# Patient Record
Sex: MALE | Race: WHITE | NOT HISPANIC OR LATINO | Employment: OTHER | URBAN - METROPOLITAN AREA
[De-identification: names, ages, dates, MRNs, and addresses within clinical notes are randomized per-mention and may not be internally consistent; named-entity substitution may affect disease eponyms.]

---

## 2017-01-13 ENCOUNTER — TRANSCRIBE ORDERS (OUTPATIENT)
Dept: LAB | Facility: OTHER | Age: 72
End: 2017-01-13

## 2017-01-13 ENCOUNTER — APPOINTMENT (OUTPATIENT)
Dept: LAB | Facility: OTHER | Age: 72
End: 2017-01-13
Payer: MEDICARE

## 2017-01-13 DIAGNOSIS — E78.00 PURE HYPERCHOLESTEROLEMIA: ICD-10-CM

## 2017-01-13 DIAGNOSIS — E11.29 TYPE 2 DIABETES MELLITUS WITH OTHER DIABETIC KIDNEY COMPLICATION (HCC): ICD-10-CM

## 2017-01-13 DIAGNOSIS — N18.30 CHRONIC KIDNEY DISEASE, STAGE III (MODERATE) (HCC): ICD-10-CM

## 2017-01-13 LAB
ALBUMIN SERPL BCP-MCNC: 4.3 G/DL (ref 3.5–5)
ALP SERPL-CCNC: 71 U/L (ref 46–116)
ALT SERPL W P-5'-P-CCNC: 26 U/L (ref 12–78)
ANION GAP SERPL CALCULATED.3IONS-SCNC: 7 MMOL/L (ref 4–13)
AST SERPL W P-5'-P-CCNC: 11 U/L (ref 5–45)
BILIRUB SERPL-MCNC: 0.56 MG/DL (ref 0.2–1)
BUN SERPL-MCNC: 33 MG/DL (ref 5–25)
CALCIUM SERPL-MCNC: 9.3 MG/DL (ref 8.3–10.1)
CHLORIDE SERPL-SCNC: 101 MMOL/L (ref 100–108)
CHOLEST SERPL-MCNC: 170 MG/DL (ref 50–200)
CO2 SERPL-SCNC: 28 MMOL/L (ref 21–32)
CREAT SERPL-MCNC: 1.83 MG/DL (ref 0.6–1.3)
EST. AVERAGE GLUCOSE BLD GHB EST-MCNC: 157 MG/DL
GFR SERPL CREATININE-BSD FRML MDRD: 36.6 ML/MIN/1.73SQ M
GLUCOSE SERPL-MCNC: 82 MG/DL (ref 65–140)
HBA1C MFR BLD: 7.1 % (ref 4.2–6.3)
HDLC SERPL-MCNC: 31 MG/DL (ref 40–60)
LDLC SERPL CALC-MCNC: 113 MG/DL (ref 0–100)
POTASSIUM SERPL-SCNC: 4.4 MMOL/L (ref 3.5–5.3)
PROT SERPL-MCNC: 7.8 G/DL (ref 6.4–8.2)
SODIUM SERPL-SCNC: 136 MMOL/L (ref 136–145)
TRIGL SERPL-MCNC: 132 MG/DL

## 2017-01-13 PROCEDURE — 83036 HEMOGLOBIN GLYCOSYLATED A1C: CPT

## 2017-01-13 PROCEDURE — 80053 COMPREHEN METABOLIC PANEL: CPT

## 2017-01-13 PROCEDURE — 80061 LIPID PANEL: CPT

## 2017-01-13 PROCEDURE — 36415 COLL VENOUS BLD VENIPUNCTURE: CPT

## 2017-01-15 ENCOUNTER — GENERIC CONVERSION - ENCOUNTER (OUTPATIENT)
Dept: OTHER | Facility: OTHER | Age: 72
End: 2017-01-15

## 2017-01-19 ENCOUNTER — ALLSCRIPTS OFFICE VISIT (OUTPATIENT)
Dept: OTHER | Facility: OTHER | Age: 72
End: 2017-01-19

## 2017-06-30 ENCOUNTER — TRANSCRIBE ORDERS (OUTPATIENT)
Dept: LAB | Facility: OTHER | Age: 72
End: 2017-06-30

## 2017-06-30 ENCOUNTER — APPOINTMENT (OUTPATIENT)
Dept: LAB | Facility: OTHER | Age: 72
End: 2017-06-30
Payer: MEDICARE

## 2017-06-30 DIAGNOSIS — N18.30 CHRONIC KIDNEY DISEASE, STAGE III (MODERATE) (HCC): ICD-10-CM

## 2017-06-30 LAB
ANION GAP SERPL CALCULATED.3IONS-SCNC: 10 MMOL/L (ref 4–13)
BILIRUB UR QL STRIP: NEGATIVE
BUN SERPL-MCNC: 30 MG/DL (ref 5–25)
CALCIUM SERPL-MCNC: 9 MG/DL (ref 8.3–10.1)
CHLORIDE SERPL-SCNC: 107 MMOL/L (ref 100–108)
CLARITY UR: CLEAR
CO2 SERPL-SCNC: 22 MMOL/L (ref 21–32)
COLOR UR: YELLOW
CREAT SERPL-MCNC: 1.83 MG/DL (ref 0.6–1.3)
CREAT UR-MCNC: 79.3 MG/DL
ERYTHROCYTE [DISTWIDTH] IN BLOOD BY AUTOMATED COUNT: 13.2 % (ref 11.6–15.1)
GFR SERPL CREATININE-BSD FRML MDRD: 36.6 ML/MIN/1.73SQ M
GLUCOSE P FAST SERPL-MCNC: 99 MG/DL (ref 65–99)
GLUCOSE UR STRIP-MCNC: NEGATIVE MG/DL
HCT VFR BLD AUTO: 41.5 % (ref 36.5–49.3)
HGB BLD-MCNC: 14.3 G/DL (ref 12–17)
HGB UR QL STRIP.AUTO: NEGATIVE
KETONES UR STRIP-MCNC: NEGATIVE MG/DL
LEUKOCYTE ESTERASE UR QL STRIP: NEGATIVE
MCH RBC QN AUTO: 30.9 PG (ref 26.8–34.3)
MCHC RBC AUTO-ENTMCNC: 34.5 G/DL (ref 31.4–37.4)
MCV RBC AUTO: 90 FL (ref 82–98)
NITRITE UR QL STRIP: NEGATIVE
PH UR STRIP.AUTO: 6 [PH] (ref 4.5–8)
PHOSPHATE SERPL-MCNC: 2.8 MG/DL (ref 2.3–4.1)
PLATELET # BLD AUTO: 264 THOUSANDS/UL (ref 149–390)
PMV BLD AUTO: 11.5 FL (ref 8.9–12.7)
POTASSIUM SERPL-SCNC: 4.3 MMOL/L (ref 3.5–5.3)
PROT UR STRIP-MCNC: NEGATIVE MG/DL
PROT UR-MCNC: 7 MG/DL
PROT/CREAT UR: 0.09 MG/G{CREAT} (ref 0–0.1)
PTH-INTACT SERPL-MCNC: 86.9 PG/ML (ref 14–72)
RBC # BLD AUTO: 4.63 MILLION/UL (ref 3.88–5.62)
SODIUM SERPL-SCNC: 139 MMOL/L (ref 136–145)
SP GR UR STRIP.AUTO: 1.01 (ref 1–1.03)
UROBILINOGEN UR QL STRIP.AUTO: 0.2 E.U./DL
WBC # BLD AUTO: 7 THOUSAND/UL (ref 4.31–10.16)

## 2017-06-30 PROCEDURE — 81003 URINALYSIS AUTO W/O SCOPE: CPT

## 2017-06-30 PROCEDURE — 82570 ASSAY OF URINE CREATININE: CPT

## 2017-06-30 PROCEDURE — 80048 BASIC METABOLIC PNL TOTAL CA: CPT

## 2017-06-30 PROCEDURE — 36415 COLL VENOUS BLD VENIPUNCTURE: CPT

## 2017-06-30 PROCEDURE — 84156 ASSAY OF PROTEIN URINE: CPT

## 2017-06-30 PROCEDURE — 84100 ASSAY OF PHOSPHORUS: CPT

## 2017-06-30 PROCEDURE — 83970 ASSAY OF PARATHORMONE: CPT

## 2017-06-30 PROCEDURE — 85027 COMPLETE CBC AUTOMATED: CPT

## 2017-07-01 DIAGNOSIS — E11.9 TYPE 2 DIABETES MELLITUS WITHOUT COMPLICATIONS (HCC): ICD-10-CM

## 2017-07-01 DIAGNOSIS — Z12.5 ENCOUNTER FOR SCREENING FOR MALIGNANT NEOPLASM OF PROSTATE: ICD-10-CM

## 2017-07-01 DIAGNOSIS — E78.00 PURE HYPERCHOLESTEROLEMIA: ICD-10-CM

## 2017-07-01 DIAGNOSIS — N18.30 CHRONIC KIDNEY DISEASE, STAGE III (MODERATE) (HCC): ICD-10-CM

## 2017-07-01 DIAGNOSIS — E11.29 TYPE 2 DIABETES MELLITUS WITH OTHER DIABETIC KIDNEY COMPLICATION (HCC): ICD-10-CM

## 2017-07-03 ENCOUNTER — ALLSCRIPTS OFFICE VISIT (OUTPATIENT)
Dept: OTHER | Facility: OTHER | Age: 72
End: 2017-07-03

## 2017-07-15 ENCOUNTER — APPOINTMENT (OUTPATIENT)
Dept: LAB | Facility: OTHER | Age: 72
End: 2017-07-15
Payer: MEDICARE

## 2017-07-15 DIAGNOSIS — Z12.5 ENCOUNTER FOR SCREENING FOR MALIGNANT NEOPLASM OF PROSTATE: ICD-10-CM

## 2017-07-15 DIAGNOSIS — N18.30 CHRONIC KIDNEY DISEASE, STAGE III (MODERATE) (HCC): ICD-10-CM

## 2017-07-15 DIAGNOSIS — E11.9 TYPE 2 DIABETES MELLITUS WITHOUT COMPLICATIONS (HCC): ICD-10-CM

## 2017-07-15 DIAGNOSIS — E78.00 PURE HYPERCHOLESTEROLEMIA: ICD-10-CM

## 2017-07-15 LAB
ALBUMIN SERPL BCP-MCNC: 4.2 G/DL (ref 3.5–5)
ALP SERPL-CCNC: 65 U/L (ref 46–116)
ALT SERPL W P-5'-P-CCNC: 28 U/L (ref 12–78)
ANION GAP SERPL CALCULATED.3IONS-SCNC: 6 MMOL/L (ref 4–13)
AST SERPL W P-5'-P-CCNC: 16 U/L (ref 5–45)
BILIRUB SERPL-MCNC: 0.69 MG/DL (ref 0.2–1)
BUN SERPL-MCNC: 29 MG/DL (ref 5–25)
CALCIUM SERPL-MCNC: 8.6 MG/DL (ref 8.3–10.1)
CHLORIDE SERPL-SCNC: 103 MMOL/L (ref 100–108)
CHOLEST SERPL-MCNC: 170 MG/DL (ref 50–200)
CO2 SERPL-SCNC: 26 MMOL/L (ref 21–32)
CREAT SERPL-MCNC: 1.72 MG/DL (ref 0.6–1.3)
EST. AVERAGE GLUCOSE BLD GHB EST-MCNC: 169 MG/DL
GFR SERPL CREATININE-BSD FRML MDRD: 39.3 ML/MIN/1.73SQ M
GLUCOSE P FAST SERPL-MCNC: 161 MG/DL (ref 65–99)
HBA1C MFR BLD: 7.5 % (ref 4.2–6.3)
HDLC SERPL-MCNC: 35 MG/DL (ref 40–60)
LDLC SERPL CALC-MCNC: 113 MG/DL (ref 0–100)
POTASSIUM SERPL-SCNC: 4.7 MMOL/L (ref 3.5–5.3)
PROT SERPL-MCNC: 7.3 G/DL (ref 6.4–8.2)
PSA SERPL-MCNC: 1.5 NG/ML (ref 0–4)
SODIUM SERPL-SCNC: 135 MMOL/L (ref 136–145)
TRIGL SERPL-MCNC: 110 MG/DL

## 2017-07-15 PROCEDURE — 83036 HEMOGLOBIN GLYCOSYLATED A1C: CPT

## 2017-07-15 PROCEDURE — 80053 COMPREHEN METABOLIC PANEL: CPT

## 2017-07-15 PROCEDURE — 36415 COLL VENOUS BLD VENIPUNCTURE: CPT

## 2017-07-15 PROCEDURE — G0103 PSA SCREENING: HCPCS

## 2017-07-15 PROCEDURE — 80061 LIPID PANEL: CPT

## 2017-07-16 ENCOUNTER — GENERIC CONVERSION - ENCOUNTER (OUTPATIENT)
Dept: OTHER | Facility: OTHER | Age: 72
End: 2017-07-16

## 2017-07-19 ENCOUNTER — ALLSCRIPTS OFFICE VISIT (OUTPATIENT)
Dept: OTHER | Facility: OTHER | Age: 72
End: 2017-07-19

## 2017-07-21 ENCOUNTER — GENERIC CONVERSION - ENCOUNTER (OUTPATIENT)
Dept: OTHER | Facility: OTHER | Age: 72
End: 2017-07-21

## 2017-08-10 ENCOUNTER — GENERIC CONVERSION - ENCOUNTER (OUTPATIENT)
Dept: OTHER | Facility: OTHER | Age: 72
End: 2017-08-10

## 2018-01-09 ENCOUNTER — TRANSCRIBE ORDERS (OUTPATIENT)
Dept: LAB | Facility: OTHER | Age: 73
End: 2018-01-09

## 2018-01-09 ENCOUNTER — LAB (OUTPATIENT)
Dept: LAB | Facility: OTHER | Age: 73
End: 2018-01-09
Payer: MEDICARE

## 2018-01-09 DIAGNOSIS — E11.29 TYPE 2 DIABETES MELLITUS WITH OTHER DIABETIC KIDNEY COMPLICATION (HCC): ICD-10-CM

## 2018-01-09 DIAGNOSIS — E78.00 PURE HYPERCHOLESTEROLEMIA: ICD-10-CM

## 2018-01-09 DIAGNOSIS — N18.30 CHRONIC KIDNEY DISEASE, STAGE III (MODERATE) (HCC): ICD-10-CM

## 2018-01-09 LAB
ALBUMIN SERPL BCP-MCNC: 4.4 G/DL (ref 3.5–5)
ALP SERPL-CCNC: 63 U/L (ref 46–116)
ALT SERPL W P-5'-P-CCNC: 26 U/L (ref 12–78)
ANION GAP SERPL CALCULATED.3IONS-SCNC: 9 MMOL/L (ref 4–13)
AST SERPL W P-5'-P-CCNC: 16 U/L (ref 5–45)
BILIRUB SERPL-MCNC: 0.75 MG/DL (ref 0.2–1)
BUN SERPL-MCNC: 30 MG/DL (ref 5–25)
CALCIUM SERPL-MCNC: 9.1 MG/DL (ref 8.3–10.1)
CHLORIDE SERPL-SCNC: 104 MMOL/L (ref 100–108)
CHOLEST SERPL-MCNC: 192 MG/DL (ref 50–200)
CO2 SERPL-SCNC: 24 MMOL/L (ref 21–32)
CREAT SERPL-MCNC: 1.85 MG/DL (ref 0.6–1.3)
CREAT UR-MCNC: 212 MG/DL
EST. AVERAGE GLUCOSE BLD GHB EST-MCNC: 166 MG/DL
GFR SERPL CREATININE-BSD FRML MDRD: 35 ML/MIN/1.73SQ M
GLUCOSE P FAST SERPL-MCNC: 155 MG/DL (ref 65–99)
HBA1C MFR BLD: 7.4 % (ref 4.2–6.3)
HDLC SERPL-MCNC: 40 MG/DL (ref 40–60)
LDLC SERPL CALC-MCNC: 126 MG/DL (ref 0–100)
MICROALBUMIN UR-MCNC: 114 MG/L (ref 0–20)
MICROALBUMIN/CREAT 24H UR: 54 MG/G CREATININE (ref 0–30)
POTASSIUM SERPL-SCNC: 5.2 MMOL/L (ref 3.5–5.3)
PROT SERPL-MCNC: 8.1 G/DL (ref 6.4–8.2)
SODIUM SERPL-SCNC: 137 MMOL/L (ref 136–145)
TRIGL SERPL-MCNC: 131 MG/DL

## 2018-01-09 PROCEDURE — 82570 ASSAY OF URINE CREATININE: CPT

## 2018-01-09 PROCEDURE — 80061 LIPID PANEL: CPT

## 2018-01-09 PROCEDURE — 36415 COLL VENOUS BLD VENIPUNCTURE: CPT

## 2018-01-09 PROCEDURE — 80053 COMPREHEN METABOLIC PANEL: CPT

## 2018-01-09 PROCEDURE — 82043 UR ALBUMIN QUANTITATIVE: CPT

## 2018-01-09 PROCEDURE — 83036 HEMOGLOBIN GLYCOSYLATED A1C: CPT

## 2018-01-09 NOTE — RESULT NOTES
Message   A1c went up from 6 9% to 7 4%  needs to really watch diet, exercise and will recheck this again in 3 months  If it conitnues to go up, will need to adjust med     Verified Results  (1) HEMOGLOBIN A1C [id number] 10:11AM Bentley Amador   5 7-6 4% impaired fasting glucose  >=6 5% diagnosis of diabetes    Falsely low levels are seen in conditions linked to short RBC life span-  hemolytic anemia, and splenomegaly  Falsely elevated levels are seen in situations where there is an increased production of RBC- receipt of erythropoietin or blood transfusions  Adopted from ADA-Clinical Practice Recommendations     Test Name Result Flag Reference   HEMOGLOBIN A1C 7 4 % H 4 0-5 6   EST  AVG   GLUCOSE 166 mg/dl

## 2018-01-10 ENCOUNTER — GENERIC CONVERSION - ENCOUNTER (OUTPATIENT)
Dept: OTHER | Facility: OTHER | Age: 73
End: 2018-01-10

## 2018-01-10 NOTE — RESULT NOTES
Verified Results  (1) COMPREHENSIVE METABOLIC PANEL [id number] 23:56UU [employer] Order Number: [id number]     Test Name Result Flag Reference   SODIUM 135 mmol/L L 136-145   POTASSIUM 4 7 mmol/L  3 5-5 3   CHLORIDE 103 mmol/L  100-108   CARBON DIOXIDE 26 mmol/L  21-32   ANION GAP (CALC) 6 mmol/L  4-13   BLOOD UREA NITROGEN 29 mg/dL H 5-25   CREATININE 1 72 mg/dL H 0 60-1 30   Standardized to IDMS reference method   CALCIUM 8 6 mg/dL  8 3-10 1   BILI, TOTAL 0 69 mg/dL  0 20-1 00   ALK PHOSPHATAS 65 U/L     ALT (SGPT) 28 U/L  12-78   AST(SGOT) 16 U/L  5-45   ALBUMIN 4 2 g/dL  3 5-5 0   TOTAL PROTEIN 7 3 g/dL  6 4-8 2   eGFR Non-African American 39 3 ml/min/1 73sq [facility] Disease Education Program recommendations are as follows:  GFR calculation is accurate only with a steady state creatinine  Chronic Kidney disease less than 60 ml/min/1 73 sq  meters  Kidney failure less than 15 ml/min/1 73 sq  meters  GLUCOSE FASTING 161 mg/dL H 65-99     (1) HEMOGLOBIN A1C [id number] 09:44AM Sola De Dios    Order Number: [id number]     Test Name Result Flag Reference   HEMOGLOBIN A1C 7 5 % H 4 2-6 3   EST  AVG  GLUCOSE 169 mg/dl       (1) LIPID PANEL FASTING W DIRECT LDL REFLEX [id number] 09:44AM Brodie [employer] Order Number: [id number]     Test Name Result Flag Reference   CHOLESTEROL 170 mg/dL     LDL CHOLESTEROL CALCULATED 113 mg/dL H 0-100   - Patient Instructions:  This is a fasting blood test  Water, black tea or black coffee only after 9:00pm the night before test   Drink 2 glasses of water the morning of test       Triglyceride:         Normal              <150 mg/dl       Borderline High    150-199 mg/dl       High               200-499 mg/dl       Very High          >499 mg/dl  Cholesterol:         Desirable        <200 mg/dl      Borderline High  200-239 mg/dl      High             >239 mg/dl  HDL Cholesterol:        High    >59 mg/dL      Low <41 mg/dL  LDL Cholesterol:        Optimal          <100 mg/dl        Near Optimal     100-129 mg/dl        Above Optimal          Borderline High   130-159 mg/dl          High              160-189 mg/dl          Very High        >189 mg/dl  LDL CALCULATED:    This screening LDL is a calculated result  It does not have the accuracy of the Direct Measured LDL in the monitoring of patients with hyperlipidemia and/or statin therapy  Direct Measure LDL (YCZ262) must be ordered separately in these patients  TRIGLYCERIDES 110 mg/dL  <=150   Specimen collection should occur prior to N-Acetylcysteine or Metamizole administration due to the potential for falsely depressed results  HDL,DIRECT 35 mg/dL L 40-60   Specimen collection should occur prior to Metamizole administration due to the potential for falsely depressed results  (1) PSA (SCREEN) (Dx V76 44 Screen for Prostate Cancer) [id number] 09:44AM Abel Riveraman Order Number: [id number]     Test Name Result Flag Reference   PROSTATE SPECIFIC ANTIGEN 1 5 ng/mL  0 0-4 0   American Urological Association Guidelines define biochemical recurrence of prostate cancer as a detectable or rising PSA value post-radical prostatectomy that is greater than or equal to 0 2 ng/mL with a second confirmatory level of greater than or equal to 0 2 ng/mL  - Patient Instructions: This test is non-fasting  Please drink two glasses of water morning of bloodwork

## 2018-01-10 NOTE — RESULT NOTES
Verified Results  (1) COMPREHENSIVE METABOLIC PANEL [id number] 32:98DO Annalisa [employer]ermasoud Order Number: [id number]     Test Name Result Flag Reference   GLUCOSE,RANDM 89 mg/dL     If the patient is fasting, the ADA then defines impaired fasting glucose as > 100 mg/dL and diabetes as > or equal to 123 mg/dL  SODIUM 138 mmol/L  136-145   POTASSIUM 4 5 mmol/L  3 5-5 3   CHLORIDE 105 mmol/L  100-108   CARBON DIOXIDE 27 mmol/L  21-32   ANION GAP (CALC) 6 mmol/L  4-13   BLOOD UREA NITROGEN 31 mg/dL H 5-25   CREATININE 1 86 mg/dL H 0 60-1 30   Standardized to IDMS reference method   CALCIUM 8 5 mg/dL  8 3-10 1   BILI, TOTAL 0 59 mg/dL  0 20-1 00   ALK PHOSPHATAS 63 U/L     ALT (SGPT) 27 U/L  12-78   AST(SGOT) 17 U/L  5-45   ALBUMIN 4 6 g/dL  3 5-5 0   TOTAL PROTEIN 7 4 g/dL  6 4-8 2   eGFR Non-African American 36 0 ml/min/1 73sq m     - Patient Instructions: This is a fasting blood test  Water, black tea or black coffee only after 9:00pm the night before test Drink 2 glasses of water the morning of test   National Kidney Disease Education Program recommendations are as follows:  GFR calculation is accurate only with a steady state creatinine  Chronic Kidney disease less than 60 ml/min/1 73 sq  meters  Kidney failure less than 15 ml/min/1 73 sq  meters  (1) HEMOGLOBIN A1C 07Oct2016 09:04AM Annalisa Parkermasoud Order Number: [id number]     Test Name Result Flag Reference   HEMOGLOBIN A1C 7 3 % H 4 2-6 3   EST  AVG  GLUCOSE 163 mg/dl       (1) LIPID PANEL FASTING W DIRECT LDL REFLEX 07Oct2016 09:04AM Annalisa Parkermasoud Order Number: [id number]     Test Name Result Flag Reference   CHOLESTEROL 168 mg/dL     LDL CHOLESTEROL CALCULATED 108 mg/dL H 0-100   - Patient Instructions: This is a fasting blood test  Water, black tea or black coffee only after 9:00pm the night before test   Drink 2 glasses of water the morning of test     - Patient Instructions:  This is a fasting blood test  Water, black tea or black coffee only after 9:00pm the night before test Drink 2 glasses of water the morning of test   Triglyceride:         Normal              <150 mg/dl       Borderline High    150-199 mg/dl       High               200-499 mg/dl       Very High          >499 mg/dl  Cholesterol:         Desirable        <200 mg/dl      Borderline High  200-239 mg/dl      High             >239 mg/dl  HDL Cholesterol:        High    >59 mg/dL      Low     <41 mg/dL  LDL Cholesterol:        Optimal          <100 mg/dl        Near Optimal     100-129 mg/dl        Above Optimal          Borderline High   130-159 mg/dl          High              160-189 mg/dl          Very High        >189 mg/dl  LDL CALCULATED:    This screening LDL is a calculated result  It does not have the accuracy of the Direct Measured LDL in the monitoring of patients with hyperlipidemia and/or statin therapy  Direct Measure LDL (IDB285) must be ordered separately in these patients  TRIGLYCERIDES 129 mg/dL  <=150   Specimen collection should occur prior to N-Acetylcysteine or Metamizole administration due to the potential for falsely depressed results  HDL,DIRECT 34 mg/dL L 40-60   Specimen collection should occur prior to Metamizole administration due to the potential for falsely depressed results       (1) MICROALBUMIN CREATININE RATIO, RANDOM URINE 07Oct2016 09:04AM [employer] Order Number: [id number]     Test Name Result Flag Reference   MICROALBUMIN/ CREAT R 17 mg/g creatinine  0-30   MICROALBUMIN,URINE 23 6 mg/L H 0 0-20 0   CREATININE URINE 140 0 mg/dL       (1) OCCULT BLOOD, FECAL IMMUNOCHEMICAL TEST 07Oct2016 09:04AM [employer] Order Number: [id number]     Test Name Result Flag Reference   OCCULT BLD, FECAL IMMUNOLOGICAL Negative  Negative   Performed by Fecal Immunochemical Test

## 2018-01-11 ENCOUNTER — HOSPITAL ENCOUNTER (EMERGENCY)
Facility: HOSPITAL | Age: 73
Discharge: HOME | End: 2018-01-11
Attending: EMERGENCY MEDICINE | Admitting: EMERGENCY MEDICINE
Payer: MEDICARE

## 2018-01-11 ENCOUNTER — APPOINTMENT (EMERGENCY)
Dept: RADIOLOGY | Facility: HOSPITAL | Age: 73
End: 2018-01-11
Payer: MEDICARE

## 2018-01-11 VITALS
HEART RATE: 76 BPM | HEIGHT: 70 IN | BODY MASS INDEX: 27.92 KG/M2 | RESPIRATION RATE: 18 BRPM | TEMPERATURE: 98 F | DIASTOLIC BLOOD PRESSURE: 79 MMHG | SYSTOLIC BLOOD PRESSURE: 182 MMHG | WEIGHT: 195 LBS | OXYGEN SATURATION: 96 %
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
## 2018-01-11 DIAGNOSIS — S81.859A: ICD-10-CM

## 2018-01-11 DIAGNOSIS — S61.452A DOG BITE OF LEFT HAND, INITIAL ENCOUNTER: Primary | ICD-10-CM

## 2018-01-11 PROCEDURE — 90715 TDAP VACCINE 7 YRS/> IM: CPT | Performed by: EMERGENCY MEDICINE

## 2018-01-11 PROCEDURE — 73110 X-RAY EXAM OF WRIST: CPT

## 2018-01-11 PROCEDURE — 90471 IMMUNIZATION ADMIN: CPT

## 2018-01-11 PROCEDURE — 99283 EMERGENCY DEPT VISIT LOW MDM: CPT

## 2018-01-11 RX ORDER — BACITRACIN, NEOMYCIN, POLYMYXIN B 400; 3.5; 5 [USP'U]/G; MG/G; [USP'U]/G
1 OINTMENT TOPICAL ONCE
Status: COMPLETED | OUTPATIENT
Start: 2018-01-11 | End: 2018-01-11

## 2018-01-11 RX ORDER — ACETAMINOPHEN 325 MG/1
975 TABLET ORAL ONCE
Status: COMPLETED | OUTPATIENT
Start: 2018-01-11 | End: 2018-01-11

## 2018-01-11 RX ORDER — AMOXICILLIN AND CLAVULANATE POTASSIUM 875; 125 MG/1; MG/1
1 TABLET, FILM COATED ORAL EVERY 12 HOURS SCHEDULED
Qty: 20 TABLET | Refills: 0 | Status: SHIPPED | OUTPATIENT
Start: 2018-01-11 | End: 2018-01-21

## 2018-01-11 RX ORDER — AMOXICILLIN AND CLAVULANATE POTASSIUM 875; 125 MG/1; MG/1
1 TABLET, FILM COATED ORAL ONCE
Status: COMPLETED | OUTPATIENT
Start: 2018-01-11 | End: 2018-01-11

## 2018-01-11 RX ORDER — LIDOCAINE HYDROCHLORIDE 10 MG/ML
10 INJECTION, SOLUTION EPIDURAL; INFILTRATION; INTRACAUDAL; PERINEURAL ONCE
Status: COMPLETED | OUTPATIENT
Start: 2018-01-11 | End: 2018-01-11

## 2018-01-11 RX ADMIN — ACETAMINOPHEN 975 MG: 325 TABLET ORAL at 15:26

## 2018-01-11 RX ADMIN — LIDOCAINE HYDROCHLORIDE 10 ML: 10 INJECTION, SOLUTION EPIDURAL; INFILTRATION; INTRACAUDAL; PERINEURAL at 15:26

## 2018-01-11 RX ADMIN — TETANUS TOXOID, REDUCED DIPHTHERIA TOXOID AND ACELLULAR PERTUSSIS VACCINE, ADSORBED 0.5 ML: 5; 2.5; 8; 8; 2.5 SUSPENSION INTRAMUSCULAR at 15:32

## 2018-01-11 RX ADMIN — AMOXICILLIN AND CLAVULANATE POTASSIUM 1 TABLET: 875; 125 TABLET, FILM COATED ORAL at 15:26

## 2018-01-11 RX ADMIN — BACITRACIN ZINC NEOMYCIN SULFATE POLYMYXIN B SULFATE 1 LARGE APPLICATION: 400; 3.5; 5 OINTMENT TOPICAL at 15:38

## 2018-01-11 NOTE — DISCHARGE INSTRUCTIONS
Please observe the dog as instructed, please keep the finger splinted as discussed as much as possible continue local wound care as instructed  The stitches are to come out approximately 10-12 days however please follow up with the hand surgeon 1 week for re-evaluation of her wound as instructed for possible tendon injury although this was not visualized/seen at the time of evaluation  Animal Bite   WHAT YOU NEED TO KNOW:   Animal bite injuries range from shallow cuts to deep, life-threatening wounds  An animal can cut or puncture the skin when it bites  Your skin may be torn from your body  Your skin may swell or bruise even if the bite does not break the skin  Animal bites occur more often on the hands, arms, legs, and face  Bites from dogs and cats are the most common injuries  DISCHARGE INSTRUCTIONS:   Return to the emergency department if:   · You have a fever  · Your wound is red, swollen, and draining pus  · You see red streaks on the skin around the wound  · You can no longer move the bitten area  · Your heartbeat and breathing are much faster than usual     · You feel dizzy and confused  Contact your healthcare provider if:   · Your pain does not get better, even after you take pain medicine  · You have nightmares or flashbacks about the animal bite  · You have questions or concerns about your condition or care  Medicines: You may need any of the following:  · Antibiotics  prevent or treat a bacterial infection  · Prescription pain medicine  may be given  Ask how to take this medicine safely  · A tetanus vaccine  may be needed to prevent tetanus  Tetanus is a life-threatening bacterial infection that affects the nerves and muscles  The bacteria can be spread through animal bites  · A rabies vaccine  may be needed to prevent rabies  Rabies is a life-threatening viral infection  The virus can be spread through animal bites  · Take your medicine as directed    Contact your healthcare provider if you think your medicine is not helping or if you have side effects  Tell him of her if you are allergic to any medicine  Keep a list of the medicines, vitamins, and herbs you take  Include the amounts, and when and why you take them  Bring the list or the pill bottles to follow-up visits  Carry your medicine list with you in case of an emergency  Follow up with your healthcare provider in 1 to 2 days: You may need to return to have your stitches removed  Write down your questions so you remember to ask them during your visits  Self-care:   · Apply antibiotic ointment as directed  This helps prevent infection in minor skin wounds  It is available without a doctor's order  · Keep the wound clean and covered  Wash the wound every day with soap and water or germ-killing cleanser  Ask your healthcare provider about the kinds of bandages to use  · Apply ice on your wound  Ice helps decrease swelling and pain  Ice may also help prevent tissue damage  Use an ice pack, or put crushed ice in a plastic bag  Cover it with a towel and place it on your wound for 15 to 20 minutes every hour or as directed  · Elevate the wound area  Raise your wound above the level of your heart as often as you can  This will help decrease swelling and pain  Prop your wound on pillows or blankets to keep it elevated comfortably  Prevent another animal bite:   · Learn to recognize the signs of a scared or angry pet  Avoid quick, sudden movements  · Do not step between animals that are fighting  · Do not leave a pet alone with a young child  · Do not disturb an animal while it eats, sleeps, or cares for its young  · Do not approach an animal you do not know, especially one that is tied up or caged  · Stay away from animals that seem sick or act strangely  · Do not feed or capture wild animals    © 2017 [employer] Information is for End User's use only and may not be sold, redistributed or otherwise used for commercial purposes  All illustrations and images included in CareNotes® are the copyrighted property of [employer]  or Andres Mendoza  The above information is an  only  It is not intended as medical advice for individual conditions or treatments  Talk to your doctor, nurse or pharmacist before following any medical regimen to see if it is safe and effective for you

## 2018-01-11 NOTE — ED PROVIDER NOTES
History  Chief Complaint   Patient presents with    Dog Bite     Pt c/o getting bit by his jerry garcia on left forearm and hand  Pt has been bit multiple times and on legs but no blood noted  Pt not UTD on tetanus  78-year-old male with a history of diabetes presenting with a dog bite to his left hand  Prior to arrival the patient's household dog which is a Sasha Hung terrier started attacking him causing a single bite to his right thumb, multiple abrasions to his left hand and wrist with significant laceration to the dorsum of his left wrist as well as multiple small bites to his bilateral lower extremities  Patient is here with significant other for evaluation  The dog's very protective and of his significant other in territorial, he has been attack before and they are now considering getting rid of the dog  For the time being the dog is vaccinated able to be observed  Patient has significant laceration of the dorsum of his left hand/wrist again small bite to the right thumb multiple small bites to the legs  No other history of poor wound healing easy bleeding or bruising he has no weakness paresthesias or anesthesia he has no other complaints or concerns otherwise denies a complete review of systems as noted            None       Past Medical History:   Diagnosis Date    Diabetes mellitus ([facility] [address] )     Hypertension        Past Surgical History:   Procedure Laterality Date    ABDOMINAL SURGERY      HERNIA REPAIR         History reviewed  No pertinent family history  I have reviewed and agree with the history as documented  Social History   Substance Use Topics    Smoking status: Never Smoker    Smokeless tobacco: Never Used    Alcohol use No        Review of Systems   Constitutional: Negative for chills and fever  Gastrointestinal: Negative for nausea and vomiting  Genitourinary: Negative for dysuria, frequency and urgency     Musculoskeletal: Negative for arthralgias, back pain, joint swelling and myalgias  Left wrist laceration   Skin: Positive for wound  Negative for color change and rash  Allergic/Immunologic: Negative for immunocompromised state  Neurological: Negative for weakness and numbness  Hematological: Negative for adenopathy  Does not bruise/bleed easily  Psychiatric/Behavioral: Negative for agitation and behavioral problems  All other systems reviewed and are negative  Physical Exam  ED Triage Vitals [01/11/18 1422]   Temperature Pulse Respirations Blood Pressure SpO2   98 °F (36 7 °C) 76 18 (!) 182/79 96 %      Temp Source Heart Rate Source Patient Position - Orthostatic VS BP Location FiO2 (%)   Oral Monitor Sitting Left arm --      Pain Score       4           Orthostatic Vital Signs  Vitals:    01/11/18 1422   BP: (!) 182/79   Pulse: 76   Patient Position - Orthostatic VS: Sitting       Physical Exam   Constitutional: He is oriented to person, place, and time  He appears well-developed and well-nourished  No distress  HENT:   Head: Normocephalic and atraumatic  Eyes: EOM are normal  Pupils are equal, round, and reactive to light  Neck: Normal range of motion  Neck supple  No tracheal deviation present  Cardiovascular: Normal rate, regular rhythm and normal heart sounds  Exam reveals no gallop and no friction rub  No murmur heard  Pulmonary/Chest: Effort normal and breath sounds normal  He has no wheezes  He has no rales     Musculoskeletal:   Patient's pants were removed he has several very small superficial bites abrasions without puncture wounds to his bilateral lower extremities these walk carefully evaluated no apparent foreign body can no puncture wounds, these were cleaned by myself, he had a single bite to the right thumb although this was not penetrating it did not appear to involve the joint distal fingers neurovascularly intact this was copiously irrigated by myself on his left hand he had multiple superficial abrasions as well as onto his left distal forearm these will off copiously irrigated by myself again he had a 4 cm gaping laceration over the dorsum of his left hand over the proximal left 4th and 5th metacarpal violated fascia distal fingers are neurovascularly intact with 5/5 flexion and extension at the DI P PIP and MCP joints cap refill is brisk in sensation is intact   Neurological: He is alert and oriented to person, place, and time  No cranial nerve deficit  He exhibits normal muscle tone  Coordination normal    Skin: Skin is warm and dry  No rash noted  Psychiatric: He has a normal mood and affect  His behavior is normal    Nursing note and vitals reviewed  ED Medications  Medications   lidocaine (PF) (XYLOCAINE-MPF) 1 % injection 10 mL (10 mL Infiltration Given by Other 1/11/18 1526)   tetanus-diphtheria-acellular pertussis (BOOSTRIX) IM injection 0 5 mL (0 5 mL Intramuscular Given 1/11/18 1532)   amoxicillin-clavulanate (AUGMENTIN) 875-125 mg per tablet 1 tablet (1 tablet Oral Given 1/11/18 1526)   acetaminophen (TYLENOL) tablet 975 mg (975 mg Oral Given 1/11/18 1526)   neomycin-bacitracin-polymyxin b (NEOSPORIN) ointment 1 large application (1 large application Topical Given 1/11/18 1538)       Diagnostic Studies  Results Reviewed     None                 XR wrist 3+ views LEFT   Final Result by Neysa Cushing, MD (01/11 1524)      No acute displaced fracture seen  Soft tissue swelling noted at the dorsum of the wrist         Workstation performed: [id number]                    Procedures  Lac Repair  Date/Time: 1/11/2018 4:00 PM  Performed by: Wing Lombardo by: Halle Rogel   Consent: Verbal consent obtained    Risks and benefits: risks, benefits and alternatives were discussed  Consent given by: patient  Patient understanding: patient states understanding of the procedure being performed  Relevant documents: relevant documents present and verified  Test results: test results available and properly labeled  Imaging studies: imaging studies available  Required items: required blood products, implants, devices, and special equipment available  Patient identity confirmed: verbally with patient  Time out: Immediately prior to procedure a "time out" was called to verify the correct patient, procedure, equipment, support staff and site/side marked as required  Body area: upper extremity  Location details: left hand  Laceration length: 4 cm  Foreign bodies: no foreign bodies  Nerve involvement: none  Vascular damage: no  Anesthesia: local infiltration    Anesthesia:  Local Anesthetic: lidocaine 1% without epinephrine  Anesthetic total: 4 mL    Sedation:  Patient sedated: no    Wound Dehiscence:  Superficial Wound Dehiscence: simple closure      Procedure Details:  Preparation: Patient was prepped and draped in the usual sterile fashion    Irrigation solution: saline  Irrigation method: syringe  Amount of cleaning: extensive  Debridement: none  Degree of undermining: minimal  Skin closure: 5-0 nylon  Number of sutures: 2  Technique: simple  Approximation: loose  Approximation difficulty: simple  Dressing: antibiotic ointment, gauze roll, non-adhesive packing strip and 4x4 sterile gauze  Patient tolerance: Patient tolerated the procedure well with no immediate complications  Comments: Patient had a 4 cm gaping laceration from the dog biting and tearing across the dorsum of his left hand over the proximal 4th and 5th metacarpal, this did violate the fascia and is full thickness although unable to completely visualize extensor tendons of the 4th and 5th metacarpal through full range of motion, he does have 5/5 strength with extension at the DI P PIP and MCP joints of the 4th and 5th finger, this is not involve the 3rd finger, this was extensively irrigated explored to the base, again in no visible tendon injury though linical concern for possible injury given approximated visual tendons, this wound was extensively irrigated with pressure wash using normal saline, hemostasis achieved, prior to local infiltration of lidocaine is fingers were neurovascularly intact cap refill is brisk in again motor was intact globally, after discussing risks benefits alternatives the patient is an advanced age with diabetes, he had a gaping wound over the dorsum of his hand, 2 simple interrupted 5-0 Ethilon sutures were placed to loosely approximate the skin margins to aid in healing, additional wounds were cleaned with normal saline and antibiotic ointment dressed with non adhering dressing, his fingers were splinted in extension neurovascularly intact after splint application, he was given wound care supplies prophylactic antibiotics follow-up Hand surgery for re-evaluation, close return instructions             Phone Contacts  ED Phone Contact    ED Course  ED Course                                MDM  Number of Diagnoses or Management Options  Dog bite of left hand, initial encounter:   Dog bite of lower leg:   Diagnosis management comments: 76-year-old male with diabetes presenting after being bitten by his domesticated dog, multiple bites to his legs, right hand and left arm and wrist, the wound on his left wrist will specifically require wound exploration irrigation possible better approximation, will update tetanus, prophylactic antibiotics, will get x-ray of the left wrist, the patient's animal can be observed for rabies, reporting paperwork will be completed, likely discharge with prophylactic antibiotics and close return instructions    CritCare Time    Disposition  Final diagnoses:   Dog bite of left hand, initial encounter   Dog bite of lower leg     Time reflects when diagnosis was documented in both MDM as applicable and the Disposition within this note     Time User Action Codes Description Comment    1/11/2018  4:23 PM Harjeet Dominguez Add [[id number],  Rolanda Alicea  0XXA] Dog bite of left hand, initial encounter     1/11/2018  4:24 PM Figueroa Yanez Add [[id number],  W54  0XXA] Dog bite of lower leg       ED Disposition     ED Disposition Condition Comment    Discharge  Bree Barrios discharge to home/self care  Condition at discharge: Good        Follow-up Information     Follow up With Specialties Details Why Contact Info Additional Information    [address] [facility] Emergency Medicine  If symptoms worsen [address] [facility] [address] ED, [address] [place], [place], [address] [address], [place] Orthopedic Surgery In 1 week For wound re-check [address] [place] [address] [address] [address]  [phone]           Discharge Medication List as of 1/11/2018  4:26 PM      START taking these medications    Details   amoxicillin-clavulanate (AUGMENTIN) 875-125 mg per tablet Take 1 tablet by mouth every 12 (twelve) hours for 10 days, Starting Thu 1/11/2018, Until Sun 1/21/2018, Print           No discharge procedures on file      ED Provider  Electronically Signed by           Dorothy Glass DO  01/11/18 2128

## 2018-01-11 NOTE — RESULT NOTES
Verified Results  (1) COMPREHENSIVE METABOLIC PANEL [id number] 26:53XE Manjinder Am[facility] Kidney Disease Education Program recommendations are as follows:  GFR calculation is accurate only with a steady state creatinine  Chronic Kidney disease less than 60 ml/min/1 73 sq  meters  Kidney failure less than 15 ml/min/1 73 sq  meters  Test Name Result Flag Reference   GLUCOSE,RANDM 119 mg/dL     SODIUM 135 mmol/L L 136-145   POTASSIUM 4 6 mmol/L  3 5-5 3   CHLORIDE 103 mmol/L  100-108   CARBON DIOXIDE 26 mmol/L  21-32   ANION GAP (CALC) 6 mmol/L  4-13   BLOOD UREA NITROGEN 37 mg/dL H 5-25   CREATININE 1 84 mg/dL H 0 60-1 30   CALCIUM 8 1 mg/dL L 8 3-10 1   BILI, TOTAL 0 68 mg/dL  0 20-1 00   ALK PHOSPHATAS 61 U/L     ALT (SGPT) 26 U/L  12-78   AST(SGOT) 11 U/L  5-45   ALBUMIN 4 3 g/dL  3 5-5 0   TOTAL PROTEIN 7 2 g/dL  6 4-8 2   eGFR Non-African American 36 4 ml/min/1 73sq m       (1) LIPID PANEL FASTING W DIRECT LDL REFLEX [id number] 10:11AM Macrina Colby   Triglyceride:         Normal              <150 mg/dl       Borderline High    150-199 mg/dl       High               200-499 mg/dl       Very High          >499 mg/dl  Cholesterol:         Desirable        <200 mg/dl      Borderline High  200-239 mg/dl      High             >239 mg/dl  HDL Cholesterol:        High    >59 mg/dL      Low     <41 mg/dL  LDL Cholesterol:        Optimal          <100 mg/dl         Near Optimal     100-129 mg/dl        Above Optimal          Borderline High   130-159 mg/dl          High              160-189 mg/dl          Very High        >189 mg/dl  LDL CALCULATED:    This screening LDL is a calculated result  It does not have the accuracy of the Direct Measured LDL in the monitoring of patients with hyperlipidemia and/or statin therapy  Direct Measure LDL (DOT804) must be ordered separately in these patients       Test Name Result Flag Reference   CHOLESTEROL 172 mg/dL     LDL CHOLESTEROL CALCULATED 115 mg/dL H 0-100   TRIGLYCERIDES 122 mg/dL  <=150   Specimen collection should occur prior to N-Acetylcysteine or Metamizole administration due to the potential for falsely depressed results     HDL,DIRECT 33 mg/dL L 40-60     (1) PSA (SCREEN) (Dx V76 44 Screen for Prostate Cancer) [id number] 10:11AM Deshawn Basilio     Test Name Result Flag Reference   PROSTATE SPECIFIC ANTIGEN 1 3 ng/mL  0 0-4 0

## 2018-01-13 VITALS
HEART RATE: 80 BPM | DIASTOLIC BLOOD PRESSURE: 70 MMHG | HEIGHT: 70 IN | BODY MASS INDEX: 27.49 KG/M2 | TEMPERATURE: 97.6 F | SYSTOLIC BLOOD PRESSURE: 120 MMHG | WEIGHT: 192 LBS | RESPIRATION RATE: 16 BRPM

## 2018-01-13 VITALS
HEART RATE: 82 BPM | TEMPERATURE: 97.6 F | SYSTOLIC BLOOD PRESSURE: 136 MMHG | BODY MASS INDEX: 27.66 KG/M2 | HEIGHT: 70 IN | OXYGEN SATURATION: 96 % | DIASTOLIC BLOOD PRESSURE: 74 MMHG | WEIGHT: 193.2 LBS

## 2018-01-13 VITALS
TEMPERATURE: 99.3 F | HEART RATE: 115 BPM | DIASTOLIC BLOOD PRESSURE: 88 MMHG | OXYGEN SATURATION: 97 % | WEIGHT: 195.38 LBS | HEIGHT: 70 IN | BODY MASS INDEX: 27.97 KG/M2 | SYSTOLIC BLOOD PRESSURE: 136 MMHG

## 2018-01-16 ENCOUNTER — HOSPITAL ENCOUNTER (EMERGENCY)
Facility: HOSPITAL | Age: 73
Discharge: HOME | End: 2018-01-16
Attending: EMERGENCY MEDICINE | Admitting: EMERGENCY MEDICINE
Payer: MEDICARE

## 2018-01-16 ENCOUNTER — APPOINTMENT (EMERGENCY)
Dept: CT IMAGING | Facility: HOSPITAL | Age: 73
End: 2018-01-16
Payer: MEDICARE

## 2018-01-16 ENCOUNTER — APPOINTMENT (EMERGENCY)
Dept: RADIOLOGY | Facility: HOSPITAL | Age: 73
End: 2018-01-16
Payer: MEDICARE

## 2018-01-16 ENCOUNTER — GENERIC CONVERSION - ENCOUNTER (OUTPATIENT)
Dept: OTHER | Facility: OTHER | Age: 73
End: 2018-01-16

## 2018-01-16 VITALS
HEIGHT: 70 IN | BODY MASS INDEX: 28.49 KG/M2 | OXYGEN SATURATION: 97 % | DIASTOLIC BLOOD PRESSURE: 83 MMHG | SYSTOLIC BLOOD PRESSURE: 130 MMHG | WEIGHT: 199 LBS | HEART RATE: 58 BPM | TEMPERATURE: 98.3 F | RESPIRATION RATE: 18 BRPM
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
## 2018-01-16 DIAGNOSIS — S20.219A CHEST WALL CONTUSION: ICD-10-CM

## 2018-01-16 DIAGNOSIS — S69.91XA HAND INJURY, RIGHT, INITIAL ENCOUNTER: ICD-10-CM

## 2018-01-16 LAB
ALBUMIN SERPL BCP-MCNC: 4.2 G/DL (ref 3.5–5)
ALP SERPL-CCNC: 70 U/L (ref 46–116)
ALT SERPL W P-5'-P-CCNC: 26 U/L (ref 12–78)
ANION GAP SERPL CALCULATED.3IONS-SCNC: 10 MMOL/L (ref 4–13)
AST SERPL W P-5'-P-CCNC: 14 U/L (ref 5–45)
BASOPHILS # BLD AUTO: 0.07 THOUSANDS/ΜL (ref 0–0.1)
BASOPHILS NFR BLD AUTO: 1 % (ref 0–1)
BILIRUB SERPL-MCNC: 0.3 MG/DL (ref 0.2–1)
BUN SERPL-MCNC: 33 MG/DL (ref 5–25)
CALCIUM SERPL-MCNC: 8.9 MG/DL (ref 8.3–10.1)
CHLORIDE SERPL-SCNC: 102 MMOL/L (ref 100–108)
CO2 SERPL-SCNC: 28 MMOL/L (ref 21–32)
CREAT SERPL-MCNC: 1.67 MG/DL (ref 0.6–1.3)
EOSINOPHIL # BLD AUTO: 0.22 THOUSAND/ΜL (ref 0–0.61)
EOSINOPHIL NFR BLD AUTO: 3 % (ref 0–6)
ERYTHROCYTE [DISTWIDTH] IN BLOOD BY AUTOMATED COUNT: 12.3 % (ref 11.6–15.1)
GFR SERPL CREATININE-BSD FRML MDRD: 40 ML/MIN/1.73SQ M
GLUCOSE SERPL-MCNC: 138 MG/DL (ref 65–140)
HCT VFR BLD AUTO: 41.2 % (ref 36.5–49.3)
HGB BLD-MCNC: 14.4 G/DL (ref 12–17)
LYMPHOCYTES # BLD AUTO: 1.42 THOUSANDS/ΜL (ref 0.6–4.47)
LYMPHOCYTES NFR BLD AUTO: 18 % (ref 14–44)
MCH RBC QN AUTO: 30.7 PG (ref 26.8–34.3)
MCHC RBC AUTO-ENTMCNC: 35 G/DL (ref 31.4–37.4)
MCV RBC AUTO: 88 FL (ref 82–98)
MONOCYTES # BLD AUTO: 0.63 THOUSAND/ΜL (ref 0.17–1.22)
MONOCYTES NFR BLD AUTO: 8 % (ref 4–12)
NEUTROPHILS # BLD AUTO: 5.66 THOUSANDS/ΜL (ref 1.85–7.62)
NEUTS SEG NFR BLD AUTO: 71 % (ref 43–75)
NRBC BLD AUTO-RTO: 0 /100 WBCS
PLATELET # BLD AUTO: 257 THOUSANDS/UL (ref 149–390)
PMV BLD AUTO: 11.3 FL (ref 8.9–12.7)
POTASSIUM SERPL-SCNC: 4.2 MMOL/L (ref 3.5–5.3)
PROT SERPL-MCNC: 7.4 G/DL (ref 6.4–8.2)
RBC # BLD AUTO: 4.69 MILLION/UL (ref 3.88–5.62)
SODIUM SERPL-SCNC: 140 MMOL/L (ref 136–145)
WBC # BLD AUTO: 8.03 THOUSAND/UL (ref 4.31–10.16)

## 2018-01-16 PROCEDURE — 93005 ELECTROCARDIOGRAM TRACING: CPT

## 2018-01-16 PROCEDURE — 85025 COMPLETE CBC W/AUTO DIFF WBC: CPT | Performed by: EMERGENCY MEDICINE

## 2018-01-16 PROCEDURE — 99284 EMERGENCY DEPT VISIT MOD MDM: CPT

## 2018-01-16 PROCEDURE — 36415 COLL VENOUS BLD VENIPUNCTURE: CPT | Performed by: EMERGENCY MEDICINE

## 2018-01-16 PROCEDURE — 74177 CT ABD & PELVIS W/CONTRAST: CPT

## 2018-01-16 PROCEDURE — 70450 CT HEAD/BRAIN W/O DYE: CPT

## 2018-01-16 PROCEDURE — 72125 CT NECK SPINE W/O DYE: CPT

## 2018-01-16 PROCEDURE — 96360 HYDRATION IV INFUSION INIT: CPT

## 2018-01-16 PROCEDURE — 71260 CT THORAX DX C+: CPT

## 2018-01-16 PROCEDURE — 80053 COMPREHEN METABOLIC PANEL: CPT | Performed by: EMERGENCY MEDICINE

## 2018-01-16 RX ORDER — LISINOPRIL AND HYDROCHLOROTHIAZIDE 25; 20 MG/1; MG/1
TABLET ORAL
COMMUNITY
Start: 2017-10-20 | End: 2018-07-30 | Stop reason: SDUPTHER

## 2018-01-16 RX ORDER — ALBUTEROL SULFATE 90 UG/1
AEROSOL, METERED RESPIRATORY (INHALATION) DAILY PRN
COMMUNITY
Start: 2014-04-08 | End: 2020-03-30 | Stop reason: SDUPTHER

## 2018-01-16 RX ORDER — GLYBURIDE 5 MG/1
TABLET ORAL
COMMUNITY
Start: 2017-05-24 | End: 2018-05-21 | Stop reason: SDUPTHER

## 2018-01-16 RX ADMIN — IOHEXOL 100 ML: 350 INJECTION, SOLUTION INTRAVENOUS at 18:39

## 2018-01-16 RX ADMIN — SODIUM CHLORIDE 1000 ML: 0.9 INJECTION, SOLUTION INTRAVENOUS at 19:00

## 2018-01-16 NOTE — ED NOTES
Patient transported to [address] [place] [address] [address] [facility]  01/16/18 8747

## 2018-01-16 NOTE — ED PROVIDER NOTES
History  Chief Complaint   Patient presents with    Fall     Pt fell about 45 mins ago on ice, landed on his back  Pt states he is now having pain on the right side and trouble breathing  Hurts to take a deep breath      66 y/o male presents today after falling on a patch of ice just PTA  He is c/o pain in his right side, right hand and right elbow  States it hurts to take a deep breath  No blood thinners  No LOC  Unsure whether he hit is head or not  Was able to stand up after the fall  History provided by:  Patient  Fall   Mechanism of injury: fall    Injury location:  Torso  Torso injury location:  R chest and R flank  Incident location:  Home  Time since incident:  1 hour  Arrived directly from scene: yes    Fall:     Fall occurred:  Walking    Impact surface:  [employer] of impact:  Back  Suspicion of alcohol use: no    Suspicion of drug use: no    Tetanus status:  Up to date  Prior to arrival data:     Medications administered:  None    Immobilization:  None  Associated symptoms: back pain, chest pain and difficulty breathing    Associated symptoms: no abdominal pain, no blindness, no headaches, no hearing loss, no loss of consciousness, no nausea, no neck pain, no seizures and no vomiting    Risk factors: diabetes    Risk factors: no AICD, no anticoagulation therapy, no asthma, no beta blocker therapy and no CABG        Prior to Admission Medications   Prescriptions Last Dose Informant Patient Reported? Taking?    Linagliptin (TRADJENTA) 5 MG TABS   Yes Yes   Sig: Take 5 mg by mouth daily   albuterol (PROAIR HFA) 90 mcg/act inhaler   Yes Yes   Sig: Inhale daily as needed   amoxicillin-clavulanate (AUGMENTIN) 875-125 mg per tablet   No No   Sig: Take 1 tablet by mouth every 12 (twelve) hours for 10 days   glyBURIDE (DIABETA) 5 mg tablet   Yes Yes   Sig: Take by mouth   lisinopril-hydrochlorothiazide (PRINZIDE,ZESTORETIC) 20-25 MG per tablet   Yes Yes   Sig: Take by mouth Facility-Administered Medications: None       Past Medical History:   Diagnosis Date    Diabetes mellitus ([facility] [address] )     Hypertension        Past Surgical History:   Procedure Laterality Date    ABDOMINAL SURGERY      HERNIA REPAIR         History reviewed  No pertinent family history  I have reviewed and agree with the history as documented  Social History   Substance Use Topics    Smoking status: Never Smoker    Smokeless tobacco: Never Used    Alcohol use No        Review of Systems   Constitutional: Negative for diaphoresis, fatigue and fever  HENT: Negative for hearing loss  Eyes: Negative for blindness and visual disturbance  Respiratory: Positive for chest tightness and shortness of breath  Cardiovascular: Positive for chest pain  Gastrointestinal: Negative for abdominal pain, nausea and vomiting  Genitourinary: Negative for difficulty urinating  Musculoskeletal: Positive for back pain and joint swelling  Negative for neck pain  Skin: Positive for wound  Negative for pallor and rash  Neurological: Negative for seizures, loss of consciousness and headaches  Psychiatric/Behavioral: Negative for confusion  Physical Exam  ED Triage Vitals [01/16/18 1439]   Temperature Pulse Respirations Blood Pressure SpO2   98 3 °F (36 8 °C) 93 16 (!) 185/90 96 %      Temp Source Heart Rate Source Patient Position - Orthostatic VS BP Location FiO2 (%)   Oral Monitor Sitting Right arm --      Pain Score       6           Orthostatic Vital Signs  Vitals:    01/16/18 1439 01/16/18 1558 01/16/18 1809 01/16/18 2005   BP: (!) 185/90 127/77 121/74 130/83   Pulse: 93 74 72 58   Patient Position - Orthostatic VS: Sitting Lying Sitting Lying       Physical Exam   Constitutional: He is oriented to person, place, and time  He appears well-developed and well-nourished  He appears distressed (appears uncomfortable)  HENT:   Head: Normocephalic and atraumatic     Eyes: EOM are normal  Pupils are equal, round, and reactive to light  Neck: Normal range of motion  Neck supple  Cardiovascular: Normal rate and regular rhythm  Pulmonary/Chest: Effort normal and breath sounds normal    Abdominal: Soft  Bowel sounds are normal    Musculoskeletal:        Hands:  Patient has a splint on his left thumb and wrist due to a dog bite sustained last week  Small abrasions on the thenar and hypothenar eminence of the right hand with tenderness on the thenar eminence and decreased range of motion of right thumb   Neurological: He is alert and oriented to person, place, and time  Skin: Skin is warm and dry  Psychiatric: He has a normal mood and affect  ED Medications  Medications   sodium chloride 0 9 % bolus 1,000 mL (1,000 mL Intravenous New Bag 1/16/18 1900)   iohexol (OMNIPAQUE) 350 MG/ML injection (MULTI-DOSE) 100 mL (100 mL Intravenous Given 1/16/18 1839)       Diagnostic Studies  Results Reviewed     Procedure Component Value Units Date/Time    Comprehensive metabolic panel [[id number]]  (Abnormal) Collected:  01/16/18 1556    Lab Status:  Final result Specimen:  Blood from Arm, Right Updated:  01/16/18 1756     Sodium 140 mmol/L      Potassium 4 2 mmol/L      Chloride 102 mmol/L      CO2 28 mmol/L      Anion Gap 10 mmol/L      BUN 33 (H) mg/dL      Creatinine 1 67 (H) mg/dL      Glucose 138 mg/dL      Calcium 8 9 mg/dL      AST 14 U/L      ALT 26 U/L      Alkaline Phosphatase 70 U/L      Total Protein 7 4 g/dL      Albumin 4 2 g/dL      Total Bilirubin 0 30 mg/dL      eGFR 40 ml/min/1 73sq m     Narrative:         National Kidney Disease Education Program recommendations are as follows:  GFR calculation is accurate only with a steady state creatinine  Chronic Kidney disease less than 60 ml/min/1 73 sq  meters  Kidney failure less than 15 ml/min/1 73 sq  meters      CBC and differential [[id number]]  (Normal) Collected:  01/16/18 1556    Lab Status:  Final result Specimen:  Blood from Arm, Right Updated:  01/16/18 1741     WBC 8 03 Thousand/uL      RBC 4 69 Million/uL      Hemoglobin 14 4 g/dL      Hematocrit 41 2 %      MCV 88 fL      MCH 30 7 pg      MCHC 35 0 g/dL      RDW 12 3 %      MPV 11 3 fL      Platelets 324 Thousands/uL      nRBC 0 /100 WBCs      Neutrophils Relative 71 %      Lymphocytes Relative 18 %      Monocytes Relative 8 %      Eosinophils Relative 3 %      Basophils Relative 1 %      Neutrophils Absolute 5 66 Thousands/µL      Lymphocytes Absolute 1 42 Thousands/µL      Monocytes Absolute 0 63 Thousand/µL      Eosinophils Absolute 0 22 Thousand/µL      Basophils Absolute 0 07 Thousands/µL                  CT recon only thoracolumbar   Final Result by Burak Mohamud MD (01/16 1927)      No fracture or traumatic subluxation  Workstation performed: [id number]         TRAUMA - CT chest abdomen pelvis w contrast   Final Result by Burak Mohamud MD (01/16 1921)      No acute thoracic or abdominopelvic findings  Workstation performed: [id number]         TRAUMA - CT spine cervical wo contrast   Final Result by Burak Mohamud MD (01/16 1907)      No cervical spine fracture or traumatic malalignment  Workstation performed: [id number]         TRAUMA - CT head wo contrast   Final Result by Burak Mohamud MD (01/16 1904)      No acute intracranial findings           Workstation performed: [id number]                    Procedures  ECG 12 Lead Documentation  Date/Time: 1/16/2018 4:33 PM  Performed by: Byron Quezada  Authorized by: Byron Quezada     Indications / Diagnosis:  Chest wall pain  Patient location:  ED  Rate:     ECG rate:  75    ECG rate assessment: normal    Rhythm:     Rhythm: sinus rhythm    Ectopy:     Ectopy: PVCs      PVCs:  Infrequent  QRS:     QRS axis:  Normal    QRS intervals:  Normal  Conduction:     Conduction: normal    ST segments:     ST segments:  Normal  T waves:     T waves: normal             Phone Contacts  ED Phone Contact    ED Course  ED Course                                MDM  Number of Diagnoses or Management Options  Chest wall contusion: new and requires workup  Fall, initial encounter: new and requires workup  Hand injury, right, initial encounter: new and requires workup  Diagnosis management comments: 3:49 PM  Patient back from xray, and refused all xrays  States he doesn't think he needs them  Understands he could have a fracture that we are missling  He is still waiting for CT C/A/P       8:05 PM  CT scans negative for acute traumatic injury  Will ambulate and d/c home  RTED instructions provided  Amount and/or Complexity of Data Reviewed  Clinical lab tests: ordered and reviewed  Tests in the radiology section of CPT®: ordered and reviewed  Tests in the medicine section of CPT®: reviewed and ordered  Decide to obtain previous medical records or to obtain history from someone other than the patient: yes  Review and summarize past medical records: yes  Independent visualization of images, tracings, or specimens: yes    Risk of Complications, Morbidity, and/or Mortality  Presenting problems: high  Diagnostic procedures: high  Management options: moderate    Patient Progress  Patient progress: stable    CritCare Time    Disposition  Final diagnoses:   Fall, initial encounter   Chest wall contusion   Hand injury, right, initial encounter     Time reflects when diagnosis was documented in both MDM as applicable and the Disposition within this note     Time User Action Codes Description Comment    1/16/2018  3:33 PM Brianna Elena Add [O23  ZFZA] Fall, initial encounter     1/16/2018  7:59 PM Castillo Rosenthal [H23 186G] Chest wall contusion     1/16/2018  7:59 PM Mika Renteria Add [S69 [id number]] Hand injury, right, initial encounter       ED Disposition     ED Disposition Condition Comment    Discharge  Bree Barrios discharge to home/self care      Condition at discharge: Stable        Follow-up Information Follow up With Specialties Details Why Contact Info Additional Information    Rylie Soto MD Family Medicine Schedule an appointment as soon as possible for a visit  21   [facility] Emergency Medicine  If symptoms worsen [address] [place] [address]  [phone] [place] ED, [address], [address]        Patient's Medications   Discharge Prescriptions    No medications on file     No discharge procedures on file      ED Provider  Electronically Signed by           Lenny Zuniga DO  01/16/18 2006

## 2018-01-16 NOTE — RESULT NOTES
Verified Results  (1) COMPREHENSIVE METABOLIC PANEL [id number] 07:73VU [facility] Order Number: [id number]     Test Name Result Flag Reference   GLUCOSE,RANDM 82 mg/dL     If the patient is fasting, the ADA then defines impaired fasting glucose as > 100 mg/dL and diabetes as > or equal to 123 mg/dL  SODIUM 136 mmol/L  136-145   POTASSIUM 4 4 mmol/L  3 5-5 3   CHLORIDE 101 mmol/L  100-108   CARBON DIOXIDE 28 mmol/L  21-32   ANION GAP (CALC) 7 mmol/L  4-13   BLOOD UREA NITROGEN 33 mg/dL H 5-25   CREATININE 1 83 mg/dL H 0 60-1 30   Standardized to IDMS reference method   CALCIUM 9 3 mg/dL  8 3-10 1   BILI, TOTAL 0 56 mg/dL  0 20-1 00   ALK PHOSPHATAS 71 U/L     ALT (SGPT) 26 U/L  12-78   AST(SGOT) 11 U/L  5-45   ALBUMIN 4 3 g/dL  3 5-5 0   TOTAL PROTEIN 7 8 g/dL  6 4-8 2   eGFR Non-African American 36 6 ml/min/1 73sq m     - Patient Instructions: This is a fasting blood test  Water, black tea or black coffee only after 9:00pm the night before test Drink 2 glasses of water the morning of test   National Kidney Disease Education Program recommendations are as follows:  GFR calculation is accurate only with a steady state creatinine  Chronic Kidney disease less than 60 ml/min/1 73 sq  meters  Kidney failure less than 15 ml/min/1 73 sq  meters  (1) HEMOGLOBIN A1C [id number] 09:56AM [facility] Order Number: [id number]     Test Name Result Flag Reference   HEMOGLOBIN A1C 7 1 % H 4 2-6 3   EST  AVG  GLUCOSE 157 mg/dl       (1) LIPID PANEL FASTING W DIRECT LDL REFLEX [id number] 09:56AM [facility] Order Number: [id number]     Test Name Result Flag Reference   CHOLESTEROL 170 mg/dL     LDL CHOLESTEROL CALCULATED 113 mg/dL H 0-100   - Patient Instructions: This is a fasting blood test  Water, black tea or black coffee only after 9:00pm the night before test   Drink 2 glasses of water the morning of test     - Patient Instructions:  This is a fasting blood test  Water, black tea or black coffee only after 9:00pm the night before test Drink 2 glasses of water the morning of test   Triglyceride:         Normal              <150 mg/dl       Borderline High    150-199 mg/dl       High               200-499 mg/dl       Very High          >499 mg/dl  Cholesterol:         Desirable        <200 mg/dl      Borderline High  200-239 mg/dl      High             >239 mg/dl  HDL Cholesterol:        High    >59 mg/dL      Low     <41 mg/dL  LDL Cholesterol:        Optimal          <100 mg/dl        Near Optimal     100-129 mg/dl        Above Optimal          Borderline High   130-159 mg/dl          High              160-189 mg/dl          Very High        >189 mg/dl  LDL CALCULATED:    This screening LDL is a calculated result  It does not have the accuracy of the Direct Measured LDL in the monitoring of patients with hyperlipidemia and/or statin therapy  Direct Measure LDL (TZF431) must be ordered separately in these patients  TRIGLYCERIDES 132 mg/dL  <=150   Specimen collection should occur prior to N-Acetylcysteine or Metamizole administration due to the potential for falsely depressed results  HDL,DIRECT 31 mg/dL L 40-60   Specimen collection should occur prior to Metamizole administration due to the potential for falsely depressed results

## 2018-01-17 ENCOUNTER — ALLSCRIPTS OFFICE VISIT (OUTPATIENT)
Dept: OTHER | Facility: OTHER | Age: 73
End: 2018-01-17

## 2018-01-17 LAB
ATRIAL RATE: 75 BPM
P AXIS: 79 DEGREES
PR INTERVAL: 138 MS
QRS AXIS: 12 DEGREES
QRSD INTERVAL: 82 MS
QT INTERVAL: 388 MS
QTC INTERVAL: 433 MS
T WAVE AXIS: 67 DEGREES
VENTRICULAR RATE: 75 BPM

## 2018-01-17 NOTE — DISCHARGE INSTRUCTIONS
Contusion in Adults   WHAT YOU NEED TO KNOW:   A contusion is a bruise that appears on your skin after an injury  A bruise happens when small blood vessels tear but skin does not  When blood vessels tear, blood leaks into nearby tissue, such as soft tissue or muscle  DISCHARGE INSTRUCTIONS:   Return to the emergency department if:   · You have new trouble moving the injured area  · You have tingling or numbness in or near the injured area  · Your hand or foot below the bruise gets cold or turns pale  Contact your healthcare provider if:   · You find a new lump in the injured area  · Your symptoms do not improve with treatment after 4 to 5 days  · You have questions or concerns about your condition or care  Medicines: You may need any of the following:  · NSAIDs  help decrease swelling and pain or fever  This medicine is available with or without a doctor's order  NSAIDs can cause stomach bleeding or kidney problems in certain people  If you take blood thinner medicine, always ask your healthcare provider if NSAIDs are safe for you  Always read the medicine label and follow directions  · Prescription pain medicine  may be given  Do not wait until the pain is severe before you take your medicine  · Take your medicine as directed  Contact your healthcare provider if you think your medicine is not helping or if you have side effects  Tell him of her if you are allergic to any medicine  Keep a list of the medicines, vitamins, and herbs you take  Include the amounts, and when and why you take them  Bring the list or the pill bottles to follow-up visits  Carry your medicine list with you in case of an emergency  Follow up with your healthcare provider as directed: You may need to return within a week to check your injury again  Write down your questions so you remember to ask them during your visits    Help a contusion heal:   · Rest the injured area  or use it less than usual  If you bruised your leg or foot, you may need crutches or a cane to help you walk  This will help you keep weight off your injured body part  · Apply ice  to decrease swelling and pain  Ice may also help prevent tissue damage  Use an ice pack, or put crushed ice in a plastic bag  Cover it with a towel and place it on your bruise for 15 to 20 minutes every hour or as directed  · Use compression  to support the area and decrease swelling  Wrap an elastic bandage around the area over the bruised muscle  Make sure the bandage is not too tight  You should be able to fit 1 finger between the bandage and your skin  · Elevate (raise) your injured body part  above the level of your heart to help decrease pain and swelling  Use pillows, blankets, or rolled towels to elevate the area as often as you can  · Do not drink alcohol  as directed  Alcohol may slow healing  · Do not stretch injured muscles  right after your injury  Ask your healthcare provider when and how you may safely stretch after your injury  Gentle stretches can help increase your flexibility  · Do not massage the area or put heating pads  on the bruise right after your injury  Heat and massage may slow healing  Your healthcare provider may tell you to apply heat after several days  At that time, heat will start to help the injury heal   Prevent another contusion:   · Stretch and warm up before you play sports or exercise  · Wear protective gear when you play sports  Examples are shin guards and padding  · If you begin a new physical activity, start slowly to give your body a chance to adjust   © 2017 [employer] Information is for End User's use only and may not be sold, redistributed or otherwise used for commercial purposes  All illustrations and images included in CareNotes® are the copyrighted property of [employer]  or Andres Mendoza  The above information is an  only   It is not intended as medical advice for individual conditions or treatments  Talk to your doctor, nurse or pharmacist before following any medical regimen to see if it is safe and effective for you  Rib Contusion   WHAT YOU NEED TO KNOW:   A rib contusion is a bruise on one or more of your ribs  DISCHARGE INSTRUCTIONS:   Return to the emergency department if:   · You have increased chest pain  · You have shortness of breath  · You start to cough up blood  · Your pain does not improve with pain medicine  Contact your healthcare provider if:   · You have a cough  · You have a fever  · You have questions or concerns about your condition or care  Medicines: You may need any of the following:  · NSAIDs , such as ibuprofen, help decrease swelling, pain, and fever  This medicine is available with or without a doctor's order  NSAIDs can cause stomach bleeding or kidney problems in certain people  If you take blood thinner medicine, always ask if NSAIDs are safe for you  Always read the medicine label and follow directions  Do not give these medicines to children under 10months of age without direction from your child's healthcare provider  · Prescription pain medicine  may be given  Ask how to take this medicine safely  · Take your medicine as directed  Contact your healthcare provider if you think your medicine is not helping or if you have side effects  Tell him of her if you are allergic to any medicine  Keep a list of the medicines, vitamins, and herbs you take  Include the amounts, and when and why you take them  Bring the list or the pill bottles to follow-up visits  Carry your medicine list with you in case of an emergency  Deep breathing:   · To help prevent pneumonia, take 10 deep breaths every hour, even when you wake up during the night  Brace your ribs with your hands or a pillow while you take deep breaths or cough  This will help decrease your pain      · You may need to use an incentive spirometer to help you take deeper breaths  Put the plastic piece into your mouth and take a very deep breath  Hold your breath as long as you can  Then let out your breath  Do this 10 times in a row every hour while you are awake  Rest:  Rest your ribs to decrease swelling and allow the injury to heal faster  Avoid activities that may cause more pain or damage to your ribs  As your pain decreases, begin movements slowly  Ice:  Ice helps decrease swelling and pain  Ice may also help prevent tissue damage  Use an ice pack or put crushed ice in a plastic bag  Cover it with a towel and place it on your bruised area for 15 to 20 minutes every hour as directed  Follow up with your healthcare provider as directed:  Write down your questions so you remember to ask them during your visits  © 2017 [employer]  Information is for End User's use only and may not be sold, redistributed or otherwise used for commercial purposes  All illustrations and images included in CareNotes® are the copyrighted property of A D A M , Inc  or Andres Mendoza  The above information is an  only  It is not intended as medical advice for individual conditions or treatments  Talk to your doctor, nurse or pharmacist before following any medical regimen to see if it is safe and effective for you

## 2018-01-17 NOTE — ED NOTES
Patient states "hurts with deep breaths "  Discussed importance of taking deep breaths to prevent further complications  Provided patient with Incentive Spirometer and instructed patients on use  Patient returned demonstration, verbalized good understanding       Liz Garcia RN  01/16/18 2014

## 2018-01-18 NOTE — PROGRESS NOTES
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)   2  Chronic kidney disease, stage 3 (585 3) (N18 3)   3  Injury of left hand, initial encounter (959 4) (X29 15IU)   4  Contusion of rib on right side, initial encounter (922 1) (S20 211A)   5  Benign essential hypertension (401 1) (I10)   6  Hypercholesterolemia (272 0) (E78 00)   7  Screening PSA (prostate specific antigen) (V76 44) (Z12 5)   8  Accidental fall, initial encounter (S274 5) (R79 VWPA)    Plan   Chronic kidney disease, stage 3    · (1) COMPREHENSIVE METABOLIC PANEL; Status:Active; Requested for:63Nkb1943;   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· (1) HEMOGLOBIN A1C; Status:Active; Requested for:14Qhu3969;   DM2 (diabetes mellitus, type 2)    · *VB - Foot Exam; Status:Complete - Retrospective Authorization;   Done: 84JKR0743    09:42AM  Hypercholesterolemia    · (1) LIPID PANEL FASTING W DIRECT LDL REFLEX; Status:Active; Requested    for:94Txz0131;   Screening PSA (prostate specific antigen)    · (1) PSA (SCREEN) (Dx V76 44 Screen for Prostate Cancer); Status:Active; Requested    for:62Obk3275; Discussion/Summary      1  Lipids - unable to tolerate statins  Watch diet  LDL not at goal -we discussed this DM - A1c 7 4%  He does not want to start any injectables  Will watch diet, recheck A1c in 4-6 months  CKD - stable, follows with nephrology HTN it stable Injury L hand - sutures not ready to be removed yet  Return on Monday will see if sutures can be removed here and assess ROM, function  May need to see orthopedics Rib contusion - take Tylenol prn  Cannot take NSAIDs due to CKD  Heating pad  Can take several weeks to heal fall precautions discussed next week to reassess hand injury  Possible side effects of new medications were reviewed with the patient/guardian today  The treatment plan was reviewed with the patient/guardian   The patient/guardian understands and agrees with the treatment plan      Chief Complaint   Patient is here for his 6 month diabetic check  He is also an ER follow up times two  He has a dog bite and a fall  Patient is here today for follow up of chronic conditions described in HPI  History of Present Illness   67year old here to review chronic problems  has been in the ER twice in the past week  On 1/11/18 was seen for a dog bite  Was placed on Augmentin, had tetanus vaccine, has a splint on L hand and was advised to f/u with orthopedics  He is asking if this is necessary  was in the ER yesterday when he slipped on ice  He states his legs went out from under him  He twisted to the R and his elbow jabbed his R rib cage  He had a CT Scan which showed no acute fractures  He states he is having pain with deep breaths and movement  - A1c was 7 4%  He states he has not been watching his diet  Fasting glucoses are in the 150s  On Glyburide and Tradjenta  - stable creatinine  Sees Dr Jeniffer Starr     - does not tolerate statins  LDL not at goal       Review of Systems        Constitutional: No fever or chills, feels well, no tiredness, no recent weight gain or weight loss  Eyes: No complaints of eye pain, no red eyes, no discharge from eyes, no itchy eyes  ENT: no complaints of earache, no hearing loss, no nosebleeds, no nasal discharge, no sore throat, no hoarseness  Cardiovascular: No complaints of slow heart rate, no fast heart rate, no chest pain, no palpitations, no leg claudication, no lower extremity  Respiratory: No complaints of shortness of breath, no wheezing, no cough, no SOB on exertion, no orthopnea or PND  Gastrointestinal: No complaints of abdominal pain, no constipation, no nausea or vomiting, no diarrhea or bloody stools  Genitourinary: No complaints of dysuria, no incontinence, no hesitancy, no nocturia, no genital lesion, no testicular pain  Musculoskeletal: as noted in HPI        Integumentary: No complaints of skin rash or skin lesions, no itching, no skin wound, no dry skin  Neurological: No compliants of headache, no confusion, no convulsions, no numbness or tingling, no dizziness or fainting, no limb weakness, no difficulty walking  Psychiatric: Is not suicidal, no sleep disturbances, no anxiety or depression, no change in personality, no emotional problems  Endocrine: No complaints of proptosis, no hot flashes, no muscle weakness, no erectile dysfunction, no deepening of the voice, no feelings of weakness  Hematologic/Lymphatic: No complaints of swollen glands, no swollen glands in the neck, does not bleed easily, no easy bruising  Active Problems   1  Asthma (493 90) (J45 909)   2  At risk for falls (V15 88) (Z91 81)   3  Benign essential hypertension (401 1) (I10)   4  Cervical radiculopathy (723 4) (M54 12)   5  Chronic kidney disease, stage 3 (585 3) (N18 3)   6  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)   7  DM2 (diabetes mellitus, type 2) (250 00) (E11 9)   8  History of syncope (V15 89) (Z87 898)   9  Hypercholesterolemia (272 0) (E78 00)   10  Moderate persistent asthma (493 90) (J45 40)   11  Poorly controlled type 2 diabetes mellitus (250 00) (E11 65)   12  Seborrheic keratosis (702 19) (L82 1)    Past Medical History   1  History of Asthma (493 90) (J45 909)   2  Asthma (493 90) (J45 909)   3  History of Asthma with acute exacerbation (493 92) (J45 901)   4  History of Change in bowel habits (787 99) (R19 4)   5  Chronic kidney disease, stage 3 (585 3) (N18 3)   6  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)   7  History of acute renal failure (V13 09) (Z87 448)   8  History of backache (V13 59) (Z87 39)   9  History of balanitis (V13 89) (Z87 438)   10  History of cataract (V12 49) (Z86 69)   11  History of diarrhea (V12 79) (Z87 898)   12  History of hypercholesterolemia (V12 29) (Z86 39)   13  History of hypertension (V12 59) (Z86 79)   14  History of nausea (V12 79) (Z87 898)   15   History of syncope (V15 89) (Z87 898)   16  Moderate persistent asthma (493 90) (J45 40)   17  History of Pneumonia (V12 61)   18  History of Voluntary childlessness (V25 9) (Z30 9)     The active problems and past medical history were reviewed and updated today  Surgical History   1  History of Appendectomy   2  History of Cataract Surgery   3  History of Hernia Repair   4  History Of Prior Surgery     The surgical history was reviewed and updated today  Family History   Mother    1  Family history of Malignant neoplastic disease  Father    2  Family history of Colon cancer   3  Family history of diabetes mellitus (V18 0) (Z83 3)   4  Family history of Metastasis from malignant tumor of colon     The family history was reviewed and updated today  Social History    · Advance directive on file (S79 81) (Z78 9)   · Denied: History of Alcohol Use (History)   ·    · Employed   · Exercises occasionally (V49 89) (Z78 9)   · Former smoker (R01 74) (B50 431)   · Never used chewing tobacco (V49 89) (Z78 9)   · Occasional alcohol use   · Occasional caffeine consumption   · Retired   · Voluntary childlessness (V25 9) (Z30 9)  The social history was reviewed and updated today  Current Meds    1  Amoxicillin-Pot Clavulanate 875-125 MG Oral Tablet; Therapy: (Recorded:17Jan2018) to Recorded   2  Gabbie Contour Test In [employer]; USE 1 STRIP Twice daily DX (250 00) diabetes; Therapy: 05Sax9388 to (Last Rx:29Rfa7927)  Requested for: 87Mwa9300 Ordered   3  GlyBURIDE 5 MG Oral Tablet; take 2 tablets by mouth twice a day; Therapy: 17GMG8335 to (Evaluate:18Jan2018)  Requested for: 53NCJ0241; Last     Rx:54Szl6393 Ordered   4  Lisinopril-Hydrochlorothiazide 20-25 MG Oral Tablet; take 1 tablet by mouth once daily; Therapy: 28ZCT7427 to (Evaluate:83Ntp3089)  Requested for: 44XEA0112; Last     Rx:20Oct2017 Ordered   5   ProAir  (90 Base) MCG/ACT Inhalation Aerosol Solution; INHALE 1 TO 2 PUFFS     EVERY 4 TO 6 HOURS AS NEEDED; Therapy: 08Apr2014 to (Last Rx:59Xja4499)  Requested for: 75URP7028 Ordered   6  Tradjenta 5 MG Oral Tablet; take 1 tablet by mouth once daily; Therapy: 61ZQS8490 to (Evaluate:25Mar2018)  Requested for: 36YSS8760; Last     Rx:52Eug2687 Ordered     The medication list was reviewed and updated today  Allergies   1  Statins   2  Demerol TABS   3  Lotrel CAPS    Vitals   Vital Signs    Recorded: 18TLW1270 09:43AM   Temperature 97 9 F   Heart Rate 82   Systolic 926   Diastolic 82   Height 5 ft 10 in   Weight 191 lb 9 6 oz   BMI Calculated 27 49   BSA Calculated 2 05   O2 Saturation 96     Physical Exam        Constitutional      General appearance: No acute distress, well appearing and well nourished  Eyes      Conjunctiva and lids: No swelling, erythema, or discharge  Pulmonary      Respiratory effort: No increased work of breathing or signs of respiratory distress  Auscultation of lungs: Clear to auscultation, equal breath sounds bilaterally, no wheezes, no rales, no rhonci  Cardiovascular      Auscultation of heart: Normal rate and rhythm, normal S1 and S2, without murmurs  Examination of extremities for edema and/or varicosities: Normal        Abdomen      Abdomen: Abnormal   The abdomen was obese  Musculoskeletal      Gait and station: Normal        Inspection/palpation of joints, bones, and muscles: Abnormal  -- L hand splint in place, tenderness along R rib cage, patient uncomfortable with movement        Psychiatric      Orientation to person, place and time: Normal        Mood and affect: Normal           Results/Data   *VB - Foot Exam 60NYG2298 09:42AM Kumar Nitish      Test Name Result Flag Reference   FOOT EXAM 34CVS7062        (1) COMPREHENSIVE METABOLIC PANEL 01LOW4420 17:75ST [employer] Order Number: [id number]      Test Name Result Flag Reference   SODIUM 137 mmol/L  136-145   POTASSIUM 5 2 mmol/L  3 5-5 3   CHLORIDE 104 mmol/L 100-108   CARBON DIOXIDE 24 mmol/L  21-32   ANION GAP (CALC) 9 mmol/L  4-13   BLOOD UREA NITROGEN 30 mg/dL H 5-25   CREATININE 1 85 mg/dL H 0 60-1 30   Standardized to IDMS reference method   CALCIUM 9 1 mg/dL  8 3-10 1   BILI, TOTAL 0 75 mg/dL  0 20-1 00   ALK PHOSPHATAS 63 U/L     ALT (SGPT) 26 U/L  12-78   Specimen collection should occur prior to Sulfasalazine and/or Sulfapyridine administration due to the potential for falsely depressed results  AST(SGOT) 16 U/L  5-45   Specimen collection should occur prior to Sulfasalazine administration due to the potential for falsely depressed results  ALBUMIN 4 4 g/dL  3 5-5 0   TOTAL PROTEIN 8 1 g/dL  6 4-8 2   eGFR 35 ml/min/1 73sq m     [facility] Disease Education Program recommendations are as follows:     GFR calculation is accurate only with a steady state creatinine     Chronic Kidney disease less than 60 ml/min/1 73 sq  meters     Kidney failure less than 15 ml/min/1 73 sq  meters  GLUCOSE FASTING 155 mg/dL H 65-99   Specimen collection should occur prior to Sulfasalazine administration due to the potential for falsely depressed results  Specimen collection should occur prior to Sulfapyridine administration due to the potential for falsely elevated results  (1) HEMOGLOBIN A1C 98JLG6761 10:55AM [facility] Order Number: [id number]      Test Name Result Flag Reference   HEMOGLOBIN A1C 7 4 % H 4 2-6 3   EST  AVG  GLUCOSE 166 mg/dl        (1) LIPID PANEL FASTING W DIRECT LDL REFLEX 47JWP9023 10:55AM Harjeet Barahona Order Number: [id number]      Test Name Result Flag Reference   CHOLESTEROL 192 mg/dL     Cholesterol:       Desirable <200 mg/dl       Borderline 200-239 mg/dl       High>239   LDL CHOLESTEROL CALCULATED 126 mg/dL H 0-100   This screening LDL is a calculated result        It does not have the accuracy of the Direct Measured LDL in the monitoring of patients with hyperlipidemia and/or statin therapy  Direct Measure LDL (NUN669) must be ordered separately in these patients  Triglyceride:           Normal <150 mg/dl      Borderline High 150-199 mg/dl      High 200-499 mg/dl      Very High >499 mg/dl   TRIGLYCERIDES 131 mg/dL  <=150   Specimen collection should occur prior to N-Acetylcysteine or Metamizole administration due to the potential for falsely depressed results  HDL,DIRECT 40 mg/dL  40-60   HDL Cholesterol:       High>59 mg/dL       Low <41 mg/dL     HDL Cholesterol:       High>59 mg/dL       Low <41 mg/dL      (1) MICROALBUMIN CREATININE RATIO, RANDOM URINE 85YMH4561 10:55AM Amrik Calderón Order Number: [id number]      Test Name Result Flag Reference   MICROALBUMIN/ CREAT R 54 mg/g creatinine H 0-30   MICROALBUMIN,URINE 114 0 mg/L H 0 0-20 0   CREATININE URINE 212 0 mg/dL        Health Management   Health Maintenance   Medicare Annual Wellness Visit; every 1 year; Next Due: 45LEA0080; Active    Future Appointments      Date/Time Provider Specialty Site   02/08/2018 08:15 AM KASSI Yan   Nephrology [address]   01/22/2018 08:40 AM Manoj Steele MD [place] [address]     Signatures    Electronically signed by : Yisel Gray MD; Jan 17 2018 12:09PM EST                       (Author)

## 2018-01-22 ENCOUNTER — GENERIC CONVERSION - ENCOUNTER (OUTPATIENT)
Dept: OTHER | Facility: OTHER | Age: 73
End: 2018-01-22

## 2018-01-23 VITALS
SYSTOLIC BLOOD PRESSURE: 124 MMHG | BODY MASS INDEX: 27.43 KG/M2 | HEIGHT: 70 IN | DIASTOLIC BLOOD PRESSURE: 82 MMHG | OXYGEN SATURATION: 96 % | HEART RATE: 82 BPM | WEIGHT: 191.6 LBS | TEMPERATURE: 97.9 F

## 2018-01-23 NOTE — RESULT NOTES
Verified Results  (1) COMPREHENSIVE METABOLIC PANEL 38CTU5241 74:60AD [employer] Order Number: [id number]     Test Name Result Flag Reference   SODIUM 137 mmol/L  136-145   POTASSIUM 5 2 mmol/L  3 5-5 3   CHLORIDE 104 mmol/L  100-108   CARBON DIOXIDE 24 mmol/L  21-32   ANION GAP (CALC) 9 mmol/L  4-13   BLOOD UREA NITROGEN 30 mg/dL H 5-25   CREATININE 1 85 mg/dL H 0 60-1 30   Standardized to IDMS reference method   CALCIUM 9 1 mg/dL  8 3-10 1   BILI, TOTAL 0 75 mg/dL  0 20-1 00   ALK PHOSPHATAS 63 U/L     ALT (SGPT) 26 U/L  12-78   Specimen collection should occur prior to Sulfasalazine and/or Sulfapyridine administration due to the potential for falsely depressed results  AST(SGOT) 16 U/L  5-45   Specimen collection should occur prior to Sulfasalazine administration due to the potential for falsely depressed results  ALBUMIN 4 4 g/dL  3 5-5 0   TOTAL PROTEIN 8 1 g/dL  6 4-8 2   eGFR 35 ml/min/1 73sq [facility] Disease Education Program recommendations are as follows:  GFR calculation is accurate only with a steady state creatinine  Chronic Kidney disease less than 60 ml/min/1 73 sq  meters  Kidney failure less than 15 ml/min/1 73 sq  meters  GLUCOSE FASTING 155 mg/dL H 65-99   Specimen collection should occur prior to Sulfasalazine administration due to the potential for falsely depressed results  Specimen collection should occur prior to Sulfapyridine administration due to the potential for falsely elevated results  (1) HEMOGLOBIN A1C 02LSM3827 10:55AM [employer] Order Number: [id number]     Test Name Result Flag Reference   HEMOGLOBIN A1C 7 4 % H 4 2-6 3   EST  AVG   GLUCOSE 166 mg/dl       (1) LIPID PANEL FASTING W DIRECT LDL REFLEX [id number] 10:55AM [employer] Order Number: [id number]     Test Name Result Flag Reference   CHOLESTEROL 192 mg/dL     Cholesterol:    Desirable <200 mg/dl    Borderline 200-239 mg/dl    High>239 LDL CHOLESTEROL CALCULATED 126 mg/dL H 0-100   This screening LDL is a calculated result  It does not have the accuracy of the Direct Measured LDL in the monitoring of patients with hyperlipidemia and/or statin therapy  Direct Measure LDL (HRF825) must be ordered separately in these patients  Triglyceride:        Normal <150 mg/dl   Borderline High 150-199 mg/dl   High 200-499 mg/dl   Very High >499 mg/dl   TRIGLYCERIDES 131 mg/dL  <=150   Specimen collection should occur prior to N-Acetylcysteine or Metamizole administration due to the potential for falsely depressed results     HDL,DIRECT 40 mg/dL  40-60   HDL Cholesterol:    High>59 mg/dL    Low <41 mg/dL  HDL Cholesterol:    High>59 mg/dL    Low <41 mg/dL     (1) MICROALBUMIN CREATININE RATIO, RANDOM URINE [id number] 10:55AM Brigitte Broad Order Number: [id number]     Test Name Result Flag Reference   MICROALBUMIN/ CREAT R 54 mg/g creatinine H 0-30   MICROALBUMIN,URINE 114 0 mg/L H 0 0-20 0   CREATININE URINE 212 0 mg/dL

## 2018-01-23 NOTE — PROGRESS NOTES
Assessment    1  Medicare annual wellness visit, subsequent (V70 0) (Z00 00)    Plan  Medicare annual wellness visit, subsequent    · Begin a limited exercise program ; Status:Complete;   Done: 47ZBN4394   · Eat a low fat and low cholesterol diet ; Status:Complete;   Done: 33BCJ9414   · Some eating tips that can help you lose weight ; Status:Complete;   Done: 04YNG2494   · These are things you can do to prevent falls in and around the home ; Status:Complete;    Done: 94RUP3672   · We recommend that you create an advance directive ; Status:Complete;   Done:  26VRK1445    Discussion/Summary    AWV completed  Impression: Subsequent Annual Wellness Visit, with preventive exam as well as age and risk appropriate counseling completed  Cardiovascular screening and counseling: the risks and benefits of screening were discussed and screening is current  Diabetes screening and counseling: the risks and benefits of screening were discussed and screening is current  Colorectal cancer screening and counseling: the risks and benefits of screening were discussed and screening is current  Prostate cancer screening and counseling: the risks and benefits of screening were discussed and screening is current  Osteoporosis screening and counseling: screening not indicated  Abdominal aortic aneurysm screening and counseling: screening is current  Glaucoma screening and counseling: the risks and benefits of screening were discussed, screening is current and He will be seeing Ophtho end of the month  HIV screening and counseling: the patient declines screening  Immunizations: the patient declines the influenza vaccination, the patient declines the pneumococcal vaccination and the patient declines the Zostavax vaccine  Advance Directive Planning: not complete, paperwork and instructions were given to the patient, he was encouraged to follow-up with me to discuss his questions and/or decisions   Patient Discussion: plan discussed with the patient, follow-up visit needed in 6 mo  Chief Complaint  Medicare Wellness      History of Present Illness  The patient is being seen for the subsequent annual wellness visit  Medicare Screening and Risk Factors   Hospitalizations: no previous hospitalizations  Once per lifetime medicare screening tests: AAA screening [place] and 2015  Medicare Screening Tests Risk Questions   Osteoporosis risk assessment: over 48years of age, but none indicated  HIV risk assessment: none indicated  Drug and Alcohol Use: The patient is a former cigarette smoker, quit smoking 1971 and is a former smokeless tobacco user  The patient reports rare alcohol use  Alcohol concern:   The patient has no concerns about alcohol abuse  He has never used illicit drugs  Diet and Physical Activity: Current diet includes low carbohydrate food choices, low salt food choices, limited junk food, 1 servings of fruit per day, 2 servings of vegetables per day, 1 servings of meat per day, 1 servings of whole grains per day, 2 servings of dairy products per day and 2 cans of diet soda per day  He exercises daily  Exercise: walking, stretching, strength training 3 5 hours per week  Mood Disorder and Cognitive Impairment Screening: PHQ-9 Depression Scale   Functional Ability/Level of Safety: Hearing is normal bilaterally, normal in the right ear, normal in the left ear and a hearing aid is not used  The patient is currently able to do activities of daily living without limitations, able to participate in social activities without limitations and able to drive without limitations  Activities of daily living details: no transportation help needed, does not need help shopping and does not need help managing money  Fall risk factors:  antihypertensive use, but The patient fell 0 times in the past 12 months   Home safety risk factors:  household clutter, but no unfamiliar surroundings, no poor household lighting, no uneven floors, grab bars in the bathroom and handrails on the stairs  Advance Directives: Advance directives: no living will  end of life decisions were reviewed with the patient  Co-Managers and Medical Equipment/Suppliers: See Patient Care Team      Patient Care Team    Care Team Member Role Specialty Office Number   Macrina Colby MD  Family Medicine [phone]   Marlee LOCK  Specialist Nephrology [phone]     Review of Systems    Constitutional: negative  Eyes: negative  ENT: negative  Cardiovascular: negative  Respiratory: negative  Gastrointestinal: negative  Genitourinary: negative  Musculoskeletal: negative  Integumentary and Breasts: negative  Neurological: negative  Psychiatric: negative  Endocrine: negative  Hematologic and Lymphatic: negative  Over the past 2 weeks, how often have you been bothered by the following problems? 1 ) Little interest or pleasure in doing things? Several days  2 ) Feeling down, depressed or hopeless? Several days  3 ) Trouble falling asleep or sleeping too much? Half the days or more  4 ) Feeling tired or having little energy? Several days  5 ) Poor appetite or overeating? Not at all    6 ) Feeling bad about yourself, or that you are a failure, or have let yourself or your family down? Several days  7 ) Trouble concentrating on things, such as reading a newspaper or watching television? Not at all    8 ) Moving or speaking so slowly that other people could have noticed, or the opposite, moving or speaking faster than usual? Not at all    9 ) Thoughts that you would be better off dead or of hurting yourself in some way? 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)   5  Encounter for screening for malignant neoplasm of colon (V76 51) (Z12 11)   6   History of syncope (V15 89) (Z87 898)   7  Moderate persistent asthma (493 90) (J45 40)   8  Poorly controlled type 2 diabetes mellitus (250 00) (E11 65)   9  Positive depression screening (796 4) (R68 89)   10  Screening for AAA (abdominal aortic aneurysm) (V81 2) (Z13 6)   11  Screening for skin condition (V82 0) (Z13 89)   12  Seborrheic keratosis (702 19) (L82 1)    Benign essential hypertension (401 1) (I10)       DM2 (diabetes mellitus, type 2) (250 00) (E11 9)       Chronic kidney disease, stage 3 (585 3) (N18 3)        Hypercholesterolemia (272 0) (E78 00)       Screening PSA (prostate specific antigen) (V76 44) (Z12 5)          Past Medical History     1  History of Asthma (493 90) (J45 909)   2  Asthma (493 90) (J45 909)   3  History of Asthma with acute exacerbation (493 92) (J45 901)   4  History of Change in bowel habits (787 99) (R19 4)   5  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)   6  History of acute renal failure (V13 09) (Z87 448)   7  History of backache (V13 59) (Z87 39)   8  History of balanitis (V13 89) (Z87 438)   9  History of cataract (V12 49) (Z86 69)   10  History of diarrhea (V12 79) (Z87 898)   11  History of hypercholesterolemia (V12 29) (Z86 39)   12  History of hypertension (V12 59) (Z86 79)   13  History of nausea (V12 79) (Z87 898)   14  History of syncope (V15 89) (Z87 898)   15  Moderate persistent asthma (493 90) (J45 40)   16  History of Pneumonia (V12 61)   17  History of Voluntary childlessness (V25 9) (Z30 9)    The active problems and past medical history were reviewed and updated today  Chronic kidney disease, stage 3 (585 3) (N18 3)             Surgical History    1  History of Appendectomy   2  History of Cataract Surgery   3  History of Hernia Repair   4  History Of Prior Surgery    The surgical history was reviewed and updated today  Family History  Mother    1  Family history of Malignant neoplastic disease  Father    2  Family history of Colon cancer   3  Family history of diabetes mellitus (V18 0) (Z83 3)   4  Family history of Metastasis from malignant tumor of colon    The family history was reviewed and updated today  Social History    · Advance directive on file (O69 06) (Z78 9)   · Denied: History of Alcohol Use (History)   ·    · Employed   · Exercises occasionally (V49 89) (Z78 9)   · Former smoker (I87 09) (Z38 484)   · Never used chewing tobacco (V49 89) (Z78 9)   · Occasional alcohol use   · Occasional caffeine consumption   · Retired   · Voluntary childlessness (V25 9) (Z30 9)  The social history was reviewed and updated today  Current Meds   1  Gabbie Contour Test In [employer]; USE 1 STRIP Twice daily DX (250 00) diabetes; Therapy: 43Fmn0075 to (Last Rx:83Zpl1760)  Requested for: 64Mul3133 Ordered   2  GlyBURIDE 5 MG Oral Tablet; take 2 tablets by mouth twice a day; Therapy: 52USL3072 to (Evaluate:17Jan2017)  Requested for: 36Qtz0927; Last   Rx:42Dxd6740 Ordered   3  Lisinopril-Hydrochlorothiazide 20-25 MG Oral Tablet; take 1 tablet by mouth once daily; Therapy: 14HXG5943 to (Evaluate:91Wys6771)  Requested for: 64DDX1623; Last   Rx:76Mlz3651 Ordered   4  ProAir  (90 Base) MCG/ACT Inhalation Aerosol Solution; INHALE 1 TO 2 PUFFS   EVERY 4 TO 6 HOURS AS NEEDED; Therapy: 03Gxi7531 to (Last AI:19AGI5564)  Requested for: 46HZR9915 Ordered   5  Tradjenta 5 MG Oral Tablet; take 1 tablet by mouth once daily as directed; Therapy: 85BEP3640 to (Evaluate:20Apr2017)  Requested for: 37Gmx7084; Last   Rx:20Nid7487 Ordered    The medication list was reviewed and updated today  Allergies    1  Statins   2  Demerol TABS   3   Lotrel CAPS    Immunizations  Influenza --- [place] File: Temporarily Deferred: Pt refuses   PPSV --- [place] File: Temporarily Deferred: Pt refuses     Results/Data  Deanna Grams - Eye Exam [id number] 12:00AM      Test Name Result Flag Reference   Retinopathy Screening      No Retinopathy on exam     Summary / No summary entered :      No summary entered  Documents attached :      sOpthalmology - Carry Busing; Enc: [id number] - Image Encounter - Carry Busing -      (Family Medicine) (Result Document)    Future Appointments    Date/Time Provider Specialty Site   07/03/2017 09:45 AM KASSI Wahl   Nephrology  [address] [place] [address]     Signatures   Electronically signed by : Angeline Calles MD; Jan 19 2017 10:23AM EST                       (Author)

## 2018-02-02 ENCOUNTER — TRANSCRIBE ORDERS (OUTPATIENT)
Dept: LAB | Facility: OTHER | Age: 73
End: 2018-02-02

## 2018-02-02 ENCOUNTER — APPOINTMENT (OUTPATIENT)
Dept: LAB | Facility: OTHER | Age: 73
End: 2018-02-02
Payer: MEDICARE

## 2018-02-02 DIAGNOSIS — N18.30 CHRONIC KIDNEY DISEASE, STAGE III (MODERATE) (HCC): ICD-10-CM

## 2018-02-02 LAB
ANION GAP SERPL CALCULATED.3IONS-SCNC: 8 MMOL/L (ref 4–13)
BILIRUB UR QL STRIP: NEGATIVE
BUN SERPL-MCNC: 32 MG/DL (ref 5–25)
CALCIUM SERPL-MCNC: 8.6 MG/DL (ref 8.3–10.1)
CHLORIDE SERPL-SCNC: 103 MMOL/L (ref 100–108)
CLARITY UR: CLEAR
CO2 SERPL-SCNC: 26 MMOL/L (ref 21–32)
COLOR UR: YELLOW
CREAT SERPL-MCNC: 1.67 MG/DL (ref 0.6–1.3)
CREAT UR-MCNC: 127 MG/DL
ERYTHROCYTE [DISTWIDTH] IN BLOOD BY AUTOMATED COUNT: 12.9 % (ref 11.6–15.1)
GFR SERPL CREATININE-BSD FRML MDRD: 40 ML/MIN/1.73SQ M
GLUCOSE P FAST SERPL-MCNC: 143 MG/DL (ref 65–99)
GLUCOSE UR STRIP-MCNC: NEGATIVE MG/DL
HCT VFR BLD AUTO: 42.4 % (ref 36.5–49.3)
HGB BLD-MCNC: 14.9 G/DL (ref 12–17)
HGB UR QL STRIP.AUTO: NEGATIVE
KETONES UR STRIP-MCNC: NEGATIVE MG/DL
LEUKOCYTE ESTERASE UR QL STRIP: NEGATIVE
MCH RBC QN AUTO: 31.4 PG (ref 26.8–34.3)
MCHC RBC AUTO-ENTMCNC: 35.1 G/DL (ref 31.4–37.4)
MCV RBC AUTO: 90 FL (ref 82–98)
NITRITE UR QL STRIP: NEGATIVE
PH UR STRIP.AUTO: 6 [PH] (ref 4.5–8)
PHOSPHATE SERPL-MCNC: 3.4 MG/DL (ref 2.3–4.1)
PLATELET # BLD AUTO: 270 THOUSANDS/UL (ref 149–390)
PMV BLD AUTO: 11.4 FL (ref 8.9–12.7)
POTASSIUM SERPL-SCNC: 4.1 MMOL/L (ref 3.5–5.3)
PROT UR STRIP-MCNC: NEGATIVE MG/DL
PROT UR-MCNC: 15 MG/DL
PROT/CREAT UR: 0.12 MG/G{CREAT} (ref 0–0.1)
PTH-INTACT SERPL-MCNC: 118.8 PG/ML (ref 14–72)
RBC # BLD AUTO: 4.74 MILLION/UL (ref 3.88–5.62)
SODIUM SERPL-SCNC: 137 MMOL/L (ref 136–145)
SP GR UR STRIP.AUTO: 1.02 (ref 1–1.03)
UROBILINOGEN UR QL STRIP.AUTO: 0.2 E.U./DL
WBC # BLD AUTO: 7.5 THOUSAND/UL (ref 4.31–10.16)

## 2018-02-02 PROCEDURE — 81003 URINALYSIS AUTO W/O SCOPE: CPT

## 2018-02-02 PROCEDURE — 36415 COLL VENOUS BLD VENIPUNCTURE: CPT

## 2018-02-02 PROCEDURE — 80048 BASIC METABOLIC PNL TOTAL CA: CPT

## 2018-02-02 PROCEDURE — 83970 ASSAY OF PARATHORMONE: CPT

## 2018-02-02 PROCEDURE — 85027 COMPLETE CBC AUTOMATED: CPT

## 2018-02-02 PROCEDURE — 82570 ASSAY OF URINE CREATININE: CPT

## 2018-02-02 PROCEDURE — 84156 ASSAY OF PROTEIN URINE: CPT

## 2018-02-02 PROCEDURE — 84100 ASSAY OF PHOSPHORUS: CPT

## 2018-02-08 ENCOUNTER — OFFICE VISIT (OUTPATIENT)
Dept: NEPHROLOGY | Facility: CLINIC | Age: 73
End: 2018-02-08
Payer: MEDICARE

## 2018-02-08 VITALS
WEIGHT: 194.4 LBS | DIASTOLIC BLOOD PRESSURE: 70 MMHG | HEART RATE: 64 BPM | TEMPERATURE: 98.2 F | BODY MASS INDEX: 27.89 KG/M2 | SYSTOLIC BLOOD PRESSURE: 110 MMHG

## 2018-02-08 DIAGNOSIS — I10 ESSENTIAL HYPERTENSION: ICD-10-CM

## 2018-02-08 DIAGNOSIS — N18.30 CKD (CHRONIC KIDNEY DISEASE) STAGE 3, GFR 30-59 ML/MIN (HCC): Primary | ICD-10-CM

## 2018-02-08 DIAGNOSIS — E11.22 TYPE 2 DIABETES MELLITUS WITH STAGE 3 CHRONIC KIDNEY DISEASE, WITHOUT LONG-TERM CURRENT USE OF INSULIN (HCC): ICD-10-CM

## 2018-02-08 DIAGNOSIS — N18.30 TYPE 2 DIABETES MELLITUS WITH STAGE 3 CHRONIC KIDNEY DISEASE, WITHOUT LONG-TERM CURRENT USE OF INSULIN (HCC): ICD-10-CM

## 2018-02-08 PROBLEM — E11.29 TYPE 2 DIABETES MELLITUS WITH RENAL COMPLICATION (HCC): Status: ACTIVE | Noted: 2018-02-08

## 2018-02-08 PROCEDURE — 99214 OFFICE O/P EST MOD 30 MIN: CPT | Performed by: INTERNAL MEDICINE

## 2018-02-08 NOTE — LETTER
February 8, 2018     Paris Ford MD  [address]  [address] [facility]  [address]    Patient: Cleopatra Perez   YOB: 1945   Date of Visit: 2/8/2018       Dear Dr Kimberly Rojas: Thank you for referring Erlinda Calderón to me for evaluation  Below are my notes for this consultation  If you have questions, please do not hesitate to call me  I look forward to following your patient along with you  Sincerely,        Jayce Gupta MD        CC: No Recipients  Jayce Gupta MD  2/8/2018  9:56 AM  Sign at close encounter  [address] 67 y o  male MRN: [id number]    Encounter: [id number] 2/8/2018    REASON FOR VISIT: Cleopatra Perez is a 67 y o  male who is here on 2/8/2018 for No chief complaint on file  Kemi Edouard HPI:    mathieu came in today for follow-up of stage III CKD  He is overall feeling well  Has some pain on right side of chest   He had fallen on ice and has some bruised rib  He had been to emergency room and fracture was ruled out  No other acute complaint  Does not take any pain killer  REVIEW OF SYSTEMS:    Review of Systems   Constitutional: Negative for activity change and fatigue  HENT: Negative for congestion and ear discharge  Eyes: Negative for photophobia and pain  Respiratory: Negative for apnea and choking  Cardiovascular: Negative for chest pain and palpitations  Gastrointestinal: Negative for abdominal distention and blood in stool  Endocrine: Negative for heat intolerance and polyphagia  Genitourinary: Negative for flank pain and urgency  Musculoskeletal: Negative for neck pain and neck stiffness  Skin: Negative for color change and wound  Allergic/Immunologic: Negative for food allergies and immunocompromised state  Neurological: Negative for seizures and facial asymmetry  Hematological: Negative for adenopathy  Does not bruise/bleed easily  Psychiatric/Behavioral: Negative for self-injury and suicidal ideas  PAST MEDICAL HISTORY:  Past Medical History:   Diagnosis Date    Diabetes mellitus ([facility] [address] )     Hypertension        PAST SURGICAL HISTORY:  Past Surgical History:   Procedure Laterality Date    ABDOMINAL SURGERY      HERNIA REPAIR         SOCIAL HISTORY:  History   Alcohol Use No     History   Drug Use No     History   Smoking Status    Never Smoker   Smokeless Tobacco    Never Used       FAMILY HISTORY:  No family history on file  MEDICATIONS:    Current Outpatient Prescriptions:     albuterol (PROAIR HFA) 90 mcg/act inhaler, Inhale daily as needed, Disp: , Rfl:     glyBURIDE (DIABETA) 5 mg tablet, Take by mouth, Disp: , Rfl:     Linagliptin (TRADJENTA) 5 MG TABS, Take 5 mg by mouth daily, Disp: , Rfl:     lisinopril-hydrochlorothiazide (PRINZIDE,ZESTORETIC) 20-25 MG per tablet, Take by mouth, Disp: , Rfl:     PHYSICAL EXAM:  Vitals:    02/08/18 0837   BP: 110/70   BP Location: Right arm   Patient Position: Sitting   Pulse: 64   Temp: 98 2 °F (36 8 °C)   TempSrc: Oral   Weight: 88 2 kg (194 lb 6 4 oz)     Body mass index is 27 89 kg/m²  Physical Exam   Constitutional: He is oriented to person, place, and time  He appears well-developed  No distress  HENT:   Head: Normocephalic  Mouth/Throat: Oropharynx is clear and moist    Eyes: Conjunctivae are normal  No scleral icterus  Neck: Neck supple  No JVD present  Cardiovascular: Normal rate and normal heart sounds  Pulmonary/Chest: Effort normal  He has no wheezes  Abdominal: Soft  There is no tenderness  Musculoskeletal: Normal range of motion  He exhibits no edema  Neurological: He is alert and oriented to person, place, and time  Skin: Skin is warm  No rash noted  Psychiatric: He has a normal mood and affect   His behavior is normal        LAB RESULTS:  Results for orders placed or performed in visit on 45/19/00   Basic metabolic panel   Result Value Ref Range    Sodium 137 136 - 145 mmol/L    Potassium 4 1 3 5 - 5 3 mmol/L Chloride 103 100 - 108 mmol/L    CO2 26 21 - 32 mmol/L    Anion Gap 8 4 - 13 mmol/L    BUN 32 (H) 5 - 25 mg/dL    Creatinine 1 67 (H) 0 60 - 1 30 mg/dL    Glucose, Fasting 143 (H) 65 - 99 mg/dL    Calcium 8 6 8 3 - 10 1 mg/dL    eGFR 40 ml/min/1 73sq m   CBC   Result Value Ref Range    WBC 7 50 4 31 - 10 16 Thousand/uL    RBC 4 74 3 88 - 5 62 Million/uL    Hemoglobin 14 9 12 0 - 17 0 g/dL    Hematocrit 42 4 36 5 - 49 3 %    MCV 90 82 - 98 fL    MCH 31 4 26 8 - 34 3 pg    MCHC 35 1 31 4 - 37 4 g/dL    RDW 12 9 11 6 - 15 1 %    Platelets 037 881 - 176 Thousands/uL    MPV 11 4 8 9 - 12 7 fL   Phosphorus   Result Value Ref Range    Phosphorus 3 4 2 3 - 4 1 mg/dL   PTH, intact   Result Value Ref Range     8 (H) 14 0 - 72 0 pg/mL   Urinalysis with reflex to microscopic   Result Value Ref Range    Color, UA Yellow     Clarity, UA Clear     Specific [place], UA 1 019 1 003 - 1 030    pH, UA 6 0 4 5 - 8 0    Leukocytes, UA Negative Negative    Nitrite, UA Negative Negative    Protein, UA Negative Negative mg/dl    Glucose, UA Negative Negative mg/dl    Ketones, UA Negative Negative mg/dl    Urobilinogen, UA 0 2 0 2, 1 0 E U /dl E U /dl    Bilirubin, UA Negative Negative    Blood, UA Negative Negative   Protein / creatinine ratio, urine   Result Value Ref Range    Creatinine, Ur 127 0 mg/dL    Protein Urine Random 15 mg/dL    Prot/Creat Ratio, Ur 0 12 (H) 0 00 - 0 10       ASSESSMENT and PLAN:      CKD (chronic kidney disease) stage 3, GFR 30-59 ml/min  Kidney function is stable at GFR about 40  Discussed at length with him about asymptomatic and progressive nature  Advised hydration and avoidance of any nephrotoxic medicine or procedure  Will continue to monitor    Essential hypertension  Very well controlled with present medication which I will continue    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)  Hemoglobin A1c is on higher side and will be monitored by primary doctor    Diet was discussed with him      Advised to continue what is doing  Better control of diabetes  I will see him back in 6 month unless something new happen  He will get blood and urine test before that visit        Portions of the record may have been created with voice recognition software  Occasional wrong word or "sound a like" substitutions may have occurred due to the inherent limitations of voice recognition software  Read the chart carefully and recognize, using context, where substitutions have occurred  If you have any questions, please contact the dictating provider

## 2018-02-08 NOTE — PATIENT INSTRUCTIONS
Chronic Kidney Disease   AMBULATORY CARE:   Chronic kidney disease (CKD)  is the gradual and permanent loss of kidney function  Normally, the kidneys remove fluid, chemicals, and waste from your blood  These wastes are turned into urine by your kidneys  CKD may worsen over time and lead to kidney failure  Common symptoms include the following:   · Changes in how often you need to urinate    · Swelling in your arms, legs, or feet    · Shortness of breath    · Fatigue or weakness    · Bad or bitter taste in your mouth    · Nausea, vomiting, or loss of appetite  Seek care immediately if:   · You are confused and very drowsy  · You have a seizure  · You have shortness of breath  Contact your healthcare provider if:   · You suddenly gain or lose more weight than your healthcare provider has told you is okay  · You have itchy skin or a rash  · You urinate more or less than you normally do  · You have blood in your urine  · You have nausea and repeated vomiting  · You have fatigue or muscle weakness  · You have hiccups that will not stop  · You have questions or concerns about your condition or care  Treatment for CKD:  Medicines may be given to decrease blood pressure and get rid of extra fluid  You may also receive medicine to manage health conditions that may occur with CKD  Dialysis is a treatment to remove chemicals and waste from your blood when your kidneys can no longer do this  Surgery may be needed to create an arteriovenous fistula (AVF) in your arm or insert a catheter into your abdomen so that you can receive dialysis  A kidney transplant may be done if your CKD becomes severe  Manage CKD:   · Maintain a healthy weight  Ask your healthcare provider how much you should weigh  Ask him to help you create a weight loss plan if you are overweight  · Exercise 30 to 60 minutes a day, 4 to 7 times a week, or as directed  Ask about the best exercise plan for you   Regular exercise can help you manage CKD, high blood pressure, and diabetes  · Follow your healthcare provider's advice about what to eat and drink  He may tell you to eat food low in sodium (salt), potassium, phosphorus, or protein  You may need to see a dietitian if you need help planning meals  Ask how much liquid to drink each day and which liquids are best for you  · Limit alcohol  Ask how much alcohol is safe for you to drink  A drink of alcohol is 12 ounces of beer, 5 ounces of wine, or 1½ ounces of liquor  · Do not smoke  Nicotine and other chemicals in cigarettes and cigars can cause lung and kidney damage  Ask your healthcare provider for information if you currently smoke and need help to quit  E-cigarettes or smokeless tobacco still contain nicotine  Talk to your healthcare provider before you use these products  · Ask your healthcare provider if you need vaccines  Infections such as pneumonia, influenza, and hepatitis can be more harmful or more likely to occur in a person who has CKD  Vaccines reduce your risk of infection with these viruses  Follow up with your healthcare provider as directed:  Write down your questions so you remember to ask them during your visits  © 2017 [employer] Information is for End User's use only and may not be sold, redistributed or otherwise used for commercial purposes  All illustrations and images included in CareNotes® are the copyrighted property of [employer]  or Andres Mendoza  The above information is an  only  It is not intended as medical advice for individual conditions or treatments  Talk to your doctor, nurse or pharmacist before following any medical regimen to see if it is safe and effective for you

## 2018-02-08 NOTE — ASSESSMENT & PLAN NOTE
Hemoglobin A1c is on higher side and will be monitored by primary doctor    Diet was discussed with him

## 2018-02-08 NOTE — PROGRESS NOTES
NEPHROLOGY OFFICE FOLLOW UP  Bree Muniz 67 y o  male MRN: [id number]    Encounter: [id number] 2/8/2018    REASON FOR VISIT: Angelito Carrasco is a 67 y o  male who is here on 2/8/2018 for No chief complaint on file  Mo Carter HPI:    bree came in today for follow-up of stage III CKD  He is overall feeling well  Has some pain on right side of chest   He had fallen on ice and has some bruised rib  He had been to emergency room and fracture was ruled out  No other acute complaint  Does not take any pain killer  REVIEW OF SYSTEMS:    Review of Systems   Constitutional: Negative for activity change and fatigue  HENT: Negative for congestion and ear discharge  Eyes: Negative for photophobia and pain  Respiratory: Negative for apnea and choking  Cardiovascular: Negative for chest pain and palpitations  Gastrointestinal: Negative for abdominal distention and blood in stool  Endocrine: Negative for heat intolerance and polyphagia  Genitourinary: Negative for flank pain and urgency  Musculoskeletal: Negative for neck pain and neck stiffness  Skin: Negative for color change and wound  Allergic/Immunologic: Negative for food allergies and immunocompromised state  Neurological: Negative for seizures and facial asymmetry  Hematological: Negative for adenopathy  Does not bruise/bleed easily  Psychiatric/Behavioral: Negative for self-injury and suicidal ideas  PAST MEDICAL HISTORY:  Past Medical History:   Diagnosis Date    Diabetes mellitus ([facility] [address] )     Hypertension        PAST SURGICAL HISTORY:  Past Surgical History:   Procedure Laterality Date    ABDOMINAL SURGERY      HERNIA REPAIR         SOCIAL HISTORY:  History   Alcohol Use No     History   Drug Use No     History   Smoking Status    Never Smoker   Smokeless Tobacco    Never Used       FAMILY HISTORY:  No family history on file      MEDICATIONS:    Current Outpatient Prescriptions:     albuterol (PROAIR HFA) 90 mcg/act inhaler, Inhale daily as needed, Disp: , Rfl:     glyBURIDE (DIABETA) 5 mg tablet, Take by mouth, Disp: , Rfl:     Linagliptin (TRADJENTA) 5 MG TABS, Take 5 mg by mouth daily, Disp: , Rfl:     lisinopril-hydrochlorothiazide (PRINZIDE,ZESTORETIC) 20-25 MG per tablet, Take by mouth, Disp: , Rfl:     PHYSICAL EXAM:  Vitals:    02/08/18 0837   BP: 110/70   BP Location: Right arm   Patient Position: Sitting   Pulse: 64   Temp: 98 2 °F (36 8 °C)   TempSrc: Oral   Weight: 88 2 kg (194 lb 6 4 oz)     Body mass index is 27 89 kg/m²  Physical Exam   Constitutional: He is oriented to person, place, and time  He appears well-developed  No distress  HENT:   Head: Normocephalic  Mouth/Throat: Oropharynx is clear and moist    Eyes: Conjunctivae are normal  No scleral icterus  Neck: Neck supple  No JVD present  Cardiovascular: Normal rate and normal heart sounds  Pulmonary/Chest: Effort normal  He has no wheezes  Abdominal: Soft  There is no tenderness  Musculoskeletal: Normal range of motion  He exhibits no edema  Neurological: He is alert and oriented to person, place, and time  Skin: Skin is warm  No rash noted  Psychiatric: He has a normal mood and affect   His behavior is normal        LAB RESULTS:  Results for orders placed or performed in visit on 37/83/02   Basic metabolic panel   Result Value Ref Range    Sodium 137 136 - 145 mmol/L    Potassium 4 1 3 5 - 5 3 mmol/L    Chloride 103 100 - 108 mmol/L    CO2 26 21 - 32 mmol/L    Anion Gap 8 4 - 13 mmol/L    BUN 32 (H) 5 - 25 mg/dL    Creatinine 1 67 (H) 0 60 - 1 30 mg/dL    Glucose, Fasting 143 (H) 65 - 99 mg/dL    Calcium 8 6 8 3 - 10 1 mg/dL    eGFR 40 ml/min/1 73sq m   CBC   Result Value Ref Range    WBC 7 50 4 31 - 10 16 Thousand/uL    RBC 4 74 3 88 - 5 62 Million/uL    Hemoglobin 14 9 12 0 - 17 0 g/dL    Hematocrit 42 4 36 5 - 49 3 %    MCV 90 82 - 98 fL    MCH 31 4 26 8 - 34 3 pg    MCHC 35 1 31 4 - 37 4 g/dL    RDW 12 9 11 6 - 15 1 % Platelets 635 568 - 468 Thousands/uL    MPV 11 4 8 9 - 12 7 fL   Phosphorus   Result Value Ref Range    Phosphorus 3 4 2 3 - 4 1 mg/dL   PTH, intact   Result Value Ref Range     8 (H) 14 0 - 72 0 pg/mL   Urinalysis with reflex to microscopic   Result Value Ref Range    Color, UA Yellow     Clarity, UA Clear     Specific [place], UA 1 019 1 003 - 1 030    pH, UA 6 0 4 5 - 8 0    Leukocytes, UA Negative Negative    Nitrite, UA Negative Negative    Protein, UA Negative Negative mg/dl    Glucose, UA Negative Negative mg/dl    Ketones, UA Negative Negative mg/dl    Urobilinogen, UA 0 2 0 2, 1 0 E U /dl E U /dl    Bilirubin, UA Negative Negative    Blood, UA Negative Negative   Protein / creatinine ratio, urine   Result Value Ref Range    Creatinine, Ur 127 0 mg/dL    Protein Urine Random 15 mg/dL    Prot/Creat Ratio, Ur 0 12 (H) 0 00 - 0 10       ASSESSMENT and PLAN:      CKD (chronic kidney disease) stage 3, GFR 30-59 ml/min  Kidney function is stable at GFR about 40  Discussed at length with him about asymptomatic and progressive nature  Advised hydration and avoidance of any nephrotoxic medicine or procedure  Will continue to monitor    Essential hypertension  Very well controlled with present medication which I will continue    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)  Hemoglobin A1c is on higher side and will be monitored by primary doctor  Diet was discussed with him      Advised to continue what is doing  Better control of diabetes  I will see him back in 6 month unless something new happen  He will get blood and urine test before that visit        Portions of the record may have been created with voice recognition software  Occasional wrong word or "sound a like" substitutions may have occurred due to the inherent limitations of voice recognition software  Read the chart carefully and recognize, using context, where substitutions have occurred  If you have any questions, please contact the dictating provider

## 2018-02-08 NOTE — ASSESSMENT & PLAN NOTE
Kidney function is stable at GFR about 40  Discussed at length with him about asymptomatic and progressive nature  Advised hydration and avoidance of any nephrotoxic medicine or procedure    Will continue to monitor

## 2018-05-21 DIAGNOSIS — E11.8 TYPE 2 DIABETES MELLITUS WITH COMPLICATION, WITHOUT LONG-TERM CURRENT USE OF INSULIN (HCC): Primary | ICD-10-CM

## 2018-05-21 RX ORDER — GLYBURIDE 5 MG/1
TABLET ORAL
Qty: 120 TABLET | Refills: 3 | Status: SHIPPED | OUTPATIENT
Start: 2018-05-21 | End: 2018-09-26 | Stop reason: SDUPTHER

## 2018-07-01 DIAGNOSIS — E78.00 PURE HYPERCHOLESTEROLEMIA: ICD-10-CM

## 2018-07-01 DIAGNOSIS — E11.29 TYPE 2 DIABETES MELLITUS WITH OTHER DIABETIC KIDNEY COMPLICATION (CODE): ICD-10-CM

## 2018-07-01 DIAGNOSIS — N18.30 CHRONIC KIDNEY DISEASE, STAGE III (MODERATE) (HCC): ICD-10-CM

## 2018-07-01 DIAGNOSIS — Z12.5 ENCOUNTER FOR SCREENING FOR MALIGNANT NEOPLASM OF PROSTATE: ICD-10-CM

## 2018-07-30 DIAGNOSIS — I10 ESSENTIAL HYPERTENSION: Primary | ICD-10-CM

## 2018-07-30 RX ORDER — LISINOPRIL AND HYDROCHLOROTHIAZIDE 25; 20 MG/1; MG/1
1 TABLET ORAL DAILY
Qty: 90 TABLET | Refills: 1 | Status: SHIPPED | OUTPATIENT
Start: 2018-07-30 | End: 2019-05-15 | Stop reason: SDUPTHER

## 2018-07-31 ENCOUNTER — APPOINTMENT (OUTPATIENT)
Dept: LAB | Facility: CLINIC | Age: 73
End: 2018-07-31
Payer: MEDICARE

## 2018-07-31 DIAGNOSIS — E78.00 PURE HYPERCHOLESTEROLEMIA: ICD-10-CM

## 2018-07-31 DIAGNOSIS — E11.29 TYPE 2 DIABETES MELLITUS WITH OTHER DIABETIC KIDNEY COMPLICATION (CODE): ICD-10-CM

## 2018-07-31 DIAGNOSIS — N18.30 CHRONIC KIDNEY DISEASE, STAGE III (MODERATE) (HCC): ICD-10-CM

## 2018-07-31 DIAGNOSIS — Z12.5 ENCOUNTER FOR SCREENING FOR MALIGNANT NEOPLASM OF PROSTATE: ICD-10-CM

## 2018-07-31 LAB
ALBUMIN SERPL BCP-MCNC: 4.1 G/DL (ref 3.5–5)
ALP SERPL-CCNC: 64 U/L (ref 46–116)
ALT SERPL W P-5'-P-CCNC: 22 U/L (ref 12–78)
ANION GAP SERPL CALCULATED.3IONS-SCNC: 8 MMOL/L (ref 4–13)
AST SERPL W P-5'-P-CCNC: 11 U/L (ref 5–45)
BILIRUB SERPL-MCNC: 0.47 MG/DL (ref 0.2–1)
BUN SERPL-MCNC: 39 MG/DL (ref 5–25)
CALCIUM SERPL-MCNC: 9.1 MG/DL (ref 8.3–10.1)
CHLORIDE SERPL-SCNC: 105 MMOL/L (ref 100–108)
CHOLEST SERPL-MCNC: 170 MG/DL (ref 50–200)
CO2 SERPL-SCNC: 25 MMOL/L (ref 21–32)
CREAT SERPL-MCNC: 1.76 MG/DL (ref 0.6–1.3)
EST. AVERAGE GLUCOSE BLD GHB EST-MCNC: 160 MG/DL
GFR SERPL CREATININE-BSD FRML MDRD: 38 ML/MIN/1.73SQ M
GLUCOSE P FAST SERPL-MCNC: 103 MG/DL (ref 65–99)
HBA1C MFR BLD: 7.2 % (ref 4.2–6.3)
HDLC SERPL-MCNC: 33 MG/DL (ref 40–60)
LDLC SERPL CALC-MCNC: 120 MG/DL (ref 0–100)
POTASSIUM SERPL-SCNC: 4.6 MMOL/L (ref 3.5–5.3)
PROT SERPL-MCNC: 7.4 G/DL (ref 6.4–8.2)
PSA SERPL-MCNC: 1.5 NG/ML (ref 0–4)
SODIUM SERPL-SCNC: 138 MMOL/L (ref 136–145)
TRIGL SERPL-MCNC: 87 MG/DL

## 2018-07-31 PROCEDURE — 36415 COLL VENOUS BLD VENIPUNCTURE: CPT

## 2018-07-31 PROCEDURE — 83036 HEMOGLOBIN GLYCOSYLATED A1C: CPT

## 2018-07-31 PROCEDURE — G0103 PSA SCREENING: HCPCS

## 2018-07-31 PROCEDURE — 80061 LIPID PANEL: CPT

## 2018-07-31 PROCEDURE — 80053 COMPREHEN METABOLIC PANEL: CPT

## 2018-08-04 ENCOUNTER — APPOINTMENT (EMERGENCY)
Dept: RADIOLOGY | Facility: HOSPITAL | Age: 73
End: 2018-08-04
Payer: MEDICARE

## 2018-08-04 ENCOUNTER — HOSPITAL ENCOUNTER (EMERGENCY)
Facility: HOSPITAL | Age: 73
Discharge: HOME | End: 2018-08-04
Attending: EMERGENCY MEDICINE
Payer: MEDICARE

## 2018-08-04 VITALS
TEMPERATURE: 98.5 F | HEART RATE: 92 BPM | BODY MASS INDEX: 27.84 KG/M2 | HEIGHT: 70 IN | RESPIRATION RATE: 18 BRPM | DIASTOLIC BLOOD PRESSURE: 66 MMHG | OXYGEN SATURATION: 96 % | WEIGHT: 194.45 LBS | SYSTOLIC BLOOD PRESSURE: 142 MMHG

## 2018-08-04 DIAGNOSIS — S61.452A DOG BITE, HAND, LEFT, INITIAL ENCOUNTER: ICD-10-CM

## 2018-08-04 DIAGNOSIS — E11.9 DIABETES (HCC): ICD-10-CM

## 2018-08-04 DIAGNOSIS — S41.151A DOG BITE OF RIGHT UPPER EXTREMITY, INITIAL ENCOUNTER: Primary | ICD-10-CM
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
## 2018-08-04 PROCEDURE — 99283 EMERGENCY DEPT VISIT LOW MDM: CPT

## 2018-08-04 PROCEDURE — 73110 X-RAY EXAM OF WRIST: CPT

## 2018-08-04 PROCEDURE — 73130 X-RAY EXAM OF HAND: CPT

## 2018-08-04 PROCEDURE — 73140 X-RAY EXAM OF FINGER(S): CPT

## 2018-08-04 RX ORDER — AMOXICILLIN AND CLAVULANATE POTASSIUM 875; 125 MG/1; MG/1
1 TABLET, FILM COATED ORAL ONCE
Status: COMPLETED | OUTPATIENT
Start: 2018-08-04 | End: 2018-08-04

## 2018-08-04 RX ORDER — AMOXICILLIN AND CLAVULANATE POTASSIUM 875; 125 MG/1; MG/1
1 TABLET, FILM COATED ORAL EVERY 12 HOURS
Qty: 14 TABLET | Refills: 0 | Status: SHIPPED | OUTPATIENT
Start: 2018-08-04 | End: 2018-08-11

## 2018-08-04 RX ADMIN — AMOXICILLIN AND CLAVULANATE POTASSIUM 1 TABLET: 875; 125 TABLET, FILM COATED ORAL at 19:40

## 2018-08-04 NOTE — ED PROVIDER NOTES
History  Chief Complaint   Patient presents with    Dog Bite     Patient states he was bit by his dog around 11:30 am this morning, and is now not able to have use of his right hand  Patient also states he is feeling nauseous  Bit by dog this afternoon R hand and wrist and L pinky finger while pt was reaching for food on the floor  Dog belongs to the pt, shots UTD  Pt's tetanus UTD as of this April  No abx allergies  Pain with movement of R hand and wrist, better with rest  Bleeding controlled  Not anticoagulated  Prior to Admission Medications   Prescriptions Last Dose Informant Patient Reported? Taking? Linagliptin (TRADJENTA) 5 MG TABS   Yes Yes   Sig: Take 5 mg by mouth daily   albuterol (PROAIR HFA) 90 mcg/act inhaler   Yes Yes   Sig: Inhale daily as needed   glyBURIDE (DIABETA) 5 mg tablet   No Yes   Sig: take 2 tablets by mouth twice a day   lisinopril-hydrochlorothiazide (PRINZIDE,ZESTORETIC) 20-25 MG per tablet   No Yes   Sig: Take 1 tablet by mouth daily      Facility-Administered Medications: None       Past Medical History:   Diagnosis Date    Asthma     last assessed: April 1, 2015    Balanitis     last assessed: July 9, 2014    Cataract     Chronic kidney disease, stage 3     last assessed: Jan 17, 2018    Diabetes mellitus [facility])     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([facility] [address] )     last assessed: Jan 17, 2018    Hypercholesterolemia     Hypertension     Pneumonia     last assessed: Feb 25, 2013       Past Surgical History:   Procedure Laterality Date   [address] [facility]    CATARACT EXTRACTION     [address] HERN[address] REPAIR  2007    x2    OTHER SURGICAL HISTORY  1965    Perforated duodenol ulcer surgery        Family History   Problem Relation Age of Onset    Pancreatic cancer Mother     Colon cancer Father     Diabetes Father         mellitus      I have reviewed and agree with the history as documented      Social History   Substance Use Topics    Smoking status: Never Smoker    Smokeless tobacco: Never Used      Comment: former smoker noted in "allscripts" Quit in 1971 - never used chewing tobacco     Alcohol use No      Comment: occasional alcohol use noted in "allscripts"         Review of Systems   Constitutional: Negative  Eyes: Negative  Respiratory: Negative  Cardiovascular: Negative  Musculoskeletal: Positive for joint swelling  Skin: Positive for wound  Neurological: Negative  Hematological: Negative  Physical Exam  Physical Exam   Constitutional: He appears well-developed and well-nourished  HENT:   Head: Normocephalic and atraumatic  Eyes: EOM are normal  Pupils are equal, round, and reactive to light  Neck: Normal range of motion  Neck supple  No JVD present  Cardiovascular: Normal rate and regular rhythm  Pulmonary/Chest: No stridor  Musculoskeletal: Normal range of motion  He exhibits tenderness  He exhibits no edema  Multiple areas of skin puncture and a 2cm linear well approximated laceration R wrist, no active bleeding  Normal distal neurovascular status  Intact median ulnar and radial nerves  L index finger laceration through nailbed  Nursing note and vitals reviewed  Vital Signs  ED Triage Vitals [08/04/18 1812]   Temperature Pulse Respirations Blood Pressure SpO2   98 5 °F (36 9 °C) 92 18 142/66 96 %      Temp Source Heart Rate Source Patient Position - Orthostatic VS BP Location FiO2 (%)   Oral Monitor Sitting Left arm --      Pain Score       --           Vitals:    08/04/18 1812   BP: 142/66   Pulse: 92   Patient Position - Orthostatic VS: Sitting       Visual Acuity      ED Medications  Medications   amoxicillin-clavulanate (AUGMENTIN) 875-125 mg per tablet 1 tablet (1 tablet Oral Given 8/4/18 1940)       Diagnostic Studies  Results Reviewed     None                 XR finger fifth digit-pinkie LEFT   Final Result by Hawk Adler MD (08/04 1920)      No fracture        No subcutaneous gas or radiopaque foreign bodies  Workstation performed: [id number]         XR hand 3+ views RIGHT   Final Result by Kelly Tsai MD (08/04 1919)      No acute osseous abnormality  Workstation performed: [id number]         XR wrist 3+ views RIGHT   Final Result by Kelly Tsai MD (08/04 1918)      Normal examination  Workstation performed: [id number]                    Procedures  Procedures       Phone Contacts  ED Phone Contact    ED Course       bit by dog, his dog, shots utd, provoked attack (doubt rabies)  Irrigated by me, dressing placed by me                        MDM   Unprovoked dog attack and dog bites  Discussed high risk of injection w pt and counseled him extensively regarding need to rted immediately if any concern for infection drainage pain redness difficulty moving hand etc      CritCare Time    Disposition  Final diagnoses:   Dog bite of right upper extremity, initial encounter   Dog bite, hand, left, initial encounter   Diabetes [facility])     Time reflects when diagnosis was documented in both MDM as applicable and the Disposition within this note     Time User Action Codes Description Comment    8/4/2018  7:31 PM Denis Camp Add Kathy Renea  0XXA] Dog bite of right upper extremity, initial encounter     8/4/2018  7:31 PM Maddie Sullivan Add [F43 390C,  W54  0XXA] Dog bite, hand, left, initial encounter     8/4/2018  7:31 PM Maddie Sullivan Add [E11 9] Diabetes [facility])       ED Disposition     ED Disposition Condition Comment    Discharge  Bree Barrios discharge to home/self care      Condition at discharge: Stable        Follow-up Information     Follow up With Specialties Details Why Contact Info    Lawyer Sheila MD Orthopedic Surgery, Orthopedics In 3 days For wound re-check [address] [place] [address] [address]  [phone]            Discharge Medication List as of 8/4/2018  7:31 PM      CONTINUE these medications which have NOT CHANGED    Details albuterol (PROAIR HFA) 90 mcg/act inhaler Inhale daily as needed, Starting Tue 4/8/2014, Historical Med      glyBURIDE (DIABETA) 5 mg tablet take 2 tablets by mouth twice a day, Normal      Linagliptin (TRADJENTA) 5 MG TABS Take 5 mg by mouth daily, Starting Sat 11/25/2017, Historical Med      lisinopril-hydrochlorothiazide (PRINZIDE,ZESTORETIC) 20-25 MG per tablet Take 1 tablet by mouth daily, Starting Mon 7/30/2018, Normal           No discharge procedures on file      ED Provider  Electronically Signed by           Luis Miguel Gill MD  08/04/18 2022

## 2018-08-04 NOTE — DISCHARGE INSTRUCTIONS
Animal Bite   WHAT YOU NEED TO KNOW:   Animal bite injuries range from shallow cuts to deep, life-threatening wounds  An animal can cut or puncture the skin when it bites  Your skin may be torn from your body  Your skin may swell or bruise even if the bite does not break the skin  Animal bites occur more often on the hands, arms, legs, and face  Bites from dogs and cats are the most common injuries  DISCHARGE INSTRUCTIONS:   Seek care immediately if:   · You have a fever  · Your wound is red, swollen, and draining pus  · You see red streaks on the skin around the wound  · You can no longer move the bitten area  · Your heartbeat and breathing are much faster than usual     · You feel dizzy and confused  Contact your healthcare provider if:   · Your pain does not get better, even after you take pain medicine  · You have nightmares or flashbacks about the animal bite  · You have questions or concerns about your condition or care  Medicines: You may need any of the following:  · Antibiotics  prevent or treat a bacterial infection  · Prescription pain medicine  may be given  Ask how to take this medicine safely  · A tetanus vaccine  may be needed to prevent tetanus  Tetanus is a life-threatening bacterial infection that affects the nerves and muscles  The bacteria can be spread through animal bites  · A rabies vaccine  may be needed to prevent rabies  Rabies is a life-threatening viral infection  The virus can be spread through animal bites  · Take your medicine as directed  Contact your healthcare provider if you think your medicine is not helping or if you have side effects  Tell him or her if you are allergic to any medicine  Keep a list of the medicines, vitamins, and herbs you take  Include the amounts, and when and why you take them  Bring the list or the pill bottles to follow-up visits  Carry your medicine list with you in case of an emergency    Follow up with your healthcare provider in 1 to 2 days: You may need to return to have your stitches removed  Write down your questions so you remember to ask them during your visits  Self-care:   · Apply antibiotic ointment as directed  This helps prevent infection in minor skin wounds  It is available without a doctor's order  · Keep the wound clean and covered  Wash the wound every day with soap and water or germ-killing cleanser  Ask your healthcare provider about the kinds of bandages to use  · Apply ice on your wound  Ice helps decrease swelling and pain  Ice may also help prevent tissue damage  Use an ice pack, or put crushed ice in a plastic bag  Cover it with a towel and place it on your wound for 15 to 20 minutes every hour or as directed  · Elevate the wound area  Raise your wound above the level of your heart as often as you can  This will help decrease swelling and pain  Prop your wound on pillows or blankets to keep it elevated comfortably  Prevent another animal bite:   · Learn to recognize the signs of a scared or angry pet  Avoid quick, sudden movements  · Do not step between animals that are fighting  · Do not leave a pet alone with a young child  · Do not disturb an animal while it eats, sleeps, or cares for its young  · Do not approach an animal you do not know, especially one that is tied up or caged  · Stay away from animals that seem sick or act strangely  · Do not feed or capture wild animals  © 2017 [address] Information is for End User's use only and may not be sold, redistributed or otherwise used for commercial purposes  All illustrations and images included in CareNotes® are the copyrighted property of [employer] , [employer]  or Andres Mendoza  The above information is an  only  It is not intended as medical advice for individual conditions or treatments   Talk to your doctor, nurse or pharmacist before following any medical regimen to see if it is safe and effective for you

## 2018-08-06 ENCOUNTER — TRANSCRIBE ORDERS (OUTPATIENT)
Dept: ADMINISTRATIVE | Facility: HOSPITAL | Age: 73
End: 2018-08-06

## 2018-08-06 ENCOUNTER — APPOINTMENT (OUTPATIENT)
Dept: LAB | Facility: CLINIC | Age: 73
End: 2018-08-06
Payer: MEDICARE

## 2018-08-06 ENCOUNTER — OFFICE VISIT (OUTPATIENT)
Dept: FAMILY MEDICINE CLINIC | Facility: CLINIC | Age: 73
End: 2018-08-06
Payer: MEDICARE

## 2018-08-06 VITALS
DIASTOLIC BLOOD PRESSURE: 80 MMHG | BODY MASS INDEX: 26.63 KG/M2 | HEART RATE: 72 BPM | WEIGHT: 186 LBS | SYSTOLIC BLOOD PRESSURE: 122 MMHG | OXYGEN SATURATION: 98 % | HEIGHT: 70 IN

## 2018-08-06 DIAGNOSIS — I10 BENIGN ESSENTIAL HYPERTENSION: Primary | ICD-10-CM

## 2018-08-06 DIAGNOSIS — R09.82 POST-NASAL DRIP: ICD-10-CM

## 2018-08-06 DIAGNOSIS — R74.01 NONSPECIFIC ELEVATION OF LEVELS OF TRANSAMINASE OR LACTIC ACID DEHYDROGENASE (LDH): ICD-10-CM

## 2018-08-06 DIAGNOSIS — E11.22 TYPE 2 DIABETES MELLITUS WITH STAGE 3 CHRONIC KIDNEY DISEASE, WITHOUT LONG-TERM CURRENT USE OF INSULIN (HCC): ICD-10-CM

## 2018-08-06 DIAGNOSIS — R35.1 NOCTURIA: ICD-10-CM

## 2018-08-06 DIAGNOSIS — R74.02 NONSPECIFIC ELEVATION OF LEVELS OF TRANSAMINASE OR LACTIC ACID DEHYDROGENASE (LDH): ICD-10-CM

## 2018-08-06 DIAGNOSIS — E11.8 UNCONTROLLED TYPE 2 DIABETES MELLITUS WITH COMPLICATION, UNSPECIFIED WHETHER LONG TERM INSULIN USE: ICD-10-CM

## 2018-08-06 DIAGNOSIS — S61.451D DOG BITE OF RIGHT HAND, SUBSEQUENT ENCOUNTER: ICD-10-CM

## 2018-08-06 DIAGNOSIS — N18.30 CKD (CHRONIC KIDNEY DISEASE) STAGE 3, GFR 30-59 ML/MIN (HCC): ICD-10-CM

## 2018-08-06 DIAGNOSIS — N18.30 TYPE 2 DIABETES MELLITUS WITH STAGE 3 CHRONIC KIDNEY DISEASE, WITHOUT LONG-TERM CURRENT USE OF INSULIN (HCC): ICD-10-CM

## 2018-08-06 DIAGNOSIS — R06.02 SHORTNESS OF BREATH: ICD-10-CM

## 2018-08-06 DIAGNOSIS — R26.89 IMPAIRMENT OF BALANCE: Primary | ICD-10-CM
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
## 2018-08-06 DIAGNOSIS — K58.0 IRRITABLE BOWEL SYNDROME WITH DIARRHEA: ICD-10-CM

## 2018-08-06 DIAGNOSIS — E11.65 UNCONTROLLED TYPE 2 DIABETES MELLITUS WITH COMPLICATION, UNSPECIFIED WHETHER LONG TERM INSULIN USE: ICD-10-CM

## 2018-08-06 DIAGNOSIS — N18.30 CHRONIC KIDNEY DISEASE, STAGE 3 (HCC): ICD-10-CM

## 2018-08-06 DIAGNOSIS — E78.00 HYPERCHOLESTEROLEMIA: ICD-10-CM

## 2018-08-06 PROBLEM — S61.451A DOG BITE OF RIGHT HAND: Status: ACTIVE | Noted: 2018-08-06
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
## 2018-08-06 LAB
ANION GAP SERPL CALCULATED.3IONS-SCNC: 6 MMOL/L (ref 4–13)
BASOPHILS # BLD AUTO: 0.06 THOUSANDS/ΜL (ref 0–0.1)
BASOPHILS NFR BLD AUTO: 1 % (ref 0–1)
BILIRUB UR QL STRIP: NEGATIVE
BUN SERPL-MCNC: 31 MG/DL (ref 5–25)
CALCIUM SERPL-MCNC: 9.1 MG/DL (ref 8.3–10.1)
CHLORIDE SERPL-SCNC: 104 MMOL/L (ref 100–108)
CLARITY UR: CLEAR
CO2 SERPL-SCNC: 27 MMOL/L (ref 21–32)
COLOR UR: YELLOW
CREAT SERPL-MCNC: 1.71 MG/DL (ref 0.6–1.3)
CREAT UR-MCNC: 145 MG/DL
EOSINOPHIL # BLD AUTO: 0.15 THOUSAND/ΜL (ref 0–0.61)
EOSINOPHIL NFR BLD AUTO: 2 % (ref 0–6)
ERYTHROCYTE [DISTWIDTH] IN BLOOD BY AUTOMATED COUNT: 13 % (ref 11.6–15.1)
GFR SERPL CREATININE-BSD FRML MDRD: 39 ML/MIN/1.73SQ M
GLUCOSE P FAST SERPL-MCNC: 169 MG/DL (ref 65–99)
GLUCOSE UR STRIP-MCNC: NEGATIVE MG/DL
HCT VFR BLD AUTO: 43.1 % (ref 36.5–49.3)
HGB BLD-MCNC: 14.1 G/DL (ref 12–17)
HGB UR QL STRIP.AUTO: NEGATIVE
IMM GRANULOCYTES # BLD AUTO: 0.03 THOUSAND/UL (ref 0–0.2)
IMM GRANULOCYTES NFR BLD AUTO: 0 % (ref 0–2)
KETONES UR STRIP-MCNC: NEGATIVE MG/DL
LEUKOCYTE ESTERASE UR QL STRIP: NEGATIVE
LYMPHOCYTES # BLD AUTO: 1.57 THOUSANDS/ΜL (ref 0.6–4.47)
LYMPHOCYTES NFR BLD AUTO: 17 % (ref 14–44)
MCH RBC QN AUTO: 30.4 PG (ref 26.8–34.3)
MCHC RBC AUTO-ENTMCNC: 32.7 G/DL (ref 31.4–37.4)
MCV RBC AUTO: 93 FL (ref 82–98)
MONOCYTES # BLD AUTO: 0.84 THOUSAND/ΜL (ref 0.17–1.22)
MONOCYTES NFR BLD AUTO: 9 % (ref 4–12)
NEUTROPHILS # BLD AUTO: 6.5 THOUSANDS/ΜL (ref 1.85–7.62)
NEUTS SEG NFR BLD AUTO: 71 % (ref 43–75)
NITRITE UR QL STRIP: NEGATIVE
NRBC BLD AUTO-RTO: 0 /100 WBCS
PH UR STRIP.AUTO: 5.5 [PH] (ref 4.5–8)
PHOSPHATE SERPL-MCNC: 3 MG/DL (ref 2.3–4.1)
PLATELET # BLD AUTO: 242 THOUSANDS/UL (ref 149–390)
PMV BLD AUTO: 11.6 FL (ref 8.9–12.7)
POTASSIUM SERPL-SCNC: 4.9 MMOL/L (ref 3.5–5.3)
PROT UR STRIP-MCNC: NEGATIVE MG/DL
PROT UR-MCNC: 24 MG/DL
PROT/CREAT UR: 0.17 MG/G{CREAT} (ref 0–0.1)
PTH-INTACT SERPL-MCNC: 101.1 PG/ML (ref 18.4–80.1)
RBC # BLD AUTO: 4.64 MILLION/UL (ref 3.88–5.62)
SODIUM SERPL-SCNC: 137 MMOL/L (ref 136–145)
SP GR UR STRIP.AUTO: 1.02 (ref 1–1.03)
UROBILINOGEN UR QL STRIP.AUTO: 0.2 E.U./DL
WBC # BLD AUTO: 9.15 THOUSAND/UL (ref 4.31–10.16)

## 2018-08-06 PROCEDURE — 84156 ASSAY OF PROTEIN URINE: CPT

## 2018-08-06 PROCEDURE — 83970 ASSAY OF PARATHORMONE: CPT

## 2018-08-06 PROCEDURE — 81003 URINALYSIS AUTO W/O SCOPE: CPT

## 2018-08-06 PROCEDURE — 85025 COMPLETE CBC W/AUTO DIFF WBC: CPT

## 2018-08-06 PROCEDURE — 80048 BASIC METABOLIC PNL TOTAL CA: CPT

## 2018-08-06 PROCEDURE — 36415 COLL VENOUS BLD VENIPUNCTURE: CPT

## 2018-08-06 PROCEDURE — 99214 OFFICE O/P EST MOD 30 MIN: CPT | Performed by: FAMILY MEDICINE

## 2018-08-06 PROCEDURE — 84100 ASSAY OF PHOSPHORUS: CPT

## 2018-08-06 PROCEDURE — 82570 ASSAY OF URINE CREATININE: CPT

## 2018-08-06 NOTE — PROGRESS NOTES
Assessment/Plan:     Diagnoses and all orders for this visit:    Benign essential hypertension    Hypercholesterolemia  -     Comprehensive metabolic panel; Future  -     Lipid Panel with Direct LDL reflex; Future    Type 2 diabetes mellitus with stage 3 chronic kidney disease, without long-term current use of insulin (HCC)  -     Comprehensive metabolic panel; Future  -     Hemoglobin A1C; Future  -     Microalbumin / creatinine urine ratio; Future    Dog bite of right hand, subsequent encounter    Chronic kidney disease, stage 3    Post-nasal drip    Other orders  -     ERYN CONTOUR TEST test strip; Test sugar twice a day         1  DM, HTN, HLD - stable labs - continue current medications  Low carb diet  2  CKD - follows with Nephrology  Stable  3  Dog bite - on Augmentin, Tdap UTD  If not improving over the next week, call or come back in  May need OT vs  Orthopedics  4  PND - advised him to try nasal spray and antihistamine which he can get OTC  F/U 6 mo sooner PRN    Subjective:      Patient ID: Bree Urena is a 67 y o  male  Patient is here to review blood work  Diabetes-A1c is 7 2%  This improved from 7 4%  He is currently on glyburide 5 mg two tablets b i d , Tradjenta 5 mg daily  Lipids-total cholesterol 170, LDL is 120  Patient is intolerant to statins  Hypertension-well controlled currently on lisinopril-hydrochlorothiazide  He was seen in the ER on 8/4/18 for a dog bite on the R hand  He is on Augmentin  He has some swelling and pain in the R wrist  He had Xrays of the right wrist, hand - negative for fx  The following portions of the patient's history were reviewed and updated as appropriate: He  has a past medical history of Asthma; Balanitis; Cataract; Chronic kidney disease, stage 3; Diabetes mellitus ([address] ); 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([address] ); Hypercholesterolemia; Hypertension; and Pneumonia    He   Patient Active Problem List    Diagnosis Date Noted    Dog bite of right hand 08/06/2018    Chronic kidney disease, stage 3 02/08/2018    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([facility] [address] ) 02/08/2018    Benign essential hypertension 02/08/2018    Hypercholesterolemia 04/02/2014     He  has a past surgical history that includes Hernia repair; Abdominal surgery; Appendectomy (1965); Cataract extraction; Hernia repair (2007); and Other surgical history (1965)  His family history includes Colon cancer in his father; Diabetes in his father; Pancreatic cancer in his mother  He  reports that he has never smoked  He has never used smokeless tobacco  He reports that he does not drink alcohol or use drugs  Current Outpatient Prescriptions   Medication Sig Dispense Refill    albuterol (PROAIR HFA) 90 mcg/act inhaler Inhale daily as needed      amoxicillin-clavulanate (AUGMENTIN) 875-125 mg per tablet Take 1 tablet by mouth every 12 (twelve) hours for 7 days 14 tablet 0    ERYN CONTOUR TEST test strip Test sugar twice a day   0    glyBURIDE (DIABETA) 5 mg tablet take 2 tablets by mouth twice a day 120 tablet 3    Linagliptin (TRADJENTA) 5 MG TABS Take 5 mg by mouth daily      lisinopril-hydrochlorothiazide (PRINZIDE,ZESTORETIC) 20-25 MG per tablet Take 1 tablet by mouth daily 90 tablet 1     No current facility-administered medications for this visit  Current Outpatient Prescriptions on File Prior to Visit   Medication Sig    albuterol (PROAIR HFA) 90 mcg/act inhaler Inhale daily as needed    amoxicillin-clavulanate (AUGMENTIN) 875-125 mg per tablet Take 1 tablet by mouth every 12 (twelve) hours for 7 days    glyBURIDE (DIABETA) 5 mg tablet take 2 tablets by mouth twice a day    Linagliptin (TRADJENTA) 5 MG TABS Take 5 mg by mouth daily    lisinopril-hydrochlorothiazide (PRINZIDE,ZESTORETIC) 20-25 MG per tablet Take 1 tablet by mouth daily     No current facility-administered medications on file prior to visit  He is allergic to meperidine and pravastatin       Review of Systems   Constitutional: Negative for activity change, appetite change, fatigue and unexpected weight change  Respiratory: Negative for chest tightness and shortness of breath  Cardiovascular: Negative for chest pain and leg swelling  Gastrointestinal: Negative for abdominal pain  Musculoskeletal: Positive for arthralgias  Skin: Positive for wound  Neurological: Negative for headaches  Objective:      /80   Pulse 72   Ht 5' 10" (1 778 m)   Wt 84 4 kg (186 lb)   SpO2 98%   BMI 26 69 kg/m²          Physical Exam   Constitutional: He is oriented to person, place, and time  He appears well-developed and well-nourished  No distress  HENT:   Head: Normocephalic and atraumatic  Cardiovascular: Normal rate, regular rhythm and normal heart sounds  Exam reveals no gallop and no friction rub  Pulses are no weak pulses  No murmur heard  Pulses:       Dorsalis pedis pulses are 2+ on the right side, and 2+ on the left side  Posterior tibial pulses are 2+ on the right side, and 2+ on the left side  Pulmonary/Chest: Effort normal and breath sounds normal  No respiratory distress  He has no wheezes  He has no rales  He exhibits no tenderness  Musculoskeletal: He exhibits no edema  Right wrist: He exhibits decreased range of motion, tenderness and swelling  R wrist swelling, tenderness, scabbed lacerations, decreased ROM  L index finger bruising, tenderness, tooth denis   Feet:   Right Foot:   Skin Integrity: Negative for ulcer, skin breakdown, erythema, warmth, callus or dry skin  Left Foot:   Skin Integrity: Negative for ulcer, skin breakdown, erythema, warmth, callus or dry skin  Neurological: He is alert and oriented to person, place, and time  Skin: He is not diaphoretic  Psychiatric: He has a normal mood and affect  His behavior is normal  Judgment and thought content normal    Nursing note and vitals reviewed  Patient's shoes and socks removed  Right Foot/Ankle   Right Foot Inspection  Skin Exam: skin normal and skin intact no dry skin, no warmth, no callus, no erythema, no maceration, no abnormal color, no pre-ulcer, no ulcer and no callus                          Toe Exam: no swelling, no tenderness, erythema and  no right toe deformity  Sensory   Vibration: intact    Monofilament testing: intact  Vascular  Capillary refills: < 3 seconds  The right DP pulse is 2+  The right PT pulse is 2+  Left Foot/Ankle  Left Foot Inspection  Skin Exam: skin normal and skin intactno dry skin, no warmth, no erythema, no maceration, normal color, no pre-ulcer, no ulcer and no callus                         Toe Exam: no swelling, no tenderness, no erythema and no left toe deformity                   Sensory   Vibration: intact    Monofilament: intact  Vascular  Capillary refills: < 3 seconds  The left DP pulse is 2+  The left PT pulse is 2+  Assign Risk Category:  No deformity present; No loss of protective sensation;  No weak pulses       Risk: 0

## 2018-08-08 ENCOUNTER — OFFICE VISIT (OUTPATIENT)
Dept: NEPHROLOGY | Facility: CLINIC | Age: 73
End: 2018-08-08
Payer: MEDICARE

## 2018-08-08 VITALS
BODY MASS INDEX: 26.8 KG/M2 | WEIGHT: 187.2 LBS | TEMPERATURE: 99.2 F | SYSTOLIC BLOOD PRESSURE: 100 MMHG | HEART RATE: 80 BPM | HEIGHT: 70 IN | DIASTOLIC BLOOD PRESSURE: 70 MMHG

## 2018-08-08 DIAGNOSIS — N18.30 TYPE 2 DIABETES MELLITUS WITH STAGE 3 CHRONIC KIDNEY DISEASE, WITHOUT LONG-TERM CURRENT USE OF INSULIN (HCC): ICD-10-CM

## 2018-08-08 DIAGNOSIS — I10 BENIGN ESSENTIAL HYPERTENSION: ICD-10-CM

## 2018-08-08 DIAGNOSIS — N18.30 CHRONIC KIDNEY DISEASE, STAGE 3 (HCC): Primary | ICD-10-CM

## 2018-08-08 DIAGNOSIS — E11.22 TYPE 2 DIABETES MELLITUS WITH STAGE 3 CHRONIC KIDNEY DISEASE, WITHOUT LONG-TERM CURRENT USE OF INSULIN (HCC): ICD-10-CM

## 2018-08-08 DIAGNOSIS — E78.00 HYPERCHOLESTEROLEMIA: ICD-10-CM

## 2018-08-08 PROCEDURE — 99214 OFFICE O/P EST MOD 30 MIN: CPT | Performed by: INTERNAL MEDICINE

## 2018-08-08 NOTE — PATIENT INSTRUCTIONS
Chronic Kidney Disease   AMBULATORY CARE:   Chronic kidney disease (CKD)  is the gradual and permanent loss of kidney function  Normally, the kidneys remove fluid, chemicals, and waste from your blood  These wastes are turned into urine by your kidneys  CKD may worsen over time and lead to kidney failure  Common symptoms include the following:   · Changes in how often you need to urinate    · Swelling in your arms, legs, or feet    · Shortness of breath    · Fatigue or weakness    · Bad or bitter taste in your mouth    · Nausea, vomiting, or loss of appetite  Seek care immediately if:   · You are confused and very drowsy  · You have a seizure  · You have shortness of breath  Contact your healthcare provider if:   · You suddenly gain or lose more weight than your healthcare provider has told you is okay  · You have itchy skin or a rash  · You urinate more or less than you normally do  · You have blood in your urine  · You have nausea and repeated vomiting  · You have fatigue or muscle weakness  · You have hiccups that will not stop  · You have questions or concerns about your condition or care  Treatment for CKD:  Medicines may be given to decrease blood pressure and get rid of extra fluid  You may also receive medicine to manage health conditions that may occur with CKD  Dialysis is a treatment to remove chemicals and waste from your blood when your kidneys can no longer do this  Surgery may be needed to create an arteriovenous fistula (AVF) in your arm or insert a catheter into your abdomen so that you can receive dialysis  A kidney transplant may be done if your CKD becomes severe  Manage CKD:   · Maintain a healthy weight  Ask your healthcare provider how much you should weigh  Ask him to help you create a weight loss plan if you are overweight  · Exercise 30 to 60 minutes a day, 4 to 7 times a week, or as directed  Ask about the best exercise plan for you   Regular exercise can help you manage CKD, high blood pressure, and diabetes  · Follow your healthcare provider's advice about what to eat and drink  He may tell you to eat food low in sodium (salt), potassium, phosphorus, or protein  You may need to see a dietitian if you need help planning meals  Ask how much liquid to drink each day and which liquids are best for you  · Limit alcohol  Ask how much alcohol is safe for you to drink  A drink of alcohol is 12 ounces of beer, 5 ounces of wine, or 1½ ounces of liquor  · Do not smoke  Nicotine and other chemicals in cigarettes and cigars can cause lung and kidney damage  Ask your healthcare provider for information if you currently smoke and need help to quit  E-cigarettes or smokeless tobacco still contain nicotine  Talk to your healthcare provider before you use these products  · Ask your healthcare provider if you need vaccines  Infections such as pneumonia, influenza, and hepatitis can be more harmful or more likely to occur in a person who has CKD  Vaccines reduce your risk of infection with these viruses  Follow up with your healthcare provider as directed:  Write down your questions so you remember to ask them during your visits  © 2017 [employer] Information is for End User's use only and may not be sold, redistributed or otherwise used for commercial purposes  All illustrations and images included in CareNotes® are the copyrighted property of [employer]  or Andres Mendoza  The above information is an  only  It is not intended as medical advice for individual conditions or treatments  Talk to your doctor, nurse or pharmacist before following any medical regimen to see if it is safe and effective for you

## 2018-08-08 NOTE — LETTER
August 8, 2018     Scotty Florence MD  [address]  [address] [place] [address] [address]    Patient: Justin Sheffield   YOB: 1945   Date of Visit: 8/8/2018       Dear Dr Hanh Borjas: Thank you for referring Radha Garland to me for evaluation  Below are my notes for this consultation  If you have questions, please do not hesitate to call me  I look forward to following your patient along with you  Sincerely,        Reshma Buitrago MD        CC: No Recipients  Reshma Buitrago MD  8/8/2018  3:45 PM  Sign at close encounter  [address] 67 y o  male MRN: [id number]    Encounter: [id number] 8/8/2018    REASON FOR VISIT: Justin Sheffield is a 67 y o  male who is here on 8/8/2018 for Follow-up and CKD III       HPI:    Bree came in today for follow-up of CKD stage 3  He is feeling quite well  Denies any complaint except some joint pain  He still has some paresthesia of both lower extremity that sometime making difficult to walk and he does get some unsteady gait but no other new complaints        REVIEW OF SYSTEMS:    Review of Systems   Constitutional: Negative for activity change and fatigue  HENT: Negative for congestion and ear discharge  Eyes: Negative for photophobia and pain  Respiratory: Negative for apnea, choking and shortness of breath  Cardiovascular: Negative for chest pain and palpitations  Gastrointestinal: Negative for abdominal distention and blood in stool  Endocrine: Negative for heat intolerance and polyphagia  Genitourinary: Negative for flank pain and urgency  Musculoskeletal: Positive for arthralgias and gait problem  Negative for neck pain and neck stiffness  Skin: Negative for color change and wound  Allergic/Immunologic: Negative for food allergies and immunocompromised state  Neurological: Positive for weakness, light-headedness and numbness  Negative for seizures and facial asymmetry  Hematological: Negative for adenopathy  Does not bruise/bleed easily  Psychiatric/Behavioral: Negative for self-injury and suicidal ideas           PAST MEDICAL HISTORY:  Past Medical History:   Diagnosis Date    Asthma     last assessed: April 1, 2015    Balanitis     last assessed: July 9, 2014    Cataract     Chronic kidney disease, stage 3     last assessed: Jan 17, 2018    Diabetes mellitus [facility])     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([facility] [facility] )     last assessed: Jan 17, 2018    Hypercholesterolemia     Hypertension     Pneumonia     last assessed: Feb 25, 2013       PAST SURGICAL HISTORY:  Past Surgical History:   Procedure Laterality Date    ABDOMINAL SURGERY      APPENDECTOMY  1965    CATARACT EXTRACTION     [address] HERNIA REPAIR  2007    x2    OTHER SURGICAL HISTORY  1965    Perforated duodenol ulcer surgery        SOCIAL HISTORY:  History   Alcohol Use No     Comment: occasional alcohol use noted in "allscripts"      History   Drug Use No     History   Smoking Status    Never Smoker   Smokeless Tobacco    Never Used     Comment: former smoker noted in "allscripts" Quit in 1971 - never used chewing tobacco        FAMILY HISTORY:  Family History   Problem Relation Age of Onset    Pancreatic cancer Mother     Colon cancer Father     Diabetes Father         mellitus        MEDICATIONS:    Current Outpatient Prescriptions:     albuterol (PROAIR HFA) 90 mcg/act inhaler, Inhale daily as needed, Disp: , Rfl:     amoxicillin-clavulanate (AUGMENTIN) 875-125 mg per tablet, Take 1 tablet by mouth every 12 (twelve) hours for 7 days, Disp: 14 tablet, Rfl: 0    ERYN CONTOUR TEST test strip, Test sugar twice a day , Disp: , Rfl: 0    glyBURIDE (DIABETA) 5 mg tablet, take 2 tablets by mouth twice a day, Disp: 120 tablet, Rfl: 3    Linagliptin (TRADJENTA) 5 MG TABS, Take 5 mg by mouth daily, Disp: , Rfl:     lisinopril-hydrochlorothiazide (PRINZIDE,ZESTORETIC) 20-25 MG per tablet, Take 1 tablet by mouth daily, Disp: 90 tablet, Rfl: 1    PHYSICAL EXAM:  Vitals:    08/08/18 1313   BP: 100/70   BP Location: Right arm   Patient Position: Sitting   Pulse: 80   Temp: 99 2 °F (37 3 °C)   TempSrc: Tympanic   Weight: 84 9 kg (187 lb 3 2 oz)   Height: 5' 10" (1 778 m)     Body mass index is 26 86 kg/m²  Physical Exam   Constitutional: He is oriented to person, place, and time  He appears well-developed  No distress  HENT:   Head: Normocephalic and atraumatic  Mouth/Throat: Oropharynx is clear and moist    Eyes: Conjunctivae are normal  Pupils are equal, round, and reactive to light  No scleral icterus  Neck: Normal range of motion  Neck supple  No JVD present  Cardiovascular: Normal rate, regular rhythm and normal heart sounds  No murmur heard  Pulmonary/Chest: Effort normal and breath sounds normal  He has no wheezes  Abdominal: Soft  Bowel sounds are normal  He exhibits no mass  There is no tenderness  Musculoskeletal: Normal range of motion  He exhibits no edema  Neurological: He is alert and oriented to person, place, and time  Skin: Skin is warm  No rash noted  Psychiatric: He has a normal mood and affect   His behavior is normal        LAB RESULTS:  Results for orders placed or performed in visit on 02/04/14   Basic metabolic panel   Result Value Ref Range    Sodium 137 136 - 145 mmol/L    Potassium 4 9 3 5 - 5 3 mmol/L    Chloride 104 100 - 108 mmol/L    CO2 27 21 - 32 mmol/L    Anion Gap 6 4 - 13 mmol/L    BUN 31 (H) 5 - 25 mg/dL    Creatinine 1 71 (H) 0 60 - 1 30 mg/dL    Glucose, Fasting 169 (H) 65 - 99 mg/dL    Calcium 9 1 8 3 - 10 1 mg/dL    eGFR 39 ml/min/1 73sq m   CBC and differential   Result Value Ref Range    WBC 9 15 4 31 - 10 16 Thousand/uL    RBC 4 64 3 88 - 5 62 Million/uL    Hemoglobin 14 1 12 0 - 17 0 g/dL    Hematocrit 43 1 36 5 - 49 3 %    MCV 93 82 - 98 fL    MCH 30 4 26 8 - 34 3 pg    MCHC 32 7 31 4 - 37 4 g/dL    RDW 13 0 11 6 - 15 1 %    MPV 11 6 8 9 - 12 7 fL    Platelets 763 119 - 915 Thousands/uL    nRBC 0 /100 WBCs    Neutrophils Relative 71 43 - 75 %    Immat GRANS % 0 0 - 2 %    Lymphocytes Relative 17 14 - 44 %    Monocytes Relative 9 4 - 12 %    Eosinophils Relative 2 0 - 6 %    Basophils Relative 1 0 - 1 %    Neutrophils Absolute 6 50 1 85 - 7 62 Thousands/µL    Immature Grans Absolute 0 03 0 00 - 0 20 Thousand/uL    Lymphocytes Absolute 1 57 0 60 - 4 47 Thousands/µL    Monocytes Absolute 0 84 0 17 - 1 22 Thousand/µL    Eosinophils Absolute 0 15 0 00 - 0 61 Thousand/µL    Basophils Absolute 0 06 0 00 - 0 10 Thousands/µL   Phosphorus   Result Value Ref Range    Phosphorus 3 0 2 3 - 4 1 mg/dL   Protein / creatinine ratio, urine   Result Value Ref Range    Creatinine, Ur 145 0 mg/dL    Protein Urine Random 24 mg/dL    Prot/Creat Ratio, Ur 0 17 (H) 0 00 - 0 10   UA (URINE) with reflex to Microscopic   Result Value Ref Range    Color, UA Yellow     Clarity, UA Clear     Specific [place], UA 1 019 1 003 - 1 030    pH, UA 5 5 4 5 - 8 0    Leukocytes, UA Negative Negative    Nitrite, UA Negative Negative    Protein, UA Negative Negative mg/dl    Glucose, UA Negative Negative mg/dl    Ketones, UA Negative Negative mg/dl    Urobilinogen, UA 0 2 0 2, 1 0 E U /dl E U /dl    Bilirubin, UA Negative Negative    Blood, UA Negative Negative   PTH, intact   Result Value Ref Range     1 (H) 18 4 - 80 1 pg/mL       ASSESSMENT and PLAN:      Chronic kidney disease, stage 3  Kidney function is stable at GFR 39  Advised to continue what is doing with good hydration and avoiding any nephrotoxic medicine and I will continue to monitor him    Benign essential hypertension  Blood pressure is very well controlled    I will see him back in 6 month          Portions of the record may have been created with voice recognition software  Occasional wrong word or "sound a like" substitutions may have occurred due to the inherent limitations of voice recognition software   Read the chart carefully and recognize, using context, where substitutions have occurred  If you have any questions, please contact the dictating provider

## 2018-08-08 NOTE — ASSESSMENT & PLAN NOTE
Kidney function is stable at GFR 39    Advised to continue what is doing with good hydration and avoiding any nephrotoxic medicine and I will continue to monitor him

## 2018-08-08 NOTE — PROGRESS NOTES
NEPHROLOGY OFFICE FOLLOW UP  Bree Johnson 67 y o  male MRN: [id number]    Encounter: [id number] 8/8/2018    REASON FOR VISIT: Vincenzo Stern is a 67 y o  male who is here on 8/8/2018 for Follow-up and CKD III       HPI:    Bree came in today for follow-up of CKD stage 3  He is feeling quite well  Denies any complaint except some joint pain  He still has some paresthesia of both lower extremity that sometime making difficult to walk and he does get some unsteady gait but no other new complaints        REVIEW OF SYSTEMS:    Review of Systems   Constitutional: Negative for activity change and fatigue  HENT: Negative for congestion and ear discharge  Eyes: Negative for photophobia and pain  Respiratory: Negative for apnea, choking and shortness of breath  Cardiovascular: Negative for chest pain and palpitations  Gastrointestinal: Negative for abdominal distention and blood in stool  Endocrine: Negative for heat intolerance and polyphagia  Genitourinary: Negative for flank pain and urgency  Musculoskeletal: Positive for arthralgias and gait problem  Negative for neck pain and neck stiffness  Skin: Negative for color change and wound  Allergic/Immunologic: Negative for food allergies and immunocompromised state  Neurological: Positive for weakness, light-headedness and numbness  Negative for seizures and facial asymmetry  Hematological: Negative for adenopathy  Does not bruise/bleed easily  Psychiatric/Behavioral: Negative for self-injury and suicidal ideas           PAST MEDICAL HISTORY:  Past Medical History:   Diagnosis Date    Asthma     last assessed: April 1, 2015    Balanitis     last assessed: July 9, 2014    Cataract     Chronic kidney disease, stage 3     last assessed: Jan 17, 2018    Diabetes mellitus [facility])     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[facility])     last assessed: Jan 17, 2018    Hypercholesterolemia     Hypertension     Pneumonia     last assessed: Feb 25, 2013 PAST SURGICAL HISTORY:  Past Surgical History:   Procedure Laterality Date    ABDOMINAL SURGERY      APPENDECTOMY  1965    CATARACT EXTRACTION      HERNIA REPAIR      HERNIA REPAIR  2007    x2    OTHER SURGICAL HISTORY  1965    Perforated duodenol ulcer surgery        SOCIAL HISTORY:  History   Alcohol Use No     Comment: occasional alcohol use noted in "allscripts"      History   Drug Use No     History   Smoking Status    Never Smoker   Smokeless Tobacco    Never Used     Comment: former smoker noted in "allscripts" Quit in 1971 - never used chewing tobacco        FAMILY HISTORY:  Family History   Problem Relation Age of Onset    Pancreatic cancer Mother     Colon cancer Father     Diabetes Father         mellitus        MEDICATIONS:    Current Outpatient Prescriptions:     albuterol (PROAIR HFA) 90 mcg/act inhaler, Inhale daily as needed, Disp: , Rfl:     amoxicillin-clavulanate (AUGMENTIN) 875-125 mg per tablet, Take 1 tablet by mouth every 12 (twelve) hours for 7 days, Disp: 14 tablet, Rfl: 0    ERYN CONTOUR TEST test strip, Test sugar twice a day , Disp: , Rfl: 0    glyBURIDE (DIABETA) 5 mg tablet, take 2 tablets by mouth twice a day, Disp: 120 tablet, Rfl: 3    Linagliptin (TRADJENTA) 5 MG TABS, Take 5 mg by mouth daily, Disp: , Rfl:     lisinopril-hydrochlorothiazide (PRINZIDE,ZESTORETIC) 20-25 MG per tablet, Take 1 tablet by mouth daily, Disp: 90 tablet, Rfl: 1    PHYSICAL EXAM:  Vitals:    08/08/18 1313   BP: 100/70   BP Location: Right arm   Patient Position: Sitting   Pulse: 80   Temp: 99 2 °F (37 3 °C)   TempSrc: Tympanic   Weight: 84 9 kg (187 lb 3 2 oz)   Height: 5' 10" (1 778 m)     Body mass index is 26 86 kg/m²  Physical Exam   Constitutional: He is oriented to person, place, and time  He appears well-developed  No distress  HENT:   Head: Normocephalic and atraumatic     Mouth/Throat: Oropharynx is clear and moist    Eyes: Conjunctivae are normal  Pupils are equal, round, and reactive to light  No scleral icterus  Neck: Normal range of motion  Neck supple  No JVD present  Cardiovascular: Normal rate, regular rhythm and normal heart sounds  No murmur heard  Pulmonary/Chest: Effort normal and breath sounds normal  He has no wheezes  Abdominal: Soft  Bowel sounds are normal  He exhibits no mass  There is no tenderness  Musculoskeletal: Normal range of motion  He exhibits no edema  Neurological: He is alert and oriented to person, place, and time  Skin: Skin is warm  No rash noted  Psychiatric: He has a normal mood and affect   His behavior is normal        LAB RESULTS:  Results for orders placed or performed in visit on 92/84/03   Basic metabolic panel   Result Value Ref Range    Sodium 137 136 - 145 mmol/L    Potassium 4 9 3 5 - 5 3 mmol/L    Chloride 104 100 - 108 mmol/L    CO2 27 21 - 32 mmol/L    Anion Gap 6 4 - 13 mmol/L    BUN 31 (H) 5 - 25 mg/dL    Creatinine 1 71 (H) 0 60 - 1 30 mg/dL    Glucose, Fasting 169 (H) 65 - 99 mg/dL    Calcium 9 1 8 3 - 10 1 mg/dL    eGFR 39 ml/min/1 73sq m   CBC and differential   Result Value Ref Range    WBC 9 15 4 31 - 10 16 Thousand/uL    RBC 4 64 3 88 - 5 62 Million/uL    Hemoglobin 14 1 12 0 - 17 0 g/dL    Hematocrit 43 1 36 5 - 49 3 %    MCV 93 82 - 98 fL    MCH 30 4 26 8 - 34 3 pg    MCHC 32 7 31 4 - 37 4 g/dL    RDW 13 0 11 6 - 15 1 %    MPV 11 6 8 9 - 12 7 fL    Platelets 199 725 - 894 Thousands/uL    nRBC 0 /100 WBCs    Neutrophils Relative 71 43 - 75 %    Immat GRANS % 0 0 - 2 %    Lymphocytes Relative 17 14 - 44 %    Monocytes Relative 9 4 - 12 %    Eosinophils Relative 2 0 - 6 %    Basophils Relative 1 0 - 1 %    Neutrophils Absolute 6 50 1 85 - 7 62 Thousands/µL    Immature Grans Absolute 0 03 0 00 - 0 20 Thousand/uL    Lymphocytes Absolute 1 57 0 60 - 4 47 Thousands/µL    Monocytes Absolute 0 84 0 17 - 1 22 Thousand/µL    Eosinophils Absolute 0 15 0 00 - 0 61 Thousand/µL    Basophils Absolute 0 06 0 00 - 0 10 Thousands/µL   Phosphorus   Result Value Ref Range    Phosphorus 3 0 2 3 - 4 1 mg/dL   Protein / creatinine ratio, urine   Result Value Ref Range    Creatinine, Ur 145 0 mg/dL    Protein Urine Random 24 mg/dL    Prot/Creat Ratio, Ur 0 17 (H) 0 00 - 0 10   UA (URINE) with reflex to Microscopic   Result Value Ref Range    Color, UA Yellow     Clarity, UA Clear     Specific [place], UA 1 019 1 003 - 1 030    pH, UA 5 5 4 5 - 8 0    Leukocytes, UA Negative Negative    Nitrite, UA Negative Negative    Protein, UA Negative Negative mg/dl    Glucose, UA Negative Negative mg/dl    Ketones, UA Negative Negative mg/dl    Urobilinogen, UA 0 2 0 2, 1 0 E U /dl E U /dl    Bilirubin, UA Negative Negative    Blood, UA Negative Negative   PTH, intact   Result Value Ref Range     1 (H) 18 4 - 80 1 pg/mL       ASSESSMENT and PLAN:      Chronic kidney disease, stage 3  Kidney function is stable at GFR 39  Advised to continue what is doing with good hydration and avoiding any nephrotoxic medicine and I will continue to monitor him    Benign essential hypertension  Blood pressure is very well controlled    I will see him back in 6 month          Portions of the record may have been created with voice recognition software  Occasional wrong word or "sound a like" substitutions may have occurred due to the inherent limitations of voice recognition software  Read the chart carefully and recognize, using context, where substitutions have occurred  If you have any questions, please contact the dictating provider

## 2018-08-30 LAB
LEFT EYE DIABETIC RETINOPATHY: NORMAL
RIGHT EYE DIABETIC RETINOPATHY: NORMAL

## 2018-09-26 DIAGNOSIS — E11.8 TYPE 2 DIABETES MELLITUS WITH COMPLICATION, WITHOUT LONG-TERM CURRENT USE OF INSULIN (HCC): ICD-10-CM

## 2018-09-26 RX ORDER — GLYBURIDE 5 MG/1
TABLET ORAL
Qty: 120 TABLET | Refills: 3 | Status: SHIPPED | OUTPATIENT
Start: 2018-09-26 | End: 2019-01-28 | Stop reason: SDUPTHER

## 2018-09-26 RX ORDER — LINAGLIPTIN 5 MG/1
TABLET, FILM COATED ORAL
Qty: 30 TABLET | Refills: 5 | Status: SHIPPED | OUTPATIENT
Start: 2018-09-26 | End: 2019-03-31 | Stop reason: SDUPTHER

## 2018-10-01 ENCOUNTER — OFFICE VISIT (OUTPATIENT)
Dept: URGENT CARE | Facility: CLINIC | Age: 73
End: 2018-10-01
Payer: MEDICARE

## 2018-10-01 ENCOUNTER — APPOINTMENT (OUTPATIENT)
Dept: RADIOLOGY | Facility: CLINIC | Age: 73
End: 2018-10-01
Payer: MEDICARE

## 2018-10-01 VITALS
SYSTOLIC BLOOD PRESSURE: 134 MMHG | BODY MASS INDEX: 27.06 KG/M2 | DIASTOLIC BLOOD PRESSURE: 80 MMHG | RESPIRATION RATE: 18 BRPM | WEIGHT: 189 LBS | TEMPERATURE: 98.7 F | OXYGEN SATURATION: 96 % | HEART RATE: 96 BPM | HEIGHT: 70 IN

## 2018-10-01 DIAGNOSIS — M25.531 RIGHT WRIST PAIN: ICD-10-CM

## 2018-10-01 DIAGNOSIS — M25.531 RIGHT WRIST PAIN: Primary | ICD-10-CM

## 2018-10-01 PROCEDURE — 99213 OFFICE O/P EST LOW 20 MIN: CPT | Performed by: PHYSICIAN ASSISTANT

## 2018-10-01 PROCEDURE — G0463 HOSPITAL OUTPT CLINIC VISIT: HCPCS | Performed by: PHYSICIAN ASSISTANT

## 2018-10-01 PROCEDURE — 73110 X-RAY EXAM OF WRIST: CPT

## 2018-10-01 NOTE — PROGRESS NOTES
[address] [employer] [facility]        NAME: Jeff Kee is a 67 y o  male  : 1945    MRN: [id number]  DATE: 2018  TIME: 4:15 PM    Assessment and Plan   Right wrist pain [M25 531]  1  Right wrist pain  XR wrist 3+ vw right         Patient Instructions     1  Right wrist pain  -X-ray as reviewed by myself is negative for fracture  -Do RICE protocol at home (rest, ice, compression, elevate)  -Wear ace wrap  -Use tylenol/motrin as needed  -Follow-up with PCP within 1 week if no improvement    Go to ER with worsening symptoms, worsening pain, or any signs of distress    Chief Complaint     Chief Complaint   Patient presents with    Wrist Injury     R wrist x2 wks post fall  + pain, swelling , bruising  Pt has iced and wrapped with no resolve  History of Present Illness       The patient is a 67year old male who presents today for an evaluation of right wrist pain  The patient states that he injured his wrist about 2 weeks ago  He states that he was walking his dog and the dog started getting out of the harness when he fell and landed on his wrist  He states that he has applied ice and wrapped his wrist however he continues with increased pain and swelling  He states that he has decreased ROM because of the pain  He rates his pain as a 10/10  Review of Systems   Review of Systems   Constitutional: Negative for chills and fever  Respiratory: Negative for shortness of breath  Cardiovascular: Negative for chest pain  Musculoskeletal: Positive for joint swelling  Skin: Negative for rash  Neurological: Negative for numbness           Current Medications       Current Outpatient Prescriptions:     albuterol (PROAIR HFA) 90 mcg/act inhaler, Inhale daily as needed, Disp: , Rfl:     ERYN CONTOUR TEST test strip, Test sugar twice a day , Disp: , Rfl: 0    glyBURIDE (DIABETA) 5 mg tablet, take 2 tablets by mouth twice a day, Disp: 120 tablet, Rfl: 3    lisinopril-hydrochlorothiazide (PRINZIDE,ZESTORETIC) 20-25 MG per tablet, Take 1 tablet by mouth daily, Disp: 90 tablet, Rfl: 1    TRADJENTA 5 MG TABS, take 1 tablet by mouth once daily, Disp: 30 tablet, Rfl: 5    Current Allergies     Allergies as of 10/01/2018 - Reviewed 10/01/2018   Allergen Reaction Noted    Meperidine      Pravastatin Myalgia             The following portions of the patient's history were reviewed and updated as appropriate: allergies, current medications, past family history, past medical history, past social history, past surgical history and problem list      Past Medical History:   Diagnosis Date    Asthma     last assessed: April 1, 2015    Balanitis     last assessed: July 9, 2014    Cataract     [facility] stage 3 ([facility] )     last assessed: Jan 17, 2018    Diabetes mellitus [facility])     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([facility] )     last assessed: Jan 17, 2018    Hypercholesterolemia     Hypertension     Pneumonia     last assessed: Feb 25, 2013       Past Surgical History:   Procedure Laterality Date   [address] [facility]    CATARACT EXTRACTION     [address] REPA[address]  2007    x2    OTHER SURGICAL HISTORY  1965    Perforated duodenol ulcer surgery        Family History   Problem Relation Age of Onset    Pancreatic cancer Mother     Colon cancer Father     Diabetes Father         mellitus          Medications have been verified  Objective   /80   Pulse 96   Temp 98 7 °F (37 1 °C) (Temporal)   Resp 18   Ht 5' 10" (1 778 m)   Wt 85 7 kg (189 lb)   SpO2 96%   BMI 27 12 kg/m²        Physical Exam     Physical Exam   Constitutional: He is oriented to person, place, and time  He appears well-developed and well-nourished  No distress  Cardiovascular: Normal rate, regular rhythm and normal heart sounds  Pulmonary/Chest: Effort normal and breath sounds normal  He has no wheezes  He has no rales     Musculoskeletal:        Right wrist: He exhibits decreased range of motion, bony tenderness (tenderness upon palpation of the ulnar aspect of the wrist) and swelling  Neurological: He is alert and oriented to person, place, and time  No sensory deficit  2+ radial pulse   Skin: Skin is warm and dry  Psychiatric: He has a normal mood and affect  Nursing note and vitals reviewed

## 2018-10-01 NOTE — PATIENT INSTRUCTIONS
1  Right wrist pain  -X-ray as reviewed by myself is negative for fracture  -Do RICE protocol at home (rest, ice, compression, elevate)  -Wear ace wrap  -Use tylenol/motrin as needed  -Follow-up with PCP within 1 week if no improvement    Go to ER with worsening symptoms, worsening pain, or any signs of distress    Wrist Injury   WHAT YOU NEED TO KNOW:   A wrist injury happens when the tissues of your wrist joint are damaged  Your wrist joint is made up of tendons, ligaments, nerves, and bones  Two common types of injuries that can happen to your wrist are sprains and strains  A sprain can happen when the ligaments are stretched or torn  Ligaments are bands of elastic tissue that connect and hold the bones together  A strain happens when a tendon or muscle is overused, stretched, or torn  Tendons attach your hand and arm muscles to the bones of the wrist    DISCHARGE INSTRUCTIONS:   Medicines:   · NSAIDs:  These medicines decrease swelling, pain, and fever  NSAIDs are available without a doctor's order  Ask which medicine is right for you  Ask how much to take and when to take it  Take as directed  NSAIDs can cause stomach bleeding and kidney problems if not taken correctly  · Pain medicine: You may be given a prescription medicine to decrease pain  Do not wait until the pain is severe before you take this medicine  · Take your medicine as directed  Contact your healthcare provider if you think your medicine is not helping or if you have side effects  Tell him or her if you are allergic to any medicine  Keep a list of the medicines, vitamins, and herbs you take  Include the amounts, and when and why you take them  Bring the list or the pill bottles to follow-up visits  Carry your medicine list with you in case of an emergency  Follow up with your healthcare provider as directed:  Write down your questions so you remember to ask them during your visits     Manage your symptoms:   · Wrist supports:  A cast or splint may be put on your fingers, hand, and wrist to support your wrist and prevent further damage  Wear these as directed  Ask for instructions on how to bathe while you are wearing a splint or case  · Rest:  You may need to rest your wrist for at least 48 hours and avoid activities that cause pain  Ask what activities you should avoid and for how long  · Ice:  Ice helps decrease swelling and pain  Ice may also help prevent tissue damage  Use an ice pack or put crushed ice in a plastic bag  Cover it with a towel and place it on your injured wrist for 15 to 20 minutes every hour as directed  · Compression:  Your healthcare provider may suggest you wrap your wrist with an elastic bandage  This will help decrease swelling, support your wrist, and help it heal  Wear your wrist wrap as directed  Ask for instructions on how to wrap your wrist     · Elevation:  When you sit or lie down, keep your wrist at or above the level of your heart  This may help decrease pain and swelling  Physical therapy:  Your healthcare provider may recommend that you go to physical therapy  A physical therapist shows you how to do exercises that can help to strengthen your wrist and improve its range of movement  These exercises may also help decrease your pain  Prevent another wrist injury:   · Do strengthening exercises: Your healthcare provider or physical therapist may suggest that you do exercises to strengthen your hand and arm muscles  Ask when you may return to your regular physical activities or sports  If you start to exercise too soon it may cause you to injure your wrist again  · Protect your wrists:  Wrist guard splints or protective tape can help to support your wrist during exercise and sports  These devices may also keep your wrist from bending too far back  Ask for more information about the type of wrist support that you should use  Contact your healthcare provider if:   · You have a fever       · The bruising, swelling, or pain in your wrist gets worse  · You have questions or concerns about your condition or care  Seek care immediately or call 911 if:   · The skin on or near your wrist or hand feels cold, or it turns blue or white  · The skin on or near your wrist or hand is very tight, raised, and swollen  · You have new trouble moving and using your hands, fingers, or wrist     · Your wrist, hands, or fingers become swollen, red, numb, or they tingle  · Your wrist has any open wounds, including from surgery, that are red, swollen, warm, or have pus coming from them  © 2017 [facility] Information is for End User's use only and may not be sold, redistributed or otherwise used for commercial purposes  All illustrations and images included in CareNotes® are the copyrighted property of [employer]  or Andres Mendoza  The above information is an  only  It is not intended as medical advice for individual conditions or treatments  Talk to your doctor, nurse or pharmacist before following any medical regimen to see if it is safe and effective for you

## 2018-10-27 ENCOUNTER — HOSPITAL ENCOUNTER (EMERGENCY)
Facility: HOSPITAL | Age: 73
Discharge: HOME | End: 2018-10-27
Attending: EMERGENCY MEDICINE | Admitting: EMERGENCY MEDICINE
Payer: MEDICARE

## 2018-10-27 VITALS
DIASTOLIC BLOOD PRESSURE: 56 MMHG | SYSTOLIC BLOOD PRESSURE: 115 MMHG | WEIGHT: 190 LBS | RESPIRATION RATE: 20 BRPM | TEMPERATURE: 97.6 F | OXYGEN SATURATION: 95 % | HEART RATE: 61 BPM | HEIGHT: 70 IN | BODY MASS INDEX: 27.2 KG/M2

## 2018-10-27 DIAGNOSIS — M62.838 MUSCLE SPASM: ICD-10-CM

## 2018-10-27 DIAGNOSIS — M54.50 ACUTE RIGHT-SIDED LOW BACK PAIN WITHOUT SCIATICA: Primary | ICD-10-CM

## 2018-10-27 PROCEDURE — 99283 EMERGENCY DEPT VISIT LOW MDM: CPT

## 2018-10-27 PROCEDURE — 96372 THER/PROPH/DIAG INJ SC/IM: CPT

## 2018-10-27 RX ORDER — HYDROCODONE BITARTRATE AND ACETAMINOPHEN 5; 325 MG/1; MG/1
1 TABLET ORAL EVERY 6 HOURS PRN
Qty: 20 TABLET | Refills: 0 | Status: SHIPPED | OUTPATIENT
Start: 2018-10-27 | End: 2018-11-16 | Stop reason: ALTCHOICE

## 2018-10-27 RX ORDER — KETOROLAC TROMETHAMINE 30 MG/ML
15 INJECTION, SOLUTION INTRAMUSCULAR; INTRAVENOUS ONCE
Status: COMPLETED | OUTPATIENT
Start: 2018-10-27 | End: 2018-10-27

## 2018-10-27 RX ORDER — METHYLPREDNISOLONE 4 MG/1
TABLET ORAL
Qty: 21 TABLET | Refills: 0 | Status: SHIPPED | OUTPATIENT
Start: 2018-10-27 | End: 2018-11-16 | Stop reason: ALTCHOICE

## 2018-10-27 RX ORDER — ACETAMINOPHEN 325 MG/1
975 TABLET ORAL ONCE
Status: COMPLETED | OUTPATIENT
Start: 2018-10-27 | End: 2018-10-27

## 2018-10-27 RX ORDER — LIDOCAINE 50 MG/G
1 PATCH TOPICAL ONCE
Status: DISCONTINUED | OUTPATIENT
Start: 2018-10-27 | End: 2018-10-27 | Stop reason: HOSPADM

## 2018-10-27 RX ORDER — LIDOCAINE 50 MG/G
1 PATCH TOPICAL DAILY
Qty: 30 PATCH | Refills: 0 | Status: SHIPPED | OUTPATIENT
Start: 2018-10-27 | End: 2018-11-16 | Stop reason: ALTCHOICE

## 2018-10-27 RX ORDER — HYDROMORPHONE HCL/PF 1 MG/ML
1 SYRINGE (ML) INJECTION ONCE
Status: COMPLETED | OUTPATIENT
Start: 2018-10-27 | End: 2018-10-27

## 2018-10-27 RX ORDER — KETOROLAC TROMETHAMINE 30 MG/ML
30 INJECTION, SOLUTION INTRAMUSCULAR; INTRAVENOUS ONCE
Status: DISCONTINUED | OUTPATIENT
Start: 2018-10-27 | End: 2018-10-27

## 2018-10-27 RX ADMIN — ACETAMINOPHEN 975 MG: 325 TABLET, FILM COATED ORAL at 18:49

## 2018-10-27 RX ADMIN — LIDOCAINE 1 PATCH: 50 PATCH TOPICAL at 18:56

## 2018-10-27 RX ADMIN — KETOROLAC TROMETHAMINE 15 MG: 30 INJECTION, SOLUTION INTRAMUSCULAR at 18:55

## 2018-10-27 RX ADMIN — HYDROMORPHONE HYDROCHLORIDE 1 MG: 1 INJECTION, SOLUTION INTRAMUSCULAR; INTRAVENOUS; SUBCUTANEOUS at 18:50

## 2018-10-27 NOTE — ED PROVIDER NOTES
History  Chief Complaint   Patient presents with    Back Pain     pt c/o RLQ abd pain and nausea for 3 days  74-year-old male patient presents here with a chief complaint of right lower back pain  He has had the pain for a day or so  The pain is worsened by trying to sit upright  He reports when he tries to sit upright he begins to develop spasm over the right posterior costal margin  He has reproducible tenderness with palpation of spasmed musculature  He denies trauma and he denies shortness of breath  No nausea no vomiting no diarrhea  No urinary complaints  This pain is completely reproducible palpation of the posterior chest wall on the right  Nothing that would suggest any renal calculi or colic  Prior to Admission Medications   Prescriptions Last Dose Informant Patient Reported? Taking?    ERYN CONTOUR TEST test strip  Self Yes No   Sig: Test sugar twice a day    TRADJENTA 5 MG TABS   No No   Sig: take 1 tablet by mouth once daily   albuterol (PROAIR HFA) 90 mcg/act inhaler   Yes No   Sig: Inhale daily as needed   glyBURIDE (DIABETA) 5 mg tablet   No No   Sig: take 2 tablets by mouth twice a day   lisinopril-hydrochlorothiazide (PRINZIDE,ZESTORETIC) 20-25 MG per tablet   No No   Sig: Take 1 tablet by mouth daily      Facility-Administered Medications: None       Past Medical History:   Diagnosis Date    Asthma     last assessed: April 1, 2015    Balanitis     last assessed: July 9, 2014    Cataract     Chronic kidney disease, stage 3 [facility])     last assessed: Jan 17, 2018    Diabetes mellitus [facility])     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([facility] [address] )     last assessed: Jan 17, 2018    Hypercholesterolemia     Hypertension     Pneumonia     last assessed: Feb 25, 2013       Past Surgical History:   Procedure Laterality Date    ABDOMINAL SURGERY      APPENDECTOMY  1965    CATARACT EXTRACTION     [address]  2007    x2   [facility] Perforated duodenol ulcer surgery        Family History   Problem Relation Age of Onset    Pancreatic cancer Mother     Colon cancer Father     Diabetes Father         mellitus      I have reviewed and agree with the history as documented  Social History   Substance Use Topics    Smoking status: Never Smoker    Smokeless tobacco: Never Used      Comment: former smoker noted in "[employer]scripts" Quit in 1971 - never used chewing tobacco     Alcohol use Yes      Comment: rare         Review of Systems   Constitutional: Negative for diaphoresis, fatigue and fever  HENT: Negative for congestion, ear pain, nosebleeds and sore throat  Eyes: Negative for photophobia, pain, discharge and visual disturbance  Respiratory: Negative for cough, choking, chest tightness, shortness of breath and wheezing  Cardiovascular: Negative for chest pain and palpitations  Gastrointestinal: Negative for abdominal distention, abdominal pain, diarrhea and vomiting  Genitourinary: Negative for dysuria, flank pain and frequency  Musculoskeletal: Positive for back pain  Negative for gait problem and joint swelling  Skin: Negative for color change and rash  Neurological: Negative for dizziness, syncope and headaches  Psychiatric/Behavioral: Negative for behavioral problems and confusion  The patient is not nervous/anxious  All other systems reviewed and are negative  Physical Exam  Physical Exam   Constitutional: He is oriented to person, place, and time  He appears well-developed and well-nourished  HENT:   Head: Normocephalic and atraumatic  Eyes: Pupils are equal, round, and reactive to light  Neck: Normal range of motion  Neck supple  Cardiovascular: Normal rate, regular rhythm, normal heart sounds and normal pulses  PMI is not displaced  Pulmonary/Chest: Effort normal and breath sounds normal  No respiratory distress  Abdominal: Soft  He exhibits no distension  There is no guarding  Musculoskeletal: Normal range of motion  Thoracic back: He exhibits tenderness, pain and spasm  He exhibits no bony tenderness  Back:    Lymphadenopathy:     He has no cervical adenopathy  Neurological: He is alert and oriented to person, place, and time  Skin: Skin is warm and dry  No rash noted  He is not diaphoretic  No pallor  Psychiatric: He has a normal mood and affect  Vitals reviewed  Vital Signs  ED Triage Vitals [10/27/18 1838]   Temperature Pulse Respirations Blood Pressure SpO2   97 6 °F (36 4 °C) 69 20 149/82 95 %      Temp Source Heart Rate Source Patient Position - Orthostatic VS BP Location FiO2 (%)   Oral Monitor Sitting Left arm --      Pain Score       --           Vitals:    10/27/18 1838 10/27/18 1915   BP: 149/82 115/56   Pulse: 69 61   Patient Position - Orthostatic VS: Sitting        Visual Acuity      ED Medications  Medications   lidocaine (LIDODERM) 5 % patch 1 patch (1 patch Topical Medication Applied 10/27/18 1856)   acetaminophen (TYLENOL) tablet 975 mg (975 mg Oral Given 10/27/18 1849)   HYDROmorphone (DILAUDID) injection 1 mg (1 mg Intramuscular Given 10/27/18 1850)   ketorolac (TORADOL) injection 15 mg (15 mg Intramuscular Given 10/27/18 1855)       Diagnostic Studies  Results Reviewed     None                 No orders to display              Procedures  Procedures       Phone Contacts  ED Phone Contact    ED Course           Identification of Seniors at Risk      Most Recent Value   (ISAR) Identification of Seniors at Risk   Before the illness or injury that brought you to the Emergency, did you need someone to help you on a regular basis? 0 Filed at: 10/27/2018 1840   In the last 24 hours, have you needed more help than usual?  0 Filed at: 10/27/2018 1840   Have you been hospitalized for one or more nights during the past 6 months?   0 Filed at: 10/27/2018 1840   In general, do you see well?  0 Filed at: 10/27/2018 1840   In general, do you have serious problems with your memory? 0 Filed at: 10/27/2018 1840   Do you take more than three different medications every day? 1 Filed at: 10/27/2018 1840   ISAR Score  1 Filed at: 10/27/2018 1840                          [facility]  Number of Diagnoses or Management Options  Acute right-sided low back pain without sciatica: new and requires workup  Muscle spasm: new and requires workup  Diagnosis management comments: This is completely reproducible tenderness with palpation and movement of the upper torso  Very suggestive of spasm and likely strain  Offered imaging for the patient but at this point he declined  States he will return if anything changes  Patient Progress  Patient progress: stable    CritCare Time    Disposition  Final diagnoses:   Acute right-sided low back pain without sciatica   Muscle spasm     Time reflects when diagnosis was documented in both MDM as applicable and the Disposition within this note     Time User Action Codes Description Comment    10/27/2018  6:53 PM [facility] Nurse Add [M54 5] Acute right-sided low back pain without sciatica     10/27/2018  6:53 PM [facility] Nurse Add [D91 720] Muscle spasm       ED Disposition     ED Disposition Condition Comment    Discharge  Bree Barrios discharge to home/self care      Condition at discharge: Good        Follow-up Information     Follow up With Specialties Details Why [address], DO Sports Medicine Schedule an appointment as soon as possible for a visit For Continued Evaluation [address] [place] [address]  [phone]            Patient's Medications   Discharge Prescriptions    HYDROCODONE-ACETAMINOPHEN (NORCO) 5-325 MG PER TABLET    Take 1 tablet by mouth every 6 (six) hours as needed for pain for up to 20 doses Max Daily Amount: 4 tablets       Start Date: 10/27/2018End Date: --       Order Dose: 1 tablet       Quantity: 20 tablet    Refills: 0    LIDOCAINE (LIDODERM) 5 %    Apply 1 patch topically daily for 10 days Remove & Discard patch within 12 hours or as directed by MD       Start Date: 10/27/2018End Date: 11/6/2018       Order Dose: 1 patch       Quantity: 30 patch    Refills: 0    METHYLPREDNISOLONE (MEDROL) 4 MG TBPK    Use as directed on package       Start Date: 10/27/2018End Date: --       Order Dose: --       Quantity: 21 tablet    Refills: 0     No discharge procedures on file      ED Provider  Electronically Signed by           TREVIN Mullins  10/27/18 7058

## 2018-10-27 NOTE — DISCHARGE INSTRUCTIONS
Acute Low Back Pain, Ambulatory Care   GENERAL INFORMATION:   Acute low back pain  is discomfort in your lower back area that lasts for less than 12 weeks  The word acute is used to describe pain that starts suddenly, worsens quickly, and lasts for a short time  Common symptoms include the following:   · Back stiffness or spasms    · Pain down the back or side of one leg    · Holding yourself in an unusual position or posture to decrease your back pain    · Not being able to find a sitting position that is comfortable    · Slow increase in your pain for 24 to 48 hours after you stress your back    · Tenderness on your lower back or severe pain when you move your back  Seek immediate care for the following symptoms:   · Severe pain    · Sudden stiffness and heaviness in both buttocks down to both legs    · Numbness or weakness in one leg, or pain in both legs    · Numbness in your genital area or across your lower back    · Unable to control your urine or bowel movements  Treatment for acute low back pain  may include any of the following:  · Medicines:      ¨ NSAIDs  help decrease swelling and pain or fever  This medicine is available with or without a doctor's order  NSAIDs can cause stomach bleeding or kidney problems in certain people  If you take blood thinner medicine, always ask your healthcare provider if NSAIDs are safe for you  Always read the medicine label and follow directions  ¨ Muscle relaxers  help decrease muscle spasms pain  ¨ Prescription pain medicine  may be given  Ask how to take this medicine safely  · Surgery  may be needed if your pain is severe and other treatments do not work  Surgery may be needed for conditions of the lumbar spine, such as herniated disc or spinal stenosis  Manage your symptoms:   · Sleep on a firm mattress  If you do not have a firm mattress, have someone move your mattress to the floor for a few days   A piece of plywood under your mattress can also help make it firmer  · Apply ice  on your lower back for 15 to 20 minutes every hour or as directed  Use an ice pack, or put crushed ice in a plastic bag  Cover it with a towel  Ice helps prevent tissue damage and decreases swelling and pain  You can alternate ice and heat  · Apply heat  on your lower back for 20 to 30 minutes every 2 hours for as many days as directed  Heat helps decrease pain and muscle spasms  · Go to physical therapy  A physical therapist teaches you exercises to help improve movement and strength, and to decrease pain  Prevent acute low back pain:   · Use proper body mechanics  ¨ Bend at the hips and knees when you  objects  Do not bend from the waist  Use your leg muscles as you lift the load  Do not use your back  Keep the object close to your chest as you lift it  Try not to twist or lift anything above your waist     ¨ Change your position often when you stand for long periods of time  Rest one foot on a small box or footrest, and then switch to the other foot often  ¨ Try not to sit for long periods of time  When you do, sit in a straight-backed chair with your feet flat on the floor  Never reach, pull, or push while you are sitting  · Exercise regularly  Warm up before you exercise  Do exercises that strengthen your back muscles  Ask about the best exercise plan for you  · Maintain a healthy weight  Ask your healthcare provider how much you should weigh  Ask him to help you create a weight loss plan if you are overweight  Follow up with your healthcare provider as directed:  Return for a follow-up visit if you still have pain after 1 to 3 weeks of treatment  You may need to visit an orthopedist if your back pain lasts more than 6 to 12 weeks  Write down your questions so you remember to ask them during your visits  CARE AGREEMENT:   You have the right to help plan your care  Learn about your health condition and how it may be treated   Discuss treatment options with your caregivers to decide what care you want to receive  You always have the right to refuse treatment  The above information is an  only  It is not intended as medical advice for individual conditions or treatments  Talk to your doctor, nurse or pharmacist before following any medical regimen to see if it is safe and effective for you  © 2014 [address] is for End User's use only and may not be sold, redistributed or otherwise used for commercial purposes  All illustrations and images included in CareNotes® are the copyrighted property of [employer]  or [employer]  Muscle Spasm   WHAT YOU NEED TO KNOW:   A muscle spasm is a sudden contraction of any muscle or group of muscles  A muscle cramp is a painful muscle spasm  Muscle cramps commonly occur after intense exercise or during pregnancy  They may also be caused by certain medications, dehydration, low calcium or magnesium levels, or another medical condition  DISCHARGE INSTRUCTIONS:   Medicines: You may need the following:  · NSAIDs  help decrease swelling and pain or fever  This medicine is available with or without a doctor's order  NSAIDs can cause stomach bleeding or kidney problems in certain people  If you take blood thinner medicine, always ask your healthcare provider if NSAIDs are safe for you  Always read the medicine label and follow directions  · Take your medicine as directed  Contact your healthcare provider if you think your medicine is not helping or if you have side effects  Tell him of her if you are allergic to any medicine  Keep a list of the medicines, vitamins, and herbs you take  Include the amounts, and when and why you take them  Bring the list or the pill bottles to follow-up visits  Carry your medicine list with you in case of an emergency  Follow up with your healthcare provider as directed: You may need other tests or treatment   You may also be referred to a physical therapist or other specialist  Write down your questions so you remember to ask them during your visits  Self-care:   · Stretch  your muscle to help relieve the cramp  It may be helpful to keep your muscle in the stretched position until the cramp is gone  · Apply heat  to help decrease pain and muscle spasms  Apply heat on the area for 20 to 30 minutes every 2 hours for as many days as directed  · Apply ice  to help decrease swelling and pain  Ice may also help prevent tissue damage  Use an ice pack, or put crushed ice in a plastic bag  Cover it with a towel and place it on your muscle for 15 to 20 minutes every hour or as directed  · Drink more liquids  to help prevent muscle cramps caused by dehydration  Sports drinks may help replace electrolytes you lose through sweat during exercise  Ask your healthcare provider how much liquid to drink each day and which liquids are best for you  · Eat healthy foods , such as fruits, vegetables, whole grains, low-fat dairy products, and lean proteins (meat, beans, and fish)  If you are pregnant, ask your healthcare provider about foods that are high in magnesium and sodium  They may help to relieve cramps during pregnancy  · Massage your muscle  to help relieve the cramp  · Take frequent deep breaths  until the cramp feels better  Lie down while you take the deep breaths so you do not get dizzy or lightheaded  Contact your healthcare provider if:   · You have signs of dehydration, such as a headache, dark yellow urine, dry eyes or mouth, or a fast heartbeat  · You have questions or concerns about your condition or care  Return to the emergency department if:   · You have warmth, swelling, or redness in the cramping muscle  · You have frequent or unrelieved muscle cramps in several different muscles  · You have muscle cramps with numbness, tingling, and burning in your hands and feet    © 2017 [employer] Information is for End User's use only and may not be sold, redistributed or otherwise used for commercial purposes  All illustrations and images included in CareNotes® are the copyrighted property of [employer]  or [employer]  The above information is an  only  It is not intended as medical advice for individual conditions or treatments  Talk to your doctor, nurse or pharmacist before following any medical regimen to see if it is safe and effective for you  Back Pain   WHAT YOU NEED TO KNOW:   Back pain is common  It can be caused by many conditions, such as arthritis or the breakdown of spinal discs  Your risk for back pain is increased by injuries, lack of activity, or repeated bending and twisting  You may feel sore or stiff on one or both sides of your back  The pain may spread to your buttocks or thighs  DISCHARGE INSTRUCTIONS:   Medicines:   · NSAIDs  help decrease swelling and pain  This medicine is available with or without a doctor's order  NSAIDs can cause stomach bleeding or kidney problems in certain people  If you take blood thinner medicine, always ask your healthcare provider if NSAIDs are safe for you  Always read the medicine label and follow directions  · Acetaminophen  decreases pain  It is available without a doctor's order  Ask how much to take and how often to take it  Follow directions  Acetaminophen can cause liver damage if not taken correctly  · Prescription pain medicine  may be given  Ask your healthcare provider how to take this medicine safely  · Take your medicine as directed  Contact your healthcare provider if you think your medicine is not helping or if you have side effects  Tell him or her if you are allergic to any medicine  Keep a list of the medicines, vitamins, and herbs you take  Include the amounts, and when and why you take them  Bring the list or the pill bottles to follow-up visits   Carry your medicine list with you in case of an emergency  Follow up with your healthcare provider in 2 weeks, or as directed:  Write down your questions so you remember to ask them during your visits  How to manage your back pain:   · Apply ice  on your back or affected area for 15 to 20 minutes every hour or as directed  Use an ice pack, or put crushed ice in a plastic bag  Cover it with a towel  Ice helps prevent tissue damage and decreases pain  · Apply heat  on your back or affected area for 20 to 30 minutes every 2 hours for as many days as directed  Heat helps decrease pain and muscle spasms  · Stay active  as much as you can without causing more pain  Bed rest could make your back pain worse  Avoid heavy lifting until your pain is gone  Return to the emergency department if:   · You have pain, numbness, or weakness in one or both legs  · Your pain becomes so severe that you cannot walk  · You cannot control your urine or bowel movements  · You have severe back pain with chest pain  · You have severe back pain, nausea, and vomiting  · You have severe back pain that spreads to your side or genital area  Contact your healthcare provider if:   · You have back pain that does not get better with rest and pain medicine  · You have a fever  · You have pain that worsens when you are on your back or when you rest     · You have pain that worsens when you cough or sneeze  · You lose weight without trying  · You have questions or concerns about your condition or care  © 2017 [address] Information is for End User's use only and may not be sold, redistributed or otherwise used for commercial purposes  All illustrations and images included in CareNotes® are the copyrighted property of [employer]  or [employer]  The above information is an  only  It is not intended as medical advice for individual conditions or treatments   Talk to your doctor, nurse or pharmacist before following any medical regimen to see if it is safe and effective for you

## 2018-10-28 NOTE — ED NOTES
D/c reviewed with pt  Pt verbalized understanding and has no further questions at this time  Pt ambulatory off unit with steady gait       [address] Milan RN  10/27/18 2050

## 2018-10-29 ENCOUNTER — APPOINTMENT (EMERGENCY)
Dept: CT IMAGING | Facility: HOSPITAL | Age: 73
End: 2018-10-29
Payer: MEDICARE

## 2018-10-29 ENCOUNTER — HOSPITAL ENCOUNTER (EMERGENCY)
Facility: HOSPITAL | Age: 73
Discharge: HOME | End: 2018-10-29
Attending: EMERGENCY MEDICINE | Admitting: EMERGENCY MEDICINE
Payer: MEDICARE

## 2018-10-29 VITALS
SYSTOLIC BLOOD PRESSURE: 133 MMHG | DIASTOLIC BLOOD PRESSURE: 67 MMHG | BODY MASS INDEX: 27.21 KG/M2 | HEIGHT: 70 IN | HEART RATE: 53 BPM | TEMPERATURE: 98.2 F | OXYGEN SATURATION: 97 % | WEIGHT: 190.04 LBS | RESPIRATION RATE: 16 BRPM

## 2018-10-29 DIAGNOSIS — R10.9 ABDOMINAL PAIN: Primary | ICD-10-CM

## 2018-10-29 DIAGNOSIS — M54.9 RIGHT-SIDED BACK PAIN: ICD-10-CM

## 2018-10-29 LAB
ALBUMIN SERPL BCP-MCNC: 4.3 G/DL (ref 3.5–5)
ALP SERPL-CCNC: 80 U/L (ref 46–116)
ALT SERPL W P-5'-P-CCNC: 21 U/L (ref 12–78)
ANION GAP SERPL CALCULATED.3IONS-SCNC: 10 MMOL/L (ref 4–13)
AST SERPL W P-5'-P-CCNC: 21 U/L (ref 5–45)
BASOPHILS # BLD AUTO: 0.06 THOUSANDS/ΜL (ref 0–0.1)
BASOPHILS NFR BLD AUTO: 1 % (ref 0–1)
BILIRUB SERPL-MCNC: 0.7 MG/DL (ref 0.2–1)
BILIRUB UR QL STRIP: NEGATIVE
BUN SERPL-MCNC: 35 MG/DL (ref 5–25)
CALCIUM SERPL-MCNC: 9.4 MG/DL (ref 8.3–10.1)
CHLORIDE SERPL-SCNC: 102 MMOL/L (ref 100–108)
CLARITY UR: CLEAR
CO2 SERPL-SCNC: 24 MMOL/L (ref 21–32)
COLOR UR: NORMAL
CREAT SERPL-MCNC: 1.51 MG/DL (ref 0.6–1.3)
EOSINOPHIL # BLD AUTO: 0.15 THOUSAND/ΜL (ref 0–0.61)
EOSINOPHIL NFR BLD AUTO: 2 % (ref 0–6)
ERYTHROCYTE [DISTWIDTH] IN BLOOD BY AUTOMATED COUNT: 12.6 % (ref 11.6–15.1)
GFR SERPL CREATININE-BSD FRML MDRD: 45 ML/MIN/1.73SQ M
GLUCOSE SERPL-MCNC: 158 MG/DL (ref 65–140)
GLUCOSE UR STRIP-MCNC: NEGATIVE MG/DL
HCT VFR BLD AUTO: 43 % (ref 36.5–49.3)
HGB BLD-MCNC: 14.9 G/DL (ref 12–17)
HGB UR QL STRIP.AUTO: NEGATIVE
IMM GRANULOCYTES # BLD AUTO: 0.02 THOUSAND/UL (ref 0–0.2)
IMM GRANULOCYTES NFR BLD AUTO: 0 % (ref 0–2)
KETONES UR STRIP-MCNC: NEGATIVE MG/DL
LEUKOCYTE ESTERASE UR QL STRIP: NEGATIVE
LIPASE SERPL-CCNC: 258 U/L (ref 73–393)
LYMPHOCYTES # BLD AUTO: 1.91 THOUSANDS/ΜL (ref 0.6–4.47)
LYMPHOCYTES NFR BLD AUTO: 19 % (ref 14–44)
MCH RBC QN AUTO: 30.5 PG (ref 26.8–34.3)
MCHC RBC AUTO-ENTMCNC: 34.7 G/DL (ref 31.4–37.4)
MCV RBC AUTO: 88 FL (ref 82–98)
MONOCYTES # BLD AUTO: 0.77 THOUSAND/ΜL (ref 0.17–1.22)
MONOCYTES NFR BLD AUTO: 8 % (ref 4–12)
NEUTROPHILS # BLD AUTO: 7.25 THOUSANDS/ΜL (ref 1.85–7.62)
NEUTS SEG NFR BLD AUTO: 70 % (ref 43–75)
NITRITE UR QL STRIP: NEGATIVE
NRBC BLD AUTO-RTO: 0 /100 WBCS
PH UR STRIP.AUTO: 5.5 [PH] (ref 4.5–8)
PLATELET # BLD AUTO: 272 THOUSANDS/UL (ref 149–390)
PMV BLD AUTO: 10.8 FL (ref 8.9–12.7)
POTASSIUM SERPL-SCNC: 4.7 MMOL/L (ref 3.5–5.3)
PROT SERPL-MCNC: 8 G/DL (ref 6.4–8.2)
PROT UR STRIP-MCNC: NEGATIVE MG/DL
RBC # BLD AUTO: 4.89 MILLION/UL (ref 3.88–5.62)
SODIUM SERPL-SCNC: 136 MMOL/L (ref 136–145)
SP GR UR STRIP.AUTO: 1.01 (ref 1–1.03)
UROBILINOGEN UR QL STRIP.AUTO: 0.2 E.U./DL
WBC # BLD AUTO: 10.16 THOUSAND/UL (ref 4.31–10.16)

## 2018-10-29 PROCEDURE — 81003 URINALYSIS AUTO W/O SCOPE: CPT | Performed by: EMERGENCY MEDICINE

## 2018-10-29 PROCEDURE — 96375 TX/PRO/DX INJ NEW DRUG ADDON: CPT

## 2018-10-29 PROCEDURE — 36415 COLL VENOUS BLD VENIPUNCTURE: CPT | Performed by: EMERGENCY MEDICINE

## 2018-10-29 PROCEDURE — 80053 COMPREHEN METABOLIC PANEL: CPT | Performed by: EMERGENCY MEDICINE

## 2018-10-29 PROCEDURE — 96361 HYDRATE IV INFUSION ADD-ON: CPT

## 2018-10-29 PROCEDURE — 83690 ASSAY OF LIPASE: CPT | Performed by: EMERGENCY MEDICINE

## 2018-10-29 PROCEDURE — 85025 COMPLETE CBC W/AUTO DIFF WBC: CPT | Performed by: EMERGENCY MEDICINE

## 2018-10-29 PROCEDURE — 96374 THER/PROPH/DIAG INJ IV PUSH: CPT

## 2018-10-29 PROCEDURE — 99284 EMERGENCY DEPT VISIT MOD MDM: CPT

## 2018-10-29 PROCEDURE — 74176 CT ABD & PELVIS W/O CONTRAST: CPT

## 2018-10-29 RX ORDER — HYDROMORPHONE HCL/PF 1 MG/ML
0.5 SYRINGE (ML) INJECTION ONCE
Status: COMPLETED | OUTPATIENT
Start: 2018-10-29 | End: 2018-10-29

## 2018-10-29 RX ORDER — TRAMADOL HYDROCHLORIDE 50 MG/1
50 TABLET ORAL ONCE
Status: COMPLETED | OUTPATIENT
Start: 2018-10-29 | End: 2018-10-29

## 2018-10-29 RX ORDER — ONDANSETRON 2 MG/ML
4 INJECTION INTRAMUSCULAR; INTRAVENOUS ONCE
Status: COMPLETED | OUTPATIENT
Start: 2018-10-29 | End: 2018-10-29

## 2018-10-29 RX ADMIN — HYDROMORPHONE HYDROCHLORIDE 0.5 MG: 1 INJECTION, SOLUTION INTRAMUSCULAR; INTRAVENOUS; SUBCUTANEOUS at 16:53

## 2018-10-29 RX ADMIN — SODIUM CHLORIDE 500 ML: 0.9 INJECTION, SOLUTION INTRAVENOUS at 16:51

## 2018-10-29 RX ADMIN — TRAMADOL HYDROCHLORIDE 50 MG: 50 TABLET, COATED ORAL at 19:58

## 2018-10-29 RX ADMIN — ONDANSETRON 4 MG: 2 INJECTION INTRAMUSCULAR; INTRAVENOUS at 16:51

## 2018-10-29 RX ADMIN — IOHEXOL 50 ML: 240 INJECTION, SOLUTION INTRATHECAL; INTRAVASCULAR; INTRAVENOUS; ORAL at 18:33

## 2018-10-29 NOTE — DISCHARGE INSTRUCTIONS
Please return immediately if he developed worsening or other concerning symptoms otherwise please fill the prescriptions follow up as instructed as discussed    Abdominal Pain, Ambulatory Care   GENERAL INFORMATION:   Abdominal pain  can be dull, achy, or sharp  You may have pain in one area of your abdomen, or in your entire abdomen  Your pain may be caused by constipation, food sensitivity or poisoning, infection, or a blockage  Abdominal pain can also be caused by a hernia, appendicitis, or an ulcer  The cause of your abdominal pain may be unknown  Seek immediate care for the following symptoms:   · New chest pain or shortness of breath    · Pulsing pain in your upper abdomen or lower back that suddenly becomes constant    · Pain in the right lower abdominal area that worsens with movement    · Fever over 100 4°F (38°C) or shaking chills    · Vomiting and you cannot keep food or fluids down    · Pain does not improve or gets worse over the next 8 to 12 hours    · Blood in your vomit or bowel movements, or they look black and tarry    · Skin or the whites of your eyes turn yellow    · Large amount of vaginal bleeding that is not your monthly period  Treatment for abdominal pain  may include medicine to calm your stomach, prevent vomiting, or decrease pain  Follow up with your healthcare provider as directed:  Write down your questions so you remember to ask them during your visits  CARE AGREEMENT:   You have the right to help plan your care  Learn about your health condition and how it may be treated  Discuss treatment options with your caregivers to decide what care you want to receive  You always have the right to refuse treatment  The above information is an  only  It is not intended as medical advice for individual conditions or treatments  Talk to your doctor, nurse or pharmacist before following any medical regimen to see if it is safe and effective for you    © 2014 [facility] ([facility] [address] [place] [address] is for End User's use only and may not be sold, redistributed or otherwise used for commercial purposes  All illustrations and images included in CareNotes® are the copyrighted property of [employer]  or [employer]  Abdominal Pain, Ambulatory Care   GENERAL INFORMATION:   Abdominal pain  can be dull, achy, or sharp  You may have pain in one area of your abdomen, or in your entire abdomen  Your pain may be caused by constipation, food sensitivity or poisoning, infection, or a blockage  Abdominal pain can also be caused by a hernia, appendicitis, or an ulcer  The cause of your abdominal pain may be unknown  Seek immediate care for the following symptoms:   · New chest pain or shortness of breath    · Pulsing pain in your upper abdomen or lower back that suddenly becomes constant    · Pain in the right lower abdominal area that worsens with movement    · Fever over 100 4°F (38°C) or shaking chills    · Vomiting and you cannot keep food or fluids down    · Pain does not improve or gets worse over the next 8 to 12 hours    · Blood in your vomit or bowel movements, or they look black and tarry    · Skin or the whites of your eyes turn yellow    · Large amount of vaginal bleeding that is not your monthly period  Treatment for abdominal pain  may include medicine to calm your stomach, prevent vomiting, or decrease pain  Follow up with your healthcare provider as directed:  Write down your questions so you remember to ask them during your visits  CARE AGREEMENT:   You have the right to help plan your care  Learn about your health condition and how it may be treated  Discuss treatment options with your caregivers to decide what care you want to receive  You always have the right to refuse treatment  The above information is an  only  It is not intended as medical advice for individual conditions or treatments   Talk to your doctor, nurse or pharmacist before following any medical regimen to see if it is safe and effective for you  © 2014 [address] is for End User's use only and may not be sold, redistributed or otherwise used for commercial purposes  All illustrations and images included in CareNotes® are the copyrighted property of [employer]  or [employer]za

## 2018-10-29 NOTE — ED PROVIDER NOTES
History  Chief Complaint   Patient presents with    Abdominal Pain     Patient presents with right sided abominal pain that has been going on since last Tuesday  Denies n/v/d     50-year-old male with a history of diabetes status post appendectomy hernia repair presenting chief complaint of back and abdominal pain  Patient reports approximately a week ago developed  right-sided back and abdominal pain, localized in his right flank and low back it does radiate anteriorly into his right low abdomen, is worse with moving bending twisting, this has  gotten progressively worse since that time he was seen evaluated several days ago and treated for acute low back pain he states that the pain is getting progressively worse to the point where he has difficulty getting around, and going from a seated to standing position, he notes mild decreased appetite today  He did not fill or take the prescriptions that were prescribed to him 2 days ago  He notes that his abdomen might be a little bit more distended than usual   His pain is otherwise nonradiating without other exacerbating remitting factors he denies history of similar denies fevers viral symptoms complaints of chest pain cough shortness of breath or hemoptysis he denies nausea or vomiting although does decreased appetite today denies change in stools urinary symptoms joint pain swelling rashes denies history of bowel obstruction or kidney stone  Complete review systems otherwise negative as noted            Prior to Admission Medications   Prescriptions Last Dose Informant Patient Reported? Taking?    ERYN CONTOUR TEST test strip  Self Yes No   Sig: Test sugar twice a day    HYDROcodone-acetaminophen (NORCO) 5-325 mg per tablet   No No   Sig: Take 1 tablet by mouth every 6 (six) hours as needed for pain for up to 20 doses Max Daily Amount: 4 tablets   Methylprednisolone (MEDROL) 4 MG TBPK   No No   Sig: Use as directed on package   TRADJENTA 5 MG TABS   No No Sig: take 1 tablet by mouth once daily   albuterol (PROAIR HFA) 90 mcg/act inhaler   Yes No   Sig: Inhale daily as needed   glyBURIDE (DIABETA) 5 mg tablet   No No   Sig: take 2 tablets by mouth twice a day   lidocaine (LIDODERM) 5 %   No No   Sig: Apply 1 patch topically daily for 10 days Remove & Discard patch within 12 hours or as directed by MD   lisinopril-hydrochlorothiazide (PRINZIDE,ZESTORETIC) 20-25 MG per tablet   No No   Sig: Take 1 tablet by mouth daily      Facility-Administered Medications: None       Past Medical History:   Diagnosis Date    Asthma     last assessed: April 1, 2015    Balanitis     last assessed: July 9, 2014    Cataract     Chronic kidney disease, stage 3 ([facility] )     last assessed: Jan 17, 2018    Diabetes mellitus [facility])     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([facility] )     last assessed: Jan 17, 2018    Hypercholesterolemia     Hypertension     Pneumonia     last assessed: Feb 25, 2013       Past Surgical History:   Procedure Laterality Date   [address] [facility]    CATARACT EXTRACTION     [address] 50 HERNIA REPAIR  2007    x2    OTHER SURGICAL HISTORY  1965    Perforated duodenol ulcer surgery        Family History   Problem Relation Age of Onset    Pancreatic cancer Mother     Colon cancer Father     Diabetes Father         mellitus      I have reviewed and agree with the history as documented  Social History   Substance Use Topics    Smoking status: Never Smoker    Smokeless tobacco: Never Used      Comment: former smoker noted in "[employer]scripts" Quit in 1971 - never used chewing tobacco     Alcohol use Yes      Comment: rare         Review of Systems   Constitutional: Positive for appetite change  Negative for chills and fever  HENT: Negative for rhinorrhea and sore throat  Respiratory: Negative for cough and shortness of breath  Cardiovascular: Negative for chest pain and palpitations     Gastrointestinal: Positive for abdominal distention and abdominal pain  Negative for diarrhea, nausea and vomiting  Endocrine: Negative for polydipsia and polyphagia  Genitourinary: Positive for flank pain  Negative for dysuria, frequency and urgency  Musculoskeletal: Positive for back pain  Negative for arthralgias and myalgias  Skin: Negative for color change and rash  Neurological: Negative for dizziness, weakness, light-headedness and headaches  Hematological: Negative for adenopathy  Does not bruise/bleed easily  Psychiatric/Behavioral: Negative for agitation and behavioral problems  All other systems reviewed and are negative  Physical Exam  Physical Exam   Constitutional: He is oriented to person, place, and time  He appears well-developed and well-nourished  No distress  Patient appears mildly uncomfortable grabbing the right side of his abdomen though conversational no acute distress   HENT:   Head: Normocephalic and atraumatic  Eyes: Pupils are equal, round, and reactive to light  EOM are normal    Neck: Normal range of motion  Neck supple  No tracheal deviation present  Cardiovascular: Normal rate, regular rhythm and normal heart sounds  Exam reveals no gallop and no friction rub  No murmur heard  Pulmonary/Chest: Effort normal and breath sounds normal  He has no wheezes  He has no rales  Abdominal: Soft  Bowel sounds are normal  He exhibits distension  There is tenderness  There is no rebound and no guarding  Patient's abdomen is obese, mildly distended but soft, tender in his right mid abdomen that reproduces symptoms no rebound or guarding this extends into his right back patient has no discrete tenderness in his right upper quadrant negative Mike sign   Musculoskeletal: Normal range of motion  He exhibits no edema or tenderness     Lower extremities neurovascularly intact globally is normal patient is mildly tender in his right lumbar paraspinal musculature right low back and flank, there is no rash or vesicles no midline tenderness   Neurological: He is alert and oriented to person, place, and time  No cranial nerve deficit  He exhibits normal muscle tone  Coordination normal    Skin: Skin is warm and dry  No rash noted  Psychiatric: He has a normal mood and affect  His behavior is normal    Nursing note and vitals reviewed        Vital Signs  ED Triage Vitals   Temperature Pulse Respirations Blood Pressure SpO2   10/29/18 1441 10/29/18 1441 10/29/18 1441 10/29/18 1441 10/29/18 1441   98 2 °F (36 8 °C) 59 18 141/66 96 %      Temp Source Heart Rate Source Patient Position - Orthostatic VS BP Location FiO2 (%)   10/29/18 1441 10/29/18 1441 10/29/18 1652 10/29/18 1652 --   Oral Monitor Lying Left arm       Pain Score       10/29/18 1441       8           Vitals:    10/29/18 1652 10/29/18 1715 10/29/18 1800 10/29/18 1959   BP: 126/81 131/63 126/68 133/67   Pulse: 67 (!) 52 (!) 50 (!) 53   Patient Position - Orthostatic VS: Lying Lying Lying Sitting       Visual Acuity      ED Medications  Medications   sodium chloride 0 9 % bolus 500 mL (0 mL Intravenous Stopped 10/29/18 1751)   ondansetron (ZOFRAN) injection 4 mg (4 mg Intravenous Given 10/29/18 1651)   HYDROmorphone (DILAUDID) injection 0 5 mg (0 5 mg Intravenous Given 10/29/18 1653)   iohexol (OMNIPAQUE) 240 MG/ML solution 50 mL (50 mL Oral Given 10/29/18 1833)   traMADol (ULTRAM) tablet 50 mg (50 mg Oral Given 10/29/18 1958)       Diagnostic Studies  Results Reviewed     Procedure Component Value Units Date/Time    Comprehensive metabolic panel [[id number]]  (Abnormal) Collected:  10/29/18 1645    Lab Status:  Final result Specimen:  Blood from Arm, Right Updated:  10/29/18 1713     Sodium 136 mmol/L      Potassium 4 7 mmol/L      Chloride 102 mmol/L      CO2 24 mmol/L      ANION GAP 10 mmol/L      BUN 35 (H) mg/dL      Creatinine 1 51 (H) mg/dL      Glucose 158 (H) mg/dL      Calcium 9 4 mg/dL      AST 21 U/L      ALT 21 U/L      Alkaline Phosphatase 80 U/L Total Protein 8 0 g/dL      Albumin 4 3 g/dL      Total Bilirubin 0 70 mg/dL      eGFR 45 ml/min/1 73sq m     Narrative:         National Kidney Disease Education Program recommendations are as follows:  GFR calculation is accurate only with a steady state creatinine  Chronic Kidney disease less than 60 ml/min/1 73 sq  meters  Kidney failure less than 15 ml/min/1 73 sq  meters      Lipase [[id number]]  (Normal) Collected:  10/29/18 1645    Lab Status:  Final result Specimen:  Blood from Arm, Right Updated:  10/29/18 1713     Lipase 258 u/L     UA w Reflex to Microscopic w Reflex to Culture [[id number]] Collected:  10/29/18 1641    Lab Status:  Final result Specimen:  Urine from Urine, Clean Catch Updated:  10/29/18 1654     Color, UA Light Yellow     Clarity, UA Clear     Specific Gravity, UA 1 015     pH, UA 5 5     Leukocytes, UA Negative     Nitrite, UA Negative     Protein, UA Negative mg/dl      Glucose, UA Negative mg/dl      Ketones, UA Negative mg/dl      Urobilinogen, UA 0 2 E U /dl      Bilirubin, UA Negative     Blood, UA Negative    CBC and differential [[id number]] Collected:  10/29/18 1645    Lab Status:  Final result Specimen:  Blood from Arm, Right Updated:  10/29/18 1653     WBC 10 16 Thousand/uL      RBC 4 89 Million/uL      Hemoglobin 14 9 g/dL      Hematocrit 43 0 %      MCV 88 fL      MCH 30 5 pg      MCHC 34 7 g/dL      RDW 12 6 %      MPV 10 8 fL      Platelets 513 Thousands/uL      nRBC 0 /100 WBCs      Neutrophils Relative 70 %      Immat GRANS % 0 %      Lymphocytes Relative 19 %      Monocytes Relative 8 %      Eosinophils Relative 2 %      Basophils Relative 1 %      Neutrophils Absolute 7 25 Thousands/µL      Immature Grans Absolute 0 02 Thousand/uL      Lymphocytes Absolute 1 91 Thousands/µL      Monocytes Absolute 0 77 Thousand/µL      Eosinophils Absolute 0 15 Thousand/µL      Basophils Absolute 0 06 Thousands/µL                  CT abdomen pelvis wo contrast   Final Result by Deidra Peñaloza MD (10/29 8814)      Colonic diverticulosis  Mild nonspecific perinephric right fatty infiltration superior pole right kidney  Correlate with urinalysis  Workstation performed: [id number]                    Procedures  Procedures       Phone Contacts  ED Phone Contact    ED Course  ED Course as of Oct 30 0131   Mon Oct 29, 2018   2884 Patient seen re-evaluated he had 1 week of constant symptoms reproducible negative from sees to standing position and bends he has decreased appetite but continues to tolerate p o  Without nausea vomiting or change in bowels or bladder urinary symptoms 1 week of symptoms is labs are at baseline his CT abdomen pelvis is otherwise unremarkable, noted mild stranding a right kidney he has no kidney stone no trauma no urinary symptoms urinalysis negative discussed at length with patient he never filled the prescriptions from his recent visit, will continue treat symptomatically with very close return follow-up instructions patient agreeable plan            Identification of Seniors at Risk      Most Recent Value   (ISAR) Identification of Seniors at Risk   Before the illness or injury that brought you to the Emergency, did you need someone to help you on a regular basis? 0 Filed at: 10/29/2018 1442   In the last 24 hours, have you needed more help than usual?  0 Filed at: 10/29/2018 1442   Have you been hospitalized for one or more nights during the past 6 months? 0 Filed at: 10/29/2018 1442   In general, do you see well?  0 Filed at: 10/29/2018 1442   In general, do you have serious problems with your memory? 0 Filed at: 10/29/2018 1442   Do you take more than three different medications every day?   1 Filed at: 10/29/2018 1442   ISAR Score  1 Filed at: 10/29/2018 1442                          MDM  Number of Diagnoses or Management Options  Abdominal pain:   Right-sided back pain:   Diagnosis management comments: 76-year-old male history of diabetes status post appendectomy hernia repair for evaluation of 1 week of progressive right flank and back pain that radiates into his mid right abdomen, now with decreased appetite today, notes abdomen might be mildly distended, no fevers no chest symptoms no nausea vomiting no change in bowels urinary symptoms, no history of kidney stones or bowel obstruction no weakness paresthesias or anesthesia on exam here he is afebrile normal vital signs he is tender in the right side of his abdomen and back that reproduces symptoms, his abdomen is soft without rebound or guarding, given his advanced age abdominal symptoms will place IV, oral contrast with his history of CKD, abdominal labs urinalysis will treat symptomatically and reassess, history of physical exam is not consistent zoster, cholecystitis, pulmonary embolism etc, disposition pending further evaluation reassessment    CritCare Time    Disposition  Final diagnoses:   Abdominal pain   Right-sided back pain     Time reflects when diagnosis was documented in both MDM as applicable and the Disposition within this note     Time User Action Codes Description Comment    10/29/2018  7:16 PM Claudell Back W Add [R10 9] Abdominal pain     10/29/2018  7:16 PM Stacey Melchor Add [M54 9] Right-sided back pain       ED Disposition     ED Disposition Condition Comment    Discharge  Bree Barrios discharge to home/self care      Condition at discharge: Good        Follow-up Information     Follow up With Specialties Details Why Contact Info Additional Information    [address] Emergency Department Emergency Medicine  If symptoms worsen [address]  [id number] [place] [facility], [place], [place], [address] MD Family Medicine In 2 days  21   [address] [facility]  [phone]             Discharge Medication List as of 10/29/2018  7:16 PM      CONTINUE these medications which have NOT CHANGED Details   albuterol (PROAIR HFA) 90 mcg/act inhaler Inhale daily as needed, Starting Tue 4/8/2014, Historical Med      ERYN CONTOUR TEST test strip Test sugar twice a day , Historical Med      glyBURIDE (DIABETA) 5 mg tablet take 2 tablets by mouth twice a day, Normal      HYDROcodone-acetaminophen (NORCO) 5-325 mg per tablet Take 1 tablet by mouth every 6 (six) hours as needed for pain for up to 20 doses Max Daily Amount: 4 tablets, Starting Sat 10/27/2018, Print      lidocaine (LIDODERM) 5 % Apply 1 patch topically daily for 10 days Remove & Discard patch within 12 hours or as directed by MD, Starting Sat 10/27/2018, Until Tue 11/6/2018, Print      lisinopril-hydrochlorothiazide (PRINZIDE,ZESTORETIC) 20-25 MG per tablet Take 1 tablet by mouth daily, Starting Mon 7/30/2018, Normal      Methylprednisolone (MEDROL) 4 MG TBPK Use as directed on package, Print      TRADJENTA 5 MG TABS take 1 tablet by mouth once daily, Normal           No discharge procedures on file      ED Provider  Electronically Signed by           Whit De La Vega DO  10/30/18 5125

## 2018-10-29 NOTE — ED NOTES
Pt attempting to locate ride at this time  Pt educated on needing a ride home in order to be administered Tramadol  Pt reports his daughter will be picking him up  Pt educated daughter must come into department to report she is taking him home as sober ride  Pt verbalized understanding        Timmy Cedeño, RN  10/29/18 1922

## 2018-10-29 NOTE — ED NOTES
Pt OOB and ambulatory to restroom to provide urine sample at this time       Selena Mckeon, FAUZIA  10/29/18 2578

## 2018-10-30 NOTE — ED NOTES
Discharge instructions and medications reviewed with pt  Pt verbalized understanding, with no further questions at this time  Pt ambulatory out of department, using slow but steady gait  Pt reports sober ride home with daughter due to administration of narcotic medications prior to arrival  Daughter present at time of departure       Irlanda Belle RN  10/29/18 2003       Irlanda Belle RN  10/29/18 2003

## 2018-11-16 ENCOUNTER — OFFICE VISIT (OUTPATIENT)
Dept: FAMILY MEDICINE CLINIC | Facility: CLINIC | Age: 73
End: 2018-11-16
Payer: MEDICARE

## 2018-11-16 VITALS
WEIGHT: 185.2 LBS | DIASTOLIC BLOOD PRESSURE: 70 MMHG | SYSTOLIC BLOOD PRESSURE: 118 MMHG | BODY MASS INDEX: 26.51 KG/M2 | HEART RATE: 94 BPM | TEMPERATURE: 98.2 F | HEIGHT: 70 IN | OXYGEN SATURATION: 96 %

## 2018-11-16 DIAGNOSIS — N18.30 TYPE 2 DIABETES MELLITUS WITH STAGE 3 CHRONIC KIDNEY DISEASE, WITHOUT LONG-TERM CURRENT USE OF INSULIN (HCC): ICD-10-CM

## 2018-11-16 DIAGNOSIS — E11.22 TYPE 2 DIABETES MELLITUS WITH STAGE 3 CHRONIC KIDNEY DISEASE, WITHOUT LONG-TERM CURRENT USE OF INSULIN (HCC): ICD-10-CM

## 2018-11-16 DIAGNOSIS — R10.9 ACUTE RIGHT FLANK PAIN: Primary | ICD-10-CM

## 2018-11-16 DIAGNOSIS — R07.89 RIGHT-SIDED CHEST WALL PAIN: ICD-10-CM
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
## 2018-11-16 PROBLEM — S61.451A DOG BITE OF RIGHT HAND: Status: RESOLVED | Noted: 2018-08-06 | Resolved: 2018-11-16

## 2018-11-16 LAB — SL AMB POCT HEMOGLOBIN AIC: 7.2

## 2018-11-16 PROCEDURE — 83036 HEMOGLOBIN GLYCOSYLATED A1C: CPT | Performed by: FAMILY MEDICINE

## 2018-11-16 PROCEDURE — 99214 OFFICE O/P EST MOD 30 MIN: CPT | Performed by: FAMILY MEDICINE

## 2018-11-16 NOTE — PROGRESS NOTES
Assessment/Plan:     Diagnoses and all orders for this visit:    Acute right flank pain  -     CT chest wo contrast; Future    Right-sided chest wall pain  -     CT chest wo contrast; Future    Type 2 diabetes mellitus with stage 3 chronic kidney disease, without long-term current use of insulin (HCC)        Unclear etiology of pain  Will check a CT of the thorax to evaluate  He can use Tylenol as needed for pain  NSAIDs cannot be used due to CKD  Diabetes is stable  Subjective:      Patient ID: Tianna Faust is a 75 y o  male  He is here for an ER follow up  Was seen in ER on 10/29 for R sided abdominal pain  He had a CT which did not show anything acute  Labs and Urine were normal   He continues having R sided flank pain  Pain is constant  Pain is worse with movement, sitting, standing in certain positions  He cannot find a comfortable position to lay in  He tried Tylenol with no relief  No N/V/D  Mild cough from PND  No SOB but sometimes feels his breathing is "not right"  He is diabetic  Is due for A1c  It is 7 2% today  He is on glyburide and Tradjenta        The following portions of the patient's history were reviewed and updated as appropriate:   He  has a past medical history of Asthma; Balanitis; Cataract; Chronic kidney disease, stage 3 ([address] ); Diabetes mellitus ([address] ); 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([address] ); Hypercholesterolemia; Hypertension; and Pneumonia  He   Patient Active Problem List    Diagnosis Date Noted    Right-sided chest wall pain 11/16/2018    Acute right flank pain 11/16/2018    Chronic kidney disease, stage 3 ([address] ) 02/08/2018    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([address] ) 02/08/2018    Benign essential hypertension 02/08/2018    Hypercholesterolemia 04/02/2014     He  has a past surgical history that includes Hernia repair; Abdominal surgery; Appendectomy (1965); Cataract extraction; Hernia repair (2007); and Other surgical history (1965)    His family history includes Colon cancer in his father; Diabetes in his father; Pancreatic cancer in his mother  He  reports that he has never smoked  He has never used smokeless tobacco  He reports that he drinks alcohol  He reports that he does not use drugs  Current Outpatient Prescriptions   Medication Sig Dispense Refill    albuterol (PROAIR HFA) 90 mcg/act inhaler Inhale daily as needed      ERYN CONTOUR TEST test strip Test sugar twice a day   0    glyBURIDE (DIABETA) 5 mg tablet take 2 tablets by mouth twice a day 120 tablet 3    lisinopril-hydrochlorothiazide (PRINZIDE,ZESTORETIC) 20-25 MG per tablet Take 1 tablet by mouth daily 90 tablet 1    TRADJENTA 5 MG TABS take 1 tablet by mouth once daily 30 tablet 5     No current facility-administered medications for this visit  Current Outpatient Prescriptions on File Prior to Visit   Medication Sig    albuterol (PROAIR HFA) 90 mcg/act inhaler Inhale daily as needed    ERYN CONTOUR TEST test strip Test sugar twice a day     glyBURIDE (DIABETA) 5 mg tablet take 2 tablets by mouth twice a day    lisinopril-hydrochlorothiazide (PRINZIDE,ZESTORETIC) 20-25 MG per tablet Take 1 tablet by mouth daily    TRADJENTA 5 MG TABS take 1 tablet by mouth once daily    [DISCONTINUED] HYDROcodone-acetaminophen (NORCO) 5-325 mg per tablet Take 1 tablet by mouth every 6 (six) hours as needed for pain for up to 20 doses Max Daily Amount: 4 tablets    [DISCONTINUED] lidocaine (LIDODERM) 5 % Apply 1 patch topically daily for 10 days Remove & Discard patch within 12 hours or as directed by MD    [DISCONTINUED] Methylprednisolone (MEDROL) 4 MG TBPK Use as directed on package     No current facility-administered medications on file prior to visit  He is allergic to meperidine and pravastatin       Review of Systems   Constitutional: Negative for activity change, appetite change, fatigue and unexpected weight change     Respiratory: Negative for chest tightness and shortness of breath  Cardiovascular: Negative for chest pain and leg swelling  Gastrointestinal: Negative for abdominal distention, abdominal pain, anal bleeding, blood in stool, constipation, diarrhea, nausea, rectal pain and vomiting  R flank pain    Genitourinary: Positive for flank pain  Negative for decreased urine volume, difficulty urinating, dysuria, frequency, hematuria and urgency  Neurological: Negative for headaches  Objective:      /70   Pulse 94   Temp 98 2 °F (36 8 °C)   Ht 5' 10" (1 778 m)   Wt 84 kg (185 lb 3 2 oz)   SpO2 96%   BMI 26 57 kg/m²          Physical Exam   Constitutional: He is oriented to person, place, and time  He appears well-developed and well-nourished  No distress  uncomfortable   HENT:   Head: Normocephalic and atraumatic  Cardiovascular: Normal rate, regular rhythm and normal heart sounds  Exam reveals no gallop and no friction rub  No murmur heard  Pulmonary/Chest: Effort normal and breath sounds normal  No respiratory distress  He has no wheezes  He has no rales  He exhibits no tenderness  Abdominal: He exhibits distension  He exhibits no mass  There is tenderness (RUQ tenderness)  There is no rebound and no guarding  Musculoskeletal: He exhibits no edema  Arms:  Neurological: He is alert and oriented to person, place, and time  Skin: He is not diaphoretic  Psychiatric: He has a normal mood and affect  His behavior is normal  Judgment and thought content normal    Nursing note and vitals reviewed

## 2018-11-23 ENCOUNTER — HOSPITAL ENCOUNTER (OUTPATIENT)
Dept: CT IMAGING | Facility: HOSPITAL | Age: 73
Discharge: HOME | End: 2018-11-23
Payer: MEDICARE

## 2018-11-23 DIAGNOSIS — R07.89 RIGHT-SIDED CHEST WALL PAIN: ICD-10-CM

## 2018-11-23 DIAGNOSIS — R10.9 ACUTE RIGHT FLANK PAIN: ICD-10-CM

## 2018-11-23 PROCEDURE — 71250 CT THORAX DX C-: CPT

## 2018-11-29 DIAGNOSIS — M54.6 THORACIC BACK PAIN, UNSPECIFIED BACK PAIN LATERALITY, UNSPECIFIED CHRONICITY: Primary | ICD-10-CM

## 2019-01-28 DIAGNOSIS — E11.8 TYPE 2 DIABETES MELLITUS WITH COMPLICATION, WITHOUT LONG-TERM CURRENT USE OF INSULIN (HCC): ICD-10-CM

## 2019-01-28 DIAGNOSIS — E11.9 TYPE 2 DIABETES MELLITUS WITHOUT COMPLICATION, WITHOUT LONG-TERM CURRENT USE OF INSULIN (HCC): Primary | ICD-10-CM

## 2019-01-28 RX ORDER — GLYBURIDE 5 MG/1
TABLET ORAL
Qty: 120 TABLET | Refills: 3 | Status: SHIPPED | OUTPATIENT
Start: 2019-01-28 | End: 2019-05-10 | Stop reason: SDUPTHER

## 2019-02-05 ENCOUNTER — TELEPHONE (OUTPATIENT)
Dept: NEPHROLOGY | Facility: CLINIC | Age: 74
End: 2019-02-05

## 2019-02-05 NOTE — TELEPHONE ENCOUNTER
L/M asking pt to contact the office to inquire if there was any blood work done   Pt is scheduled for a f/u on 2/12 with Dr Shalini Flowers

## 2019-02-07 ENCOUNTER — APPOINTMENT (OUTPATIENT)
Dept: LAB | Facility: CLINIC | Age: 74
End: 2019-02-07
Payer: COMMERCIAL

## 2019-02-07 ENCOUNTER — TRANSCRIBE ORDERS (OUTPATIENT)
Dept: LAB | Facility: CLINIC | Age: 74
End: 2019-02-07

## 2019-02-07 DIAGNOSIS — E11.22 TYPE 2 DIABETES MELLITUS WITH STAGE 3 CHRONIC KIDNEY DISEASE, WITHOUT LONG-TERM CURRENT USE OF INSULIN (HCC): ICD-10-CM

## 2019-02-07 DIAGNOSIS — E78.00 PURE HYPERCHOLESTEROLEMIA: Primary | ICD-10-CM

## 2019-02-07 DIAGNOSIS — N18.30 CHRONIC KIDNEY DISEASE, STAGE 3 (HCC): ICD-10-CM

## 2019-02-07 DIAGNOSIS — K58.0 IRRITABLE BOWEL SYNDROME WITH DIARRHEA: ICD-10-CM

## 2019-02-07 DIAGNOSIS — R06.02 SHORTNESS OF BREATH: ICD-10-CM

## 2019-02-07 DIAGNOSIS — E78.00 HYPERCHOLESTEROLEMIA: ICD-10-CM

## 2019-02-07 DIAGNOSIS — R35.1 NOCTURIA: ICD-10-CM

## 2019-02-07 DIAGNOSIS — R74.02 NONSPECIFIC ELEVATION OF LEVELS OF TRANSAMINASE OR LACTIC ACID DEHYDROGENASE (LDH): ICD-10-CM

## 2019-02-07 DIAGNOSIS — IMO0002 UNCONTROLLED TYPE 2 DIABETES MELLITUS WITH COMPLICATION: ICD-10-CM

## 2019-02-07 DIAGNOSIS — R74.01 NONSPECIFIC ELEVATION OF LEVELS OF TRANSAMINASE OR LACTIC ACID DEHYDROGENASE (LDH): ICD-10-CM

## 2019-02-07 DIAGNOSIS — R26.89 IMPAIRMENT OF BALANCE: ICD-10-CM

## 2019-02-07 DIAGNOSIS — N18.30 TYPE 2 DIABETES MELLITUS WITH STAGE 3 CHRONIC KIDNEY DISEASE, WITHOUT LONG-TERM CURRENT USE OF INSULIN (HCC): ICD-10-CM

## 2019-02-07 LAB
ALBUMIN SERPL BCP-MCNC: 4.4 G/DL (ref 3.5–5)
ALP SERPL-CCNC: 68 U/L (ref 46–116)
ALT SERPL W P-5'-P-CCNC: 22 U/L (ref 12–78)
ANION GAP SERPL CALCULATED.3IONS-SCNC: 7 MMOL/L (ref 4–13)
AST SERPL W P-5'-P-CCNC: 18 U/L (ref 5–45)
BASOPHILS # BLD AUTO: 0.05 THOUSANDS/ΜL (ref 0–0.1)
BASOPHILS NFR BLD AUTO: 1 % (ref 0–1)
BILIRUB SERPL-MCNC: 0.5 MG/DL (ref 0.2–1)
BILIRUB UR QL STRIP: NEGATIVE
BUN SERPL-MCNC: 44 MG/DL (ref 5–25)
CALCIUM SERPL-MCNC: 8.4 MG/DL (ref 8.3–10.1)
CHLORIDE SERPL-SCNC: 107 MMOL/L (ref 100–108)
CHOLEST SERPL-MCNC: 167 MG/DL (ref 50–200)
CHOLEST SERPL-MCNC: 170 MG/DL (ref 50–200)
CLARITY UR: CLEAR
CO2 SERPL-SCNC: 23 MMOL/L (ref 21–32)
COLOR UR: YELLOW
CREAT SERPL-MCNC: 1.61 MG/DL (ref 0.6–1.3)
CREAT UR-MCNC: 117 MG/DL
CREAT UR-MCNC: 117 MG/DL
EOSINOPHIL # BLD AUTO: 0.13 THOUSAND/ΜL (ref 0–0.61)
EOSINOPHIL NFR BLD AUTO: 2 % (ref 0–6)
ERYTHROCYTE [DISTWIDTH] IN BLOOD BY AUTOMATED COUNT: 13 % (ref 11.6–15.1)
EST. AVERAGE GLUCOSE BLD GHB EST-MCNC: 171 MG/DL
FERRITIN SERPL-MCNC: 210 NG/ML (ref 8–388)
GFR SERPL CREATININE-BSD FRML MDRD: 42 ML/MIN/1.73SQ M
GLUCOSE P FAST SERPL-MCNC: 67 MG/DL (ref 65–99)
GLUCOSE UR STRIP-MCNC: NEGATIVE MG/DL
HBA1C MFR BLD: 7.6 % (ref 4.2–6.3)
HCT VFR BLD AUTO: 42.2 % (ref 36.5–49.3)
HDLC SERPL-MCNC: 34 MG/DL (ref 40–60)
HDLC SERPL-MCNC: 35 MG/DL (ref 40–60)
HGB BLD-MCNC: 13.6 G/DL (ref 12–17)
HGB UR QL STRIP.AUTO: NEGATIVE
IMM GRANULOCYTES # BLD AUTO: 0.02 THOUSAND/UL (ref 0–0.2)
IMM GRANULOCYTES NFR BLD AUTO: 0 % (ref 0–2)
IRON SERPL-MCNC: 68 UG/DL (ref 65–175)
KETONES UR STRIP-MCNC: NEGATIVE MG/DL
LDLC SERPL CALC-MCNC: 119 MG/DL (ref 0–100)
LDLC SERPL CALC-MCNC: 120 MG/DL (ref 0–100)
LEUKOCYTE ESTERASE UR QL STRIP: NEGATIVE
LYMPHOCYTES # BLD AUTO: 1.49 THOUSANDS/ΜL (ref 0.6–4.47)
LYMPHOCYTES NFR BLD AUTO: 18 % (ref 14–44)
MCH RBC QN AUTO: 29.2 PG (ref 26.8–34.3)
MCHC RBC AUTO-ENTMCNC: 32.2 G/DL (ref 31.4–37.4)
MCV RBC AUTO: 91 FL (ref 82–98)
MICROALBUMIN UR-MCNC: 33.2 MG/L (ref 0–20)
MICROALBUMIN/CREAT 24H UR: 28 MG/G CREATININE (ref 0–30)
MONOCYTES # BLD AUTO: 0.66 THOUSAND/ΜL (ref 0.17–1.22)
MONOCYTES NFR BLD AUTO: 8 % (ref 4–12)
NEUTROPHILS # BLD AUTO: 5.8 THOUSANDS/ΜL (ref 1.85–7.62)
NEUTS SEG NFR BLD AUTO: 71 % (ref 43–75)
NITRITE UR QL STRIP: NEGATIVE
NONHDLC SERPL-MCNC: 133 MG/DL
NRBC BLD AUTO-RTO: 0 /100 WBCS
PH UR STRIP.AUTO: 5.5 [PH] (ref 4.5–8)
PHOSPHATE SERPL-MCNC: 3.4 MG/DL (ref 2.3–4.1)
PLATELET # BLD AUTO: 253 THOUSANDS/UL (ref 149–390)
PMV BLD AUTO: 10.9 FL (ref 8.9–12.7)
POTASSIUM SERPL-SCNC: 4.7 MMOL/L (ref 3.5–5.3)
PROT SERPL-MCNC: 7.5 G/DL (ref 6.4–8.2)
PROT UR STRIP-MCNC: NEGATIVE MG/DL
PROT UR-MCNC: 19 MG/DL
PROT/CREAT UR: 0.16 MG/G{CREAT} (ref 0–0.1)
RBC # BLD AUTO: 4.65 MILLION/UL (ref 3.88–5.62)
SODIUM SERPL-SCNC: 137 MMOL/L (ref 136–145)
SP GR UR STRIP.AUTO: 1.02 (ref 1–1.03)
TRIGL SERPL-MCNC: 72 MG/DL
TRIGL SERPL-MCNC: 75 MG/DL
UROBILINOGEN UR QL STRIP.AUTO: 0.2 E.U./DL
WBC # BLD AUTO: 8.15 THOUSAND/UL (ref 4.31–10.16)

## 2019-02-07 PROCEDURE — 85025 COMPLETE CBC W/AUTO DIFF WBC: CPT

## 2019-02-07 PROCEDURE — 82043 UR ALBUMIN QUANTITATIVE: CPT

## 2019-02-07 PROCEDURE — 82570 ASSAY OF URINE CREATININE: CPT | Performed by: INTERNAL MEDICINE

## 2019-02-07 PROCEDURE — 83036 HEMOGLOBIN GLYCOSYLATED A1C: CPT

## 2019-02-07 PROCEDURE — 83970 ASSAY OF PARATHORMONE: CPT

## 2019-02-07 PROCEDURE — 84156 ASSAY OF PROTEIN URINE: CPT | Performed by: INTERNAL MEDICINE

## 2019-02-07 PROCEDURE — 83540 ASSAY OF IRON: CPT

## 2019-02-07 PROCEDURE — 80061 LIPID PANEL: CPT

## 2019-02-07 PROCEDURE — 82728 ASSAY OF FERRITIN: CPT

## 2019-02-07 PROCEDURE — 84100 ASSAY OF PHOSPHORUS: CPT

## 2019-02-07 PROCEDURE — 3061F NEG MICROALBUMINURIA REV: CPT | Performed by: FAMILY MEDICINE

## 2019-02-07 PROCEDURE — 36415 COLL VENOUS BLD VENIPUNCTURE: CPT

## 2019-02-07 PROCEDURE — 80053 COMPREHEN METABOLIC PANEL: CPT

## 2019-02-07 PROCEDURE — 82570 ASSAY OF URINE CREATININE: CPT

## 2019-02-07 PROCEDURE — 81003 URINALYSIS AUTO W/O SCOPE: CPT | Performed by: INTERNAL MEDICINE

## 2019-02-15 ENCOUNTER — OFFICE VISIT (OUTPATIENT)
Dept: FAMILY MEDICINE CLINIC | Facility: CLINIC | Age: 74
End: 2019-02-15
Payer: COMMERCIAL

## 2019-02-15 ENCOUNTER — TELEPHONE (OUTPATIENT)
Dept: FAMILY MEDICINE CLINIC | Facility: CLINIC | Age: 74
End: 2019-02-15

## 2019-02-15 VITALS
HEIGHT: 70 IN | OXYGEN SATURATION: 96 % | SYSTOLIC BLOOD PRESSURE: 122 MMHG | HEART RATE: 80 BPM | WEIGHT: 188.4 LBS | TEMPERATURE: 97.7 F | DIASTOLIC BLOOD PRESSURE: 76 MMHG | BODY MASS INDEX: 26.97 KG/M2

## 2019-02-15 DIAGNOSIS — I10 BENIGN ESSENTIAL HYPERTENSION: Primary | ICD-10-CM

## 2019-02-15 DIAGNOSIS — E11.22 TYPE 2 DIABETES MELLITUS WITH STAGE 3 CHRONIC KIDNEY DISEASE, WITHOUT LONG-TERM CURRENT USE OF INSULIN (HCC): ICD-10-CM

## 2019-02-15 DIAGNOSIS — N18.30 CHRONIC KIDNEY DISEASE, STAGE 3 (HCC): ICD-10-CM
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
## 2019-02-15 DIAGNOSIS — M48.02 DEGENERATIVE CERVICAL SPINAL STENOSIS: ICD-10-CM

## 2019-02-15 DIAGNOSIS — N18.30 TYPE 2 DIABETES MELLITUS WITH STAGE 3 CHRONIC KIDNEY DISEASE, WITHOUT LONG-TERM CURRENT USE OF INSULIN (HCC): ICD-10-CM

## 2019-02-15 DIAGNOSIS — E78.00 HYPERCHOLESTEROLEMIA: ICD-10-CM

## 2019-02-15 DIAGNOSIS — Z78.9 STATIN INTOLERANCE: ICD-10-CM

## 2019-02-15 DIAGNOSIS — S61.451A DOG BITE, HAND, RIGHT, INITIAL ENCOUNTER: ICD-10-CM

## 2019-02-15 DIAGNOSIS — E11.9 TYPE 2 DIABETES MELLITUS WITHOUT COMPLICATION, WITHOUT LONG-TERM CURRENT USE OF INSULIN (HCC): ICD-10-CM

## 2019-02-15 DIAGNOSIS — M25.631 WRIST STIFFNESS, RIGHT: ICD-10-CM

## 2019-02-15 DIAGNOSIS — Z00.00 MEDICARE ANNUAL WELLNESS VISIT, SUBSEQUENT: ICD-10-CM

## 2019-02-15 PROBLEM — R07.89 RIGHT-SIDED CHEST WALL PAIN: Status: RESOLVED | Noted: 2018-11-16 | Resolved: 2019-02-15

## 2019-02-15 PROBLEM — R10.9 ACUTE RIGHT FLANK PAIN: Status: RESOLVED | Noted: 2018-11-16 | Resolved: 2019-02-15

## 2019-02-15 PROCEDURE — 1125F AMNT PAIN NOTED PAIN PRSNT: CPT | Performed by: FAMILY MEDICINE

## 2019-02-15 PROCEDURE — 99214 OFFICE O/P EST MOD 30 MIN: CPT | Performed by: FAMILY MEDICINE

## 2019-02-15 PROCEDURE — 1170F FXNL STATUS ASSESSED: CPT | Performed by: FAMILY MEDICINE

## 2019-02-15 PROCEDURE — G0439 PPPS, SUBSEQ VISIT: HCPCS | Performed by: FAMILY MEDICINE

## 2019-02-15 RX ORDER — AMOXICILLIN AND CLAVULANATE POTASSIUM 875; 125 MG/1; MG/1
1 TABLET, FILM COATED ORAL EVERY 12 HOURS SCHEDULED
Qty: 14 TABLET | Refills: 0 | Status: SHIPPED | OUTPATIENT
Start: 2019-02-15 | End: 2019-02-22

## 2019-02-15 NOTE — TELEPHONE ENCOUNTER
He needs to come back in 3 months for an A1c  We can do in the office  He needs to check his blood sugar daily fasting  What exactly does he need faxed in to the pharmacy?

## 2019-02-15 NOTE — PROGRESS NOTES
Assessment and Plan:    Problem List Items Addressed This Visit        Endocrine    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([facility] )       Cardiovascular and Mediastinum    Benign essential hypertension - Primary       Genitourinary    Chronic kidney disease, stage 3 ([facility] )       Other    Hypercholesterolemia    Statin intolerance    Degenerative cervical spinal stenosis    Relevant Orders    Ambulatory referral to Pain Management    Ambulatory referral to Physical Therapy      Other Visit Diagnoses     Medicare annual wellness visit, subsequent        Dog bite, hand, right, initial encounter        Relevant Medications    amoxicillin-clavulanate (AUGMENTIN) 875-125 mg per tablet    Wrist stiffness, right        Relevant Orders    Ambulatory referral to Orthopedic Surgery        Health Maintenance Due   Topic Date Due    Medicare Annual Wellness Visit (AWV)  1945    SLP PLAN OF CARE  1945    BMI: Followup Plan  10/09/1962    HEPATITIS B VACCINES (1 of 3 - Risk 3-dose series) 10/09/1963         HPI:  Chris Harris is a 75 y o  male here for his Subsequent Wellness Visit      Patient Active Problem List   Diagnosis    Chronic kidney disease, stage 3 ([facility] )    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([facility] )    Benign essential hypertension    Hypercholesterolemia    Statin intolerance    Degenerative cervical spinal stenosis     Past Medical History:   Diagnosis Date    Asthma     last assessed: April 1, 2015    Balanitis     last assessed: July 9, 2014    Cataract     Chronic kidney disease, stage 3 [facility])     last assessed: Jan 17, 2018    Diabetes mellitus [facility])     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([facility] )     last assessed: Jan 17, 2018    Hypercholesterolemia     Hypertension     Pneumonia     last assessed: Feb 25, 2013     Past Surgical History:   Procedure Laterality Date    ABDOMINAL SURGERY      APPENDECTOMY  1965    CATARACT EXTRACTION     [address]  2007    x2    OTHER SURGICAL HISTORY  1965    Perforated duodenol ulcer surgery      Family History   Problem Relation Age of Onset    Pancreatic cancer Mother     Colon cancer Father     Diabetes Father         mellitus      Social History     Tobacco Use   Smoking Status Never Smoker   Smokeless Tobacco Never Used   Tobacco Comment    former smoker noted in "allscripts" Quit in 1971 - never used chewing tobacco      Social History     Substance and Sexual Activity   Alcohol Use Yes    Comment: rare       Social History     Substance and Sexual Activity   Drug Use No       Current Outpatient Medications   Medication Sig Dispense Refill    albuterol (PROAIR HFA) 90 mcg/act inhaler Inhale daily as needed      amoxicillin-clavulanate (AUGMENTIN) 875-125 mg per tablet Take 1 tablet by mouth every 12 (twelve) hours for 7 days 14 tablet 0    ERYN CONTOUR TEST test strip Test sugar twice a day 100 each 5    glyBURIDE (DIABETA) 5 mg tablet take 2 tablets by mouth twice a day 120 tablet 3    lisinopril-hydrochlorothiazide (PRINZIDE,ZESTORETIC) 20-25 MG per tablet Take 1 tablet by mouth daily 90 tablet 1    TRADJENTA 5 MG TABS take 1 tablet by mouth once daily 30 tablet 5     No current facility-administered medications for this visit  Allergies   Allergen Reactions    Meperidine     Pravastatin Myalgia     Immunization History   Administered Date(s) Administered    Tdap 01/11/2018, 01/11/2018       Patient Care Team:  Brodie Yadav MD as PCP - Jenny Nur MD as Consulting Physician    Medicare Screening Tests and Risk Assessments:  Bree is here for his Subsequent Wellness visit  Last Medicare Wellness visit information reviewed, patient interviewed, no change since last [facility]  Health Risk Assessment:  Patient rates overall health as good  Patient feels that their physical health rating is Same  Eyesight was rated as Slightly worse  Hearing was rated as Same   Patient feels that their emotional and mental health rating is Same  Pain experienced by patient in the last 7 days has been A lot  Patient's pain rating has been 7/10  Patient states that he has experienced no weight loss or gain in last 6 months  Emotional/Mental Health:  Patient has been feeling nervous/anxious  PHQ-9 Depression Screening:    Frequency of the following problems over the past two weeks:      1  Little interest or pleasure in doing things: 0 - not at all      2  Feeling down, depressed, or hopeless: 0 - not at all  PHQ-2 Score: 0          Broken Bones/Falls: Fall Risk Assessment:    In the past year, patient has experienced: History of falling in past year     Number of falls: 1  Patient does not feel he is unsteady standing  Chronic conditions that may contribute to falls diabetes  Bladder/Bowel:  Patient has not leaked urine accidently in the last six months  Patient reports no loss of bowel control  Immunizations:  Patient has not had a flu vaccination within the last year  Patient has not received a pneumonia shot  Patient has not received a shingles shot  Patient has received tetanus/diphtheria shot  Home Safety:  Patient does not have trouble with stairs inside or outside of their home  Patient currently reports that there are no safety hazards present in home, working smoke alarms, working carbon monoxide detectors  Preventative Screenings:   prostate cancer screen performed, colon cancer screen completed, cholesterol screen completed, glaucoma eye exam completed,     Nutrition:  Current diet: Regular and Diabetic with servings of the following:    Medications:  Patient is currently taking over-the-counter supplements  Patient is able to manage medications  Lifestyle Choices:  Patient reports no tobacco use  Patient has not smoked or used tobacco in the past   Patient reports alcohol use  Patient drives a vehicle  Patient wears seat belt          Activities of Daily Living:  Can get out of bed by his or her self, able to dress self, able to make own meals, able to do own shopping, able to bathe self, can do own laundry/housekeeping, can manage own money, pay bills and track expenses    Previous Hospitalizations:  Hospitalization or ED visit in past 12 months  Number of hospitalizations within the last year: 3-4        Advanced Directives:  Patient has not decided on power of   Patient has not completed advanced directive  Preventative Screening/Counseling:      Cardiovascular:      General: Risks and Benefits Discussed and Screening Current      Counseling: Healthy Diet and Healthy Weight          Diabetes:      General: Risks and Benefits Discussed and Screening Current      Counseling: Healthy Diet, Healthy Weight and Improve Physical Activity          Colorectal Cancer:      General: Risks and Benefits Discussed and Screening Current          Prostate Cancer:      General: Risks and Benefits Discussed and Screening Current          Osteoporosis:      General: Screening Not Indicated          AAA:      General: Screening Not Indicated          Glaucoma:      General: Risks and Benefits Discussed and Screening Current          HIV:      General: Screening Not Indicated          Hepatitis C:      General: Screening Not Indicated        Advanced Directives:   Patient has no living will for healthcare, does not have durable POA for healthcare, patient does not have an advanced directive  Information on ACP and/or AD provided  5 wishes given  Additional Comments: Has 5 wishes at home - has not filled it out    Immunizations:      Influenza: Risks & Benefits Discussed and Patient Declines      Pneumococcal: Risks & Benefits Discussed and Patient Declines      Shingrix: Risks & Benefits Discussed and Patient Declines      Zostavax: Risks & Benefits Discussed and Patient Declines      TDAP: Risks & Benefits Discussed and Tdap Vaccine UTD      Other Preventative Counseling (Non-Medicare):   Fall Prevention, Increase physical activity and Weight reduction discussed      Referrals:  Referral(s) to: Orthopedics and Pain Management

## 2019-02-15 NOTE — PROGRESS NOTES
Assessment/Plan:    No problem-specific Assessment & Plan notes found for this encounter  Diagnoses and all orders for this visit:    Benign essential hypertension    Type 2 diabetes mellitus with stage 3 chronic kidney disease, without long-term current use of insulin (HCC)    Chronic kidney disease, stage 3 ([address] )    Medicare annual wellness visit, subsequent    Hypercholesterolemia    Statin intolerance    Dog bite, hand, right, initial encounter  -     amoxicillin-clavulanate (AUGMENTIN) 875-125 mg per tablet; Take 1 tablet by mouth every 12 (twelve) hours for 7 days    Degenerative cervical spinal stenosis  -     Ambulatory referral to Pain Management; Future  -     Ambulatory referral to Physical Therapy; Future    Wrist stiffness, right  -     Ambulatory referral to Orthopedic Surgery; Future        1  Diabetes - advised better compliance with diet  Continue Glyburide and Tradjenta  A1c -will repeat in 3 months  BMI Counseling: Body mass index is 27 03 kg/m²  Discussed the patient's BMI with him  The BMI is above average  BMI counseling and education was provided to the patient  Nutrition recommendations include moderation in carbohydrate intake  Exercise recommendations include exercising 3-5 times per week  2   CKD - stable, following with nephrology  3  HTN - stable  4  Lipids - intolerance to statin  5  Dog bite - wound dressed, steri strips applied, and started Augmentin  Discussed signs and symptoms of infection  If any worsening he should go to the ER  6  Cervical DDD - referral spine/pain and PT   7  Wrist pain - referral to ortho/hand  Subjective:      Patient ID: Chris Harris is a 75 y o  male  50-year-old male here to review blood work  Lipids -  , HDL 35  Does not tolerate statins "it affected my muscle"  DM - a1c 7 6% this is up from 7 2%  He checks his glucose and it goes "Up and down"    States his significant other is in culinary school and is making a lot of sweets  On Glyburide 5 mg BID and Tradjenta  He has CKD - so cannot take Metformin  HTN - well controlled  CKD - creatinine 1 61  Stable  CBC is normal    Having pain in the neck and it radiates down the L arm  He had a CT of cervical spine last year - showed degenerative changes  He cannot take NSAIDs due to CKD  Is worried that the symptoms will start impairing his ability to do things with his hands  He has a dog bite on the R hand  He states the dog bit him last night  He cleaned the area with alcohol peroxide  He has had several dog bites in the past   It is his own dog that bites him  He has pain in the right wrist chronically  We discussed this in the past   He states he gets pain when he moves the wrist up and down also if he coughs or breathes deeply he gets pain in the wrist   He did have an x-ray which showed mild degenerative changes  The following portions of the patient's history were reviewed and updated as appropriate:   He  has a past medical history of Asthma, Balanitis, Cataract, Chronic kidney disease, stage 3 ([address] ), Diabetes mellitus ([address] ), 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([address] ), Hypercholesterolemia, Hypertension, and Pneumonia  He   Patient Active Problem List    Diagnosis Date Noted    Statin intolerance 02/15/2019    Degenerative cervical spinal stenosis 02/15/2019    Chronic kidney disease, stage 3 ([address] ) 02/08/2018    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([address] ) 02/08/2018    Benign essential hypertension 02/08/2018    Hypercholesterolemia 04/02/2014     He  has a past surgical history that includes Hernia repair; Abdominal surgery; Appendectomy (1965); Cataract extraction; Hernia repair (2007); and Other surgical history (1965)  His family history includes Colon cancer in his father; Diabetes in his father; Pancreatic cancer in his mother  He  reports that he has never smoked  He has never used smokeless tobacco  He reports that he drinks alcohol   He reports that he does not use drugs  Current Outpatient Medications   Medication Sig Dispense Refill    albuterol (PROAIR HFA) 90 mcg/act inhaler Inhale daily as needed      amoxicillin-clavulanate (AUGMENTIN) 875-125 mg per tablet Take 1 tablet by mouth every 12 (twelve) hours for 7 days 14 tablet 0    ERYN CONTOUR TEST test strip Test sugar twice a day 100 each 5    glyBURIDE (DIABETA) 5 mg tablet take 2 tablets by mouth twice a day 120 tablet 3    lisinopril-hydrochlorothiazide (PRINZIDE,ZESTORETIC) 20-25 MG per tablet Take 1 tablet by mouth daily 90 tablet 1    TRADJENTA 5 MG TABS take 1 tablet by mouth once daily 30 tablet 5     No current facility-administered medications for this visit  Current Outpatient Medications on File Prior to Visit   Medication Sig    albuterol (PROAIR HFA) 90 mcg/act inhaler Inhale daily as needed    ERYN CONTOUR TEST test strip Test sugar twice a day    glyBURIDE (DIABETA) 5 mg tablet take 2 tablets by mouth twice a day    lisinopril-hydrochlorothiazide (PRINZIDE,ZESTORETIC) 20-25 MG per tablet Take 1 tablet by mouth daily    TRADJENTA 5 MG TABS take 1 tablet by mouth once daily     No current facility-administered medications on file prior to visit  He is allergic to meperidine and pravastatin       Review of Systems   Constitutional: Negative for activity change, appetite change, chills, fatigue, fever and unexpected weight change  HENT: Negative for congestion, ear discharge, ear pain, postnasal drip, sinus pressure and sore throat  Eyes: Negative for discharge and visual disturbance  Respiratory: Negative for cough, shortness of breath and wheezing  Cardiovascular: Negative for chest pain, palpitations and leg swelling  Gastrointestinal: Negative for abdominal pain, constipation, diarrhea, nausea and vomiting  Endocrine: Negative for cold intolerance, heat intolerance, polydipsia and polyuria  Genitourinary: Negative for difficulty urinating and frequency  Musculoskeletal: Positive for arthralgias and neck pain  Negative for back pain, joint swelling and myalgias  Skin: Positive for wound  Negative for rash  Neurological: Positive for numbness  Negative for dizziness, weakness, light-headedness and headaches  Hematological: Negative for adenopathy  Psychiatric/Behavioral: Negative for behavioral problems, confusion, dysphoric mood, sleep disturbance and suicidal ideas  The patient is not nervous/anxious  Objective:       /76   Pulse 80   Temp 97 7 °F (36 5 °C)   Ht 5' 10" (1 778 m)   Wt 85 5 kg (188 lb 6 4 oz)   SpO2 96%   BMI 27 03 kg/m²          Physical Exam   Constitutional: He is oriented to person, place, and time  He appears well-developed and well-nourished  No distress  HENT:   Head: Normocephalic and atraumatic  Right Ear: Hearing, tympanic membrane, external ear and ear canal normal    Left Ear: Hearing, tympanic membrane, external ear and ear canal normal    Nose: Nose normal    Mouth/Throat: Oropharynx is clear and moist and mucous membranes are normal  No oropharyngeal exudate  Eyes: Pupils are equal, round, and reactive to light  Conjunctivae and EOM are normal    Neck: Neck supple  No thyromegaly present  Cardiovascular: Normal rate, regular rhythm and normal heart sounds  Exam reveals no gallop and no friction rub  No murmur heard  Pulmonary/Chest: Effort normal and breath sounds normal  No respiratory distress  He has no wheezes  He has no rales  He exhibits no tenderness  Musculoskeletal: He exhibits no edema  Right wrist: He exhibits decreased range of motion and tenderness  Cervical back: He exhibits decreased range of motion and pain  Lymphadenopathy:     He has no cervical adenopathy  Neurological: He is alert and oriented to person, place, and time  Skin: Skin is warm and dry  No rash noted  He is not diaphoretic  Psychiatric: He has a normal mood and affect   His behavior is normal  Judgment and thought content normal    Nursing note and vitals reviewed

## 2019-02-15 NOTE — TELEPHONE ENCOUNTER
He would need new script for test strips for the 100 because you increased him to testing twice a day and then any other supplies lancets also

## 2019-02-15 NOTE — TELEPHONE ENCOUNTER
Pt asks how often blood sugar should be tested,will need new script sent tp kina louis  call pt w/ans [phone] leave message

## 2019-02-19 ENCOUNTER — TELEPHONE (OUTPATIENT)
Dept: PAIN MEDICINE | Facility: CLINIC | Age: 74
End: 2019-02-19

## 2019-02-27 ENCOUNTER — OFFICE VISIT (OUTPATIENT)
Dept: OBGYN CLINIC | Facility: CLINIC | Age: 74
End: 2019-02-27
Payer: COMMERCIAL

## 2019-02-27 ENCOUNTER — APPOINTMENT (OUTPATIENT)
Dept: RADIOLOGY | Facility: CLINIC | Age: 74
End: 2019-02-27
Payer: COMMERCIAL

## 2019-02-27 VITALS
WEIGHT: 188 LBS | DIASTOLIC BLOOD PRESSURE: 73 MMHG | HEART RATE: 78 BPM | HEIGHT: 70 IN | SYSTOLIC BLOOD PRESSURE: 144 MMHG | BODY MASS INDEX: 26.92 KG/M2

## 2019-02-27 DIAGNOSIS — M25.531 RIGHT WRIST PAIN: ICD-10-CM

## 2019-02-27 DIAGNOSIS — M19.032 PRIMARY OSTEOARTHRITIS OF LEFT WRIST: Primary | ICD-10-CM

## 2019-02-27 DIAGNOSIS — M25.631 WRIST STIFFNESS, RIGHT: ICD-10-CM

## 2019-02-27 PROCEDURE — 99203 OFFICE O/P NEW LOW 30 MIN: CPT | Performed by: ORTHOPAEDIC SURGERY

## 2019-02-27 PROCEDURE — 20605 DRAIN/INJ JOINT/BURSA W/O US: CPT | Performed by: ORTHOPAEDIC SURGERY

## 2019-02-27 PROCEDURE — 73110 X-RAY EXAM OF WRIST: CPT

## 2019-02-27 RX ORDER — LIDOCAINE HYDROCHLORIDE 10 MG/ML
0.5 INJECTION, SOLUTION INFILTRATION; PERINEURAL
Status: COMPLETED | OUTPATIENT
Start: 2019-02-27 | End: 2019-02-27

## 2019-02-27 RX ORDER — TRIAMCINOLONE ACETONIDE 40 MG/ML
20 INJECTION, SUSPENSION INTRA-ARTICULAR; INTRAMUSCULAR
Status: COMPLETED | OUTPATIENT
Start: 2019-02-27 | End: 2019-02-27

## 2019-02-27 RX ADMIN — LIDOCAINE HYDROCHLORIDE 0.5 ML: 10 INJECTION, SOLUTION INFILTRATION; PERINEURAL at 13:16

## 2019-02-27 RX ADMIN — TRIAMCINOLONE ACETONIDE 20 MG: 40 INJECTION, SUSPENSION INTRA-ARTICULAR; INTRAMUSCULAR at 13:16

## 2019-02-27 NOTE — PROGRESS NOTES
CHIEF COMPLAINT:  Chief Complaint   Patient presents with    Right Wrist - Pain       SUBJECTIVE:  Bree Bautista is a 75y o  year old RHD male who presents to the office for evaluation of his right wrist   Patient states he has been having ulnar-sided wrist pain  Patient states that he had a fall in September of 2018 but the pain did not begin directly after the fall  Patient states he had x-rays in October that showed no fracture or dislocation  Patient states that he currently has pain when lifting  Patient does not take medication for pain at this time  The patient states he had a fracture of his right wrist approximately 20 years ago  Patient has history of kidney failure        PAST MEDICAL HISTORY:  Past Medical History:   Diagnosis Date    Asthma     last assessed: April 1, 2015    Balanitis     last assessed: July 9, 2014    Cataract     Chronic kidney disease, stage 3 [facility])     last assessed: Jan 17, 2018    Diabetes mellitus [facility])     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([facility] )     last assessed: Jan 17, 2018    Hypercholesterolemia     Hypertension     Pneumonia     last assessed: Feb 25, 2013       PAST SURGICAL HISTORY:  Past Surgical History:   Procedure Laterality Date    ABDOMINAL SURGERY      APPENDECTOMY  1965    CATARACT EXTRACTION     [address] HERNIA REPAIR  2007    x2    OTHER SURGICAL HISTORY  1965    Perforated duodenol ulcer surgery        FAMILY HISTORY:  Family History   Problem Relation Age of Onset    Pancreatic cancer Mother     Colon cancer Father     Diabetes Father         mellitus        SOCIAL HISTORY:  Social History     Tobacco Use    Smoking status: Never Smoker    Smokeless tobacco: Never Used    Tobacco comment: former smoker noted in "allscripts" Quit in 1971 - never used chewing tobacco    Substance Use Topics    Alcohol use: Yes     Comment: rare     Drug use: No       MEDICATIONS:    Current Outpatient Medications:     albuterol (PROAIR HFA) 90 mcg/act inhaler, Inhale daily as needed, Disp: , Rfl:     ERYN CONTOUR TEST test strip, Test sugar twice a day, Disp: 100 each, Rfl: 5    ERYN MICROLET LANCETS lancets, Test twice a day, Disp: 100 each, Rfl: 3    glyBURIDE (DIABETA) 5 mg tablet, take 2 tablets by mouth twice a day, Disp: 120 tablet, Rfl: 3    lisinopril-hydrochlorothiazide (PRINZIDE,ZESTORETIC) 20-25 MG per tablet, Take 1 tablet by mouth daily, Disp: 90 tablet, Rfl: 1    TRADJENTA 5 MG TABS, take 1 tablet by mouth once daily, Disp: 30 tablet, Rfl: 5    ALLERGIES:  Allergies   Allergen Reactions    Meperidine     Pravastatin Myalgia       REVIEW OF SYSTEMS:  Review of Systems   Constitutional: Negative for chills, fever and unexpected weight change  HENT: Negative for hearing loss, nosebleeds and sore throat  Eyes: Negative for pain, redness and visual disturbance  Respiratory: Negative for cough, shortness of breath and wheezing  Cardiovascular: Negative for chest pain, palpitations and leg swelling  Gastrointestinal: Negative for abdominal pain, nausea and vomiting  Endocrine: Negative for polydipsia and polyuria  Genitourinary: Negative for dysuria and hematuria  Musculoskeletal: Positive for arthralgias  Negative for joint swelling and myalgias  Skin: Negative for rash and wound  Neurological: Negative for light-headedness, numbness and headaches  Psychiatric/Behavioral: Negative for decreased concentration, dysphoric mood and suicidal ideas  The patient is not nervous/anxious          VITALS:  Vitals:    02/27/19 0923   BP: 144/73   Pulse: 78       LABS:  HgA1c:   Lab Results   Component Value Date    HGBA1C 7 6 (H) 02/07/2019     BMP:   Lab Results   Component Value Date    GLUCOSE 109 11/25/2015    CALCIUM 8 4 02/07/2019     (L) 11/25/2015    K 4 7 02/07/2019    CO2 23 02/07/2019     02/07/2019    BUN 44 (H) 02/07/2019    CREATININE 1 61 (H) 02/07/2019 _____________________________________________________  PHYSICAL EXAMINATION:  General: well developed and well nourished, alert, oriented times 3 and appears comfortable  Psychiatric: Normal  HEENT: Trachea Midline, No torticollis  Pulmonary: No audible wheezing or respiratory distress   Skin: No masses, erythema, lacerations, fluctation, ulcerations  Neurovascular: Sensation Intact to the Median, Ulnar, Radial Nerve, Motor Intact to the Median, Ulnar, Radial Nerve and Pulses Intact    MUSCULOSKELETAL EXAMINATION:  Right wrist  Fullness noted on the ulnar aspect of the wrist  No erythema or ecchymosis  Skin intact  Pain with supination  Nontender over the ulnar styloid  Tender to palpation over the ulnocarpal joint      ___________________________________________________  STUDIES REVIEWED:  Images obtained today of the Right wrist Three views demonstrate Cystic changes noted in the ulna and carpal bones, angle of the scaphoid indicates scapholunate laxity no fractures dislocation noted      PROCEDURES PERFORMED:  Medium joint arthrocentesis: R ulnocarpal  Date/Time: 2/27/2019 1:16 PM  Consent given by: patient  Timeout: Immediately prior to procedure a time out was called to verify the correct patient, procedure, equipment, support staff and site/side marked as required   Supporting Documentation  Indications: pain and joint swelling   Procedure Details  Location: wrist - R ulnocarpal  Medications administered: 20 mg triamcinolone acetonide 40 mg/mL; 0 5 mL lidocaine 1 %    Patient tolerance: patient tolerated the procedure well with no immediate complications  Dressing:  Sterile dressing applied             _____________________________________________________  ASSESSMENT/PLAN:    Osteoarthritis of the right ulnocarpal joint  * cortisone steroid was administered into ulnocarpal joint without complications  * patient was provided with a wrist splint to wear with activity and any time he is having pain  * patient may remove splint to work on motion of his wrist  * patient will follow up in the office in 2 weeks        Follow Up:  Return in about 2 weeks (around 3/13/2019)        To Do Next Visit:  Re-evaluation of current issue        Scribe Attestation    I,:   Radha Harris am acting as a scribe while in the presence of the attending physician :        I,:   Christel Castellanos MD personally performed the services described in this documentation    as scribed in my presence :

## 2019-03-04 ENCOUNTER — TELEPHONE (OUTPATIENT)
Dept: OBGYN CLINIC | Facility: HOSPITAL | Age: 74
End: 2019-03-04

## 2019-03-04 NOTE — TELEPHONE ENCOUNTER
Please be aware Jason Lopez from Radiology called with significant findings, results are in [address]

## 2019-03-11 ENCOUNTER — OFFICE VISIT (OUTPATIENT)
Dept: NEPHROLOGY | Facility: CLINIC | Age: 74
End: 2019-03-11
Payer: COMMERCIAL

## 2019-03-11 VITALS
BODY MASS INDEX: 26.83 KG/M2 | DIASTOLIC BLOOD PRESSURE: 70 MMHG | HEIGHT: 70 IN | TEMPERATURE: 98.6 F | RESPIRATION RATE: 16 BRPM | HEART RATE: 78 BPM | SYSTOLIC BLOOD PRESSURE: 110 MMHG | WEIGHT: 187.4 LBS

## 2019-03-11 DIAGNOSIS — M48.02 DEGENERATIVE CERVICAL SPINAL STENOSIS: ICD-10-CM

## 2019-03-11 DIAGNOSIS — E11.22 TYPE 2 DIABETES MELLITUS WITH STAGE 3 CHRONIC KIDNEY DISEASE, WITHOUT LONG-TERM CURRENT USE OF INSULIN (HCC): ICD-10-CM

## 2019-03-11 DIAGNOSIS — I10 BENIGN ESSENTIAL HYPERTENSION: ICD-10-CM

## 2019-03-11 DIAGNOSIS — N18.30 TYPE 2 DIABETES MELLITUS WITH STAGE 3 CHRONIC KIDNEY DISEASE, WITHOUT LONG-TERM CURRENT USE OF INSULIN (HCC): ICD-10-CM

## 2019-03-11 DIAGNOSIS — E78.00 HYPERCHOLESTEROLEMIA: ICD-10-CM

## 2019-03-11 DIAGNOSIS — N18.30 CHRONIC KIDNEY DISEASE, STAGE 3 (HCC): Primary | ICD-10-CM

## 2019-03-11 PROCEDURE — 99213 OFFICE O/P EST LOW 20 MIN: CPT | Performed by: INTERNAL MEDICINE

## 2019-03-11 NOTE — PROGRESS NOTES
NEPHROLOGY OFFICE FOLLOW UP  Bree Flores 75 y o  male MRN: [id number]    Encounter: [id number] 3/11/2019    REASON FOR VISIT: Roberth Cummings is a 75 y o  male who is here on 3/11/2019 for No chief complaint on file  Jaja Kearney HPI:    Bree came in today for follow-up of CKD stage 3  He is doing quite well only complaint he has a pain in his right wrist which has a fracture  He is being monitored by orthopedic closely but apparently is not healing well and he is frustrated with that  No other acute complaint  Denies taking nonsteroidal pain killer at this point        REVIEW OF SYSTEMS:    Review of Systems   Constitutional: Negative for activity change and fatigue  HENT: Negative for congestion and ear discharge  Eyes: Negative for photophobia and pain  Respiratory: Negative for apnea and choking  Cardiovascular: Negative for chest pain and palpitations  Gastrointestinal: Negative for abdominal distention and blood in stool  Endocrine: Negative for heat intolerance and polyphagia  Genitourinary: Negative for flank pain and urgency  Musculoskeletal: Negative for neck pain and neck stiffness  Skin: Negative for color change and wound  Allergic/Immunologic: Negative for food allergies and immunocompromised state  Neurological: Negative for seizures and facial asymmetry  Hematological: Negative for adenopathy  Does not bruise/bleed easily  Psychiatric/Behavioral: Negative for self-injury and suicidal ideas           PAST MEDICAL HISTORY:  Past Medical History:   Diagnosis Date    Asthma     last assessed: April 1, 2015    Balanitis     last assessed: July 9, 2014    Cataract     Chronic kidney disease, stage 3 [facility])     last assessed: Jan 17, 2018    Diabetes mellitus [facility])     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([facility] [address] )     last assessed: Jan 17, 2018    Hypercholesterolemia     Hypertension     Pneumonia     last assessed: Feb 25, 2013       PAST SURGICAL HISTORY:  Past Surgical History:   Procedure Laterality Date    ABDOMINAL SURGERY      APPENDECTOMY  1965    CATARACT EXTRACTION      HERNIA REPAIR      HERNIA REPAIR  2007    x2    OTHER SURGICAL HISTORY  1965    Perforated duodenol ulcer surgery        SOCIAL HISTORY:  Social History     Substance and Sexual Activity   Alcohol Use Yes    Alcohol/week: 0 6 oz    Types: 1 Standard drinks or equivalent per week    Frequency: Monthly or less    Drinks per session: 1 or 2    Binge frequency: Never    Comment: rare  socially     Social History     Substance and Sexual Activity   Drug Use No     Social History     Tobacco Use   Smoking Status Former Smoker    Packs/day: 2 00    Years: 10 00    Pack years: 20 00    Types: Cigarettes    Last attempt to quit: Kristi Barnes Years since quittin 2   Smokeless Tobacco Former User    Quit date:    Tobacco Comment    former smoker noted in "allscripts" Quit in  - never used chewing tobacco        FAMILY HISTORY:  Family History   Problem Relation Age of Onset    Pancreatic cancer Mother     Colon cancer Father     Diabetes Father         mellitus     No Known Problems Sister     Diabetes Sister     Nephrolithiasis Sister        MEDICATIONS:    Current Outpatient Medications:     albuterol (PROAIR HFA) 90 mcg/act inhaler, Inhale daily as needed, Disp: , Rfl:     ERYN CONTOUR TEST test strip, Test sugar twice a day, Disp: 100 each, Rfl: 5    ERYN MICROLET LANCETS lancets, Test twice a day, Disp: 100 each, Rfl: 3    glyBURIDE (DIABETA) 5 mg tablet, take 2 tablets by mouth twice a day, Disp: 120 tablet, Rfl: 3    lisinopril-hydrochlorothiazide (PRINZIDE,ZESTORETIC) 20-25 MG per tablet, Take 1 tablet by mouth daily, Disp: 90 tablet, Rfl: 1    TRADJENTA 5 MG TABS, take 1 tablet by mouth once daily, Disp: 30 tablet, Rfl: 5    PHYSICAL EXAM:  Vitals:    19 1509   BP: 110/70   BP Location: Right arm   Patient Position: Sitting   Pulse: 78   Resp: 16   Temp: 98 6 °F (37 °C)   TempSrc: Tympanic   Weight: 85 kg (187 lb 6 4 oz)   Height: 5' 10" (1 778 m)     Body mass index is 26 89 kg/m²  Physical Exam   Constitutional: He is oriented to person, place, and time  He appears well-developed  No distress  HENT:   Head: Normocephalic  Mouth/Throat: Oropharynx is clear and moist    Eyes: Conjunctivae are normal  No scleral icterus  Neck: Normal range of motion  Neck supple  No JVD present  Cardiovascular: Normal rate, normal heart sounds and intact distal pulses  Pulmonary/Chest: Effort normal and breath sounds normal  He has no wheezes  Abdominal: Soft  Bowel sounds are normal  There is no tenderness  Musculoskeletal: Normal range of motion  He exhibits no edema  Neurological: He is alert and oriented to person, place, and time  Skin: Skin is warm  No rash noted  Psychiatric: He has a normal mood and affect  His behavior is normal        LAB RESULTS:  Results for orders placed or performed in visit on 02/07/19   Microalbumin / creatinine urine ratio   Result Value Ref Range    Creatinine, Ur 117 0 mg/dL    Microalbum  ,U,Random 33 2 (H) 0 0 - 20 0 mg/L    Microalb Creat Ratio 28 0 - 30 mg/g creatinine       ASSESSMENT and PLAN:      Chronic kidney disease, stage 3  Kidney function is stable at this point  Advised to avoid any nephrotoxic medicine of procedure and hydration discussed with him    Benign essential hypertension  Blood pressure is very well control      I will see him back in 6 month        Portions of the record may have been created with voice recognition software  Occasional wrong word or "sound a like" substitutions may have occurred due to the inherent limitations of voice recognition software  Read the chart carefully and recognize, using context, where substitutions have occurred  If you have any questions, please contact the dictating provider

## 2019-03-11 NOTE — ASSESSMENT & PLAN NOTE
Kidney function is stable at this point    Advised to avoid any nephrotoxic medicine of procedure and hydration discussed with him

## 2019-03-11 NOTE — PATIENT INSTRUCTIONS
Chronic Kidney Disease   AMBULATORY CARE:   Chronic kidney disease (CKD)  is the gradual and permanent loss of kidney function  Normally, the kidneys remove fluid, chemicals, and waste from your blood  These wastes are turned into urine by your kidneys  CKD may worsen over time and lead to kidney failure  Common symptoms include the following:   · Changes in how often you need to urinate    · Swelling in your arms, legs, or feet    · Shortness of breath    · Fatigue or weakness    · Bad or bitter taste in your mouth    · Nausea, vomiting, or loss of appetite  Seek care immediately if:   · You are confused and very drowsy  · You have a seizure  · You have shortness of breath  Contact your healthcare provider if:   · You suddenly gain or lose more weight than your healthcare provider has told you is okay  · You have itchy skin or a rash  · You urinate more or less than you normally do  · You have blood in your urine  · You have nausea and repeated vomiting  · You have fatigue or muscle weakness  · You have hiccups that will not stop  · You have questions or concerns about your condition or care  Treatment for CKD:  Medicines may be given to decrease blood pressure and get rid of extra fluid  You may also receive medicine to manage health conditions that may occur with CKD  Dialysis is a treatment to remove chemicals and waste from your blood when your kidneys can no longer do this  Surgery may be needed to create an arteriovenous fistula (AVF) in your arm or insert a catheter into your abdomen so that you can receive dialysis  A kidney transplant may be done if your CKD becomes severe  Manage CKD:   · Maintain a healthy weight  Ask your healthcare provider how much you should weigh  Ask him to help you create a weight loss plan if you are overweight  · Exercise 30 to 60 minutes a day, 4 to 7 times a week, or as directed  Ask about the best exercise plan for you   Regular exercise can help you manage CKD, high blood pressure, and diabetes  · Follow your healthcare provider's advice about what to eat and drink  He may tell you to eat food low in sodium (salt), potassium, phosphorus, or protein  You may need to see a dietitian if you need help planning meals  Ask how much liquid to drink each day and which liquids are best for you  · Limit alcohol  Ask how much alcohol is safe for you to drink  A drink of alcohol is 12 ounces of beer, 5 ounces of wine, or 1½ ounces of liquor  · Do not smoke  Nicotine and other chemicals in cigarettes and cigars can cause lung and kidney damage  Ask your healthcare provider for information if you currently smoke and need help to quit  E-cigarettes or smokeless tobacco still contain nicotine  Talk to your healthcare provider before you use these products  · Ask your healthcare provider if you need vaccines  Infections such as pneumonia, influenza, and hepatitis can be more harmful or more likely to occur in a person who has CKD  Vaccines reduce your risk of infection with these viruses  Follow up with your healthcare provider as directed:  Write down your questions so you remember to ask them during your visits  © 2017 [employer] Information is for End User's use only and may not be sold, redistributed or otherwise used for commercial purposes  All illustrations and images included in CareNotes® are the copyrighted property of [employer]  or Andres Mendoza  The above information is an  only  It is not intended as medical advice for individual conditions or treatments  Talk to your doctor, nurse or pharmacist before following any medical regimen to see if it is safe and effective for you

## 2019-03-14 ENCOUNTER — OFFICE VISIT (OUTPATIENT)
Dept: OBGYN CLINIC | Facility: CLINIC | Age: 74
End: 2019-03-14
Payer: COMMERCIAL

## 2019-03-14 VITALS
WEIGHT: 188.4 LBS | BODY MASS INDEX: 26.97 KG/M2 | HEART RATE: 60 BPM | DIASTOLIC BLOOD PRESSURE: 77 MMHG | SYSTOLIC BLOOD PRESSURE: 145 MMHG | HEIGHT: 70 IN

## 2019-03-14 DIAGNOSIS — M19.032 PRIMARY OSTEOARTHRITIS OF LEFT WRIST: Primary | ICD-10-CM

## 2019-03-14 PROCEDURE — 20605 DRAIN/INJ JOINT/BURSA W/O US: CPT | Performed by: ORTHOPAEDIC SURGERY

## 2019-03-14 PROCEDURE — 99213 OFFICE O/P EST LOW 20 MIN: CPT | Performed by: ORTHOPAEDIC SURGERY

## 2019-03-14 RX ORDER — LIDOCAINE HYDROCHLORIDE 10 MG/ML
2.5 INJECTION, SOLUTION INFILTRATION; PERINEURAL
Status: COMPLETED | OUTPATIENT
Start: 2019-03-14 | End: 2019-03-14

## 2019-03-14 RX ADMIN — LIDOCAINE HYDROCHLORIDE 2.5 ML: 10 INJECTION, SOLUTION INFILTRATION; PERINEURAL at 09:39

## 2019-03-14 RX ADMIN — Medication 0.5 MEQ: at 09:39

## 2019-03-14 NOTE — PROGRESS NOTES
CHIEF COMPLAINT:  Chief Complaint   Patient presents with    Right Wrist - Follow-up, Pain       SUBJECTIVE:  Bree Wheat is a 75y o  year old RHD male who presents to the office s/p right ulnar carpal joint injection administered 02/27/2019  Pt states that he had little relief from the CSI  Pt states that he has diffuse wrist pain when he even makes small movements  Patient previous reported falling in September 2018 although the pain in her wrist not begin directly after the fall  Patient had x-rays in October that show no fracture or dislocation, patient states he only has pain when lifting  Patient states he does not take medication for pain and wrist patient also reports having pain in his right wrist for about 20 years  Patient's history of kidney failure        PAST MEDICAL HISTORY:  Past Medical History:   Diagnosis Date    Asthma     last assessed: April 1, 2015    Balanitis     last assessed: July 9, 2014    Cataract     Chronic kidney disease, stage 3 [facility])     last assessed: Jan 17, 2018    Diabetes mellitus [facility])     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([facility] [address] )     last assessed: Jan 17, 2018    Hypercholesterolemia     Hypertension     Pneumonia     last assessed: Feb 25, 2013       PAST SURGICAL HISTORY:  Past Surgical History:   Procedure Laterality Date    ABDOMINAL SURGERY      APPENDECTOMY  1965    CATARACT EXTRACTION     [address] HERNIA REPAIR  2007    x2    OTHER SURGICAL HISTORY  1965    Perforated duodenol ulcer surgery        FAMILY HISTORY:  Family History   Problem Relation Age of Onset    Pancreatic cancer Mother     Colon cancer Father     Diabetes Father         mellitus     No Known Problems Sister     Diabetes Sister     Nephrolithiasis Sister        SOCIAL HISTORY:  Social History     Tobacco Use    Smoking status: Former Smoker     Packs/day: 2 00     Years: 10 00     Pack years: 20 00     Types: Cigarettes     Last attempt to quit: 1971 Years since quittin 3    Smokeless tobacco: Former User     Quit date:     Tobacco comment: former smoker noted in "allscripts" Quit in [address] - never used chewing tobacco    Substance Use Topics    Alcohol use: Yes     Alcohol/week: 0 6 oz     Types: 1 Standard drinks or equivalent per week     Frequency: Monthly or less     Drinks per session: 1 or 2     Binge frequency: Never     Comment: rare  socially    Drug use: No       MEDICATIONS:    Current Outpatient Medications:     albuterol (PROAIR HFA) 90 mcg/act inhaler, Inhale daily as needed, Disp: , Rfl:     ERYN CONTOUR TEST test strip, Test sugar twice a day, Disp: 100 each, Rfl: 5    ERYN MICROLET LANCETS lancets, Test twice a day, Disp: 100 each, Rfl: 3    glyBURIDE (DIABETA) 5 mg tablet, take 2 tablets by mouth twice a day, Disp: 120 tablet, Rfl: 3    lisinopril-hydrochlorothiazide (PRINZIDE,ZESTORETIC) 20-25 MG per tablet, Take 1 tablet by mouth daily, Disp: 90 tablet, Rfl: 1    TRADJENTA 5 MG TABS, take 1 tablet by mouth once daily, Disp: 30 tablet, Rfl: 5    ALLERGIES:  Allergies   Allergen Reactions    Meperidine     Pravastatin Myalgia       REVIEW OF SYSTEMS:  Review of Systems   Constitutional: Negative for chills, fever and unexpected weight change  HENT: Negative for hearing loss, nosebleeds and sore throat  Eyes: Negative for pain, redness and visual disturbance  Respiratory: Negative for cough, shortness of breath and wheezing  Cardiovascular: Negative for chest pain, palpitations and leg swelling  Gastrointestinal: Negative for abdominal pain, nausea and vomiting  Endocrine: Negative for polydipsia and polyuria  Genitourinary: Negative for dysuria and hematuria  Musculoskeletal: Negative for arthralgias, joint swelling and myalgias  Skin: Negative for rash and wound  Neurological: Negative for light-headedness, numbness and headaches     Psychiatric/Behavioral: Negative for decreased concentration, dysphoric mood and suicidal ideas  The patient is not nervous/anxious  VITALS:  Vitals:    03/14/19 0913   BP: 145/77   Pulse: 60       LABS:  HgA1c:   Lab Results   Component Value Date    HGBA1C 7 6 (H) 02/07/2019     BMP:   Lab Results   Component Value Date    GLUCOSE 109 11/25/2015    CALCIUM 8 4 02/07/2019     (L) 11/25/2015    K 4 7 02/07/2019    CO2 23 02/07/2019     02/07/2019    BUN 44 (H) 02/07/2019    CREATININE 1 61 (H) 02/07/2019       _____________________________________________________  PHYSICAL EXAMINATION:  General: well developed and well nourished, alert, oriented times 3 and appears comfortable  Psychiatric: Normal  HEENT: Trachea Midline, No torticollis  Pulmonary: No audible wheezing or respiratory distress   Skin: No masses, erythema, lacerations, fluctation, ulcerations  Neurovascular: Sensation Intact to the Median, Ulnar, Radial Nerve, Motor Intact to the Median, Ulnar, Radial Nerve and Pulses Intact    MUSCULOSKELETAL EXAMINATION:  Right wrist  No swelling erythema or ecchymosis  Skin intact  Pain with motion of the wrist  Pain with shuck test   Pain with translation of the pisotriquetral joint   ___________________________________________________  STUDIES REVIEWED:  No studies reviewed         PROCEDURES PERFORMED:  Medium joint arthrocentesis: R pisotriquetral joint  Date/Time: 3/14/2019 9:39 AM  Consent given by: patient  Site marked: site marked  Timeout: Immediately prior to procedure a time out was called to verify the correct patient, procedure, equipment, support staff and site/side marked as required   Supporting Documentation  Indications: pain   Procedure Details  Location: wrist - R intercarpal  Medications administered: 2 5 mL lidocaine 1 %; 0 5 mEq sodium bicarbonate 8 4 %               _____________________________________________________  ASSESSMENT/PLAN:    S/P right ulnar carpal joint injection administered 02/27/2019 for osteoarthritis the right ulnar carpal without significant relief of pain, now with Right pisotriquetral joint pain  * CSI was offered and accepted by patient  * CSI was administered today into the pisotriquetral joint without complications  * Pt was advised to only wear the splint with activities that cause him discomfort and remove it as much as possible  * pt will follow up in the office in 2 weeks        Follow Up:  No follow-ups on file        To Do Next Visit:  Re-evaluation of current issue        Scribe Attestation    I,:   Ronal Márquez am acting as a scribe while in the presence of the attending physician :        I,:   Eddi Rodriguez MD personally performed the services described in this documentation    as scribed in my presence :

## 2019-03-28 ENCOUNTER — OFFICE VISIT (OUTPATIENT)
Dept: OBGYN CLINIC | Facility: CLINIC | Age: 74
End: 2019-03-28
Payer: COMMERCIAL

## 2019-03-28 ENCOUNTER — CONSULT (OUTPATIENT)
Dept: PAIN MEDICINE | Facility: CLINIC | Age: 74
End: 2019-03-28
Payer: COMMERCIAL

## 2019-03-28 VITALS
DIASTOLIC BLOOD PRESSURE: 68 MMHG | WEIGHT: 188 LBS | HEART RATE: 70 BPM | SYSTOLIC BLOOD PRESSURE: 124 MMHG | HEIGHT: 70 IN | RESPIRATION RATE: 18 BRPM | BODY MASS INDEX: 26.92 KG/M2

## 2019-03-28 VITALS
HEART RATE: 68 BPM | BODY MASS INDEX: 26.92 KG/M2 | DIASTOLIC BLOOD PRESSURE: 81 MMHG | WEIGHT: 188 LBS | HEIGHT: 70 IN | SYSTOLIC BLOOD PRESSURE: 122 MMHG

## 2019-03-28 DIAGNOSIS — M48.02 DEGENERATIVE CERVICAL SPINAL STENOSIS: ICD-10-CM

## 2019-03-28 DIAGNOSIS — M19.031 PRIMARY OSTEOARTHRITIS OF RIGHT WRIST: Primary | ICD-10-CM

## 2019-03-28 PROCEDURE — 99213 OFFICE O/P EST LOW 20 MIN: CPT | Performed by: ORTHOPAEDIC SURGERY

## 2019-03-28 PROCEDURE — 99204 OFFICE O/P NEW MOD 45 MIN: CPT | Performed by: ANESTHESIOLOGY

## 2019-03-28 NOTE — PROGRESS NOTES
Assessment:  1  Degenerative cervical spinal stenosis  Ambulatory referral to Pain Management    Ambulatory referral to Physical Therapy     Plan  This is a 48-year-old male who presents today for initial consultation for management of neck pain with left arm radicular symptoms  CT scan of the cervical spine demonstrates multilevel degenerative disc disease with foraminal stenosis  On examination, there is no gross motor deficits appreciated  At this time, I will sent patient for physical therapy 2 to 3 times a week for 8 weeks duration  I will see him back in 8 weeks for reassessment  If he continues to have pain symptoms, I will consider a cervical epidural steroid injection to help relieve his neck pain and radicular symptoms  Patient verbalized understanding  My impressions and treatment recommendations were discussed in detail with the patient who verbalized understanding and had no further questions  Discharge instructions were provided  I personally saw and examined the patient and I agree with the above discussed plan of care  History of Present Illness    Bree Miranda is a 75 y o  male who presents today for initial consultation for management of neck pain and left arm pain  Of note, patient reports prior injury to his neck  Today he reports severe pain which he rates 8/10 on the pain scale  His pain is constant, with no typical pattern  Patient further describes pain as shooting with numbness and pins and needles sensation  Patient reports pain which is aggravated with prolonged standing, bending, and walking  He reports left upper extremity weakness  CT scan of the cervical spine demonstrates multilevel degenerative disc disease with foraminal stenosis  No prior physical therapy  I have personally reviewed and/or updated the patient's past medical history, past surgical history, family history, social history, current medications, allergies, and vital signs today       Review of Systems   Constitutional: Negative for fever and unexpected weight change  HENT: Positive for trouble swallowing  Eyes: Negative for visual disturbance  Respiratory: Negative for shortness of breath and wheezing  Cardiovascular: Negative for chest pain and palpitations  Gastrointestinal: Negative for constipation, diarrhea, nausea and vomiting  Endocrine: Negative for cold intolerance, heat intolerance and polydipsia  Genitourinary: Negative for difficulty urinating and frequency  Musculoskeletal: Negative for arthralgias, gait problem, joint swelling and myalgias  Skin: Negative for rash  Neurological: Positive for dizziness and weakness  Negative for seizures, syncope and headaches  Hematological: Does not bruise/bleed easily  Psychiatric/Behavioral: Negative for dysphoric mood  All other systems reviewed and are negative        Patient Active Problem List   Diagnosis    Chronic kidney disease, stage 3 ([place] [address] )    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([place] [address] )    Benign essential hypertension    Hypercholesterolemia    Statin intolerance    Degenerative cervical spinal stenosis       Past Medical History:   Diagnosis Date    Asthma     last assessed: April 1, 2015    Balanitis     last assessed: July 9, 2014    Cataract     Chronic kidney disease, stage 3 ([place] [address] )     last assessed: Jan 17, 2018    Diabetes mellitus [facility])     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([place] [address] )     last assessed: Jan 17, 2018    Hypercholesterolemia     Hypertension     Pneumonia     last assessed: Feb 25, 2013       Past Surgical History:   Procedure Laterality Date    ABDOMINAL SURGERY      APPENDECTOMY  1965    CATAR[address] EXTRACTION     [address]  2007    x2    OTHER SURGICAL HISTORY  1965    Perforated duodenol ulcer surgery        Family History   Problem Relation Age of Onset    Pancreatic cancer Mother     Colon cancer Father     Diabetes Father         mellitus     No Known Problems Sister     Diabetes Sister     Nephrolithiasis Sister        Social History     Occupational History    Occupation: Not employed - Retired    Tobacco Use    Smoking status: Former Smoker     Packs/day: 2 00     Years: 10 00     Pack years: 20 00     Types: Cigarettes     Last attempt to quit: 1971     Years since quittin 2    Smokeless tobacco: Former User     Quit date:     Tobacco comment: former smoker noted in "allscripts" Quit in [address] - never used chewing tobacco    Substance and Sexual Activity    Alcohol use: Yes     Alcohol/week: 0 6 oz     Types: 1 Standard drinks or equivalent per week     Frequency: Monthly or less     Drinks per session: 1 or 2     Binge frequency: Never     Comment: rare  socially    Drug use: No    Sexual activity: Not on file       Current Outpatient Medications on File Prior to Visit   Medication Sig    albuterol (PROAIR HFA) 90 mcg/act inhaler Inhale daily as needed    ERYN CONTOUR TEST test strip Test sugar twice a day    ERYN MICROLET LANCETS lancets Test twice a day    glyBURIDE (DIABETA) 5 mg tablet take 2 tablets by mouth twice a day    lisinopril-hydrochlorothiazide (PRINZIDE,ZESTORETIC) 20-25 MG per tablet Take 1 tablet by mouth daily    TRADJENTA 5 MG TABS take 1 tablet by mouth once daily     No current facility-administered medications on file prior to visit  Allergies   Allergen Reactions    Meperidine     Pravastatin Myalgia       Physical Exam    /68   Pulse 70   Resp 18   Ht 5' 10" (1 778 m)   Wt 85 3 kg (188 lb)   BMI 26 98 kg/m²     Constitutional: normal, well developed, well nourished, alert, in no distress and non-toxic and no overt pain behavior    Eyes: anicteric  HEENT: grossly intact  Neck: supple, symmetric, trachea midline and no masses   Pulmonary:even and unlabored  Cardiovascular:No edema or pitting edema present  Skin:Normal without rashes or lesions and well hydrated  Psychiatric:Mood and affect appropriate  Neurologic:Cranial Nerves II-XII grossly intact  Musculoskeletal:normal    Cervical Spine Exam    Appearance:  Normal lordosis  Palpation/Tenderness:  left cervical paraspinal tenderness  right cervical paraspinal tenderness  Sensory:  no sensory deficits noted  Range of Motion:  Extension:  Minimally limited  with pain  Motor Strength:  Left    5/5  Right   5/5  Reflexes:  Left Triceps:  2+   Right Triceps:  2+     Imaging

## 2019-03-28 NOTE — PROGRESS NOTES
CHIEF COMPLAINT:  Chief Complaint   Patient presents with    Right Wrist - Follow-up       SUBJECTIVE:  Bree Mckeon is a 75y o  year old RHD male who presents to the office for follow-up evaluation of his right wrist   Patient had a cortisone steroid injection into the right ulnar carpal joint on 2019 that did not offer him any relief and a CSI into his pisotriquetral joint on 3/14/19 with significant relief in pain and swelling  Pt states that he also has increased motion of his wrist        PAST MEDICAL HISTORY:  Past Medical History:   Diagnosis Date    Asthma     last assessed: 2015    Balanitis     last assessed: 2014    Cataract     Chronic kidney disease, stage 3 [facility])     last assessed: 2018    Diabetes mellitus [facility])     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([facility] [address] )     last assessed: 2018    Hypercholesterolemia     Hypertension     Pneumonia     last assessed: 2013       PAST SURGICAL HISTORY:  Past Surgical History:   Procedure Laterality Date   [address] [place] [address]    CATARACT EXTRACTION     [address] HERNIA REPAIR  2007    x2    OTHER SURGICAL HISTORY  1965    Perforated duodenol ulcer surgery        FAMILY HISTORY:  Family History   Problem Relation Age of Onset    Pancreatic cancer Mother     Colon cancer Father     Diabetes Father         mellitus     No Known Problems Sister     Diabetes Sister     Nephrolithiasis Sister        SOCIAL HISTORY:  Social History     Tobacco Use    Smoking status: Former Smoker     Packs/day: 2 00     Years: 10 00     Pack years: 20 00     Types: Cigarettes     Last attempt to quit: 1971     Years since quittin 2    Smokeless tobacco: Former User     Quit date:     Tobacco comment: former smoker noted in "allscripts" Quit in [address] - never used chewing tobacco    Substance Use Topics    Alcohol use:  Yes     Alcohol/week: 0 6 oz     Types: 1 Standard drinks or equivalent per week     Frequency: Monthly or less     Drinks per session: 1 or 2     Binge frequency: Never     Comment: rare  socially    Drug use: No       MEDICATIONS:    Current Outpatient Medications:     albuterol (PROAIR HFA) 90 mcg/act inhaler, Inhale daily as needed, Disp: , Rfl:     ERYN CONTOUR TEST test strip, Test sugar twice a day, Disp: 100 each, Rfl: 5    ERYN MICROLET LANCETS lancets, Test twice a day, Disp: 100 each, Rfl: 3    glyBURIDE (DIABETA) 5 mg tablet, take 2 tablets by mouth twice a day, Disp: 120 tablet, Rfl: 3    lisinopril-hydrochlorothiazide (PRINZIDE,ZESTORETIC) 20-25 MG per tablet, Take 1 tablet by mouth daily, Disp: 90 tablet, Rfl: 1    TRADJENTA 5 MG TABS, take 1 tablet by mouth once daily, Disp: 30 tablet, Rfl: 5    ALLERGIES:  Allergies   Allergen Reactions    Meperidine     Pravastatin Myalgia       REVIEW OF SYSTEMS:  Review of Systems   Constitutional: Negative for chills, fever and unexpected weight change  HENT: Negative for hearing loss, nosebleeds and sore throat  Eyes: Negative for pain, redness and visual disturbance  Respiratory: Negative for cough, shortness of breath and wheezing  Cardiovascular: Negative for chest pain, palpitations and leg swelling  Gastrointestinal: Negative for abdominal pain, nausea and vomiting  Endocrine: Negative for polydipsia and polyuria  Genitourinary: Negative for dysuria and hematuria  Musculoskeletal: Negative for arthralgias, joint swelling and myalgias  Skin: Negative for rash and wound  Neurological: Negative for light-headedness, numbness and headaches  Psychiatric/Behavioral: Negative for decreased concentration, dysphoric mood and suicidal ideas  The patient is not nervous/anxious          VITALS:  Vitals:    03/28/19 1402   BP: 122/81   Pulse: 68       LABS:  HgA1c:   Lab Results   Component Value Date    HGBA1C 7 6 (H) 02/07/2019     BMP:   Lab Results   Component Value Date    GLUCOSE 109 11/25/2015    CALCIUM 8 4 02/07/2019     (L) 11/25/2015    K 4 7 02/07/2019    CO2 23 02/07/2019     02/07/2019    BUN 44 (H) 02/07/2019    CREATININE 1 61 (H) 02/07/2019       _____________________________________________________  PHYSICAL EXAMINATION:  General: well developed and well nourished, alert, oriented times 3 and appears comfortable  Psychiatric: Normal  HEENT: Trachea Midline, No torticollis  Pulmonary: No audible wheezing or respiratory distress   Skin: No masses, erythema, lacerations, fluctation, ulcerations  Neurovascular: Sensation Intact to the Median, Ulnar, Radial Nerve, Motor Intact to the Median, Ulnar, Radial Nerve and Pulses Intact    MUSCULOSKELETAL EXAMINATION:  No swelling erythema or ecchymosis  Crepitus noted without pain with motion of the pisotriquetral joint    ___________________________________________________  STUDIES REVIEWED:  No studies reviewed  PROCEDURES PERFORMED:  Procedures  No Procedures performed today    _____________________________________________________  ASSESSMENT/PLAN:    Right wrist osteoarthritis pisotriquetral joint with significant relief following PT joint injection  * Pt has no activity limitations at this time  * Pt was advised that if he does not continue to have extended relief from CSI we may need to consider excision of pisiform  * Pt was advised to call the office if he has return of pain in his right wrist        Follow Up:  Return if symptoms worsen or fail to improve        To Do Next Visit:  Re-evaluation of current issue      Scribe Attestation    I,:   Lilian Parrish am acting as a scribe while in the presence of the attending physician :        I,:   Fredi South MD personally performed the services described in this documentation    as scribed in my presence :

## 2019-03-31 DIAGNOSIS — E11.8 TYPE 2 DIABETES MELLITUS WITH COMPLICATION, WITHOUT LONG-TERM CURRENT USE OF INSULIN (HCC): ICD-10-CM

## 2019-03-31 RX ORDER — LINAGLIPTIN 5 MG/1
TABLET, FILM COATED ORAL
Qty: 30 TABLET | Refills: 5 | Status: SHIPPED | OUTPATIENT
Start: 2019-03-31 | End: 2019-09-28 | Stop reason: SDUPTHER

## 2019-04-16 ENCOUNTER — EVALUATION (OUTPATIENT)
Dept: PHYSICAL THERAPY | Age: 74
End: 2019-04-16
Payer: COMMERCIAL

## 2019-04-16 DIAGNOSIS — M54.2 NECK PAIN: ICD-10-CM

## 2019-04-16 DIAGNOSIS — M48.02 DEGENERATIVE CERVICAL SPINAL STENOSIS: Primary | ICD-10-CM

## 2019-04-16 PROCEDURE — 97162 PT EVAL MOD COMPLEX 30 MIN: CPT | Performed by: PHYSICAL THERAPIST

## 2019-04-16 PROCEDURE — 97140 MANUAL THERAPY 1/> REGIONS: CPT | Performed by: PHYSICAL THERAPIST

## 2019-04-22 ENCOUNTER — OFFICE VISIT (OUTPATIENT)
Dept: PHYSICAL THERAPY | Age: 74
End: 2019-04-22
Payer: COMMERCIAL

## 2019-04-22 DIAGNOSIS — M54.2 NECK PAIN: ICD-10-CM

## 2019-04-22 DIAGNOSIS — M48.02 DEGENERATIVE CERVICAL SPINAL STENOSIS: Primary | ICD-10-CM

## 2019-04-22 PROCEDURE — 97140 MANUAL THERAPY 1/> REGIONS: CPT | Performed by: PHYSICAL THERAPIST

## 2019-04-22 PROCEDURE — 97110 THERAPEUTIC EXERCISES: CPT | Performed by: PHYSICAL THERAPIST

## 2019-04-26 ENCOUNTER — OFFICE VISIT (OUTPATIENT)
Dept: PHYSICAL THERAPY | Age: 74
End: 2019-04-26
Payer: COMMERCIAL

## 2019-04-26 ENCOUNTER — TELEPHONE (OUTPATIENT)
Dept: PAIN MEDICINE | Facility: CLINIC | Age: 74
End: 2019-04-26

## 2019-04-26 DIAGNOSIS — M54.2 NECK PAIN: Primary | ICD-10-CM

## 2019-04-26 DIAGNOSIS — M48.02 DEGENERATIVE CERVICAL SPINAL STENOSIS: ICD-10-CM

## 2019-04-26 PROCEDURE — 97110 THERAPEUTIC EXERCISES: CPT | Performed by: PHYSICAL THERAPIST

## 2019-04-26 PROCEDURE — 97140 MANUAL THERAPY 1/> REGIONS: CPT | Performed by: PHYSICAL THERAPIST

## 2019-05-03 ENCOUNTER — OFFICE VISIT (OUTPATIENT)
Dept: PHYSICAL THERAPY | Age: 74
End: 2019-05-03
Payer: COMMERCIAL

## 2019-05-03 DIAGNOSIS — M48.02 DEGENERATIVE CERVICAL SPINAL STENOSIS: ICD-10-CM

## 2019-05-03 DIAGNOSIS — M54.2 NECK PAIN: Primary | ICD-10-CM

## 2019-05-03 PROCEDURE — 97140 MANUAL THERAPY 1/> REGIONS: CPT | Performed by: PHYSICAL THERAPIST

## 2019-05-03 PROCEDURE — 97110 THERAPEUTIC EXERCISES: CPT | Performed by: PHYSICAL THERAPIST

## 2019-05-09 ENCOUNTER — OFFICE VISIT (OUTPATIENT)
Dept: PHYSICAL THERAPY | Age: 74
End: 2019-05-09
Payer: COMMERCIAL

## 2019-05-09 DIAGNOSIS — M54.2 NECK PAIN: Primary | ICD-10-CM

## 2019-05-09 DIAGNOSIS — M48.02 DEGENERATIVE CERVICAL SPINAL STENOSIS: ICD-10-CM

## 2019-05-09 PROCEDURE — 97140 MANUAL THERAPY 1/> REGIONS: CPT | Performed by: PHYSICAL THERAPIST

## 2019-05-09 PROCEDURE — 97110 THERAPEUTIC EXERCISES: CPT | Performed by: PHYSICAL THERAPIST

## 2019-05-10 DIAGNOSIS — E11.8 TYPE 2 DIABETES MELLITUS WITH COMPLICATION, WITHOUT LONG-TERM CURRENT USE OF INSULIN (HCC): ICD-10-CM

## 2019-05-10 RX ORDER — GLYBURIDE 5 MG/1
TABLET ORAL
Qty: 120 TABLET | Refills: 3 | Status: SHIPPED | OUTPATIENT
Start: 2019-05-10 | End: 2019-10-01 | Stop reason: SDUPTHER

## 2019-05-13 ENCOUNTER — TRANSCRIBE ORDERS (OUTPATIENT)
Dept: LAB | Facility: CLINIC | Age: 74
End: 2019-05-13

## 2019-05-15 DIAGNOSIS — I10 ESSENTIAL HYPERTENSION: ICD-10-CM

## 2019-05-15 RX ORDER — LISINOPRIL AND HYDROCHLOROTHIAZIDE 25; 20 MG/1; MG/1
TABLET ORAL
Qty: 90 TABLET | Refills: 1 | Status: SHIPPED | OUTPATIENT
Start: 2019-05-15 | End: 2020-01-23

## 2019-08-16 ENCOUNTER — TELEPHONE (OUTPATIENT)
Dept: FAMILY MEDICINE CLINIC | Facility: CLINIC | Age: 74
End: 2019-08-16

## 2019-08-16 DIAGNOSIS — N18.30 TYPE 2 DIABETES MELLITUS WITH STAGE 3 CHRONIC KIDNEY DISEASE, WITHOUT LONG-TERM CURRENT USE OF INSULIN (HCC): Primary | ICD-10-CM

## 2019-08-16 DIAGNOSIS — E11.22 TYPE 2 DIABETES MELLITUS WITH STAGE 3 CHRONIC KIDNEY DISEASE, WITHOUT LONG-TERM CURRENT USE OF INSULIN (HCC): Primary | ICD-10-CM

## 2019-08-16 NOTE — TELEPHONE ENCOUNTER
Pt walked in and wanted blood work with your name on it  He did not want to get done what the other doctor on for the appt with you  I tried to explain it would be ok as you would possible order the same lab but her didn't want to hear it

## 2019-08-16 NOTE — TELEPHONE ENCOUNTER
I ordered his diabetic labs- there are orders in there from Dr Cresencio Basilio - not sure if he means those?

## 2019-08-19 ENCOUNTER — APPOINTMENT (OUTPATIENT)
Dept: LAB | Facility: CLINIC | Age: 74
End: 2019-08-19
Payer: COMMERCIAL

## 2019-08-19 DIAGNOSIS — E11.22 TYPE 2 DIABETES MELLITUS WITH STAGE 3 CHRONIC KIDNEY DISEASE, WITHOUT LONG-TERM CURRENT USE OF INSULIN (HCC): ICD-10-CM

## 2019-08-19 DIAGNOSIS — N18.30 TYPE 2 DIABETES MELLITUS WITH STAGE 3 CHRONIC KIDNEY DISEASE, WITHOUT LONG-TERM CURRENT USE OF INSULIN (HCC): ICD-10-CM

## 2019-08-19 LAB
ALBUMIN SERPL BCP-MCNC: 4.2 G/DL (ref 3.5–5)
ALP SERPL-CCNC: 66 U/L (ref 46–116)
ALT SERPL W P-5'-P-CCNC: 23 U/L (ref 12–78)
ANION GAP SERPL CALCULATED.3IONS-SCNC: 8 MMOL/L (ref 4–13)
AST SERPL W P-5'-P-CCNC: 14 U/L (ref 5–45)
BASOPHILS # BLD AUTO: 0.05 THOUSANDS/ΜL (ref 0–0.1)
BASOPHILS NFR BLD AUTO: 1 % (ref 0–1)
BILIRUB SERPL-MCNC: 0.61 MG/DL (ref 0.2–1)
BUN SERPL-MCNC: 32 MG/DL (ref 5–25)
CALCIUM SERPL-MCNC: 8.5 MG/DL (ref 8.3–10.1)
CHLORIDE SERPL-SCNC: 104 MMOL/L (ref 100–108)
CHOLEST SERPL-MCNC: 175 MG/DL (ref 50–200)
CO2 SERPL-SCNC: 25 MMOL/L (ref 21–32)
CREAT SERPL-MCNC: 1.64 MG/DL (ref 0.6–1.3)
CREAT UR-MCNC: 102 MG/DL
EOSINOPHIL # BLD AUTO: 0.28 THOUSAND/ΜL (ref 0–0.61)
EOSINOPHIL NFR BLD AUTO: 3 % (ref 0–6)
ERYTHROCYTE [DISTWIDTH] IN BLOOD BY AUTOMATED COUNT: 13 % (ref 11.6–15.1)
EST. AVERAGE GLUCOSE BLD GHB EST-MCNC: 171 MG/DL
GFR SERPL CREATININE-BSD FRML MDRD: 41 ML/MIN/1.73SQ M
GLUCOSE P FAST SERPL-MCNC: 148 MG/DL (ref 65–99)
HBA1C MFR BLD: 7.6 % (ref 4.2–6.3)
HCT VFR BLD AUTO: 41.8 % (ref 36.5–49.3)
HDLC SERPL-MCNC: 30 MG/DL (ref 40–60)
HGB BLD-MCNC: 13.8 G/DL (ref 12–17)
IMM GRANULOCYTES # BLD AUTO: 0.04 THOUSAND/UL (ref 0–0.2)
IMM GRANULOCYTES NFR BLD AUTO: 0 % (ref 0–2)
LDLC SERPL CALC-MCNC: 120 MG/DL (ref 0–100)
LYMPHOCYTES # BLD AUTO: 1.76 THOUSANDS/ΜL (ref 0.6–4.47)
LYMPHOCYTES NFR BLD AUTO: 20 % (ref 14–44)
MCH RBC QN AUTO: 30.3 PG (ref 26.8–34.3)
MCHC RBC AUTO-ENTMCNC: 33 G/DL (ref 31.4–37.4)
MCV RBC AUTO: 92 FL (ref 82–98)
MICROALBUMIN UR-MCNC: 23.1 MG/L (ref 0–20)
MICROALBUMIN/CREAT 24H UR: 23 MG/G CREATININE (ref 0–30)
MONOCYTES # BLD AUTO: 0.69 THOUSAND/ΜL (ref 0.17–1.22)
MONOCYTES NFR BLD AUTO: 8 % (ref 4–12)
NEUTROPHILS # BLD AUTO: 6.12 THOUSANDS/ΜL (ref 1.85–7.62)
NEUTS SEG NFR BLD AUTO: 68 % (ref 43–75)
NRBC BLD AUTO-RTO: 0 /100 WBCS
PLATELET # BLD AUTO: 221 THOUSANDS/UL (ref 149–390)
PMV BLD AUTO: 11.6 FL (ref 8.9–12.7)
POTASSIUM SERPL-SCNC: 4.2 MMOL/L (ref 3.5–5.3)
PROT SERPL-MCNC: 7.2 G/DL (ref 6.4–8.2)
RBC # BLD AUTO: 4.56 MILLION/UL (ref 3.88–5.62)
SODIUM SERPL-SCNC: 137 MMOL/L (ref 136–145)
TRIGL SERPL-MCNC: 125 MG/DL
WBC # BLD AUTO: 8.94 THOUSAND/UL (ref 4.31–10.16)

## 2019-08-19 PROCEDURE — 80061 LIPID PANEL: CPT

## 2019-08-19 PROCEDURE — 83036 HEMOGLOBIN GLYCOSYLATED A1C: CPT

## 2019-08-19 PROCEDURE — 80053 COMPREHEN METABOLIC PANEL: CPT

## 2019-08-19 PROCEDURE — 36415 COLL VENOUS BLD VENIPUNCTURE: CPT

## 2019-08-19 PROCEDURE — 82043 UR ALBUMIN QUANTITATIVE: CPT | Performed by: FAMILY MEDICINE

## 2019-08-19 PROCEDURE — 85025 COMPLETE CBC W/AUTO DIFF WBC: CPT

## 2019-08-19 PROCEDURE — 82570 ASSAY OF URINE CREATININE: CPT | Performed by: FAMILY MEDICINE

## 2019-08-19 PROCEDURE — 3061F NEG MICROALBUMINURIA REV: CPT | Performed by: FAMILY MEDICINE

## 2019-08-22 ENCOUNTER — OFFICE VISIT (OUTPATIENT)
Dept: FAMILY MEDICINE CLINIC | Facility: CLINIC | Age: 74
End: 2019-08-22
Payer: COMMERCIAL

## 2019-08-22 VITALS
BODY MASS INDEX: 27.54 KG/M2 | HEART RATE: 88 BPM | TEMPERATURE: 97.1 F | SYSTOLIC BLOOD PRESSURE: 134 MMHG | DIASTOLIC BLOOD PRESSURE: 76 MMHG | WEIGHT: 192.4 LBS | RESPIRATION RATE: 18 BRPM | OXYGEN SATURATION: 94 % | HEIGHT: 70 IN

## 2019-08-22 DIAGNOSIS — E11.22 TYPE 2 DIABETES MELLITUS WITH STAGE 3 CHRONIC KIDNEY DISEASE, WITHOUT LONG-TERM CURRENT USE OF INSULIN (HCC): Primary | ICD-10-CM

## 2019-08-22 DIAGNOSIS — Z12.5 SCREENING PSA (PROSTATE SPECIFIC ANTIGEN): ICD-10-CM

## 2019-08-22 DIAGNOSIS — E78.00 HYPERCHOLESTEROLEMIA: ICD-10-CM

## 2019-08-22 DIAGNOSIS — E66.3 OVERWEIGHT (BMI 25.0-29.9): ICD-10-CM

## 2019-08-22 DIAGNOSIS — N40.1 BENIGN PROSTATIC HYPERPLASIA WITH LOWER URINARY TRACT SYMPTOMS, SYMPTOM DETAILS UNSPECIFIED: ICD-10-CM

## 2019-08-22 DIAGNOSIS — N18.30 CHRONIC KIDNEY DISEASE, STAGE 3 (HCC): ICD-10-CM

## 2019-08-22 DIAGNOSIS — R10.30 LOWER ABDOMINAL PAIN: ICD-10-CM

## 2019-08-22 DIAGNOSIS — Z78.9 STATIN INTOLERANCE: ICD-10-CM

## 2019-08-22 DIAGNOSIS — I10 BENIGN ESSENTIAL HYPERTENSION: ICD-10-CM

## 2019-08-22 DIAGNOSIS — N18.30 TYPE 2 DIABETES MELLITUS WITH STAGE 3 CHRONIC KIDNEY DISEASE, WITHOUT LONG-TERM CURRENT USE OF INSULIN (HCC): Primary | ICD-10-CM

## 2019-08-22 LAB
SL AMB  POCT GLUCOSE, UA: 100
SL AMB LEUKOCYTE ESTERASE,UA: ABNORMAL
SL AMB POCT BILIRUBIN,UA: ABNORMAL
SL AMB POCT BLOOD,UA: ABNORMAL
SL AMB POCT CLARITY,UA: CLEAR
SL AMB POCT COLOR,UA: ABNORMAL
SL AMB POCT KETONES,UA: ABNORMAL
SL AMB POCT NITRITE,UA: ABNORMAL
SL AMB POCT PH,UA: 5
SL AMB POCT SPECIFIC GRAVITY,UA: 1.01
SL AMB POCT URINE PROTEIN: ABNORMAL
SL AMB POCT UROBILINOGEN: 0.2

## 2019-08-22 PROCEDURE — 3066F NEPHROPATHY DOC TX: CPT | Performed by: FAMILY MEDICINE

## 2019-08-22 PROCEDURE — 99214 OFFICE O/P EST MOD 30 MIN: CPT | Performed by: FAMILY MEDICINE

## 2019-08-22 PROCEDURE — 3075F SYST BP GE 130 - 139MM HG: CPT | Performed by: FAMILY MEDICINE

## 2019-08-22 PROCEDURE — 1160F RVW MEDS BY RX/DR IN RCRD: CPT | Performed by: FAMILY MEDICINE

## 2019-08-22 PROCEDURE — 81003 URINALYSIS AUTO W/O SCOPE: CPT | Performed by: FAMILY MEDICINE

## 2019-08-22 RX ORDER — TAMSULOSIN HYDROCHLORIDE 0.4 MG/1
0.4 CAPSULE ORAL
Qty: 30 CAPSULE | Refills: 5 | Status: SHIPPED | OUTPATIENT
Start: 2019-08-22 | End: 2020-03-12 | Stop reason: ALTCHOICE

## 2019-08-22 NOTE — ASSESSMENT & PLAN NOTE
BMI Counseling: Body mass index is 27 61 kg/m²  Discussed the patient's BMI with him  The BMI is above average  BMI counseling and education was provided to the patient  Nutrition recommendations include moderation in carbohydrate intake

## 2019-08-22 NOTE — ASSESSMENT & PLAN NOTE
Prostate is enlarged on exam   He will start Flomax 0 4 mg daily  Discussed common side effects  If no improvement in symptoms will refer to Urology

## 2019-08-22 NOTE — PROGRESS NOTES
Assessment/Plan:         Problem List Items Addressed This Visit        Endocrine    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([facility] [address] ) - Primary     Lab Results   Component Value Date    HGBA1C 7 6 (H) 08/19/2019       No results for input(s): POCGLU in the last 72 hours  Blood Sugar Average: Last 72 hrs:    He is hesitant to add another medication  He wants to work on diet, exercise  Stay on Glyburide and Tradjenta  Relevant Orders    Hemoglobin A1C       Cardiovascular and Mediastinum    Benign essential hypertension     Well controlled  Continue Lisinopril HCTZ            Genitourinary    Chronic kidney disease, stage 3 (HCC)     Stable, will monitor  Relevant Orders    CBC and differential    Comprehensive metabolic panel    Benign prostatic hyperplasia with lower urinary tract symptoms     Prostate is enlarged on exam   He will start Flomax 0 4 mg daily  Discussed common side effects  If no improvement in symptoms will refer to Urology  Relevant Medications    tamsulosin (FLOMAX) 0 4 mg       Other    Hypercholesterolemia     Does not tolerate statins  Follow low fat diet  Relevant Orders    Comprehensive metabolic panel    Lipid Panel with Direct LDL reflex    Statin intolerance    Overweight (BMI 25 0-29  9)     BMI Counseling: Body mass index is 27 61 kg/m²  Discussed the patient's BMI with him  The BMI is above average  BMI counseling and education was provided to the patient  Nutrition recommendations include moderation in carbohydrate intake  Screening PSA (prostate specific antigen)    Relevant Orders    PSA, Total Screen    Lower abdominal pain     Will check CT of abdomen and pelvis         Relevant Orders    POCT urine dip auto non-scope (Completed)    CT abdomen pelvis w contrast            Subjective:      Patient ID: Bree Lazo is a 75 y o  male  65-year-old here for diabetic checkup  He had labs done  Diabetes-his A1c is 7 6%    This is stable from his last A1c  He is currently on glyburide 10 mg BID and Tradjenta 5 mg  Cannot take Metformin due to CKD  Fasting glucoses are around 140s  Diet is fair, he tries to follow low carb diet  Lipids-total cholesterol 175, , HDL 30  He is currently not on cholesterol medication  He has intolerance to statins - says he had muscle issues  Hypertension-his blood pressure is well controlled on lisinopril HCTZ  checks at pharmacy occasionally - runs in 120/60s    CKD-recent creatinine 1 64 this is fairly stable, last creatinine was 1 61  He avoids NSAIDs  Overweight - BMI 27, no exercise  He says he has discomfort in the rectal area x 2 weeks  He thinks that it is his prostate  He says it feels like "I'm sitting on it"  Lower abdominal "discomfort"  No changes in bowels  No bloody stools or melena  Denies any hemorrhoids or history of them  He has urinary symptoms including a weaker stream  Denies dysuria  His father had prostate cancer  His last PSA was done in July of 2018 was normal       The following portions of the patient's history were reviewed and updated as appropriate:   He  has a past medical history of Asthma, Balanitis, Cataract, Chronic kidney disease, stage 3 ([address] ), Diabetes mellitus ([address] ), 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([address] ), Hypercholesterolemia, Hypertension, and Pneumonia    He   Patient Active Problem List    Diagnosis Date Noted    Overweight (BMI 25 0-29 9) 08/22/2019    Screening PSA (prostate specific antigen) 08/22/2019    Lower abdominal pain 08/22/2019    Benign prostatic hyperplasia with lower urinary tract symptoms 08/22/2019    Statin intolerance 02/15/2019    Degenerative cervical spinal stenosis 02/15/2019    Chronic kidney disease, stage 3 ([address] ) 02/08/2018    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([address] ) 02/08/2018    Benign essential hypertension 02/08/2018    Hypercholesterolemia 04/02/2014     He  has a past surgical history that includes Hernia repair; Abdominal surgery; Appendectomy (1965); Cataract extraction; Hernia repair (2007); and Other surgical history (1965)  His family history includes Colon cancer in his father; Diabetes in his father and sister; Nephrolithiasis in his sister; No Known Problems in his sister; Pancreatic cancer in his mother  He  reports that he quit smoking about 48 years ago  His smoking use included cigarettes  He has a 20 00 pack-year smoking history  He quit smokeless tobacco use about 12 years ago  He reports that he drinks about 1 0 standard drinks of alcohol per week  He reports that he does not use drugs  Current Outpatient Medications   Medication Sig Dispense Refill    albuterol (PROAIR HFA) 90 mcg/act inhaler Inhale daily as needed      ERYN CONTOUR TEST test strip Test sugar twice a day 100 each 5    ERYN MICROLET LANCETS lancets Test twice a day 100 each 3    glyBURIDE (DIABETA) 5 mg tablet take 2 tablets by mouth twice a day 120 tablet 3    lisinopril-hydrochlorothiazide (PRINZIDE,ZESTORETIC) 20-25 MG per tablet take 1 tablet by mouth once daily 90 tablet 1    TRADJENTA 5 MG TABS take 1 tablet by mouth once daily 30 tablet 5    tamsulosin (FLOMAX) 0 4 mg Take 1 capsule (0 4 mg total) by mouth daily with dinner 30 capsule 5     No current facility-administered medications for this visit  Current Outpatient Medications on File Prior to Visit   Medication Sig    albuterol (PROAIR HFA) 90 mcg/act inhaler Inhale daily as needed    ERYN CONTOUR TEST test strip Test sugar twice a day    ERYN MICROLET LANCETS lancets Test twice a day    glyBURIDE (DIABETA) 5 mg tablet take 2 tablets by mouth twice a day    lisinopril-hydrochlorothiazide (PRINZIDE,ZESTORETIC) 20-25 MG per tablet take 1 tablet by mouth once daily    TRADJENTA 5 MG TABS take 1 tablet by mouth once daily     No current facility-administered medications on file prior to visit        He is allergic to meperidine and pravastatin       Review of Systems   Constitutional: Negative for activity change, appetite change, chills, fatigue, fever and unexpected weight change  HENT: Negative for congestion, ear discharge, ear pain, postnasal drip, sinus pressure and sore throat  Eyes: Negative for discharge and visual disturbance  Respiratory: Negative for cough, shortness of breath and wheezing  Cardiovascular: Negative for chest pain, palpitations and leg swelling  Gastrointestinal: Positive for abdominal pain (abd discomfort)  Negative for constipation, diarrhea, nausea and vomiting  Endocrine: Negative for cold intolerance, heat intolerance, polydipsia and polyuria  Genitourinary: Positive for urgency  Negative for difficulty urinating, dysuria and frequency  Musculoskeletal: Negative for arthralgias, back pain, joint swelling and myalgias  Skin: Negative for rash  Neurological: Negative for dizziness, weakness, light-headedness, numbness and headaches  Hematological: Negative for adenopathy  Psychiatric/Behavioral: Negative for behavioral problems, confusion, dysphoric mood, sleep disturbance and suicidal ideas  The patient is not nervous/anxious  Objective:      /76 (BP Location: Left arm, Patient Position: Sitting, Cuff Size: Adult)   Pulse 88   Temp (!) 97 1 °F (36 2 °C) (Tympanic)   Resp 18   Ht 5' 10" (1 778 m)   Wt 87 3 kg (192 lb 6 4 oz)   SpO2 94%   BMI 27 61 kg/m²          Physical Exam   Constitutional: He is oriented to person, place, and time  He appears well-developed and well-nourished  No distress  HENT:   Head: Normocephalic and atraumatic  Cardiovascular: Normal rate, regular rhythm and normal heart sounds  Exam reveals no gallop and no friction rub  Pulses are no weak pulses  No murmur heard  Pulses:       Dorsalis pedis pulses are 2+ on the right side, and 2+ on the left side  Posterior tibial pulses are 2+ on the right side, and 2+ on the left side  Pulmonary/Chest: Effort normal and breath sounds normal  No respiratory distress  He has no wheezes  He has no rales  He exhibits no tenderness  Abdominal: Soft  Normal appearance and bowel sounds are normal  There is generalized tenderness (Diffuse tenderness lower abdomen)  Genitourinary: Rectum normal  Rectal exam shows no external hemorrhoid, no internal hemorrhoid, no fissure, no mass, no tenderness and anal tone normal  Prostate is enlarged  Prostate is not tender  Musculoskeletal: He exhibits no edema  Feet:   Right Foot:   Skin Integrity: Negative for ulcer, skin breakdown, erythema, warmth, callus or dry skin  Left Foot:   Skin Integrity: Negative for ulcer, skin breakdown, erythema, warmth, callus or dry skin  Neurological: He is alert and oriented to person, place, and time  Skin: He is not diaphoretic  Psychiatric: He has a normal mood and affect  His behavior is normal  Judgment and thought content normal    Nursing note and vitals reviewed  Patient's shoes and socks removed  Right Foot/Ankle   Right Foot Inspection  Skin Exam: skin normal and skin intact no dry skin, no warmth, no callus, no erythema, no maceration, no abnormal color, no pre-ulcer, no ulcer and no callus                          Toe Exam: no swelling, no tenderness, erythema and  no right toe deformity  Sensory   Vibration: intact    Monofilament testing: intact  Vascular  Capillary refills: < 3 seconds  The right DP pulse is 2+  The right PT pulse is 2+  Left Foot/Ankle  Left Foot Inspection  Skin Exam: skin normal and skin intactno dry skin, no warmth, no erythema, no maceration, normal color, no pre-ulcer, no ulcer and no callus                         Toe Exam: no swelling, no tenderness, no erythema and no left toe deformity                   Sensory   Vibration: intact    Monofilament: intact  Vascular  Capillary refills: < 3 seconds  The left DP pulse is 2+  The left PT pulse is 2+     Assign Risk Category:  No deformity present; No loss of protective sensation;  No weak pulses       Risk: 0

## 2019-08-22 NOTE — ASSESSMENT & PLAN NOTE
Lab Results   Component Value Date    HGBA1C 7 6 (H) 08/19/2019       No results for input(s): POCGLU in the last 72 hours  Blood Sugar Average: Last 72 hrs:    He is hesitant to add another medication  He wants to work on diet, exercise  Stay on Glyburide and Tradjenta

## 2019-09-04 ENCOUNTER — TELEPHONE (OUTPATIENT)
Dept: FAMILY MEDICINE CLINIC | Facility: CLINIC | Age: 74
End: 2019-09-04

## 2019-09-05 ENCOUNTER — APPOINTMENT (OUTPATIENT)
Dept: LAB | Facility: CLINIC | Age: 74
End: 2019-09-05
Payer: COMMERCIAL

## 2019-09-05 DIAGNOSIS — N18.30 CHRONIC KIDNEY DISEASE, STAGE 3 (HCC): ICD-10-CM

## 2019-09-05 DIAGNOSIS — Z12.5 SCREENING PSA (PROSTATE SPECIFIC ANTIGEN): ICD-10-CM

## 2019-09-05 LAB
ANION GAP SERPL CALCULATED.3IONS-SCNC: 6 MMOL/L (ref 4–13)
BASOPHILS # BLD AUTO: 0.08 THOUSANDS/ΜL (ref 0–0.1)
BASOPHILS NFR BLD AUTO: 1 % (ref 0–1)
BILIRUB UR QL STRIP: NEGATIVE
BUN SERPL-MCNC: 37 MG/DL (ref 5–25)
CALCIUM SERPL-MCNC: 8.8 MG/DL (ref 8.3–10.1)
CHLORIDE SERPL-SCNC: 106 MMOL/L (ref 100–108)
CLARITY UR: CLEAR
CO2 SERPL-SCNC: 25 MMOL/L (ref 21–32)
COLOR UR: YELLOW
CREAT SERPL-MCNC: 1.73 MG/DL (ref 0.6–1.3)
CREAT UR-MCNC: 76.8 MG/DL
EOSINOPHIL # BLD AUTO: 0.33 THOUSAND/ΜL (ref 0–0.61)
EOSINOPHIL NFR BLD AUTO: 5 % (ref 0–6)
ERYTHROCYTE [DISTWIDTH] IN BLOOD BY AUTOMATED COUNT: 12.8 % (ref 11.6–15.1)
GFR SERPL CREATININE-BSD FRML MDRD: 38 ML/MIN/1.73SQ M
GLUCOSE SERPL-MCNC: 179 MG/DL (ref 65–140)
GLUCOSE UR STRIP-MCNC: NEGATIVE MG/DL
HCT VFR BLD AUTO: 39.9 % (ref 36.5–49.3)
HGB BLD-MCNC: 13.3 G/DL (ref 12–17)
HGB UR QL STRIP.AUTO: NEGATIVE
IMM GRANULOCYTES # BLD AUTO: 0.03 THOUSAND/UL (ref 0–0.2)
IMM GRANULOCYTES NFR BLD AUTO: 0 % (ref 0–2)
KETONES UR STRIP-MCNC: NEGATIVE MG/DL
LEUKOCYTE ESTERASE UR QL STRIP: NEGATIVE
LYMPHOCYTES # BLD AUTO: 1.67 THOUSANDS/ΜL (ref 0.6–4.47)
LYMPHOCYTES NFR BLD AUTO: 24 % (ref 14–44)
MCH RBC QN AUTO: 30.2 PG (ref 26.8–34.3)
MCHC RBC AUTO-ENTMCNC: 33.3 G/DL (ref 31.4–37.4)
MCV RBC AUTO: 91 FL (ref 82–98)
MONOCYTES # BLD AUTO: 0.6 THOUSAND/ΜL (ref 0.17–1.22)
MONOCYTES NFR BLD AUTO: 9 % (ref 4–12)
NEUTROPHILS # BLD AUTO: 4.29 THOUSANDS/ΜL (ref 1.85–7.62)
NEUTS SEG NFR BLD AUTO: 61 % (ref 43–75)
NITRITE UR QL STRIP: NEGATIVE
NRBC BLD AUTO-RTO: 0 /100 WBCS
PH UR STRIP.AUTO: 6 [PH]
PHOSPHATE SERPL-MCNC: 3 MG/DL (ref 2.3–4.1)
PLATELET # BLD AUTO: 252 THOUSANDS/UL (ref 149–390)
PMV BLD AUTO: 11 FL (ref 8.9–12.7)
POTASSIUM SERPL-SCNC: 4.3 MMOL/L (ref 3.5–5.3)
PROT UR STRIP-MCNC: NEGATIVE MG/DL
PROT UR-MCNC: 11 MG/DL
PROT/CREAT UR: 0.14 MG/G{CREAT} (ref 0–0.1)
PSA SERPL-MCNC: 1.8 NG/ML (ref 0–4)
PTH-INTACT SERPL-MCNC: 111.3 PG/ML (ref 18.4–80.1)
RBC # BLD AUTO: 4.41 MILLION/UL (ref 3.88–5.62)
SODIUM SERPL-SCNC: 137 MMOL/L (ref 136–145)
SP GR UR STRIP.AUTO: 1.01 (ref 1–1.03)
UROBILINOGEN UR QL STRIP.AUTO: 0.2 E.U./DL
WBC # BLD AUTO: 7 THOUSAND/UL (ref 4.31–10.16)

## 2019-09-05 PROCEDURE — 84156 ASSAY OF PROTEIN URINE: CPT

## 2019-09-05 PROCEDURE — 36415 COLL VENOUS BLD VENIPUNCTURE: CPT

## 2019-09-05 PROCEDURE — 84100 ASSAY OF PHOSPHORUS: CPT

## 2019-09-05 PROCEDURE — 83970 ASSAY OF PARATHORMONE: CPT

## 2019-09-05 PROCEDURE — G0103 PSA SCREENING: HCPCS

## 2019-09-05 PROCEDURE — 81003 URINALYSIS AUTO W/O SCOPE: CPT

## 2019-09-05 PROCEDURE — 85025 COMPLETE CBC W/AUTO DIFF WBC: CPT

## 2019-09-05 PROCEDURE — 82570 ASSAY OF URINE CREATININE: CPT

## 2019-09-05 PROCEDURE — 80048 BASIC METABOLIC PNL TOTAL CA: CPT

## 2019-09-05 NOTE — TELEPHONE ENCOUNTER
Talked to Patient  He is scheduling the scan at [address] [facility] message me and said he was good to go

## 2019-09-05 NOTE — TELEPHONE ENCOUNTER
Patient called back - it is not scheduled as it is waiting to know if it will be covered or not before he schedules   It

## 2019-09-05 NOTE — TELEPHONE ENCOUNTER
Good Morning,  There isn't an appointment scheduled in my workqueue  When is he scheduled and where is he going?   Thank you,  Vana Severe

## 2019-09-12 ENCOUNTER — OFFICE VISIT (OUTPATIENT)
Dept: NEPHROLOGY | Facility: CLINIC | Age: 74
End: 2019-09-12
Payer: COMMERCIAL

## 2019-09-12 VITALS
BODY MASS INDEX: 27.4 KG/M2 | TEMPERATURE: 97.8 F | DIASTOLIC BLOOD PRESSURE: 70 MMHG | HEIGHT: 70 IN | RESPIRATION RATE: 16 BRPM | WEIGHT: 191.4 LBS | SYSTOLIC BLOOD PRESSURE: 120 MMHG | HEART RATE: 68 BPM

## 2019-09-12 DIAGNOSIS — N18.30 CHRONIC KIDNEY DISEASE, STAGE 3 (HCC): Primary | ICD-10-CM

## 2019-09-12 DIAGNOSIS — N18.9 CHRONIC KIDNEY DISEASE-MINERAL AND BONE DISORDER: ICD-10-CM

## 2019-09-12 DIAGNOSIS — E11.22 TYPE 2 DIABETES MELLITUS WITH STAGE 3 CHRONIC KIDNEY DISEASE, WITHOUT LONG-TERM CURRENT USE OF INSULIN (HCC): ICD-10-CM

## 2019-09-12 DIAGNOSIS — M89.9 CHRONIC KIDNEY DISEASE-MINERAL AND BONE DISORDER: ICD-10-CM

## 2019-09-12 DIAGNOSIS — E83.9 CHRONIC KIDNEY DISEASE-MINERAL AND BONE DISORDER: ICD-10-CM

## 2019-09-12 DIAGNOSIS — N18.30 TYPE 2 DIABETES MELLITUS WITH STAGE 3 CHRONIC KIDNEY DISEASE, WITHOUT LONG-TERM CURRENT USE OF INSULIN (HCC): ICD-10-CM

## 2019-09-12 DIAGNOSIS — I10 BENIGN ESSENTIAL HYPERTENSION: ICD-10-CM

## 2019-09-12 LAB
LEFT EYE DIABETIC RETINOPATHY: NORMAL
RIGHT EYE DIABETIC RETINOPATHY: NORMAL

## 2019-09-12 PROCEDURE — 99213 OFFICE O/P EST LOW 20 MIN: CPT | Performed by: INTERNAL MEDICINE

## 2019-09-12 PROCEDURE — 3078F DIAST BP <80 MM HG: CPT | Performed by: INTERNAL MEDICINE

## 2019-09-12 PROCEDURE — 3074F SYST BP LT 130 MM HG: CPT | Performed by: INTERNAL MEDICINE

## 2019-09-12 NOTE — PROGRESS NOTES
NEPHROLOGY OFFICE FOLLOW UP  Bree Khalil 75 y o  male MRN: [id number]    Encounter: [id number] 9/12/2019    REASON FOR VISIT: Dariana Blanco is a 75 y o  male who is here on 9/12/2019 for Follow-up and Chronic Kidney Disease    HPI:    Bree came in today for follow-up of CKD stage 3  He is doing well  Seems to have increasing abdominal girth  Also complaining of abdominal discomfort  Will be getting CT scan as part of the management    No nausea no vomiting  Denies constipation or diarrhea  Denies any urinary complaint        REVIEW OF SYSTEMS:    Review of Systems   Constitutional: Negative for activity change and fatigue  HENT: Negative for congestion and ear discharge  Eyes: Negative for photophobia and pain  Respiratory: Negative for apnea and choking  Cardiovascular: Negative for chest pain and palpitations  Gastrointestinal: Negative for abdominal distention and blood in stool  Endocrine: Negative for heat intolerance and polyphagia  Genitourinary: Negative for flank pain and urgency  Musculoskeletal: Positive for arthralgias  Negative for neck pain and neck stiffness  Skin: Negative for color change and wound  Allergic/Immunologic: Negative for food allergies and immunocompromised state  Neurological: Negative for seizures and facial asymmetry  Hematological: Negative for adenopathy  Does not bruise/bleed easily  Psychiatric/Behavioral: Negative for self-injury and suicidal ideas           PAST MEDICAL HISTORY:  Past Medical History:   Diagnosis Date    Asthma     last assessed: April 1, 2015    Balanitis     last assessed: July 9, 2014    Cataract     Chronic kidney disease, stage 3 [facility])     last assessed: Jan 17, 2018    Diabetes mellitus [facility]     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     last assessed: Jan 17, 2018    Hypercholesterolemia     Hypertension     Pneumonia     last assessed: Feb 25, 2013       PAST SURGICAL HISTORY:  Past Surgical History: Procedure Laterality Date    ABDOMINAL SURGERY      APPENDECTOMY  1965    CATARACT EXTRACTION      HERNIA REPAIR      HERNIA REPAIR  2007    x2    OTHER SURGICAL HISTORY  1965    Perforated duodenol ulcer surgery        SOCIAL HISTORY:  Social History     Substance and Sexual Activity   Alcohol Use Yes    Alcohol/week: 1 0 standard drinks    Types: 1 Standard drinks or equivalent per week    Frequency: Monthly or less    Drinks per session: 1 or 2    Binge frequency: Never    Comment: rare  socially     Social History     Substance and Sexual Activity   Drug Use No     Social History     Tobacco Use   Smoking Status Former Smoker    Packs/day: 2 00    Years: 10 00    Pack years: 20 00    Types: Cigarettes    Last attempt to quit: Trinna Rebecca Years since quittin 7   Smokeless Tobacco Former User    Quit date:    Tobacco Comment    former smoker noted in "[employer]scripts" Quit in  - never used chewing tobacco        FAMILY HISTORY:  Family History   Problem Relation Age of Onset    Pancreatic cancer Mother     Colon cancer Father     Diabetes Father         mellitus     No Known Problems Sister     Diabetes Sister     Nephrolithiasis Sister        MEDICATIONS:    Current Outpatient Medications:     albuterol (PROAIR HFA) 90 mcg/act inhaler, Inhale daily as needed, Disp: , Rfl:     ERYN CONTOUR TEST test strip, Test sugar twice a day, Disp: 100 each, Rfl: 5    ERYN MICROLET LANCETS lancets, Test twice a day, Disp: 100 each, Rfl: 3    glyBURIDE (DIABETA) 5 mg tablet, take 2 tablets by mouth twice a day, Disp: 120 tablet, Rfl: 3    lisinopril-hydrochlorothiazide (PRINZIDE,ZESTORETIC) 20-25 MG per tablet, take 1 tablet by mouth once daily, Disp: 90 tablet, Rfl: 1    tamsulosin (FLOMAX) 0 4 mg, Take 1 capsule (0 4 mg total) by mouth daily with dinner, Disp: 30 capsule, Rfl: 5    TRADJENTA 5 MG TABS, take 1 tablet by mouth once daily, Disp: 30 tablet, Rfl: 5    PHYSICAL EXAM:  Vitals: 09/12/19 0908   BP: 120/70   BP Location: Right arm   Patient Position: Sitting   Pulse: 68   Resp: 16   Temp: 97 8 °F (36 6 °C)   TempSrc: Temporal   Weight: 86 8 kg (191 lb 6 4 oz)   Height: 5' 10" (1 778 m)     Body mass index is 27 46 kg/m²  Physical Exam   Constitutional: He is oriented to person, place, and time  He appears well-developed  No distress  HENT:   Head: Normocephalic and atraumatic  Mouth/Throat: Oropharynx is clear and moist    Eyes: Pupils are equal, round, and reactive to light  Conjunctivae are normal  No scleral icterus  Neck: Normal range of motion  Neck supple  No JVD present  Cardiovascular: Normal rate and normal heart sounds  Pulmonary/Chest: Effort normal and breath sounds normal  He has no wheezes  Abdominal: Soft  Bowel sounds are normal  He exhibits distension  There is no tenderness  Musculoskeletal: Normal range of motion  He exhibits no edema  Neurological: He is alert and oriented to person, place, and time  Skin: Skin is warm  No rash noted  Psychiatric: He has a normal mood and affect   His behavior is normal        LAB RESULTS:  Results for orders placed or performed in visit on 18/09/70   Basic metabolic panel   Result Value Ref Range    Sodium 137 136 - 145 mmol/L    Potassium 4 3 3 5 - 5 3 mmol/L    Chloride 106 100 - 108 mmol/L    CO2 25 21 - 32 mmol/L    ANION GAP 6 4 - 13 mmol/L    BUN 37 (H) 5 - 25 mg/dL    Creatinine 1 73 (H) 0 60 - 1 30 mg/dL    Glucose 179 (H) 65 - 140 mg/dL    Calcium 8 8 8 3 - 10 1 mg/dL    eGFR 38 ml/min/1 73sq m   CBC and differential   Result Value Ref Range    WBC 7 00 4 31 - 10 16 Thousand/uL    RBC 4 41 3 88 - 5 62 Million/uL    Hemoglobin 13 3 12 0 - 17 0 g/dL    Hematocrit 39 9 36 5 - 49 3 %    MCV 91 82 - 98 fL    MCH 30 2 26 8 - 34 3 pg    MCHC 33 3 31 4 - 37 4 g/dL    RDW 12 8 11 6 - 15 1 %    MPV 11 0 8 9 - 12 7 fL    Platelets 903 315 - 221 Thousands/uL    nRBC 0 /100 WBCs    Neutrophils Relative 61 43 - 75 % Immat GRANS % 0 0 - 2 %    Lymphocytes Relative 24 14 - 44 %    Monocytes Relative 9 4 - 12 %    Eosinophils Relative 5 0 - 6 %    Basophils Relative 1 0 - 1 %    Neutrophils Absolute 4 29 1 85 - 7 62 Thousands/µL    Immature Grans Absolute 0 03 0 00 - 0 20 Thousand/uL    Lymphocytes Absolute 1 67 0 60 - 4 47 Thousands/µL    Monocytes Absolute 0 60 0 17 - 1 22 Thousand/µL    Eosinophils Absolute 0 33 0 00 - 0 61 Thousand/µL    Basophils Absolute 0 08 0 00 - 0 10 Thousands/µL   Phosphorus   Result Value Ref Range    Phosphorus 3 0 2 3 - 4 1 mg/dL   Protein / creatinine ratio, urine   Result Value Ref Range    Creatinine, Ur 76 8 mg/dL    Protein Urine Random 11 mg/dL    Prot/Creat Ratio, Ur 0 14 (H) 0 00 - 0 10   Urinalysis with reflex to microscopic   Result Value Ref Range    Color, UA Yellow     Clarity, UA Clear     Specific [place], UA 1 015 1 003 - 1 030    pH, UA 6 0 4 5, 5 0, 5 5, 6 0, 6 5, 7 0, 7 5, 8 0    Leukocytes, UA Negative Negative    Nitrite, UA Negative Negative    Protein, UA Negative Negative mg/dl    Glucose, UA Negative Negative mg/dl    Ketones, UA Negative Negative mg/dl    Urobilinogen, UA 0 2 0 2, 1 0 E U /dl E U /dl    Bilirubin, UA Negative Negative    Blood, UA Negative Negative   PSA, Total Screen   Result Value Ref Range    PSA 1 8 0 0 - 4 0 ng/mL   PTH, intact   Result Value Ref Range     3 (H) 18 4 - 80 1 pg/mL       ASSESSMENT and PLAN:      Chronic kidney disease, stage 3  Creatinine stable at 1 73 with GFR of about 37  Does have stage III CKD with some fluctuation in some progression  Asymptomatic and progressive nature of kidney disease discussed with him advised hydration and avoidance of any nephrotoxic medicine     Chronic kidney disease-mineral and bone disorder  PTH is 111 with normal phosphorus    Will continue to monitor as part of the CKD management    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([facility] )  Lab Results   Component Value Date    HGBA1C 7 6 (H) 08/19/2019       No results for input(s): POCGLU in the last 72 hours  Blood Sugar Average: Last 72 hrs:   diabetes is not that well controlled  Discussed with him about importance of diabetic control and kidney disease    Benign essential hypertension  Very well controlled with medication which include ACE-inhibitor      I will see him back in 6 months  I discussed blood work and medication with him at length  Will redo the blood test and urine test before next visit        Portions of the record may have been created with voice recognition software  Occasional wrong word or "sound a like" substitutions may have occurred due to the inherent limitations of voice recognition software  Read the chart carefully and recognize, using context, where substitutions have occurred  If you have any questions, please contact the dictating provider

## 2019-09-12 NOTE — ASSESSMENT & PLAN NOTE
Creatinine stable at 1 73 with GFR of about 37  Does have stage III CKD with some fluctuation in some progression    Asymptomatic and progressive nature of kidney disease discussed with him advised hydration and avoidance of any nephrotoxic medicine

## 2019-09-12 NOTE — ASSESSMENT & PLAN NOTE
Lab Results   Component Value Date    HGBA1C 7 6 (H) 08/19/2019       No results for input(s): POCGLU in the last 72 hours  Blood Sugar Average: Last 72 hrs:   diabetes is not that well controlled    Discussed with him about importance of diabetic control and kidney disease

## 2019-09-12 NOTE — PATIENT INSTRUCTIONS
Chronic Kidney Disease   AMBULATORY CARE:   Chronic kidney disease (CKD)  is the gradual and permanent loss of kidney function  Normally, the kidneys remove fluid, chemicals, and waste from your blood  These wastes are turned into urine by your kidneys  CKD may worsen over time and lead to kidney failure  Common symptoms include the following:   · Changes in how often you need to urinate    · Swelling in your arms, legs, or feet    · Shortness of breath    · Fatigue or weakness    · Bad or bitter taste in your mouth    · Nausea, vomiting, or loss of appetite  Seek care immediately if:   · You are confused and very drowsy  · You have a seizure  · You have shortness of breath  Contact your healthcare provider if:   · You suddenly gain or lose more weight than your healthcare provider has told you is okay  · You have itchy skin or a rash  · You urinate more or less than you normally do  · You have blood in your urine  · You have nausea and repeated vomiting  · You have fatigue or muscle weakness  · You have hiccups that will not stop  · You have questions or concerns about your condition or care  Treatment for CKD:  Medicines may be given to decrease blood pressure and get rid of extra fluid  You may also receive medicine to manage health conditions that may occur with CKD  Dialysis is a treatment to remove chemicals and waste from your blood when your kidneys can no longer do this  Surgery may be needed to create an arteriovenous fistula (AVF) in your arm or insert a catheter into your abdomen so that you can receive dialysis  A kidney transplant may be done if your CKD becomes severe  Manage CKD:   · Maintain a healthy weight  Ask your healthcare provider how much you should weigh  Ask him to help you create a weight loss plan if you are overweight  · Exercise 30 to 60 minutes a day, 4 to 7 times a week, or as directed  Ask about the best exercise plan for you   Regular exercise can help you manage CKD, high blood pressure, and diabetes  · Follow your healthcare provider's advice about what to eat and drink  He may tell you to eat food low in sodium (salt), potassium, phosphorus, or protein  You may need to see a dietitian if you need help planning meals  Ask how much liquid to drink each day and which liquids are best for you  · Limit alcohol  Ask how much alcohol is safe for you to drink  A drink of alcohol is 12 ounces of beer, 5 ounces of wine, or 1½ ounces of liquor  · Do not smoke  Nicotine and other chemicals in cigarettes and cigars can cause lung and kidney damage  Ask your healthcare provider for information if you currently smoke and need help to quit  E-cigarettes or smokeless tobacco still contain nicotine  Talk to your healthcare provider before you use these products  · Ask your healthcare provider if you need vaccines  Infections such as pneumonia, influenza, and hepatitis can be more harmful or more likely to occur in a person who has CKD  Vaccines reduce your risk of infection with these viruses  Follow up with your healthcare provider as directed:  Write down your questions so you remember to ask them during your visits  © 2017 [employer] Information is for End User's use only and may not be sold, redistributed or otherwise used for commercial purposes  All illustrations and images included in CareNotes® are the copyrighted property of [employer]  or Andres Mendoza  The above information is an  only  It is not intended as medical advice for individual conditions or treatments  Talk to your doctor, nurse or pharmacist before following any medical regimen to see if it is safe and effective for you

## 2019-09-16 ENCOUNTER — HOSPITAL ENCOUNTER (OUTPATIENT)
Dept: CT IMAGING | Facility: HOSPITAL | Age: 74
Discharge: HOME | End: 2019-09-16
Payer: COMMERCIAL

## 2019-09-16 DIAGNOSIS — R10.30 LOWER ABDOMINAL PAIN: ICD-10-CM

## 2019-09-16 PROCEDURE — 74176 CT ABD & PELVIS W/O CONTRAST: CPT

## 2019-09-18 DIAGNOSIS — K40.90 UNILATERAL INGUINAL HERNIA WITHOUT OBSTRUCTION OR GANGRENE, RECURRENCE NOT SPECIFIED: Primary | ICD-10-CM

## 2019-09-28 DIAGNOSIS — E11.8 TYPE 2 DIABETES MELLITUS WITH COMPLICATION, WITHOUT LONG-TERM CURRENT USE OF INSULIN (HCC): ICD-10-CM

## 2019-09-29 RX ORDER — LINAGLIPTIN 5 MG/1
TABLET, FILM COATED ORAL
Qty: 30 TABLET | Refills: 5 | Status: SHIPPED | OUTPATIENT
Start: 2019-09-29 | End: 2019-10-01 | Stop reason: SDUPTHER

## 2019-09-30 ENCOUNTER — PREP FOR PROCEDURE (OUTPATIENT)
Dept: GASTROENTEROLOGY | Facility: CLINIC | Age: 74
End: 2019-09-30

## 2019-09-30 ENCOUNTER — CONSULT (OUTPATIENT)
Dept: SURGERY | Facility: CLINIC | Age: 74
End: 2019-09-30
Payer: COMMERCIAL

## 2019-09-30 VITALS
BODY MASS INDEX: 27.77 KG/M2 | SYSTOLIC BLOOD PRESSURE: 130 MMHG | DIASTOLIC BLOOD PRESSURE: 64 MMHG | RESPIRATION RATE: 13 BRPM | TEMPERATURE: 97.6 F | HEIGHT: 70 IN | WEIGHT: 194 LBS | HEART RATE: 56 BPM

## 2019-09-30 DIAGNOSIS — Z87.11 HISTORY OF PEPTIC ULCER: ICD-10-CM

## 2019-09-30 DIAGNOSIS — R13.10 DYSPHAGIA, UNSPECIFIED TYPE: ICD-10-CM

## 2019-09-30 DIAGNOSIS — K40.90 UNILATERAL INGUINAL HERNIA WITHOUT OBSTRUCTION OR GANGRENE, RECURRENCE NOT SPECIFIED: Primary | ICD-10-CM

## 2019-09-30 DIAGNOSIS — R10.13 EPIGASTRIC PAIN: ICD-10-CM

## 2019-09-30 DIAGNOSIS — K21.9 GASTROESOPHAGEAL REFLUX DISEASE, ESOPHAGITIS PRESENCE NOT SPECIFIED: Primary | ICD-10-CM

## 2019-09-30 PROCEDURE — 99202 OFFICE O/P NEW SF 15 MIN: CPT | Performed by: SURGERY

## 2019-09-30 NOTE — PROGRESS NOTES
Assessment/Plan:    We discussed the hernia repair  He is most concerned about his abdominal pain  Given his history of peptic ulcer disease Will refer to GI for EGD first  Followup thereafter  Subjective:      Patient ID: Ramona Merritt is a 75 y o  male  Triage Notes: Pt is here today for a consult for a unilateral inguinal hernia without obstruction or gangrene  Pt is currently having pain and discomfort  He has a bulge and some swelling to the left groin  Bowels are strained  Appetite is ok  Some nausea  He states he has abdominal pain as well but not sure if it is related  Mr Helder Reilly is seen for an inguinal hernia  He has been having abdominal pain which he states is in his epigastric area and lower abdomen  He had a CT scan which showed a moderate sized left inguinal hernia  He also relates that he has a feeling that when he swallows things are getting stuck or hung up and not moving through  He does relate a history of a remote peptic ulcer which was treated many years ago  He has had an increase in abdominal girth over the past several years and he feels his abdomen is very hard and he doesn't know why  He is relatively asymptomatic in the left groin but does note a bulge that does reduce when he lies flat  He does have some intermittent constipation  He is mostly concerned about his abdominal pain/symptoms  Review of Systems   Constitutional: Negative for appetite change, chills, fever and unexpected weight change  HENT: Negative for hearing loss, sore throat, trouble swallowing and voice change  Eyes: Negative for visual disturbance  Uses corrective lenses   Respiratory: Negative for cough, chest tightness, shortness of breath and wheezing  Cardiovascular: Negative for chest pain and palpitations  Gastrointestinal: Positive for abdominal pain and constipation  Negative for abdominal distention and vomiting          Per HPI   Endocrine: Negative for cold intolerance and heat intolerance  Genitourinary: Negative for difficulty urinating  Skin: Negative for color change and rash  Neurological: Negative for dizziness, speech difficulty, light-headedness and numbness  Hematological: Does not bruise/bleed easily  Psychiatric/Behavioral: Negative for confusion, hallucinations and suicidal ideas  Objective:    Vitals:    09/30/19 1252   BP: 130/64   Pulse: 56   Resp: 13   Temp: 97 6 °F (36 4 °C)       There were no vitals taken for this visit  Physical Exam   Constitutional: He is oriented to person, place, and time  He appears well-developed and well-nourished  No distress  HENT:   Head: Normocephalic and atraumatic  Eyes: EOM are normal  Right eye exhibits no discharge  Left eye exhibits no discharge  Neck: Normal range of motion  Neck supple  No JVD present  Cardiovascular: Normal rate and regular rhythm  Pulmonary/Chest: Effort normal and breath sounds normal    Abdominal: Soft  He exhibits no distension and no mass  There is no guarding  +diastasis  Mild tenderness to palpation in midepigastrium and lower quadrants  Genitourinary:   Genitourinary Comments: Reducible left inguinal hernia  Musculoskeletal: Normal range of motion  Neurological: He is alert and oriented to person, place, and time  Skin: Skin is warm and dry  Psychiatric: He has a normal mood and affect  His behavior is normal  Thought content normal    Nursing note and vitals reviewed

## 2019-10-01 DIAGNOSIS — E11.8 TYPE 2 DIABETES MELLITUS WITH COMPLICATION, WITHOUT LONG-TERM CURRENT USE OF INSULIN (HCC): ICD-10-CM

## 2019-10-01 RX ORDER — GLYBURIDE 5 MG/1
10 TABLET ORAL 2 TIMES DAILY
Qty: 360 TABLET | Refills: 3 | Status: SHIPPED | OUTPATIENT
Start: 2019-10-01 | End: 2020-02-27 | Stop reason: SDUPTHER

## 2020-01-23 DIAGNOSIS — I10 ESSENTIAL HYPERTENSION: ICD-10-CM

## 2020-01-23 RX ORDER — LISINOPRIL AND HYDROCHLOROTHIAZIDE 25; 20 MG/1; MG/1
TABLET ORAL
Qty: 90 TABLET | Refills: 0 | Status: SHIPPED | OUTPATIENT
Start: 2020-01-23 | End: 2020-02-27 | Stop reason: SDUPTHER

## 2020-02-24 ENCOUNTER — APPOINTMENT (OUTPATIENT)
Dept: LAB | Facility: CLINIC | Age: 75
End: 2020-02-24
Payer: COMMERCIAL

## 2020-02-24 DIAGNOSIS — N18.9 CHRONIC KIDNEY DISEASE-MINERAL AND BONE DISORDER: ICD-10-CM

## 2020-02-24 DIAGNOSIS — E11.22 TYPE 2 DIABETES MELLITUS WITH STAGE 3 CHRONIC KIDNEY DISEASE, WITHOUT LONG-TERM CURRENT USE OF INSULIN (HCC): ICD-10-CM

## 2020-02-24 DIAGNOSIS — E83.9 CHRONIC KIDNEY DISEASE-MINERAL AND BONE DISORDER: ICD-10-CM

## 2020-02-24 DIAGNOSIS — N18.30 CHRONIC KIDNEY DISEASE, STAGE 3 (HCC): ICD-10-CM

## 2020-02-24 DIAGNOSIS — M89.9 CHRONIC KIDNEY DISEASE-MINERAL AND BONE DISORDER: ICD-10-CM

## 2020-02-24 DIAGNOSIS — E78.00 HYPERCHOLESTEROLEMIA: ICD-10-CM

## 2020-02-24 DIAGNOSIS — N18.30 TYPE 2 DIABETES MELLITUS WITH STAGE 3 CHRONIC KIDNEY DISEASE, WITHOUT LONG-TERM CURRENT USE OF INSULIN (HCC): ICD-10-CM

## 2020-02-24 LAB
25(OH)D3 SERPL-MCNC: 24.7 NG/ML (ref 30–100)
ALBUMIN SERPL BCP-MCNC: 4.2 G/DL (ref 3.5–5)
ALP SERPL-CCNC: 68 U/L (ref 46–116)
ALT SERPL W P-5'-P-CCNC: 26 U/L (ref 12–78)
ANION GAP SERPL CALCULATED.3IONS-SCNC: 7 MMOL/L (ref 4–13)
AST SERPL W P-5'-P-CCNC: 14 U/L (ref 5–45)
BASOPHILS # BLD AUTO: 0.08 THOUSANDS/ΜL (ref 0–0.1)
BASOPHILS NFR BLD AUTO: 1 % (ref 0–1)
BILIRUB SERPL-MCNC: 0.52 MG/DL (ref 0.2–1)
BILIRUB UR QL STRIP: NEGATIVE
BUN SERPL-MCNC: 37 MG/DL (ref 5–25)
CALCIUM SERPL-MCNC: 8.5 MG/DL (ref 8.3–10.1)
CHLORIDE SERPL-SCNC: 105 MMOL/L (ref 100–108)
CHOLEST SERPL-MCNC: 182 MG/DL (ref 50–200)
CLARITY UR: CLEAR
CO2 SERPL-SCNC: 23 MMOL/L (ref 21–32)
COLOR UR: YELLOW
CREAT SERPL-MCNC: 1.67 MG/DL (ref 0.6–1.3)
CREAT UR-MCNC: 60.2 MG/DL
EOSINOPHIL # BLD AUTO: 0.26 THOUSAND/ΜL (ref 0–0.61)
EOSINOPHIL NFR BLD AUTO: 4 % (ref 0–6)
ERYTHROCYTE [DISTWIDTH] IN BLOOD BY AUTOMATED COUNT: 13.2 % (ref 11.6–15.1)
EST. AVERAGE GLUCOSE BLD GHB EST-MCNC: 163 MG/DL
GFR SERPL CREATININE-BSD FRML MDRD: 39 ML/MIN/1.73SQ M
GLUCOSE P FAST SERPL-MCNC: 162 MG/DL (ref 65–99)
GLUCOSE UR STRIP-MCNC: NEGATIVE MG/DL
HBA1C MFR BLD: 7.3 %
HCT VFR BLD AUTO: 42.2 % (ref 36.5–49.3)
HDLC SERPL-MCNC: 32 MG/DL
HGB BLD-MCNC: 14.4 G/DL (ref 12–17)
HGB UR QL STRIP.AUTO: NEGATIVE
IMM GRANULOCYTES # BLD AUTO: 0.03 THOUSAND/UL (ref 0–0.2)
IMM GRANULOCYTES NFR BLD AUTO: 0 % (ref 0–2)
KETONES UR STRIP-MCNC: NEGATIVE MG/DL
LDLC SERPL CALC-MCNC: 131 MG/DL (ref 0–100)
LEUKOCYTE ESTERASE UR QL STRIP: NEGATIVE
LYMPHOCYTES # BLD AUTO: 1.64 THOUSANDS/ΜL (ref 0.6–4.47)
LYMPHOCYTES NFR BLD AUTO: 23 % (ref 14–44)
MCH RBC QN AUTO: 30.8 PG (ref 26.8–34.3)
MCHC RBC AUTO-ENTMCNC: 34.1 G/DL (ref 31.4–37.4)
MCV RBC AUTO: 90 FL (ref 82–98)
MONOCYTES # BLD AUTO: 0.67 THOUSAND/ΜL (ref 0.17–1.22)
MONOCYTES NFR BLD AUTO: 9 % (ref 4–12)
NEUTROPHILS # BLD AUTO: 4.55 THOUSANDS/ΜL (ref 1.85–7.62)
NEUTS SEG NFR BLD AUTO: 63 % (ref 43–75)
NITRITE UR QL STRIP: NEGATIVE
NRBC BLD AUTO-RTO: 0 /100 WBCS
PH UR STRIP.AUTO: 5.5 [PH]
PHOSPHATE SERPL-MCNC: 2.7 MG/DL (ref 2.3–4.1)
PLATELET # BLD AUTO: 241 THOUSANDS/UL (ref 149–390)
PMV BLD AUTO: 11.4 FL (ref 8.9–12.7)
POTASSIUM SERPL-SCNC: 4.4 MMOL/L (ref 3.5–5.3)
PROT SERPL-MCNC: 7.6 G/DL (ref 6.4–8.2)
PROT UR STRIP-MCNC: NEGATIVE MG/DL
PROT UR-MCNC: <6 MG/DL
PROT/CREAT UR: <0.1 MG/G{CREAT} (ref 0–0.1)
PTH-INTACT SERPL-MCNC: 175.3 PG/ML (ref 18.4–80.1)
RBC # BLD AUTO: 4.68 MILLION/UL (ref 3.88–5.62)
SODIUM SERPL-SCNC: 135 MMOL/L (ref 136–145)
SP GR UR STRIP.AUTO: 1.01 (ref 1–1.03)
TRIGL SERPL-MCNC: 94 MG/DL
UROBILINOGEN UR QL STRIP.AUTO: 0.2 E.U./DL
WBC # BLD AUTO: 7.23 THOUSAND/UL (ref 4.31–10.16)

## 2020-02-24 PROCEDURE — 80061 LIPID PANEL: CPT

## 2020-02-24 PROCEDURE — 82570 ASSAY OF URINE CREATININE: CPT

## 2020-02-24 PROCEDURE — 84156 ASSAY OF PROTEIN URINE: CPT

## 2020-02-24 PROCEDURE — 81003 URINALYSIS AUTO W/O SCOPE: CPT

## 2020-02-24 PROCEDURE — 83036 HEMOGLOBIN GLYCOSYLATED A1C: CPT

## 2020-02-24 PROCEDURE — 84100 ASSAY OF PHOSPHORUS: CPT

## 2020-02-24 PROCEDURE — 83970 ASSAY OF PARATHORMONE: CPT

## 2020-02-24 PROCEDURE — 82306 VITAMIN D 25 HYDROXY: CPT

## 2020-02-24 PROCEDURE — 85025 COMPLETE CBC W/AUTO DIFF WBC: CPT

## 2020-02-24 PROCEDURE — 3051F HG A1C>EQUAL 7.0%<8.0%: CPT | Performed by: INTERNAL MEDICINE

## 2020-02-24 PROCEDURE — 36415 COLL VENOUS BLD VENIPUNCTURE: CPT

## 2020-02-24 PROCEDURE — 3066F NEPHROPATHY DOC TX: CPT | Performed by: INTERNAL MEDICINE

## 2020-02-24 PROCEDURE — 80053 COMPREHEN METABOLIC PANEL: CPT

## 2020-02-27 ENCOUNTER — OFFICE VISIT (OUTPATIENT)
Dept: FAMILY MEDICINE CLINIC | Facility: CLINIC | Age: 75
End: 2020-02-27
Payer: COMMERCIAL

## 2020-02-27 VITALS
BODY MASS INDEX: 26.92 KG/M2 | DIASTOLIC BLOOD PRESSURE: 68 MMHG | TEMPERATURE: 97.8 F | SYSTOLIC BLOOD PRESSURE: 126 MMHG | WEIGHT: 188 LBS | HEART RATE: 68 BPM | HEIGHT: 70 IN | OXYGEN SATURATION: 98 %

## 2020-02-27 DIAGNOSIS — N18.30 CHRONIC KIDNEY DISEASE, STAGE 3 (HCC): ICD-10-CM

## 2020-02-27 DIAGNOSIS — R10.13 EPIGASTRIC PAIN: ICD-10-CM

## 2020-02-27 DIAGNOSIS — I10 BENIGN ESSENTIAL HYPERTENSION: ICD-10-CM

## 2020-02-27 DIAGNOSIS — E11.8 TYPE 2 DIABETES MELLITUS WITH COMPLICATION, WITHOUT LONG-TERM CURRENT USE OF INSULIN (HCC): Primary | ICD-10-CM

## 2020-02-27 DIAGNOSIS — I10 ESSENTIAL HYPERTENSION: ICD-10-CM

## 2020-02-27 DIAGNOSIS — Z00.00 MEDICARE ANNUAL WELLNESS VISIT, SUBSEQUENT: ICD-10-CM

## 2020-02-27 PROCEDURE — 1160F RVW MEDS BY RX/DR IN RCRD: CPT | Performed by: FAMILY MEDICINE

## 2020-02-27 PROCEDURE — 1170F FXNL STATUS ASSESSED: CPT | Performed by: FAMILY MEDICINE

## 2020-02-27 PROCEDURE — 3051F HG A1C>EQUAL 7.0%<8.0%: CPT | Performed by: FAMILY MEDICINE

## 2020-02-27 PROCEDURE — 3008F BODY MASS INDEX DOCD: CPT | Performed by: FAMILY MEDICINE

## 2020-02-27 PROCEDURE — G0439 PPPS, SUBSEQ VISIT: HCPCS | Performed by: FAMILY MEDICINE

## 2020-02-27 PROCEDURE — 1036F TOBACCO NON-USER: CPT | Performed by: FAMILY MEDICINE

## 2020-02-27 PROCEDURE — 99214 OFFICE O/P EST MOD 30 MIN: CPT | Performed by: FAMILY MEDICINE

## 2020-02-27 PROCEDURE — 4010F ACE/ARB THERAPY RXD/TAKEN: CPT | Performed by: FAMILY MEDICINE

## 2020-02-27 PROCEDURE — 3074F SYST BP LT 130 MM HG: CPT | Performed by: FAMILY MEDICINE

## 2020-02-27 PROCEDURE — 3078F DIAST BP <80 MM HG: CPT | Performed by: FAMILY MEDICINE

## 2020-02-27 PROCEDURE — 1125F AMNT PAIN NOTED PAIN PRSNT: CPT | Performed by: FAMILY MEDICINE

## 2020-02-27 PROCEDURE — 3066F NEPHROPATHY DOC TX: CPT | Performed by: FAMILY MEDICINE

## 2020-02-27 RX ORDER — LISINOPRIL AND HYDROCHLOROTHIAZIDE 25; 20 MG/1; MG/1
1 TABLET ORAL DAILY
Qty: 90 TABLET | Refills: 1 | Status: SHIPPED | OUTPATIENT
Start: 2020-02-27 | End: 2020-09-30 | Stop reason: SDUPTHER

## 2020-02-27 RX ORDER — GLYBURIDE 5 MG/1
10 TABLET ORAL 2 TIMES DAILY
Qty: 360 TABLET | Refills: 1 | Status: SHIPPED | OUTPATIENT
Start: 2020-02-27 | End: 2020-09-30 | Stop reason: SDUPTHER

## 2020-02-27 NOTE — PROGRESS NOTES
Assessment and Plan:     Problem List Items Addressed This Visit        Cardiovascular and Mediastinum    Benign essential hypertension    Relevant Medications    lisinopril-hydrochlorothiazide (PRINZIDE,ZESTORETIC) 20-25 MG per tablet       Genitourinary    Chronic kidney disease, stage 3 ([place] [address] )       Other    Medicare annual wellness visit, subsequent      Other Visit Diagnoses     Type 2 diabetes mellitus with complication, without long-term current use of insulin ([place] [address] )    -  Primary    Relevant Medications    linaGLIPtin (Tradjenta) 5 MG TABS    glyBURIDE (DIABETA) 5 mg tablet    Other Relevant Orders    Hemoglobin A1C    Comprehensive metabolic panel    Lipid Panel with Direct LDL reflex    Essential hypertension        Relevant Medications    lisinopril-hydrochlorothiazide (PRINZIDE,ZESTORETIC) 20-25 MG per tablet    Epigastric pain        Relevant Orders    Ambulatory referral to Gastroenterology         Discussed starting a statin to lower risk of cardiovascular event, patient is reluctant to start taking "more pills"  Provided education on low fat/low cholesterol diet and discussed risk factors  Discussed fall/injury prevention, use of cane for balance  Preventive health issues were discussed with patient, and age appropriate screening tests were ordered as noted in patient's After Visit Summary  Personalized health advice and appropriate referrals for health education or preventive services given if needed, as noted in patient's After Visit Summary       History of Present Illness:     Patient presents for Medicare Annual Wellness visit    Patient Care Team:  Mauri Still MD as PCP - Mulu Beckman MD as Consulting Physician     Problem List:     Patient Active Problem List   Diagnosis    Chronic [facility] disease, stage 3 ([facility] )    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([place] [address] )    Benign essential hypertension    Hypercholesterolemia    Statin intolerance    Degenerative cervical spinal stenosis  Overweight (BMI 25 0-29  9)    Medicare annual wellness visit, subsequent    Lower abdominal pain    Benign prostatic hyperplasia with lower urinary tract symptoms    Chronic kidney disease-mineral and bone disorder      Past Medical and Surgical History:     Past Medical History:   Diagnosis Date    Asthma     last assessed: 2015    Balanitis     last assessed: 2014    Cataract     Chronic kidney disease, stage 3 [facility])     last assessed: 2018    Diabetes mellitus [facility])     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([address] )     last assessed: 2018    Hypercholesterolemia     Hypertension     Pneumonia     last assessed: 2013     Past Surgical History:   Procedure Laterality Date   [address] [facility]    CATARACT EXTRACTION     [address]  2007    x2    OTHER SURGICAL HISTORY  1965    Perforated duodenol ulcer surgery       Family History:     Family History   Problem Relation Age of Onset    Pancreatic cancer Mother     Colon cancer Father     Diabetes Father         mellitus     No Known Problems Sister     Diabetes Sister     Nephrolithiasis Sister       Social History:        Social History     Socioeconomic History    Marital status:      Spouse name: Not on file    Number of children: Not on file    Years of education: Not on file    Highest education level: Not on file   Occupational History    Occupation: Not employed - Retired    Social Needs    Financial resource strain: Not on file    Food insecurity:     Worry: Not on file     Inability: Not on file   My COI needs:     Medical: Not on file     Non-medical: Not on file   Tobacco Use    Smoking status: Former Smoker     Packs/day: 2 00     Years: 10 00     Pack years: 20 00     Types: Cigarettes     Last attempt to quit: 1971     Years since quittin 1    Smokeless tobacco: Former User     Quit date:     Tobacco comment: former smoker noted in "allscripts" Quit in [address] - never used chewing tobacco    Substance and Sexual Activity    Alcohol use:  Yes     Alcohol/week: 1 0 standard drinks     Types: 1 Standard drinks or equivalent per week     Frequency: Monthly or less     Drinks per session: 1 or 2     Binge frequency: Never     Comment: rare  socially    Drug use: No    Sexual activity: Not on file   Lifestyle    Physical activity:     Days per week: Not on file     Minutes per session: Not on file    Stress: Not on file   Relationships    Social connections:     Talks on phone: Not on file     Gets together: Not on file     Attends Jewish service: Not on file     Active member of club or organization: Not on file     Attends meetings of clubs or organizations: Not on file     Relationship status: Not on file    Intimate partner violence:     Fear of current or ex partner: Not on file     Emotionally abused: Not on file     Physically abused: Not on file     Forced sexual activity: Not on file   Other Topics Concern    Not on file   Social History Narrative    Voluntary Childlessness     Advance directive on file - No     Exercises occasionally     Occasional caffeine consumption       Medications and Allergies:     Current Outpatient Medications   Medication Sig Dispense Refill    albuterol (PROAIR HFA) 90 mcg/act inhaler Inhale daily as needed      ERYN CONTOUR TEST test strip Test sugar twice a day 100 each 5    ERYN MICROLET LANCETS lancets Test twice a day 100 each 3    glyBURIDE (DIABETA) 5 mg tablet Take 2 tablets (10 mg total) by mouth 2 (two) times a day 360 tablet 1    linaGLIPtin (Tradjenta) 5 MG TABS Take 5 mg by mouth daily 90 tablet 1    lisinopril-hydrochlorothiazide (PRINZIDE,ZESTORETIC) 20-25 MG per tablet Take 1 tablet by mouth daily 90 tablet 1    tamsulosin (FLOMAX) 0 4 mg Take 1 capsule (0 4 mg total) by mouth daily with dinner 30 capsule 5     No current facility-administered medications for this visit  Allergies   Allergen Reactions    Meperidine     Pravastatin Myalgia      Immunizations:     Immunization History   Administered Date(s) Administered    Tdap 01/11/2018, 01/11/2018      Health Maintenance:         Topic Date Due    CRC Screening: Colonoscopy  12/02/2024     There are no preventive care reminders to display for this patient  Medicare Health Risk Assessment:     /68   Pulse 68   Temp 97 8 °F (36 6 °C)   Ht 5' 10" (1 778 m)   Wt 85 3 kg (188 lb)   SpO2 98%   BMI 26 98 kg/m²      Bree is here for his Subsequent Wellness visit  Health Risk Assessment:   Patient rates overall health as good  Patient feels that their physical health rating is same  Eyesight was rated as same  Hearing was rated as same  Patient feels that their emotional and mental health rating is same  Pain experienced in the last 7 days has been some  Patient's pain rating has been 5/10  Patient states that he has experienced no weight loss or gain in last 6 months  Arm/neck pain that radiates down his left arm, present for 15 years, tried PT and OTC medications    Depression Screening:   PHQ-2 Score: 0      Fall Risk Screening: In the past year, patient has experienced: history of falling in past year    Number of falls: 2 or more  Injured during fall?: No    Feels unsteady when standing or walking?: No    Worried about falling?: No      Home Safety:  Patient does not have trouble with stairs inside or outside of their home  Patient has working smoke alarms and has working carbon monoxide detector  Home safety hazards include: none  Balance issues, rarely uses a cane     Nutrition:   Current diet is Low Carb  Medications:   Patient is currently taking over-the-counter supplements  OTC medications include: see medication list  Patient is able to manage medications       Activities of Daily Living (ADLs)/Instrumental Activities of Daily Living (IADLs):   Walk and transfer into and out of bed and chair?: Yes  Dress and groom yourself?: Yes    Bathe or shower yourself?: Yes    Feed yourself?  Yes  Do your laundry/housekeeping?: Yes  Manage your money, pay your bills and track your expenses?: Yes  Make your own meals?: Yes    Do your own shopping?: Yes    Durable Medical Equipment Suppliers  [employer]     Previous Hospitalizations:   Any hospitalizations or ED visits within the last 12 months?: No      Advance Care Planning:   Living will: No    Durable POA for healthcare: No    Five wishes given: Yes      Cognitive Screening:   Provider or family/friend/caregiver concerned regarding cognition?: Yes    Cognition Comments: Does not want evaluation at this time    PREVENTIVE SCREENINGS      Cardiovascular Screening:    General: Screening Not Indicated, History Lipid Disorder and Screening Current      Diabetes Screening:     General: Screening Not Indicated, History Diabetes and Screening Current      Colorectal Cancer Screening:     General: Screening Current      Prostate Cancer Screening:    General: Screening Not Indicated and Screening Current      Osteoporosis Screening:    General: Screening Not Indicated      Abdominal Aortic Aneurysm (AAA) Screening:    Risk factors include: age between 73-67 yo and tobacco use        General: Screening Current      Lung Cancer Screening:     General: Screening Not Indicated      Hepatitis C Screening:    General: Screening Not Indicated      Yasmine Alves MD

## 2020-02-27 NOTE — PATIENT INSTRUCTIONS
Medicare Preventive Visit Patient Instructions  Thank you for completing your Welcome to Medicare Visit or Medicare Annual Wellness Visit today  Your next wellness visit will be due in one year (2/27/2021)  The screening/preventive services that you may require over the next 5-10 years are detailed below  Some tests may not apply to you based off risk factors and/or age  Screening tests ordered at today's visit but not completed yet may show as past due  Also, please note that scanned in results may not display below  Preventive Screenings:  Service Recommendations Previous Testing/Comments   Colorectal Cancer Screening  · Colonoscopy    · Fecal Occult Blood Test (FOBT)/Fecal Immunochemical Test (FIT)  · Fecal DNA/Cologuard Test  · Flexible Sigmoidoscopy Age: 54-65 years old   Colonoscopy: every 10 years (May be performed more frequently if at higher risk)  OR  FOBT/FIT: every 1 year  OR  Cologuard: every 3 years  OR  Sigmoidoscopy: every 5 years  Screening may be recommended earlier than age 48 if at higher risk for colorectal cancer  Also, an individualized decision between you and your healthcare provider will decide whether screening between the ages of 74-80 would be appropriate   Colonoscopy: 12/02/2014  FOBT/FIT: Not on file  Cologuard: Not on file  Sigmoidoscopy: Not on file    Screening Current     Prostate Cancer Screening Individualized decision between patient and health care provider in men between ages of 53-78   Medicare will cover every 12 months beginning on the day after your 50th birthday PSA: 1 8 ng/mL     Screening Not Indicated     Hepatitis C Screening Once for adults born between 1945 and 1965  More frequently in patients at high risk for Hepatitis C Hep C Antibody: Not on file       Diabetes Screening 1-2 times per year if you're at risk for diabetes or have pre-diabetes Fasting glucose: 162 mg/dL   A1C: 7 3 %    Screening Not Indicated  History Diabetes   Cholesterol Screening Once every 5 years if you don't have a lipid disorder  May order more often based on risk factors  Lipid panel: 02/24/2020    Screening Not Indicated  History Lipid Disorder      Other Preventive Screenings Covered by Medicare:  1  Abdominal Aortic Aneurysm (AAA) Screening: covered once if your at risk  You're considered to be at risk if you have a family history of AAA or a male between the age of 73-68 who smoking at least 100 cigarettes in your lifetime  2  Lung Cancer Screening: covers low dose CT scan once per year if you meet all of the following conditions: (1) Age 50-69; (2) No signs or symptoms of lung cancer; (3) Current smoker or have quit smoking within the last 15 years; (4) You have a tobacco smoking history of at least 30 pack years (packs per day x number of years you smoked); (5) You get a written order from a healthcare provider  3  Glaucoma Screening: covered annually if you're considered high risk: (1) You have diabetes OR (2) Family history of glaucoma OR (3)  aged 48 and older OR (3)  American aged 72 and older  3  Osteoporosis Screening: covered every 2 years if you meet one of the following conditions: (1) Have a vertebral abnormality; (2) On glucocorticoid therapy for more than 3 months; (3) Have primary hyperparathyroidism; (4) On osteoporosis medications and need to assess response to drug therapy  5  HIV Screening: covered annually if you're between the age of 12-76  Also covered annually if you are younger than 13 and older than 72 with risk factors for HIV infection  For pregnant patients, it is covered up to 3 times per pregnancy      Immunizations:  Immunization Recommendations   Influenza Vaccine Annual influenza vaccination during flu season is recommended for all persons aged >= 6 months who do not have contraindications   Pneumococcal Vaccine (Prevnar and Pneumovax)  * Prevnar = PCV13  * Pneumovax = PPSV23 Adults 25-60 years old: 1-3 doses may be recommended based on certain risk factors  Adults 72 years old: Prevnar (PCV13) vaccine recommended followed by Pneumovax (PPSV23) vaccine  If already received PPSV23 since turning 65, then PCV13 recommended at least one year after PPSV23 dose  Hepatitis B Vaccine 3 dose series if at intermediate or high risk (ex: diabetes, end stage renal disease, liver disease)   Tetanus (Td) Vaccine - COST NOT COVERED BY MEDICARE PART B Following completion of primary series, a booster dose should be given every 10 years to maintain immunity against tetanus  Td may also be given as tetanus wound prophylaxis  Tdap Vaccine - COST NOT COVERED BY MEDICARE PART B Recommended at least once for all adults  For pregnant patients, recommended with each pregnancy  Shingles Vaccine (Shingrix) - COST NOT COVERED BY MEDICARE PART B  2 shot series recommended in those aged 48 and above     Health Maintenance Due:      Topic Date Due    CRC Screening: Colonoscopy  12/02/2024     Immunizations Due:  There are no preventive care reminders to display for this patient  Advance Directives   What are advance directives? Advance directives are legal documents that state your wishes and plans for medical care  These plans are made ahead of time in case you lose your ability to make decisions for yourself  Advance directives can apply to any medical decision, such as the treatments you want, and if you want to donate organs  What are the types of advance directives? There are many types of advance directives, and each state has rules about how to use them  You may choose a combination of any of the following:  · Living will: This is a written record of the treatment you want  You can also choose which treatments you do not want, which to limit, and which to stop at a certain time  This includes surgery, medicine, IV fluid, and tube feedings  · Durable power of  for healthcare [facility]):   This is a written record that states who you want to make healthcare choices for you when you are unable to make them for yourself  This person, called a proxy, is usually a family member or a friend  You may choose more than 1 proxy  · Do not resuscitate (DNR) order:  A DNR order is used in case your heart stops beating or you stop breathing  It is a request not to have certain forms of treatment, such as CPR  A DNR order may be included in other types of advance directives  · Medical directive: This covers the care that you want if you are in a coma, near death, or unable to make decisions for yourself  You can list the treatments you want for each condition  Treatment may include pain medicine, surgery, blood transfusions, dialysis, IV or tube feedings, and a ventilator (breathing machine)  · Values history: This document has questions about your views, beliefs, and how you feel and think about life  This information can help others choose the care that you would choose  Why are advance directives important? An advance directive helps you control your care  Although spoken wishes may be used, it is better to have your wishes written down  Spoken wishes can be misunderstood, or not followed  Treatments may be given even if you do not want them  An advance directive may make it easier for your family to make difficult choices about your care  Fall Prevention    Fall prevention  includes ways to make your home and other areas safer  It also includes ways you can move more carefully to prevent a fall  Health conditions that cause changes in your blood pressure, vision, or muscle strength and coordination may increase your risk for falls  Medicines may also increase your risk for falls if they make you dizzy, weak, or sleepy  Fall prevention tips:   · Stand or sit up slowly  · Use assistive devices as directed  · Wear shoes that fit well and have soles that   · Wear a personal alarm  · Stay active  · Manage your medical conditions      Home Safety Tips:  · Add items to prevent falls in the bathroom  · Keep paths clear  · Install bright lights in your home  · Keep items you use often on shelves within reach  · Paint or place reflective tape on the edges of your stairs  Weight Management   Why it is important to manage your weight:  Being overweight increases your risk of health conditions such as heart disease, high blood pressure, type 2 diabetes, and certain types of cancer  It can also increase your risk for osteoarthritis, sleep apnea, and other respiratory problems  Aim for a slow, steady weight loss  Even a small amount of weight loss can lower your risk of health problems  How to lose weight safely:  A safe and healthy way to lose weight is to eat fewer calories and get regular exercise  You can lose up about 1 pound a week by decreasing the number of calories you eat by 500 calories each day  Healthy meal plan for weight management:  A healthy meal plan includes a variety of foods, contains fewer calories, and helps you stay healthy  A healthy meal plan includes the following:  · Eat whole-grain foods more often  A healthy meal plan should contain fiber  Fiber is the part of grains, fruits, and vegetables that is not broken down by your body  Whole-grain foods are healthy and provide extra fiber in your diet  Some examples of whole-grain foods are whole-wheat breads and pastas, oatmeal, brown rice, and bulgur  · Eat a variety of vegetables every day  Include dark, leafy greens such as spinach, kale, anderson greens, and mustard greens  Eat yellow and orange vegetables such as carrots, sweet potatoes, and winter squash  · Eat a variety of fruits every day  Choose fresh or canned fruit (canned in its own juice or light syrup) instead of juice  Fruit juice has very little or no fiber  · Eat low-fat dairy foods  Drink fat-free (skim) milk or 1% milk  Eat fat-free yogurt and low-fat cottage cheese   Try low-fat cheeses such as mozzarella and other reduced-fat cheeses  · Choose meat and other protein foods that are low in fat  Choose beans or other legumes such as split peas or lentils  Choose fish, skinless poultry (chicken or turkey), or lean cuts of red meat (beef or pork)  Before you cook meat or poultry, cut off any visible fat  · Use less fat and oil  Try baking foods instead of frying them  Add less fat, such as margarine, sour cream, regular salad dressing and mayonnaise to foods  Eat fewer high-fat foods  Some examples of high-fat foods include french fries, doughnuts, ice cream, and cakes  · Eat fewer sweets  Limit foods and drinks that are high in sugar  This includes candy, cookies, regular soda, and sweetened drinks  Exercise:  Exercise at least 30 minutes per day on most days of the week  Some examples of exercise include walking, biking, dancing, and swimming  You can also fit in more physical activity by taking the stairs instead of the elevator or parking farther away from stores  Ask your healthcare provider about the best exercise plan for you  © Copyright [employer] 2018 Information is for End User's use only and may not be sold, redistributed or otherwise used for commercial purposes   All illustrations and images included in CareNotes® are the copyrighted property of A [employer] M [employer]  or [address] [place] [address] [employer]

## 2020-02-27 NOTE — PROGRESS NOTES
Assessment/Plan:       Problem List Items Addressed This Visit        Cardiovascular and Mediastinum    Benign essential hypertension    Relevant Medications    lisinopril-hydrochlorothiazide (PRINZIDE,ZESTORETIC) 20-25 MG per tablet       Genitourinary    Chronic kidney disease, stage 3 ([facility] )       Other    Medicare annual wellness visit, subsequent      Other Visit Diagnoses     Type 2 diabetes mellitus with complication, without long-term current use of insulin ([facility] )    -  Primary    Relevant Medications    linaGLIPtin (Tradjenta) 5 MG TABS    glyBURIDE (DIABETA) 5 mg tablet    Other Relevant Orders    Hemoglobin A1C    Comprehensive metabolic panel    Lipid Panel with Direct LDL reflex    Essential hypertension        Relevant Medications    lisinopril-hydrochlorothiazide (PRINZIDE,ZESTORETIC) 20-25 MG per tablet    Epigastric pain        Relevant Orders    Ambulatory referral to Gastroenterology            Subjective:      Patient ID: Johan Harris is a 75 y o  male  75year old here for lab review  DM - a1c is 7 3% down from 7 6%, Fasting glucose was 162  He was taken off of Metformin to protect his kidneys he is now on Glyburide 10 mg twice a day and Tradjenta 5mg daily  He states he is following a low carb diet  LDL-131-He has a family history of heart disease  He is reluctant to start taking a statin, "I don't like taking pills" He states he tries to avoid "fatty foods, fried foods"  CKD Stage 3- BUN/Creat 37/1 67, following with nephrology, Dr Grupo Alcazar     Vitamin D- low 24 7  He is not currently taking any supplements and is not interested in taking any at this time  Epigastric Pain- He states he has had this pain "for a while", He discussed this with General Surgery in the past and was recommended to see GI to rule out other causes  He denies nausea, vomiting, fever, chills, diarrhea or constipation  Denies blood is stool       Neck/Arm pain- left sided neck pain that has been present for 15 years that is intermittent  He states it will radiate down his left arm and "shoots out my thumb"  He states he has been to a neurologist and physical therapy for this in the past  He does not feel the physical therapy helped the pain  He has tried OTC muscle rubs with minimal relief  He states, "I try to avoid taking pills as much as I can, It's just something I've been dealing with for 15 years "           The following portions of the patient's history were reviewed and updated as appropriate:   He  has a past medical history of Asthma, Balanitis, Cataract, Chronic kidney disease, stage 3 ([address] ), Diabetes mellitus ([facility] ), 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([facility] ), Hypercholesterolemia, Hypertension, and Pneumonia  He   Patient Active Problem List    Diagnosis Date Noted    Chronic kidney disease-mineral and bone disorder 09/12/2019    Overweight (BMI 25 0-29 9) 08/22/2019    Medicare annual wellness visit, subsequent 08/22/2019    Lower abdominal pain 08/22/2019    Benign prostatic hyperplasia with lower urinary tract symptoms 08/22/2019    Statin intolerance 02/15/2019    Degenerative cervical spinal stenosis 02/15/2019    Chronic kidney disease, stage 3 ([address] ) 02/08/2018    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([facility] ) 02/08/2018    Benign essential hypertension 02/08/2018    Hypercholesterolemia 04/02/2014     He  has a past surgical history that includes Hernia repair; Abdominal surgery; Appendectomy (1965); Cataract extraction; Hernia repair (2007); and Other surgical history (1965)  His family history includes Colon cancer in his father; Diabetes in his father and sister; Nephrolithiasis in his sister; No Known Problems in his sister; Pancreatic cancer in his mother  He  reports that he quit smoking about 49 years ago  His smoking use included cigarettes  He has a 20 00 pack-year smoking history  He quit smokeless tobacco use about 13 years ago   He reports that he drinks about 1 0 standard drinks of alcohol per week  He reports that he does not use drugs  Current Outpatient Medications   Medication Sig Dispense Refill    albuterol (PROAIR HFA) 90 mcg/act inhaler Inhale daily as needed      ERYN CONTOUR TEST test strip Test sugar twice a day 100 each 5    ERYN MICROLET LANCETS lancets Test twice a day 100 each 3    glyBURIDE (DIABETA) 5 mg tablet Take 2 tablets (10 mg total) by mouth 2 (two) times a day 360 tablet 1    linaGLIPtin (Tradjenta) 5 MG TABS Take 5 mg by mouth daily 90 tablet 1    lisinopril-hydrochlorothiazide (PRINZIDE,ZESTORETIC) 20-25 MG per tablet Take 1 tablet by mouth daily 90 tablet 1    tamsulosin (FLOMAX) 0 4 mg Take 1 capsule (0 4 mg total) by mouth daily with dinner 30 capsule 5     No current facility-administered medications for this visit  Current Outpatient Medications on File Prior to Visit   Medication Sig    albuterol (PROAIR HFA) 90 mcg/act inhaler Inhale daily as needed    ERYN CONTOUR TEST test strip Test sugar twice a day    ERYN MICROLET LANCETS lancets Test twice a day    tamsulosin (FLOMAX) 0 4 mg Take 1 capsule (0 4 mg total) by mouth daily with dinner    [DISCONTINUED] glyBURIDE (DIABETA) 5 mg tablet Take 2 tablets (10 mg total) by mouth 2 (two) times a day    [DISCONTINUED] linaGLIPtin (TRADJENTA) 5 MG TABS Take 5 mg by mouth daily    [DISCONTINUED] lisinopril-hydrochlorothiazide (PRINZIDE,ZESTORETIC) 20-25 MG per tablet take 1 tablet by mouth once daily     No current facility-administered medications on file prior to visit  He is allergic to meperidine and pravastatin       Review of Systems   Constitutional: Negative  HENT: Negative  Eyes: Negative  Respiratory: Negative  Cardiovascular: Negative  Gastrointestinal: Negative  Endocrine: Negative  Genitourinary: Negative  Musculoskeletal: Positive for arthralgias, back pain (Back pain for 15 years) and neck pain (hx of cervical spinal stenosis )  Skin: Negative  Neurological: Negative  Psychiatric/Behavioral: Negative  Objective:      /68   Pulse 68   Temp 97 8 °F (36 6 °C)   Ht 5' 10" (1 778 m)   Wt 85 3 kg (188 lb)   SpO2 98%   BMI 26 98 kg/m²          Physical Exam   Constitutional: He is oriented to person, place, and time  He appears well-developed and well-nourished  No distress  HENT:   Head: Normocephalic and atraumatic  Right Ear: Hearing, tympanic membrane, external ear and ear canal normal    Left Ear: Hearing, tympanic membrane, external ear and ear canal normal    Nose: Nose normal    Mouth/Throat: Oropharynx is clear and moist and mucous membranes are normal  No oropharyngeal exudate  Eyes: Pupils are equal, round, and reactive to light  Conjunctivae and EOM are normal    Neck: Neck supple  No thyromegaly present  Cardiovascular: Normal rate, regular rhythm and normal heart sounds  Exam reveals no gallop and no friction rub  No murmur heard  Pulmonary/Chest: Effort normal and breath sounds normal  No respiratory distress  He has no wheezes  He has no rales  He exhibits no tenderness  Abdominal: Soft  Bowel sounds are normal  He exhibits no distension and no mass  There is no tenderness  There is no rebound and no guarding  Musculoskeletal: Normal range of motion  He exhibits no edema  Lymphadenopathy:     He has no cervical adenopathy  Neurological: He is alert and oriented to person, place, and time  Skin: Skin is warm and dry  No rash noted  He is not diaphoretic  Psychiatric: He has a normal mood and affect  His behavior is normal  Judgment and thought content normal    Nursing note and vitals reviewed

## 2020-03-12 ENCOUNTER — OFFICE VISIT (OUTPATIENT)
Dept: NEPHROLOGY | Facility: CLINIC | Age: 75
End: 2020-03-12
Payer: COMMERCIAL

## 2020-03-12 VITALS
HEART RATE: 68 BPM | HEIGHT: 70 IN | RESPIRATION RATE: 16 BRPM | DIASTOLIC BLOOD PRESSURE: 70 MMHG | BODY MASS INDEX: 26.86 KG/M2 | SYSTOLIC BLOOD PRESSURE: 130 MMHG | WEIGHT: 187.6 LBS | TEMPERATURE: 96.6 F

## 2020-03-12 DIAGNOSIS — N18.30 CHRONIC KIDNEY DISEASE, STAGE 3 (HCC): Primary | ICD-10-CM

## 2020-03-12 DIAGNOSIS — N18.30 TYPE 2 DIABETES MELLITUS WITH STAGE 3 CHRONIC KIDNEY DISEASE, WITHOUT LONG-TERM CURRENT USE OF INSULIN (HCC): ICD-10-CM

## 2020-03-12 DIAGNOSIS — M89.9 CHRONIC KIDNEY DISEASE-MINERAL AND BONE DISORDER: ICD-10-CM

## 2020-03-12 DIAGNOSIS — E11.22 TYPE 2 DIABETES MELLITUS WITH STAGE 3 CHRONIC KIDNEY DISEASE, WITHOUT LONG-TERM CURRENT USE OF INSULIN (HCC): ICD-10-CM

## 2020-03-12 DIAGNOSIS — E78.00 HYPERCHOLESTEROLEMIA: ICD-10-CM

## 2020-03-12 DIAGNOSIS — E83.9 CHRONIC KIDNEY DISEASE-MINERAL AND BONE DISORDER: ICD-10-CM

## 2020-03-12 DIAGNOSIS — I10 BENIGN ESSENTIAL HYPERTENSION: ICD-10-CM

## 2020-03-12 DIAGNOSIS — N18.9 CHRONIC KIDNEY DISEASE-MINERAL AND BONE DISORDER: ICD-10-CM

## 2020-03-12 PROCEDURE — 3075F SYST BP GE 130 - 139MM HG: CPT | Performed by: INTERNAL MEDICINE

## 2020-03-12 PROCEDURE — 1170F FXNL STATUS ASSESSED: CPT | Performed by: INTERNAL MEDICINE

## 2020-03-12 PROCEDURE — 99214 OFFICE O/P EST MOD 30 MIN: CPT | Performed by: INTERNAL MEDICINE

## 2020-03-12 PROCEDURE — 2022F DILAT RTA XM EVC RTNOPTHY: CPT | Performed by: INTERNAL MEDICINE

## 2020-03-12 PROCEDURE — 1036F TOBACCO NON-USER: CPT | Performed by: INTERNAL MEDICINE

## 2020-03-12 PROCEDURE — 3051F HG A1C>EQUAL 7.0%<8.0%: CPT | Performed by: INTERNAL MEDICINE

## 2020-03-12 PROCEDURE — 3008F BODY MASS INDEX DOCD: CPT | Performed by: INTERNAL MEDICINE

## 2020-03-12 PROCEDURE — 3066F NEPHROPATHY DOC TX: CPT | Performed by: INTERNAL MEDICINE

## 2020-03-12 PROCEDURE — 1160F RVW MEDS BY RX/DR IN RCRD: CPT | Performed by: INTERNAL MEDICINE

## 2020-03-12 PROCEDURE — 3078F DIAST BP <80 MM HG: CPT | Performed by: INTERNAL MEDICINE

## 2020-03-12 NOTE — ASSESSMENT & PLAN NOTE
Renal function is stable at creatinine 1 6  Asymptomatic and progressive nature of kidney disease discussed with the patient    Advised hydration and avoidance of any nephrotoxic medicine

## 2020-03-12 NOTE — ASSESSMENT & PLAN NOTE
Blood pressure is also quite well controlled  Will continue to monitor    Patient is on ACE-inhibitor which I will continue

## 2020-03-12 NOTE — PATIENT INSTRUCTIONS
Chronic Kidney Disease   AMBULATORY CARE:   Chronic kidney disease (CKD)  is the gradual and permanent loss of kidney function  Normally, the kidneys remove fluid, chemicals, and waste from your blood  These wastes are turned into urine by your kidneys  CKD may worsen over time and lead to kidney failure  Common symptoms include the following:   · Changes in how often you need to urinate    · Swelling in your arms, legs, or feet    · Shortness of breath    · Fatigue or weakness    · Bad or bitter taste in your mouth    · Nausea, vomiting, or loss of appetite  Seek care immediately if:   · You are confused and very drowsy  · You have a seizure  · You have shortness of breath  Contact your healthcare provider if:   · You suddenly gain or lose more weight than your healthcare provider has told you is okay  · You have itchy skin or a rash  · You urinate more or less than you normally do  · You have blood in your urine  · You have nausea and repeated vomiting  · You have fatigue or muscle weakness  · You have hiccups that will not stop  · You have questions or concerns about your condition or care  Treatment for CKD:  Medicines may be given to decrease blood pressure and get rid of extra fluid  You may also receive medicine to manage health conditions that may occur with CKD  Dialysis is a treatment to remove chemicals and waste from your blood when your kidneys can no longer do this  Surgery may be needed to create an arteriovenous fistula (AVF) in your arm or insert a catheter into your abdomen so that you can receive dialysis  A kidney transplant may be done if your CKD becomes severe  Manage CKD:   · Maintain a healthy weight  Ask your healthcare provider how much you should weigh  Ask him to help you create a weight loss plan if you are overweight  · Exercise 30 to 60 minutes a day, 4 to 7 times a week, or as directed  Ask about the best exercise plan for you   Regular exercise can help you manage CKD, high blood pressure, and diabetes  · Follow your healthcare provider's advice about what to eat and drink  He may tell you to eat food low in sodium (salt), potassium, phosphorus, or protein  You may need to see a dietitian if you need help planning meals  Ask how much liquid to drink each day and which liquids are best for you  · Limit alcohol  Ask how much alcohol is safe for you to drink  A drink of alcohol is 12 ounces of beer, 5 ounces of wine, or 1½ ounces of liquor  · Do not smoke  Nicotine and other chemicals in cigarettes and cigars can cause lung and kidney damage  Ask your healthcare provider for information if you currently smoke and need help to quit  E-cigarettes or smokeless tobacco still contain nicotine  Talk to your healthcare provider before you use these products  · Ask your healthcare provider if you need vaccines  Infections such as pneumonia, influenza, and hepatitis can be more harmful or more likely to occur in a person who has CKD  Vaccines reduce your risk of infection with these viruses  Follow up with your healthcare provider as directed:  Write down your questions so you remember to ask them during your visits  © 2017 [employer] Information is for End User's use only and may not be sold, redistributed or otherwise used for commercial purposes  All illustrations and images included in CareNotes® are the copyrighted property of [employer]  or Andres Mendoza  The above information is an  only  It is not intended as medical advice for individual conditions or treatments  Talk to your doctor, nurse or pharmacist before following any medical regimen to see if it is safe and effective for you

## 2020-03-12 NOTE — ASSESSMENT & PLAN NOTE
Lab Results   Component Value Date    HGBA1C 7 3 (H) 02/24/2020    Diabetes not very well control  Advised to discussed with primary doctor    Importance of diabetic control and kidney disease discussed with him

## 2020-03-12 NOTE — PROGRESS NOTES
NEPHROLOGY OFFICE FOLLOW UP  Bree Ferrer 75 y o  male MRN: [id number]    Encounter: [id number] 3/12/2020    REASON FOR VISIT: Hakan Forte is a 75 y o  male who is here on 3/12/2020 for Chronic Kidney Disease; Follow-up; and Hypertension    HPI:    Bree came in today for follow-up of CKD  He is overall doing well  Has quite a bit of back pain which started couple of days ago  Looks like muscle spasm as he was walkingwith dog and dog was running resulting in this strain  No urinary complaint no bowel problem    No chest pain or palpitation      REVIEW OF SYSTEMS:    Review of Systems   Constitutional: Negative for activity change and fatigue  HENT: Negative for congestion and ear discharge  Eyes: Negative for photophobia and pain  Respiratory: Negative for apnea and choking  Cardiovascular: Negative for chest pain and palpitations  Gastrointestinal: Negative for abdominal distention, abdominal pain and blood in stool  Endocrine: Negative for heat intolerance and polyphagia  Genitourinary: Negative for difficulty urinating, flank pain and urgency  Musculoskeletal: Positive for arthralgias and back pain  Negative for neck pain and neck stiffness  Skin: Negative for color change and wound  Allergic/Immunologic: Negative for food allergies and immunocompromised state  Neurological: Negative for seizures and facial asymmetry  Hematological: Negative for adenopathy  Does not bruise/bleed easily  Psychiatric/Behavioral: Negative for self-injury and suicidal ideas           PAST MEDICAL HISTORY:  Past Medical History:   Diagnosis Date    Asthma     last assessed: April 1, 2015    Balanitis     last assessed: July 9, 2014    Cataract     Chronic kidney disease, stage 3 [facility])     last assessed: Jan 17, 2018    Diabetes mellitus [facility])     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 )     last assessed: Jan 17, 2018    Hypercholesterolemia     Hypertension     Pneumonia     last assessed: 2013       PAST SURGICAL HISTORY:  Past Surgical History:   Procedure Laterality Date    ABDOMINAL SURGERY      APPENDECTOMY  1965    CATARACT EXTRACTION      HERNIA REPAIR      HERNIA REPAIR  2007    x2    OTHER SURGICAL HISTORY  1965    Perforated duodenol ulcer surgery        SOCIAL HISTORY:  Social History     Substance and Sexual Activity   Alcohol Use Yes    Alcohol/week: 1 0 standard drinks    Types: 1 Standard drinks or equivalent per week    Frequency: Monthly or less    Drinks per session: 1 or 2    Binge frequency: Never    Comment: rare  socially     Social History     Substance and Sexual Activity   Drug Use No     Social History     Tobacco Use   Smoking Status Former Smoker    Packs/day: 2 00    Years: 10     Pack years: 20     Types: Cigarettes    Last attempt to quit: Monica Mendieta Years since quittin 2   Smokeless Tobacco Former User    Quit date:    Tobacco Comment    former smoker noted in "allscripts" Quit in  - never used chewing tobacco        FAMILY HISTORY:  Family History   Problem Relation Age of Onset    Pancreatic cancer Mother     Colon cancer Father     Diabetes Father         mellitus     No Known Problems Sister     Diabetes Sister     Nephrolithiasis Sister        MEDICATIONS:    Current Outpatient Medications:     albuterol (PROAIR HFA) 90 mcg/act inhaler, Inhale daily as needed, Disp: , Rfl:     ERYN CONTOUR TEST test strip, Test sugar twice a day, Disp: 100 each, Rfl: 5    ERYN MICROLET LANCETS lancets, Test twice a day, Disp: 100 each, Rfl: 3    glyBURIDE (DIABETA) 5 mg tablet, Take 2 tablets (10 mg total) by mouth 2 (two) times a day, Disp: 360 tablet, Rfl: 1    linaGLIPtin (Tradjenta) 5 MG TABS, Take 5 mg by mouth daily, Disp: 90 tablet, Rfl: 1    lisinopril-hydrochlorothiazide (PRINZIDE,ZESTORETIC) 20-25 MG per tablet, Take 1 tablet by mouth daily, Disp: 90 tablet, Rfl: 1    PHYSICAL EXAM:  Vitals:    20 1040   BP: 130/70   BP Location: Right arm   Patient Position: Sitting   Pulse: 68   Resp: 16   Temp: (!) 96 6 °F (35 9 °C)   TempSrc: Tympanic   Weight: 85 1 kg (187 lb 9 6 oz)   Height: 5' 10" (1 778 m)     Body mass index is 26 92 kg/m²  Physical Exam   Constitutional: He is oriented to person, place, and time  He appears well-developed  No distress  HENT:   Head: Normocephalic  Mouth/Throat: Oropharynx is clear and moist    Eyes: Conjunctivae are normal  No scleral icterus  Neck: Neck supple  No JVD present  Cardiovascular: Normal rate and normal heart sounds  Pulmonary/Chest: Effort normal  He has no wheezes  Abdominal: Soft  There is no tenderness  Musculoskeletal: Normal range of motion  He exhibits no edema  Neurological: He is alert and oriented to person, place, and time  Skin: Skin is warm  No rash noted  Psychiatric: He has a normal mood and affect   His behavior is normal        LAB RESULTS:  Results for orders placed or performed in visit on 02/24/20   CBC and differential   Result Value Ref Range    WBC 7 23 4 31 - 10 16 Thousand/uL    RBC 4 68 3 88 - 5 62 Million/uL    Hemoglobin 14 4 12 0 - 17 0 g/dL    Hematocrit 42 2 36 5 - 49 3 %    MCV 90 82 - 98 fL    MCH 30 8 26 8 - 34 3 pg    MCHC 34 1 31 4 - 37 4 g/dL    RDW 13 2 11 6 - 15 1 %    MPV 11 4 8 9 - 12 7 fL    Platelets 115 601 - 217 Thousands/uL    nRBC 0 /100 WBCs    Neutrophils Relative 63 43 - 75 %    Immat GRANS % 0 0 - 2 %    Lymphocytes Relative 23 14 - 44 %    Monocytes Relative 9 4 - 12 %    Eosinophils Relative 4 0 - 6 %    Basophils Relative 1 0 - 1 %    Neutrophils Absolute 4 55 1 85 - 7 62 Thousands/µL    Immature Grans Absolute 0 03 0 00 - 0 20 Thousand/uL    Lymphocytes Absolute 1 64 0 60 - 4 47 Thousands/µL    Monocytes Absolute 0 67 0 17 - 1 22 Thousand/µL    Eosinophils Absolute 0 26 0 00 - 0 61 Thousand/µL    Basophils Absolute 0 08 0 00 - 0 10 Thousands/µL   Comprehensive metabolic panel   Result Value Ref Range Sodium 135 (L) 136 - 145 mmol/L    Potassium 4 4 3 5 - 5 3 mmol/L    Chloride 105 100 - 108 mmol/L    CO2 23 21 - 32 mmol/L    ANION GAP 7 4 - 13 mmol/L    BUN 37 (H) 5 - 25 mg/dL    Creatinine 1 67 (H) 0 60 - 1 30 mg/dL    Glucose, Fasting 162 (H) 65 - 99 mg/dL    Calcium 8 5 8 3 - 10 1 mg/dL    AST 14 5 - 45 U/L    ALT 26 12 - 78 U/L    Alkaline Phosphatase 68 46 - 116 U/L    Total Protein 7 6 6 4 - 8 2 g/dL    Albumin 4 2 3 5 - 5 0 g/dL    Total Bilirubin 0 52 0 20 - 1 00 mg/dL    eGFR 39 ml/min/1 73sq m   Lipid Panel with Direct LDL reflex   Result Value Ref Range    Cholesterol 182 50 - 200 mg/dL    Triglycerides 94 <=150 mg/dL    HDL, Direct 32 (L) >=40 mg/dL    LDL Calculated 131 (H) 0 - 100 mg/dL   Hemoglobin A1C   Result Value Ref Range    Hemoglobin A1C 7 3 (H) Normal 3 8-5 6%; PreDiabetic 5 7-6 4%;  Diabetic >=6 5%; Glycemic control for adults with diabetes <7 0% %     mg/dl   Phosphorus   Result Value Ref Range    Phosphorus 2 7 2 3 - 4 1 mg/dL   Protein / creatinine ratio, urine   Result Value Ref Range    Creatinine, Ur 60 2 mg/dL    Protein Urine Random <6 mg/dL    Prot/Creat Ratio, Ur <0 10 0 00 - 0 10   PTH, intact   Result Value Ref Range     3 (H) 18 4 - 80 1 pg/mL   Urinalysis with reflex to microscopic   Result Value Ref Range    Color, UA Yellow     Clarity, UA Clear     Specific Nickerson, UA 1 011 1 003 - 1 030    pH, UA 5 5 4 5, 5 0, 5 5, 6 0, 6 5, 7 0, 7 5, 8 0    Leukocytes, UA Negative Negative    Nitrite, UA Negative Negative    Protein, UA Negative Negative mg/dl    Glucose, UA Negative Negative mg/dl    Ketones, UA Negative Negative mg/dl    Urobilinogen, UA 0 2 0 2, 1 0 E U /dl E U /dl    Bilirubin, UA Negative Negative    Blood, UA Negative Negative   Vitamin D 25 hydroxy   Result Value Ref Range    Vit D, 25-Hydroxy 24 7 (L) 30 0 - 100 0 ng/mL       ASSESSMENT and PLAN:      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(HCC)    Lab Results   Component Value Date    HGBA1C 7 3 (H) 02/24/2020    Diabetes not very well control  Advised to discussed with primary doctor  Importance of diabetic control and kidney disease discussed with him    Benign essential hypertension  Blood pressure is also quite well controlled  Will continue to monitor  Patient is on ACE-inhibitor which I will continue    Chronic kidney disease, stage 3  Renal function is stable at creatinine 1 6  Asymptomatic and progressive nature of kidney disease discussed with the patient  Advised hydration and avoidance of any nephrotoxic medicine    Chronic kidney disease-mineral and bone disorder  PTH and phosphorus along with vitamin-D level are within acceptable range and will continue to monitor as part of the CKD management    Hypercholesterolemia  Cholesterol is high but cannot be on an medication because of side effect  Advised to discussed with primary doctor      I will see him back in 6 months again  Will get blood and urine test before that visit  Portions of the record may have been created with voice recognition software  Occasional wrong word or "sound a like" substitutions may have occurred due to the inherent limitations of voice recognition software  Read the chart carefully and recognize, using context, where substitutions have occurred  If you have any questions, please contact the dictating provider

## 2020-03-30 DIAGNOSIS — R06.02 SHORTNESS OF BREATH: Primary | ICD-10-CM

## 2020-03-30 RX ORDER — ALBUTEROL SULFATE 90 UG/1
2 AEROSOL, METERED RESPIRATORY (INHALATION) DAILY PRN
Qty: 1 INHALER | Refills: 1 | Status: SHIPPED | OUTPATIENT
Start: 2020-03-30 | End: 2020-04-29

## 2020-03-31 DIAGNOSIS — E11.9 TYPE 2 DIABETES MELLITUS WITHOUT COMPLICATION, WITHOUT LONG-TERM CURRENT USE OF INSULIN (HCC): ICD-10-CM

## 2020-05-19 ENCOUNTER — TELEPHONE (OUTPATIENT)
Dept: NEPHROLOGY | Facility: CLINIC | Age: 75
End: 2020-05-19

## 2020-05-21 ENCOUNTER — TELEPHONE (OUTPATIENT)
Dept: NEPHROLOGY | Facility: CLINIC | Age: 75
End: 2020-05-21

## 2020-05-28 ENCOUNTER — TELEPHONE (OUTPATIENT)
Dept: NEPHROLOGY | Facility: CLINIC | Age: 75
End: 2020-05-28

## 2020-09-10 ENCOUNTER — APPOINTMENT (OUTPATIENT)
Dept: LAB | Facility: CLINIC | Age: 75
End: 2020-09-10
Payer: COMMERCIAL

## 2020-09-10 DIAGNOSIS — N18.9 CHRONIC KIDNEY DISEASE-MINERAL AND BONE DISORDER: ICD-10-CM

## 2020-09-10 DIAGNOSIS — M89.9 CHRONIC KIDNEY DISEASE-MINERAL AND BONE DISORDER: ICD-10-CM

## 2020-09-10 DIAGNOSIS — N18.30 CHRONIC KIDNEY DISEASE, STAGE 3 (HCC): ICD-10-CM

## 2020-09-10 DIAGNOSIS — E83.9 CHRONIC KIDNEY DISEASE-MINERAL AND BONE DISORDER: ICD-10-CM

## 2020-09-10 LAB
25(OH)D3 SERPL-MCNC: 28.2 NG/ML (ref 30–100)
ANION GAP SERPL CALCULATED.3IONS-SCNC: 9 MMOL/L (ref 4–13)
BASOPHILS # BLD AUTO: 0.05 THOUSANDS/ΜL (ref 0–0.1)
BASOPHILS NFR BLD AUTO: 1 % (ref 0–1)
BILIRUB UR QL STRIP: NEGATIVE
BUN SERPL-MCNC: 35 MG/DL (ref 5–25)
CALCIUM SERPL-MCNC: 8.6 MG/DL (ref 8.3–10.1)
CHLORIDE SERPL-SCNC: 105 MMOL/L (ref 100–108)
CLARITY UR: CLEAR
CO2 SERPL-SCNC: 23 MMOL/L (ref 21–32)
COLOR UR: YELLOW
CREAT SERPL-MCNC: 1.86 MG/DL (ref 0.6–1.3)
CREAT UR-MCNC: 108 MG/DL
EOSINOPHIL # BLD AUTO: 0.3 THOUSAND/ΜL (ref 0–0.61)
EOSINOPHIL NFR BLD AUTO: 5 % (ref 0–6)
ERYTHROCYTE [DISTWIDTH] IN BLOOD BY AUTOMATED COUNT: 13 % (ref 11.6–15.1)
GFR SERPL CREATININE-BSD FRML MDRD: 35 ML/MIN/1.73SQ M
GLUCOSE P FAST SERPL-MCNC: 158 MG/DL (ref 65–99)
GLUCOSE UR STRIP-MCNC: NEGATIVE MG/DL
HCT VFR BLD AUTO: 44.1 % (ref 36.5–49.3)
HGB BLD-MCNC: 14.5 G/DL (ref 12–17)
HGB UR QL STRIP.AUTO: NEGATIVE
IMM GRANULOCYTES # BLD AUTO: 0.02 THOUSAND/UL (ref 0–0.2)
IMM GRANULOCYTES NFR BLD AUTO: 0 % (ref 0–2)
KETONES UR STRIP-MCNC: NEGATIVE MG/DL
LEUKOCYTE ESTERASE UR QL STRIP: NEGATIVE
LYMPHOCYTES # BLD AUTO: 1.58 THOUSANDS/ΜL (ref 0.6–4.47)
LYMPHOCYTES NFR BLD AUTO: 24 % (ref 14–44)
MCH RBC QN AUTO: 30.6 PG (ref 26.8–34.3)
MCHC RBC AUTO-ENTMCNC: 32.9 G/DL (ref 31.4–37.4)
MCV RBC AUTO: 93 FL (ref 82–98)
MONOCYTES # BLD AUTO: 0.62 THOUSAND/ΜL (ref 0.17–1.22)
MONOCYTES NFR BLD AUTO: 10 % (ref 4–12)
NEUTROPHILS # BLD AUTO: 3.98 THOUSANDS/ΜL (ref 1.85–7.62)
NEUTS SEG NFR BLD AUTO: 60 % (ref 43–75)
NITRITE UR QL STRIP: NEGATIVE
NRBC BLD AUTO-RTO: 0 /100 WBCS
PH UR STRIP.AUTO: 6 [PH]
PHOSPHATE SERPL-MCNC: 3.5 MG/DL (ref 2.3–4.1)
PLATELET # BLD AUTO: 238 THOUSANDS/UL (ref 149–390)
PMV BLD AUTO: 11.3 FL (ref 8.9–12.7)
POTASSIUM SERPL-SCNC: 4.8 MMOL/L (ref 3.5–5.3)
PROT UR STRIP-MCNC: NEGATIVE MG/DL
PROT UR-MCNC: 13 MG/DL
PROT/CREAT UR: 0.12 MG/G{CREAT} (ref 0–0.1)
PTH-INTACT SERPL-MCNC: 138.4 PG/ML (ref 18.4–80.1)
RBC # BLD AUTO: 4.74 MILLION/UL (ref 3.88–5.62)
SODIUM SERPL-SCNC: 137 MMOL/L (ref 136–145)
SP GR UR STRIP.AUTO: 1.02 (ref 1–1.03)
UROBILINOGEN UR QL STRIP.AUTO: 0.2 E.U./DL
WBC # BLD AUTO: 6.55 THOUSAND/UL (ref 4.31–10.16)

## 2020-09-10 PROCEDURE — 84100 ASSAY OF PHOSPHORUS: CPT

## 2020-09-10 PROCEDURE — 82570 ASSAY OF URINE CREATININE: CPT

## 2020-09-10 PROCEDURE — 82306 VITAMIN D 25 HYDROXY: CPT

## 2020-09-10 PROCEDURE — 81003 URINALYSIS AUTO W/O SCOPE: CPT

## 2020-09-10 PROCEDURE — 36415 COLL VENOUS BLD VENIPUNCTURE: CPT

## 2020-09-10 PROCEDURE — 85025 COMPLETE CBC W/AUTO DIFF WBC: CPT

## 2020-09-10 PROCEDURE — 80048 BASIC METABOLIC PNL TOTAL CA: CPT

## 2020-09-10 PROCEDURE — 3061F NEG MICROALBUMINURIA REV: CPT | Performed by: FAMILY MEDICINE

## 2020-09-10 PROCEDURE — 83970 ASSAY OF PARATHORMONE: CPT

## 2020-09-10 PROCEDURE — 84156 ASSAY OF PROTEIN URINE: CPT

## 2020-09-10 PROCEDURE — 3066F NEPHROPATHY DOC TX: CPT | Performed by: FAMILY MEDICINE

## 2020-09-18 ENCOUNTER — TELEPHONE (OUTPATIENT)
Dept: NEPHROLOGY | Facility: CLINIC | Age: 75
End: 2020-09-18

## 2020-09-18 NOTE — TELEPHONE ENCOUNTER
Called and left message on machine for patient to return our call about confirming appointment   Cristhian Carrillo,

## 2020-09-21 ENCOUNTER — OFFICE VISIT (OUTPATIENT)
Dept: NEPHROLOGY | Facility: CLINIC | Age: 75
End: 2020-09-21
Payer: COMMERCIAL

## 2020-09-21 VITALS
BODY MASS INDEX: 26.35 KG/M2 | TEMPERATURE: 97.3 F | WEIGHT: 188.2 LBS | SYSTOLIC BLOOD PRESSURE: 110 MMHG | RESPIRATION RATE: 16 BRPM | HEART RATE: 78 BPM | DIASTOLIC BLOOD PRESSURE: 70 MMHG | HEIGHT: 71 IN

## 2020-09-21 DIAGNOSIS — E11.22 TYPE 2 DIABETES MELLITUS WITH STAGE 3 CHRONIC KIDNEY DISEASE, WITHOUT LONG-TERM CURRENT USE OF INSULIN (HCC): ICD-10-CM

## 2020-09-21 DIAGNOSIS — M89.9 CHRONIC KIDNEY DISEASE-MINERAL AND BONE DISORDER: ICD-10-CM

## 2020-09-21 DIAGNOSIS — N18.30 CHRONIC KIDNEY DISEASE, STAGE 3 (HCC): Primary | ICD-10-CM

## 2020-09-21 DIAGNOSIS — N18.30 TYPE 2 DIABETES MELLITUS WITH STAGE 3 CHRONIC KIDNEY DISEASE, WITHOUT LONG-TERM CURRENT USE OF INSULIN (HCC): ICD-10-CM

## 2020-09-21 DIAGNOSIS — E83.9 CHRONIC KIDNEY DISEASE-MINERAL AND BONE DISORDER: ICD-10-CM

## 2020-09-21 DIAGNOSIS — I10 BENIGN ESSENTIAL HYPERTENSION: ICD-10-CM

## 2020-09-21 DIAGNOSIS — N18.9 CHRONIC KIDNEY DISEASE-MINERAL AND BONE DISORDER: ICD-10-CM

## 2020-09-21 PROCEDURE — 99214 OFFICE O/P EST MOD 30 MIN: CPT | Performed by: INTERNAL MEDICINE

## 2020-09-21 RX ORDER — ALBUTEROL SULFATE 90 UG/1
AEROSOL, METERED RESPIRATORY (INHALATION)
COMMUNITY
Start: 2020-07-06 | End: 2021-01-28

## 2020-09-21 NOTE — PATIENT INSTRUCTIONS
Chronic Kidney Disease   AMBULATORY CARE:   Chronic kidney disease (CKD)  is the gradual and permanent loss of kidney function  Normally, the kidneys remove fluid, chemicals, and waste from your blood  These wastes are turned into urine by your kidneys  CKD may worsen over time and lead to kidney failure  Common symptoms include the following:   · Changes in how often you need to urinate    · Swelling in your arms, legs, or feet    · Shortness of breath    · Fatigue or weakness    · Bad or bitter taste in your mouth    · Nausea, vomiting, or loss of appetite  Seek care immediately if:   · You are confused and very drowsy  · You have a seizure  · You have shortness of breath  Contact your healthcare provider if:   · You suddenly gain or lose more weight than your healthcare provider has told you is okay  · You have itchy skin or a rash  · You urinate more or less than you normally do  · You have blood in your urine  · You have nausea and repeated vomiting  · You have fatigue or muscle weakness  · You have hiccups that will not stop  · You have questions or concerns about your condition or care  Treatment for CKD:  Medicines may be given to decrease blood pressure and get rid of extra fluid  You may also receive medicine to manage health conditions that may occur with CKD  Dialysis is a treatment to remove chemicals and waste from your blood when your kidneys can no longer do this  Surgery may be needed to create an arteriovenous fistula (AVF) in your arm or insert a catheter into your abdomen so that you can receive dialysis  A kidney transplant may be done if your CKD becomes severe  Manage CKD:   · Maintain a healthy weight  Ask your healthcare provider how much you should weigh  Ask him to help you create a weight loss plan if you are overweight  · Exercise 30 to 60 minutes a day, 4 to 7 times a week, or as directed  Ask about the best exercise plan for you   Regular exercise can help you manage CKD, high blood pressure, and diabetes  · Follow your healthcare provider's advice about what to eat and drink  He may tell you to eat food low in sodium (salt), potassium, phosphorus, or protein  You may need to see a dietitian if you need help planning meals  Ask how much liquid to drink each day and which liquids are best for you  · Limit alcohol  Ask how much alcohol is safe for you to drink  A drink of alcohol is 12 ounces of beer, 5 ounces of wine, or 1½ ounces of liquor  · Do not smoke  Nicotine and other chemicals in cigarettes and cigars can cause lung and kidney damage  Ask your healthcare provider for information if you currently smoke and need help to quit  E-cigarettes or smokeless tobacco still contain nicotine  Talk to your healthcare provider before you use these products  · Ask your healthcare provider if you need vaccines  Infections such as pneumonia, influenza, and hepatitis can be more harmful or more likely to occur in a person who has CKD  Vaccines reduce your risk of infection with these viruses  Follow up with your healthcare provider as directed:  Write down your questions so you remember to ask them during your visits  © 2017 [employer] Information is for End User's use only and may not be sold, redistributed or otherwise used for commercial purposes  All illustrations and images included in CareNotes® are the copyrighted property of [employer]  or Andres Mendoza  The above information is an  only  It is not intended as medical advice for individual conditions or treatments  Talk to your doctor, nurse or pharmacist before following any medical regimen to see if it is safe and effective for you

## 2020-09-21 NOTE — ASSESSMENT & PLAN NOTE
Renal function is fluctuating  I thing because he is not drinking enough water and he himself says the same thing    Importance of hydration and avoidance of nephrotoxic medicine discussed with him

## 2020-09-21 NOTE — ASSESSMENT & PLAN NOTE
Lab Results   Component Value Date    HGBA1C 7 3 (H) 02/24/2020    Diabetes not well controlled    Importance of diabetic control and kidney disease discussed with him

## 2020-09-22 ENCOUNTER — APPOINTMENT (OUTPATIENT)
Dept: LAB | Facility: CLINIC | Age: 75
End: 2020-09-22
Payer: COMMERCIAL

## 2020-09-22 DIAGNOSIS — E11.8 TYPE 2 DIABETES MELLITUS WITH COMPLICATION, WITHOUT LONG-TERM CURRENT USE OF INSULIN (HCC): ICD-10-CM

## 2020-09-22 LAB
ALBUMIN SERPL BCP-MCNC: 4.2 G/DL (ref 3.5–5)
ALP SERPL-CCNC: 66 U/L (ref 46–116)
ALT SERPL W P-5'-P-CCNC: 28 U/L (ref 12–78)
ANION GAP SERPL CALCULATED.3IONS-SCNC: 8 MMOL/L (ref 4–13)
AST SERPL W P-5'-P-CCNC: 18 U/L (ref 5–45)
BILIRUB SERPL-MCNC: 0.61 MG/DL (ref 0.2–1)
BUN SERPL-MCNC: 29 MG/DL (ref 5–25)
CALCIUM SERPL-MCNC: 8.6 MG/DL (ref 8.3–10.1)
CHLORIDE SERPL-SCNC: 106 MMOL/L (ref 100–108)
CHOLEST SERPL-MCNC: 182 MG/DL (ref 50–200)
CO2 SERPL-SCNC: 25 MMOL/L (ref 21–32)
CREAT SERPL-MCNC: 1.63 MG/DL (ref 0.6–1.3)
EST. AVERAGE GLUCOSE BLD GHB EST-MCNC: 154 MG/DL
GFR SERPL CREATININE-BSD FRML MDRD: 41 ML/MIN/1.73SQ M
GLUCOSE P FAST SERPL-MCNC: 127 MG/DL (ref 65–99)
HBA1C MFR BLD: 7 %
HDLC SERPL-MCNC: 34 MG/DL
LDLC SERPL CALC-MCNC: 128 MG/DL (ref 0–100)
POTASSIUM SERPL-SCNC: 4.5 MMOL/L (ref 3.5–5.3)
PROT SERPL-MCNC: 7.5 G/DL (ref 6.4–8.2)
SODIUM SERPL-SCNC: 139 MMOL/L (ref 136–145)
TRIGL SERPL-MCNC: 99 MG/DL

## 2020-09-22 PROCEDURE — 80061 LIPID PANEL: CPT

## 2020-09-22 PROCEDURE — 3051F HG A1C>EQUAL 7.0%<8.0%: CPT | Performed by: FAMILY MEDICINE

## 2020-09-22 PROCEDURE — 83036 HEMOGLOBIN GLYCOSYLATED A1C: CPT

## 2020-09-22 PROCEDURE — 80053 COMPREHEN METABOLIC PANEL: CPT

## 2020-09-22 PROCEDURE — 36415 COLL VENOUS BLD VENIPUNCTURE: CPT

## 2020-09-25 DIAGNOSIS — E11.8 TYPE 2 DIABETES MELLITUS WITH COMPLICATION, WITHOUT LONG-TERM CURRENT USE OF INSULIN (HCC): ICD-10-CM

## 2020-09-25 RX ORDER — LINAGLIPTIN 5 MG/1
5 TABLET, FILM COATED ORAL DAILY
Qty: 90 TABLET | Refills: 1 | Status: SHIPPED | OUTPATIENT
Start: 2020-09-25 | End: 2021-04-23

## 2020-09-28 NOTE — ASSESSMENT & PLAN NOTE
[facility] called to let you know your patient has been admitted to the hospital. PTH and phosphorus along with vitamin-D are within acceptable range and will continue to monitor

## 2020-09-30 ENCOUNTER — OFFICE VISIT (OUTPATIENT)
Dept: FAMILY MEDICINE CLINIC | Facility: CLINIC | Age: 75
End: 2020-09-30
Payer: COMMERCIAL

## 2020-09-30 VITALS
SYSTOLIC BLOOD PRESSURE: 136 MMHG | HEIGHT: 71 IN | DIASTOLIC BLOOD PRESSURE: 72 MMHG | OXYGEN SATURATION: 97 % | TEMPERATURE: 98 F | HEART RATE: 92 BPM | WEIGHT: 189 LBS | RESPIRATION RATE: 16 BRPM | BODY MASS INDEX: 26.46 KG/M2

## 2020-09-30 DIAGNOSIS — E11.22 TYPE 2 DIABETES MELLITUS WITH STAGE 3 CHRONIC KIDNEY DISEASE, WITHOUT LONG-TERM CURRENT USE OF INSULIN (HCC): ICD-10-CM

## 2020-09-30 DIAGNOSIS — Z28.21 INFLUENZA VACCINATION DECLINED: ICD-10-CM

## 2020-09-30 DIAGNOSIS — Z78.9 STATIN INTOLERANCE: ICD-10-CM

## 2020-09-30 DIAGNOSIS — Z28.21 PNEUMOCOCCAL VACCINATION DECLINED: ICD-10-CM

## 2020-09-30 DIAGNOSIS — I10 BENIGN ESSENTIAL HYPERTENSION: Primary | ICD-10-CM

## 2020-09-30 DIAGNOSIS — E78.00 HYPERCHOLESTEROLEMIA: ICD-10-CM

## 2020-09-30 DIAGNOSIS — N18.30 TYPE 2 DIABETES MELLITUS WITH STAGE 3 CHRONIC KIDNEY DISEASE, WITHOUT LONG-TERM CURRENT USE OF INSULIN (HCC): ICD-10-CM

## 2020-09-30 DIAGNOSIS — N18.30 CHRONIC KIDNEY DISEASE, STAGE 3 (HCC): ICD-10-CM

## 2020-09-30 DIAGNOSIS — I10 ESSENTIAL HYPERTENSION: ICD-10-CM

## 2020-09-30 DIAGNOSIS — E11.8 TYPE 2 DIABETES MELLITUS WITH COMPLICATION, WITHOUT LONG-TERM CURRENT USE OF INSULIN (HCC): ICD-10-CM

## 2020-09-30 PROBLEM — R10.30 LOWER ABDOMINAL PAIN: Status: RESOLVED | Noted: 2019-08-22 | Resolved: 2020-09-30

## 2020-09-30 PROCEDURE — 3078F DIAST BP <80 MM HG: CPT | Performed by: FAMILY MEDICINE

## 2020-09-30 PROCEDURE — 1160F RVW MEDS BY RX/DR IN RCRD: CPT | Performed by: FAMILY MEDICINE

## 2020-09-30 PROCEDURE — 99214 OFFICE O/P EST MOD 30 MIN: CPT | Performed by: FAMILY MEDICINE

## 2020-09-30 PROCEDURE — 1036F TOBACCO NON-USER: CPT | Performed by: FAMILY MEDICINE

## 2020-09-30 RX ORDER — LISINOPRIL AND HYDROCHLOROTHIAZIDE 25; 20 MG/1; MG/1
1 TABLET ORAL DAILY
Qty: 90 TABLET | Refills: 1 | Status: SHIPPED | OUTPATIENT
Start: 2020-09-30 | End: 2021-03-30

## 2020-09-30 RX ORDER — GLYBURIDE 5 MG/1
10 TABLET ORAL 2 TIMES DAILY
Qty: 360 TABLET | Refills: 1 | Status: SHIPPED | OUTPATIENT
Start: 2020-09-30 | End: 2021-05-26

## 2020-09-30 NOTE — PROGRESS NOTES
Assessment/Plan:       Problem List Items Addressed This Visit        Endocrine    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(HCC)    Relevant Medications    glyBURIDE (DIABETA) 5 mg tablet    Other Relevant Orders    Hemoglobin A1C    Microalbumin / creatinine urine ratio       Cardiovascular and Mediastinum    Benign essential hypertension - Primary    Relevant Medications    lisinopril-hydrochlorothiazide (PRINZIDE,ZESTORETIC) 20-25 MG per tablet       Genitourinary    Chronic kidney disease, stage 3 (HCC)    Relevant Orders    Comprehensive metabolic panel    CBC and differential       Other    Hypercholesterolemia    Relevant Orders    Lipid Panel with Direct LDL reflex    Comprehensive metabolic panel    Statin intolerance      Other Visit Diagnoses     Influenza vaccination declined        Pneumococcal vaccination declined        Type 2 diabetes mellitus with complication, without long-term current use of insulin (HCC)        Relevant Medications    glyBURIDE (DIABETA) 5 mg tablet    Essential hypertension        Relevant Medications    lisinopril-hydrochlorothiazide (PRINZIDE,ZESTORETIC) 20-25 MG per tablet          -No changes today  -F/U 6 months with labs  -Declined vaccines      Falls Plan of Care: Balance, strength, and gait training instructions were provided  Subjective:      Patient ID: Benja Milian is a 75 y o  male  Here for a check up  CKD - stable, creatinine 1 63, baseline 1 6-1 8 - sees Nephrology Dr Shalini Flowers   HTN -stable - on lisinopril hctz  DM - A1c improved from 7 3 to 7%  On Glyburide and Tradjenta  Lipids - LDL not at goal - does not tolerate statins (myalgias)  HM - declines vaccines, counseling provided    Falls - occasionally, says he has poor balance      The following portions of the patient's history were reviewed and updated as appropriate: He  has a past medical history of Asthma, Balanitis, Cataract, Chronic kidney disease, Chronic kidney disease, stage 3 ([address] ), Diabetes mellitus ([facility] ), 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([facility] ), Hypercholesterolemia, Hypertension, and Pneumonia  He   Patient Active Problem List    Diagnosis Date Noted    Chronic kidney disease-mineral and bone disorder 09/12/2019    Overweight (BMI 25 0-29 9) 08/22/2019    Medicare annual wellness visit, subsequent 08/22/2019    Benign prostatic hyperplasia with lower urinary tract symptoms 08/22/2019    Statin intolerance 02/15/2019    Degenerative cervical spinal stenosis 02/15/2019    Chronic kidney disease, stage 3 ([facility] [address] ) 02/08/2018    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([place] [address] ) 02/08/2018    Benign essential hypertension 02/08/2018    Hypercholesterolemia 04/02/2014     He  has a past surgical history that includes Hernia repair; Abdominal surgery; Appendectomy (1965); Cataract extraction; Hernia repair (2007); and Other surgical history (1965)  His family history includes Colon cancer in his father; Diabetes in his father and sister; Nephrolithiasis in his sister; No Known Problems in his sister; Pancreatic cancer in his mother  He  reports that he quit smoking about 49 years ago  His smoking use included cigarettes  He has a 20 00 pack-year smoking history  He quit smokeless tobacco use about 13 years ago  He reports current alcohol use of about 1 0 standard drinks of alcohol per week  He reports that he does not use drugs    Current Outpatient Medications   Medication Sig Dispense Refill    albuterol (PROVENTIL HFA,VENTOLIN HFA) 90 mcg/act inhaler inhale 2 puffs by mouth daily if needed for shortness of breath      ERYN MICROLET LANCETS lancets Test twice a day 100 each 3    glucose blood test strip TEST twice a day 100 each 5    glyBURIDE (DIABETA) 5 mg tablet Take 2 tablets (10 mg total) by mouth 2 (two) times a day 360 tablet 1    linaGLIPtin (Tradjenta) 5 MG TABS Take 5 mg by mouth daily 90 tablet 1    lisinopril-hydrochlorothiazide (PRINZIDE,ZESTORETIC) 20-25 MG per tablet Take 1 tablet by mouth daily 90 tablet 1     No current facility-administered medications for this visit  Current Outpatient Medications on File Prior to Visit   Medication Sig    albuterol (PROVENTIL HFA,VENTOLIN HFA) 90 mcg/act inhaler inhale 2 puffs by mouth daily if needed for shortness of breath    ERYN MICROLET LANCETS lancets Test twice a day    glucose blood test strip TEST twice a day    linaGLIPtin (Tradjenta) 5 MG TABS Take 5 mg by mouth daily    [DISCONTINUED] glyBURIDE (DIABETA) 5 mg tablet Take 2 tablets (10 mg total) by mouth 2 (two) times a day    [DISCONTINUED] lisinopril-hydrochlorothiazide (PRINZIDE,ZESTORETIC) 20-25 MG per tablet Take 1 tablet by mouth daily     No current facility-administered medications on file prior to visit  He is allergic to meperidine and pravastatin       Review of Systems   Constitutional: Negative for activity change, appetite change, chills, fatigue, fever and unexpected weight change  HENT: Negative for congestion, ear discharge, ear pain, postnasal drip, sinus pressure and sore throat  Eyes: Negative for discharge and visual disturbance  Respiratory: Negative for cough, shortness of breath and wheezing  Cardiovascular: Negative for chest pain, palpitations and leg swelling  Gastrointestinal: Negative for abdominal pain, constipation, diarrhea, nausea and vomiting  Endocrine: Negative for cold intolerance, heat intolerance, polydipsia and polyuria  Genitourinary: Negative for difficulty urinating and frequency  Musculoskeletal: Negative for arthralgias, back pain, joint swelling and myalgias  Skin: Negative for rash  Neurological: Negative for dizziness, weakness, light-headedness, numbness and headaches  Hematological: Negative for adenopathy  Psychiatric/Behavioral: Negative for behavioral problems, confusion, dysphoric mood, sleep disturbance and suicidal ideas  The patient is not nervous/anxious            Objective:      /72 (BP Location: Left arm, Patient Position: Sitting, Cuff Size: Adult)   Pulse 92   Temp 98 °F (36 7 °C)   Resp 16   Ht 5' 10 5" (1 791 m)   Wt 85 7 kg (189 lb)   SpO2 97%   BMI 26 74 kg/m²          Physical Exam  Vitals signs and nursing note reviewed  Constitutional:       General: He is not in acute distress  Appearance: Normal appearance  He is well-developed  He is not diaphoretic  HENT:      Head: Normocephalic and atraumatic  Cardiovascular:      Rate and Rhythm: Normal rate and regular rhythm  Pulses: no weak pulses          Dorsalis pedis pulses are 2+ on the right side and 2+ on the left side  Posterior tibial pulses are 2+ on the right side and 2+ on the left side  Heart sounds: Normal heart sounds  No murmur  No friction rub  No gallop  Pulmonary:      Effort: Pulmonary effort is normal  No respiratory distress  Breath sounds: Normal breath sounds  No wheezing or rales  Chest:      Chest wall: No tenderness  Feet:      Right foot:      Skin integrity: No ulcer, skin breakdown, erythema, warmth, callus or dry skin  Left foot:      Skin integrity: No ulcer, skin breakdown, erythema, warmth, callus or dry skin  Neurological:      Mental Status: He is alert and oriented to person, place, and time  Psychiatric:         Mood and Affect: Mood normal          Behavior: Behavior normal          Thought Content: Thought content normal          Judgment: Judgment normal          Patient's shoes and socks removed  Right Foot/Ankle   Right Foot Inspection  Skin Exam: skin normal and skin intact no dry skin, no warmth, no callus, no erythema, no maceration, no abnormal color, no pre-ulcer, no ulcer and no callus                          Toe Exam: no swelling, no tenderness, erythema and  no right toe deformity  Sensory   Vibration: intact    Monofilament testing: intact  Vascular  Capillary refills: < 3 seconds  The right DP pulse is 2+  The right PT pulse is 2+       Left Foot/Ankle  Left Foot Inspection  Skin Exam: skin normal and skin intactno dry skin, no warmth, no erythema, no maceration, normal color, no pre-ulcer, no ulcer and no callus                         Toe Exam: no swelling, no tenderness, no erythema and no left toe deformity                   Sensory   Vibration: intact    Monofilament: intact  Vascular  Capillary refills: < 3 seconds  The left DP pulse is 2+  The left PT pulse is 2+  Assign Risk Category:  No deformity present; No loss of protective sensation; No weak pulses       Risk: 0      BMI Counseling: Body mass index is 26 74 kg/m²  The BMI is above normal  Nutrition recommendations include moderation in carbohydrate intake and reducing intake of cholesterol  Exercise recommendations include exercising 3-5 times per week

## 2020-12-23 ENCOUNTER — TELEPHONE (OUTPATIENT)
Dept: GASTROENTEROLOGY | Facility: CLINIC | Age: 75
End: 2020-12-23

## 2021-01-28 DIAGNOSIS — R06.02 SHORTNESS OF BREATH: Primary | ICD-10-CM

## 2021-01-28 RX ORDER — ALBUTEROL SULFATE 90 UG/1
AEROSOL, METERED RESPIRATORY (INHALATION)
Qty: 8.5 G | Refills: 1 | Status: SHIPPED | OUTPATIENT
Start: 2021-01-28 | End: 2021-12-29 | Stop reason: SDUPTHER

## 2021-03-12 ENCOUNTER — HOSPITAL ENCOUNTER (EMERGENCY)
Facility: HOSPITAL | Age: 76
Discharge: HOME | End: 2021-03-12
Attending: EMERGENCY MEDICINE
Payer: COMMERCIAL

## 2021-03-12 ENCOUNTER — TELEPHONE (OUTPATIENT)
Dept: FAMILY MEDICINE CLINIC | Facility: CLINIC | Age: 76
End: 2021-03-12

## 2021-03-12 VITALS
HEART RATE: 71 BPM | TEMPERATURE: 98.2 F | OXYGEN SATURATION: 96 % | WEIGHT: 188 LBS | HEIGHT: 70 IN | RESPIRATION RATE: 17 BRPM | SYSTOLIC BLOOD PRESSURE: 143 MMHG | BODY MASS INDEX: 26.92 KG/M2 | DIASTOLIC BLOOD PRESSURE: 71 MMHG

## 2021-03-12 DIAGNOSIS — S61.012A LACERATION OF LEFT THUMB: Primary | ICD-10-CM

## 2021-03-12 DIAGNOSIS — S69.90XS INJURY OF HAND, UNSPECIFIED LATERALITY, SEQUELA: Primary | ICD-10-CM

## 2021-03-12 PROCEDURE — 29125 APPL SHORT ARM SPLINT STATIC: CPT | Performed by: PHYSICIAN ASSISTANT

## 2021-03-12 PROCEDURE — 99282 EMERGENCY DEPT VISIT SF MDM: CPT

## 2021-03-12 PROCEDURE — 99282 EMERGENCY DEPT VISIT SF MDM: CPT | Performed by: PHYSICIAN ASSISTANT

## 2021-03-12 NOTE — TELEPHONE ENCOUNTER
mathieu severed his left thumb tendon yesterday  He went to a urgent care in [place] and they wanted him to go to the hospital because he should have surgery  He wanted to wait till he was home to have surgery  So they stitched up his thumb and told him to follow up with a surgeon  I put him in for an appointment with you on Monday  Although he did not know if he should go to the er or can you just refer him to a surgeon

## 2021-03-12 NOTE — TELEPHONE ENCOUNTER
I dont have any records but he needs to see ortho/hand  I dont know how soon they can see him  Will place referral but can you call to get him in ASAP   Also need to get records

## 2021-03-12 NOTE — ED PROVIDER NOTES
History  Chief Complaint   Patient presents with    Thumb Laceration     pt c/o thum,b laceration on left hand after cuttign it with a razor knife yesterday, per urgent care he needs to see a doctor for surgery but Dr Catalino Kebede was not accepting patients      68 yo male pt with laceration to left thumb  Occurred yesterday  Seen at urgent care and was sutured at that time  Given concern for extensor tendon involvement it was recommended he follow up with ortho  He contacted the office today and was told that the hand surgeon was not taking appointments and should to go the ER  He has no new complaints  Continues to have limited ROM of the thumb, difficulty with extension  History provided by:  Patient   used: No    Laceration  Location: left thumb  Length:  2cm  Time since incident:  1 day  Laceration mechanism:  Knife  Pain details:     Severity:  No pain  Relieved by:  Nothing  Worsened by:  Nothing  Ineffective treatments:  None tried  Associated symptoms: focal weakness (left thumb)    Associated symptoms: no rash        Prior to Admission Medications   Prescriptions Last Dose Informant Patient Reported? Taking?    ERYN MICROLET LANCETS lancets  Self No No   Sig: Test twice a day   albuterol (PROVENTIL HFA,VENTOLIN HFA) 90 mcg/act inhaler   No No   Sig: inhale 2 puffs by mouth daily if needed for shortness of breath   glucose blood test strip  Self No No   Sig: TEST twice a day   glyBURIDE (DIABETA) 5 mg tablet   No No   Sig: Take 2 tablets (10 mg total) by mouth 2 (two) times a day   linaGLIPtin (Tradjenta) 5 MG TABS   No No   Sig: Take 5 mg by mouth daily   lisinopril-hydrochlorothiazide (PRINZIDE,ZESTORETIC) 20-25 MG per tablet   No No   Sig: Take 1 tablet by mouth daily      Facility-Administered Medications: None       Past Medical History:   Diagnosis Date    Asthma     last assessed: April 1, 2015    Balanitis     last assessed: July 9, 2014    Cataract     Chronic kidney disease     Chronic kidney disease, stage 3     last assessed: 2018    Diabetes mellitus [facility])     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([facility] [address] )     last assessed: 2018    Hypercholesterolemia     Hypertension     Pneumonia     last assessed: 2013       Past Surgical History:   Procedure Laterality Date   [address] [place]    CATARACT EXTRACTION     [address]  2007    x2    OTHER SURGICAL HISTORY  1965    Perforated duodenol ulcer surgery        Family History   Problem Relation Age of Onset    Pancreatic cancer Mother     Colon cancer Father     Diabetes Father         mellitus     No Known Problems Sister     Diabetes Sister     Nephrolithiasis Sister      I have reviewed and agree with the history as documented  E-Cigarette/Vaping     E-Cigarette/Vaping Substances     Social History     Tobacco Use    Smoking status: Former Smoker     Packs/day: 2 00     Years: 10 00     Pack years: 20 00     Types: Cigarettes     Quit date:      Years since quittin 2    Smokeless tobacco: Former User     Quit date:     Tobacco comment: former smoker noted in "allscripts" Quit in [address] - never used chewing tobacco    Substance Use Topics    Alcohol use: Yes     Alcohol/week: 1 0 standard drinks     Types: 1 Standard drinks or equivalent per week     Frequency: Monthly or less     Drinks per session: 1 or 2     Binge frequency: Never     Comment: rare  socially    Drug use: No       Review of Systems   Constitutional: Negative  HENT: Negative for dental problem, ear pain, hearing loss, nosebleeds, tinnitus and trouble swallowing  Eyes: Negative for photophobia, pain and visual disturbance  Respiratory: Negative for apnea, cough, choking, chest tightness, shortness of breath, wheezing and stridor  Gastrointestinal: Negative for abdominal pain, nausea and vomiting     Genitourinary: Negative for decreased urine volume and enuresis  Musculoskeletal: Negative for back pain, myalgias, neck pain and neck stiffness  Skin: Negative for pallor, rash and wound  Allergic/Immunologic: Negative  Neurological: Positive for focal weakness (left thumb)  Negative for dizziness, syncope, speech difficulty, weakness, light-headedness, numbness and headaches  Physical Exam  Physical Exam  Vitals signs and nursing note reviewed  Constitutional:       Appearance: He is well-developed  HENT:      Head: Normocephalic and atraumatic  Eyes:      Conjunctiva/sclera: Conjunctivae normal    Neck:      Musculoskeletal: Neck supple  Cardiovascular:      Rate and Rhythm: Normal rate and regular rhythm  Heart sounds: No murmur  Pulmonary:      Effort: Pulmonary effort is normal  No respiratory distress  Breath sounds: Normal breath sounds  Abdominal:      Palpations: Abdomen is soft  Tenderness: There is no abdominal tenderness  Musculoskeletal:        Hands:    Skin:     General: Skin is warm and dry  Neurological:      Mental Status: He is alert  Vital Signs  ED Triage Vitals [03/12/21 1723]   Temperature Pulse Respirations Blood Pressure SpO2   98 2 °F (36 8 °C) 71 17 143/71 96 %      Temp Source Heart Rate Source Patient Position - Orthostatic VS BP Location FiO2 (%)   Oral Monitor Sitting Left arm --      Pain Score       --           Vitals:    03/12/21 1723   BP: 143/71   Pulse: 71   Patient Position - Orthostatic VS: Sitting         Visual Acuity      ED Medications  Medications - No data to display    Diagnostic Studies  Results Reviewed     None                 No orders to display              Procedures  Splint application    Date/Time: 3/12/2021 6:18 PM  Performed by: Na Wiggins PA-C  Authorized by: Na Wiggins PA-C   Universal Protocol:  Consent: Verbal consent obtained    Risks and benefits: risks, benefits and alternatives were discussed  Consent given by: patient  Patient understanding: patient states understanding of the procedure being performed  Patient identity confirmed: verbally with patient, arm band and provided demographic data      Pre-procedure details:     Sensation:  Normal  Procedure details:     Laterality:  Left    Location:  Wrist    Wrist:  L wrist    Splint type:  Thumb spica    Supplies:  Ortho-Glass  Post-procedure details:     Pain:  Improved    Sensation:  Normal    Patient tolerance of procedure: Tolerated well, no immediate complications             ED Course               Identification of Seniors at Risk      Most Recent Value   (ISAR) Identification of Seniors at Risk   Before the illness or injury that brought you to the Emergency, did you need someone to help you on a regular basis? 0 Filed at: 03/12/2021 1725   In the last 24 hours, have you needed more help than usual?  0 Filed at: 03/12/2021 1725   Have you been hospitalized for one or more nights during the past 6 months? 0 Filed at: 03/12/2021 1725   In general, do you see well?  0 Filed at: 03/12/2021 1725   In general, do you have serious problems with your memory? 0 Filed at: 03/12/2021 1725   Do you take more than three different medications every day?  0 Filed at: 03/12/2021 1725   ISAR Score  0 Filed at: 03/12/2021 1725                                  MDM  Number of Diagnoses or Management Options  Laceration of left thumb: new and requires workup  Diagnosis management comments: DDx including but not limited to: laceration, deep structure involvement, foreign body, fracture, wound infection  Risk of Complications, Morbidity, and/or Mortality  Presenting problems: low  Management options: low  General comments: Plan/MDM: 66 yo here for re-eval of a lac closed yesterday  Contacted orthopedic hand surgeon who recommended pt contact office again and explain that he is to be seen in office by his service  He was placed in a neutral position with a thumb spica splint  NVI   Return parameters provided  Pt understands and agrees with plan  Patient Progress  Patient progress: stable      Disposition  Final diagnoses:   Laceration of left thumb     Time reflects when diagnosis was documented in both MDM as applicable and the Disposition within this note     Time User Action Codes Description Comment    3/12/2021  6:12 PM Satnam Miller [[id number]] Laceration of left thumb       ED Disposition     ED Disposition Condition Date/Time Comment    Discharge Stable Fri Mar 12, 2021  6:12 PM Bree Burger discharge to home/self care              Follow-up Information     Follow up With Specialties Details Why Mickie Grande MD Orthopedic Surgery, Hand Surgery, Orthopedics Call   [facility]  [address]  [address]            Discharge Medication List as of 3/12/2021  6:13 PM      CONTINUE these medications which have NOT CHANGED    Details   albuterol (PROVENTIL HFA,VENTOLIN HFA) 90 mcg/act inhaler inhale 2 puffs by mouth daily if needed for shortness of breath, Normal      ERYN MICROLET LANCETS lancets Test twice a day, Normal      glucose blood test strip TEST twice a day, Normal      glyBURIDE (DIABETA) 5 mg tablet Take 2 tablets (10 mg total) by mouth 2 (two) times a day, Starting Wed 9/30/2020, Until Tue 12/29/2020, Normal      linaGLIPtin (Tradjenta) 5 MG TABS Take 5 mg by mouth daily, Starting Fri 9/25/2020, Until Sun 10/25/2020, Normal      lisinopril-hydrochlorothiazide (PRINZIDE,ZESTORETIC) 20-25 MG per tablet Take 1 tablet by mouth daily, Starting Wed 9/30/2020, Normal               PDMP Review     None          ED Provider  Electronically Signed by           Ml Aceves PA-C  03/12/21 1927

## 2021-03-16 ENCOUNTER — TELEPHONE (OUTPATIENT)
Dept: NEPHROLOGY | Facility: CLINIC | Age: 76
End: 2021-03-16

## 2021-03-16 ENCOUNTER — OFFICE VISIT (OUTPATIENT)
Dept: OBGYN CLINIC | Facility: CLINIC | Age: 76
End: 2021-03-16
Payer: COMMERCIAL

## 2021-03-16 VITALS
WEIGHT: 187.8 LBS | BODY MASS INDEX: 26.88 KG/M2 | SYSTOLIC BLOOD PRESSURE: 142 MMHG | HEIGHT: 70 IN | HEART RATE: 71 BPM | DIASTOLIC BLOOD PRESSURE: 82 MMHG

## 2021-03-16 DIAGNOSIS — S61.012A LACERATION OF LEFT THUMB: Primary | ICD-10-CM

## 2021-03-16 PROCEDURE — 99214 OFFICE O/P EST MOD 30 MIN: CPT | Performed by: ORTHOPAEDIC SURGERY

## 2021-03-16 RX ORDER — ACETAMINOPHEN 500 MG
TABLET ORAL
Qty: 30 TABLET | Refills: 0 | Status: SHIPPED | OUTPATIENT
Start: 2021-03-16 | End: 2021-04-01

## 2021-03-16 RX ORDER — LIDOCAINE WITH 8.4% SOD BICARB 0.9%(10ML)
10 SYRINGE (ML) INJECTION ONCE
Status: CANCELLED | OUTPATIENT
Start: 2021-03-16 | End: 2021-03-16

## 2021-03-16 RX ORDER — IBUPROFEN 600 MG/1
TABLET ORAL
Qty: 30 TABLET | Refills: 0 | Status: SHIPPED | OUTPATIENT
Start: 2021-03-16 | End: 2021-03-31 | Stop reason: ALTCHOICE

## 2021-03-16 NOTE — PROGRESS NOTES
CHIEF COMPLAINT:  Chief Complaint   Patient presents with    Left Thumb - Pain       SUBJECTIVE:  Bree Medellin is a 67y o  year old male who presents  Left thumb pain  Patient states that he lacerated the dorsal aspect of the left thumb on 2021  He went to urgent care initially and then reported to the emergency room for evaluation  He states he had the inability to extend the IP joint of the left thumb  Patient states that he was using a razor knife on  when he cut across the dorsal aspect of his thumb  Fredy Strauss He denies any numbness or tingling  The patient denies any cardiac or pulmonary issues  Patient has diabetes  Denies any history of MI, gastric ulcers, kidney or liver issues  Denies blood thinners            PAST MEDICAL HISTORY:  Past Medical History:   Diagnosis Date    Asthma     last assessed: 2015    Balanitis     last assessed: 2014    Cataract     Chronic kidney disease     Chronic kidney disease, stage 3     last assessed: 2018    Diabetes mellitus [facility])     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     last assessed: 2018    Hypercholesterolemia     Hypertension     Pneumonia     last assessed: 2013       PAST SURGICAL HISTORY:  Past Surgical History:   Procedure Laterality Date    ABDOMINAL SURGERY      APPENDECTOMY  1965    CATARACT EXTRACTION      HERNIA REPAIR      HERNIA REPAIR  2007    x2    OTHER SURGICAL HISTORY  1965    Perforated duodenol ulcer surgery        FAMILY HISTORY:  Family History   Problem Relation Age of Onset    Pancreatic cancer Mother     Colon cancer Father     Diabetes Father         mellitus     No Known Problems Sister     Diabetes Sister     Nephrolithiasis Sister        SOCIAL HISTORY:  Social History     Tobacco Use    Smoking status: Former Smoker     Packs/day: 2 00     Years: 10 00     Pack years: 20 00     Types: Cigarettes     Quit date:      Years since quittin 2    Smokeless tobacco: Former User     Quit date: 2007    Tobacco comment: former smoker noted in "allscripts" Quit in [address] - never used chewing tobacco    Substance Use Topics    Alcohol use: Yes     Alcohol/week: 1 0 standard drinks     Types: 1 Standard drinks or equivalent per week     Frequency: Monthly or less     Drinks per session: 1 or 2     Binge frequency: Never     Comment: rare  socially    Drug use: No       MEDICATIONS:    Current Outpatient Medications:     albuterol (PROVENTIL HFA,VENTOLIN HFA) 90 mcg/act inhaler, inhale 2 puffs by mouth daily if needed for shortness of breath, Disp: 8 5 g, Rfl: 1    ERYN MICROLET LANCETS lancets, Test twice a day, Disp: 100 each, Rfl: 3    glucose blood test strip, TEST twice a day, Disp: 100 each, Rfl: 5    lisinopril-hydrochlorothiazide (PRINZIDE,ZESTORETIC) 20-25 MG per tablet, Take 1 tablet by mouth daily, Disp: 90 tablet, Rfl: 1    acetaminophen (TYLENOL) 500 mg tablet, Take 1 500mg tablet in the morning prior to surgery, then 1 tablet every 6 hours for 5-7 days  , Disp: 30 tablet, Rfl: 0    glyBURIDE (DIABETA) 5 mg tablet, Take 2 tablets (10 mg total) by mouth 2 (two) times a day, Disp: 360 tablet, Rfl: 1    ibuprofen (MOTRIN) 600 mg tablet, Take 1 600mg tablet in the morning prior to surgery, then 1 tablet every 6 hours for 5-7 days  , Disp: 30 tablet, Rfl: 0    linaGLIPtin (Tradjenta) 5 MG TABS, Take 5 mg by mouth daily, Disp: 90 tablet, Rfl: 1    ALLERGIES:  Allergies   Allergen Reactions    Meperidine     Pravastatin Myalgia       REVIEW OF SYSTEMS:  Review of Systems   ROS:   General: no fever, no chills  HEENT:  No loss of hearing or eyesight problems  Eyes:  No red eyes  Respiratory:  No coughing, shortness of breath or wheezing  Cardiovascular:  No chest pain, no palpitations  GI:  Abdomen soft nontender, denies nausea  Endocrine:  No muscle weakness, no frequent urination, no excessive thirst  Urinary:  No dysuria, no incontinence  Musculoskeletal: see HPI and PE  SKIN:  No skin rash, no dry skin  Neurological:  No headaches, no confusion  Psychiatric:  No suicide thoughts, no anxiety, no depression  Review of all other systems is negative    VITALS:  Vitals:    03/16/21 1032   BP: 142/82   Pulse: 71       LABS:  HgA1c:   Lab Results   Component Value Date    HGBA1C 7 0 (H) 09/22/2020     BMP:   Lab Results   Component Value Date    GLUCOSE 109 11/25/2015    CALCIUM 8 6 09/22/2020     (L) 11/25/2015    K 4 5 09/22/2020    CO2 25 09/22/2020     09/22/2020    BUN 29 (H) 09/22/2020    CREATININE 1 63 (H) 09/22/2020       _____________________________________________________  PHYSICAL EXAMINATION:  General: well developed and well nourished, alert, oriented times 3 and appears comfortable  Psychiatric: Normal  HEENT: Trachea Midline, No torticollis  Pulmonary: No audible wheezing or strider  Cardiovascular: No discernable arrhythmia   Skin: No masses, erythema, lacerations, fluctation, ulcerations  Neurovascular: Sensation Intact to the Median, Ulnar, Radial Nerve, Motor Intact to the Median, Ulnar, Radial Nerve and Pulses Intact    MUSCULOSKELETAL EXAMINATION:   left thumb   Laceration over the dorsal aspect of the thumb is healing well with sutures intact   Patient has the inability to extend the thumb at the IP joint    ___________________________________________________  STUDIES REVIEWED:  No studies reviewed  PROCEDURES PERFORMED:  Procedures  No Procedures performed today    _____________________________________________________  ASSESSMENT/PLAN:     left thumb EPL tendon laceration  -  It was discussed with the patient conservative and operative treatment  He would like to proceed with surgical intervention  Detail consent was obtained today       Surgery: left   Thumb EPL tendon repair   Anesthesia:  local   Antibiotics:  ordered   Medical clearance: not needed   Hand therapy: ordered   Consent: obtained    patient can take Tylenol and ibuprofen as needed for pain    Surgery medication instructions: You will stop eating and drinking at midnight the night before your surgery, but you may continue to take your normal medications with a small sip of water  In the morning on the day of your surgery, we would like you to take the following medications (as long as you have never been told to avoid Tylenol or NSAIDs like ibuprofen, Naproxen, Aleve, Advil, etc):   Ibuprofen 600mg one tablet by mouth   Tylenol 500mg one tablet by mouth    After surgery, we would like you to take Ibuprofen 600mg one tablet by mouth every 6 hours with food (at breakfast, lunch and dinner)  AND Tylenol 500 mg one tablet by mouth every 6 hours  (at breakfast, lunch and dinner) for 5-7 days after your surgery  Please take these medication EVERYDAY after surgery for 5-7 days, and not just as needed  You can take these medications at the same time  Taking these medications after surgery will limit your need for prescription pain medication  We will also prescribe a narcotic pain medication for a limited time after surgery that you can take as needed for moderate or severe pain  Diagnoses and all orders for this visit:    Laceration of left thumb  -     Ambulatory referral to Orthopedic Surgery  -     Ambulatory referral to PT/OT hand therapy; Future  -     ibuprofen (MOTRIN) 600 mg tablet; Take 1 600mg tablet in the morning prior to surgery, then 1 tablet every 6 hours for 5-7 days  -     acetaminophen (TYLENOL) 500 mg tablet; Take 1 500mg tablet in the morning prior to surgery, then 1 tablet every 6 hours for 5-7 days    -     Case request operating room: Left thumb EPL repair; Standing  -     Case request operating room: Left thumb EPL repair    Other orders  -     Diet NPO; Sips with meds; Standing  -     Height and weight upon arrival; Standing  -     Nursing Communication [address] [place] [address] Interventions Implemented; Standing  -     Nursing Communication [facility] bath, have staff wash entire body (neck down) per pre-op bathing protocol  Routine, evening prior to, and day of surgery ; Standing  -     Void on call to OR; Standing  -     Insert peripheral IV; Standing  -     ceFAZolin (ANCEF) 2,000 mg in dextrose 5 % 100 mL IVPB  -     lidocaine 1% buffered 10 mL        Follow Up:  Return for post op  Work/school status:   no restrictions    To Do Next Visit:  Re-evaluation of current issue and Sutures out        Scribe Attestation    I,:  Barbie Guerrero PA-C am acting as a scribe while in the presence of the attending physician :       I,:  Chester Gilmore MD personally performed the services described in this documentation    as scribed in my presence :           Portions of the record may have been created with voice recognition software  Occasional wrong word or "sound a like" substitutions may have occurred due to the inherent limitations of voice recognition software  Read the chart carefully and recognize, using context, where substitutions have occurred

## 2021-03-17 ENCOUNTER — APPOINTMENT (OUTPATIENT)
Dept: LAB | Facility: CLINIC | Age: 76
End: 2021-03-17
Payer: COMMERCIAL

## 2021-03-17 ENCOUNTER — APPOINTMENT (OUTPATIENT)
Dept: RADIOLOGY | Facility: HOSPITAL | Age: 76
End: 2021-03-17
Payer: COMMERCIAL

## 2021-03-17 ENCOUNTER — HOSPITAL ENCOUNTER (OUTPATIENT)
Facility: HOSPITAL | Age: 76
Setting detail: OUTPATIENT SURGERY
Discharge: HOME | End: 2021-03-17
Attending: ORTHOPAEDIC SURGERY | Admitting: ORTHOPAEDIC SURGERY
Payer: COMMERCIAL

## 2021-03-17 VITALS
OXYGEN SATURATION: 97 % | WEIGHT: 185.63 LBS | SYSTOLIC BLOOD PRESSURE: 131 MMHG | BODY MASS INDEX: 26.57 KG/M2 | HEART RATE: 63 BPM | DIASTOLIC BLOOD PRESSURE: 74 MMHG | TEMPERATURE: 97.8 F | HEIGHT: 70 IN | RESPIRATION RATE: 18 BRPM

## 2021-03-17 DIAGNOSIS — N18.30 TYPE 2 DIABETES MELLITUS WITH STAGE 3 CHRONIC KIDNEY DISEASE, WITHOUT LONG-TERM CURRENT USE OF INSULIN (HCC): ICD-10-CM

## 2021-03-17 DIAGNOSIS — E78.00 HYPERCHOLESTEROLEMIA: ICD-10-CM

## 2021-03-17 DIAGNOSIS — N18.30 CHRONIC KIDNEY DISEASE, STAGE 3 (HCC): ICD-10-CM

## 2021-03-17 DIAGNOSIS — N18.9 CHRONIC KIDNEY DISEASE-MINERAL AND BONE DISORDER: ICD-10-CM

## 2021-03-17 DIAGNOSIS — E11.22 TYPE 2 DIABETES MELLITUS WITH STAGE 3 CHRONIC KIDNEY DISEASE, WITHOUT LONG-TERM CURRENT USE OF INSULIN (HCC): ICD-10-CM

## 2021-03-17 DIAGNOSIS — E83.9 CHRONIC KIDNEY DISEASE-MINERAL AND BONE DISORDER: ICD-10-CM

## 2021-03-17 DIAGNOSIS — M89.9 CHRONIC KIDNEY DISEASE-MINERAL AND BONE DISORDER: ICD-10-CM

## 2021-03-17 LAB
25(OH)D3 SERPL-MCNC: 22 NG/ML (ref 30–100)
ALBUMIN SERPL BCP-MCNC: 4.3 G/DL (ref 3.5–5)
ALP SERPL-CCNC: 72 U/L (ref 46–116)
ALT SERPL W P-5'-P-CCNC: 22 U/L (ref 12–78)
ANION GAP SERPL CALCULATED.3IONS-SCNC: 5 MMOL/L (ref 4–13)
AST SERPL W P-5'-P-CCNC: 8 U/L (ref 5–45)
BASOPHILS # BLD AUTO: 0.07 THOUSANDS/ΜL (ref 0–0.1)
BASOPHILS NFR BLD AUTO: 1 % (ref 0–1)
BILIRUB SERPL-MCNC: 0.38 MG/DL (ref 0.2–1)
BILIRUB UR QL STRIP: NEGATIVE
BUN SERPL-MCNC: 34 MG/DL (ref 5–25)
CALCIUM SERPL-MCNC: 8.5 MG/DL (ref 8.3–10.1)
CHLORIDE SERPL-SCNC: 105 MMOL/L (ref 100–108)
CHOLEST SERPL-MCNC: 164 MG/DL (ref 50–200)
CLARITY UR: CLEAR
CO2 SERPL-SCNC: 26 MMOL/L (ref 21–32)
COLOR UR: YELLOW
CREAT SERPL-MCNC: 1.81 MG/DL (ref 0.6–1.3)
EOSINOPHIL # BLD AUTO: 0.21 THOUSAND/ΜL (ref 0–0.61)
EOSINOPHIL NFR BLD AUTO: 3 % (ref 0–6)
ERYTHROCYTE [DISTWIDTH] IN BLOOD BY AUTOMATED COUNT: 12.5 % (ref 11.6–15.1)
EST. AVERAGE GLUCOSE BLD GHB EST-MCNC: 163 MG/DL
GFR SERPL CREATININE-BSD FRML MDRD: 36 ML/MIN/1.73SQ M
GLUCOSE P FAST SERPL-MCNC: 160 MG/DL (ref 65–99)
GLUCOSE SERPL-MCNC: 158 MG/DL (ref 65–140)
GLUCOSE UR STRIP-MCNC: NEGATIVE MG/DL
HBA1C MFR BLD: 7.3 %
HCT VFR BLD AUTO: 42.1 % (ref 36.5–49.3)
HDLC SERPL-MCNC: 34 MG/DL
HGB BLD-MCNC: 13.9 G/DL (ref 12–17)
HGB UR QL STRIP.AUTO: NEGATIVE
IMM GRANULOCYTES # BLD AUTO: 0.02 THOUSAND/UL (ref 0–0.2)
IMM GRANULOCYTES NFR BLD AUTO: 0 % (ref 0–2)
KETONES UR STRIP-MCNC: NEGATIVE MG/DL
LDLC SERPL CALC-MCNC: 116 MG/DL (ref 0–100)
LEUKOCYTE ESTERASE UR QL STRIP: NEGATIVE
LYMPHOCYTES # BLD AUTO: 1.7 THOUSANDS/ΜL (ref 0.6–4.47)
LYMPHOCYTES NFR BLD AUTO: 25 % (ref 14–44)
MCH RBC QN AUTO: 30.2 PG (ref 26.8–34.3)
MCHC RBC AUTO-ENTMCNC: 33 G/DL (ref 31.4–37.4)
MCV RBC AUTO: 92 FL (ref 82–98)
MONOCYTES # BLD AUTO: 0.56 THOUSAND/ΜL (ref 0.17–1.22)
MONOCYTES NFR BLD AUTO: 8 % (ref 4–12)
NEUTROPHILS # BLD AUTO: 4.18 THOUSANDS/ΜL (ref 1.85–7.62)
NEUTS SEG NFR BLD AUTO: 63 % (ref 43–75)
NITRITE UR QL STRIP: NEGATIVE
NRBC BLD AUTO-RTO: 0 /100 WBCS
PH UR STRIP.AUTO: 6.5 [PH]
PHOSPHATE SERPL-MCNC: 2.6 MG/DL (ref 2.3–4.1)
PLATELET # BLD AUTO: 234 THOUSANDS/UL (ref 149–390)
PMV BLD AUTO: 10.9 FL (ref 8.9–12.7)
POTASSIUM SERPL-SCNC: 4.8 MMOL/L (ref 3.5–5.3)
PROT SERPL-MCNC: 7.4 G/DL (ref 6.4–8.2)
PROT UR STRIP-MCNC: NEGATIVE MG/DL
PTH-INTACT SERPL-MCNC: 170.7 PG/ML (ref 18.4–80.1)
RBC # BLD AUTO: 4.6 MILLION/UL (ref 3.88–5.62)
SODIUM SERPL-SCNC: 136 MMOL/L (ref 136–145)
SP GR UR STRIP.AUTO: 1.02 (ref 1–1.03)
TRIGL SERPL-MCNC: 71 MG/DL
UROBILINOGEN UR QL STRIP.AUTO: 0.2 E.U./DL
WBC # BLD AUTO: 6.74 THOUSAND/UL (ref 4.31–10.16)

## 2021-03-17 PROCEDURE — 80053 COMPREHEN METABOLIC PANEL: CPT

## 2021-03-17 PROCEDURE — 85025 COMPLETE CBC W/AUTO DIFF WBC: CPT

## 2021-03-17 PROCEDURE — 83970 ASSAY OF PARATHORMONE: CPT

## 2021-03-17 PROCEDURE — 82306 VITAMIN D 25 HYDROXY: CPT

## 2021-03-17 PROCEDURE — 26418 REPAIR FINGER TENDON: CPT | Performed by: ORTHOPAEDIC SURGERY

## 2021-03-17 PROCEDURE — 83036 HEMOGLOBIN GLYCOSYLATED A1C: CPT

## 2021-03-17 PROCEDURE — 84156 ASSAY OF PROTEIN URINE: CPT

## 2021-03-17 PROCEDURE — 81003 URINALYSIS AUTO W/O SCOPE: CPT

## 2021-03-17 PROCEDURE — 82570 ASSAY OF URINE CREATININE: CPT

## 2021-03-17 PROCEDURE — 26418 REPAIR FINGER TENDON: CPT | Performed by: PHYSICIAN ASSISTANT

## 2021-03-17 PROCEDURE — 82948 REAGENT STRIP/BLOOD GLUCOSE: CPT

## 2021-03-17 PROCEDURE — 82043 UR ALBUMIN QUANTITATIVE: CPT

## 2021-03-17 PROCEDURE — 84100 ASSAY OF PHOSPHORUS: CPT

## 2021-03-17 PROCEDURE — 36415 COLL VENOUS BLD VENIPUNCTURE: CPT

## 2021-03-17 PROCEDURE — 80061 LIPID PANEL: CPT

## 2021-03-17 RX ORDER — ONDANSETRON 2 MG/ML
4 INJECTION INTRAMUSCULAR; INTRAVENOUS EVERY 6 HOURS PRN
Status: DISCONTINUED | OUTPATIENT
Start: 2021-03-17 | End: 2021-03-17 | Stop reason: HOSPADM

## 2021-03-17 RX ORDER — ACETAMINOPHEN 325 MG/1
650 TABLET ORAL EVERY 6 HOURS PRN
Status: DISCONTINUED | OUTPATIENT
Start: 2021-03-17 | End: 2021-03-17 | Stop reason: HOSPADM

## 2021-03-17 RX ORDER — TRAMADOL HYDROCHLORIDE 50 MG/1
50 TABLET ORAL EVERY 6 HOURS PRN
Status: DISCONTINUED | OUTPATIENT
Start: 2021-03-17 | End: 2021-03-17 | Stop reason: HOSPADM

## 2021-03-17 RX ORDER — CEFAZOLIN SODIUM 2 G/50ML
2000 SOLUTION INTRAVENOUS ONCE
Status: COMPLETED | OUTPATIENT
Start: 2021-03-17 | End: 2021-03-17

## 2021-03-17 RX ORDER — LIDOCAINE WITH 8.4% SOD BICARB 0.9%(10ML)
10 SYRINGE (ML) INJECTION ONCE
Status: COMPLETED | OUTPATIENT
Start: 2021-03-17 | End: 2021-03-17

## 2021-03-17 RX ORDER — SODIUM CHLORIDE, SODIUM LACTATE, POTASSIUM CHLORIDE, CALCIUM CHLORIDE 600; 310; 30; 20 MG/100ML; MG/100ML; MG/100ML; MG/100ML
50 INJECTION, SOLUTION INTRAVENOUS CONTINUOUS
Status: DISCONTINUED | OUTPATIENT
Start: 2021-03-17 | End: 2021-03-17 | Stop reason: HOSPADM

## 2021-03-17 RX ADMIN — SODIUM CHLORIDE, SODIUM LACTATE, POTASSIUM CHLORIDE, AND CALCIUM CHLORIDE 50 ML/HR: .6; .31; .03; .02 INJECTION, SOLUTION INTRAVENOUS at 08:32

## 2021-03-17 RX ADMIN — CEFAZOLIN SODIUM 2000 MG: 2 SOLUTION INTRAVENOUS at 08:32

## 2021-03-17 RX ADMIN — Medication 10 ML: at 08:56

## 2021-03-17 NOTE — DISCHARGE INSTRUCTIONS
Post Operative Instructions    You have had surgery on your arm today, please read and follow the information below:  · Elevate your hand above your elbow during the next 24-48 hours to help with swelling  · Place your hand and arm over your head with motion at your shoulder three times a day  · Do not apply any cream/ointment/oil to your incisions including antibiotics  · Do not soak your hands in standing water (dishwater, tubs, Jacuzzi's, pools, etc ) until given permission (typically 2-3 weeks after injury)    Call the office at [phone]  if you notice any:  · Increased numbness or tingling of your hand or fingers that is not relieved with elevation  · Increasing pain that is not controlled with medication  · Difficulty chewing, breathing, swallowing  · Chest pains or shortness of breath  · Fever over 101 4 degrees  Bandage: Your therapist will remove your bandage at your first therapy appointment  Motion: Move fingers into a fist 5 times a day, DO NOT move any splinted fingers  Weight bearing status: The operated extremity should be non-weight bearing until further notice  Ice: Ice for 10 minutes every hour as needed for swelling x 24 hours  Sling: No sling necessary  Pain medication:      After surgery, we would like you to take Ibuprofen 600mg one tablet by mouth every 6 hours with food (at breakfast, lunch and dinner)  AND Tylenol 500 mg one tablet by mouth every 6 hours  (at breakfast, lunch and dinner) for 5-7 days after your surgery  Please take these medication EVERYDAY after surgery for 5-7 days, and not just as needed  You can take these medications at the same time  Taking these medications after surgery will limit your need for prescription pain medication  If the pain becomes severe, and the pain medication is not alleviating symptoms, The greatest source of pain after surgery is usually a tight dressing due to increased swelling after surgery    If the pain becomes severe after surgery, and the patient medication is NOT alleviating the symptoms, the patient should do the following: The patient should  loosen the top dressing (usually coban or an ace bandage) and loosen/cut the rolled gauze beneath  The 4x4 gauze that is directly covering the incision should remain in place  The splint (if the patient has one) should remain in place  The ace bandage/coban can then be replaced on top in a less constrictive manor  If this does not help relieve the pain/numbness in a few hours, the patient should call our office (number listed below)  and we can have them seen in the office for further evaluation  Follow-up Appointment: 7-10 days with Dr Chelsie Trevizo  Occupational Therapy: 3/23/2021  AFTER THE FIRST THERAPY APPOINTMENT, the patient may remove the splint/dressing for showering and clean the incision with soap and water  Keep incision dry after washing  Do not expose the incision to dirty water (oceans, pools, hot tubs, etc)     If you need help scheduling Therapy, you can call [phone]        Please call the office at [phone] if you have any questions or concerns regarding your post-operative care

## 2021-03-17 NOTE — H&P
Chief Complaint   Patient presents with    Left Thumb - Pain         SUBJECTIVE:  Bree Dhillon is a 67y o  year old male who presents  Left thumb pain  Patient states that he lacerated the dorsal aspect of the left thumb on 03/11/2021  He went to urgent care initially and then reported to the emergency room for evaluation  He states he had the inability to extend the IP joint of the left thumb  Patient states that he was using a razor knife on 03/11 when he cut across the dorsal aspect of his thumb  Jeannetta Given He denies any numbness or tingling      The patient denies any cardiac or pulmonary issues  Patient has diabetes  Denies any history of MI, gastric ulcers, kidney or liver issues    Denies blood thinners              PAST MEDICAL HISTORY:  Medical History        Past Medical History:   Diagnosis Date    Asthma       last assessed: April 1, 2015    Balanitis       last assessed: July 9, 2014    Cataract      Chronic kidney disease      Chronic kidney disease, stage 3       last assessed: Jan 17, 2018    Diabetes mellitus [facility])      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[facility])       last assessed: Jan 17, 2018    Hypercholesterolemia      Hypertension      Pneumonia       last assessed: Feb 25, 2013           PAST SURGICAL HISTORY:  Surgical History         Past Surgical History:   Procedure Laterality Date    ABDOMINAL SURGERY        APPENDECTOMY   1965    CATARACT EXTRACTION        HERNIA REPAIR        HERNIA REPAIR   2007     x2    OTHER SURGICAL HISTORY   1965     Perforated duodenol ulcer surgery            FAMILY HISTORY:        Family History   Problem Relation Age of Onset    Pancreatic cancer Mother      Colon cancer Father      Diabetes Father           mellitus     No Known Problems Sister      Diabetes Sister      Nephrolithiasis Sister           SOCIAL HISTORY:  Social History            Tobacco Use    Smoking status: Former Smoker       Packs/day: 2 00       Years: 10 00       Pack years: 20 00       Types: Cigarettes       Quit date: 25       Years since quittin 2    Smokeless tobacco: Former User       Quit date:     Tobacco comment: former smoker noted in "allscripts" Quit in  - never used chewing tobacco    Substance Use Topics    Alcohol use: Yes       Alcohol/week: 1 0 standard drinks       Types: 1 Standard drinks or equivalent per week       Frequency: Monthly or less       Drinks per session: 1 or 2       Binge frequency: Never       Comment: rare  socially    Drug use: No         MEDICATIONS:     Current Outpatient Medications:     albuterol (PROVENTIL HFA,VENTOLIN HFA) 90 mcg/act inhaler, inhale 2 puffs by mouth daily if needed for shortness of breath, Disp: 8 5 g, Rfl: 1    ERYN MICROLET LANCETS lancets, Test twice a day, Disp: 100 each, Rfl: 3    glucose blood test strip, TEST twice a day, Disp: 100 each, Rfl: 5    lisinopril-hydrochlorothiazide (PRINZIDE,ZESTORETIC) 20-25 MG per tablet, Take 1 tablet by mouth daily, Disp: 90 tablet, Rfl: 1    acetaminophen (TYLENOL) 500 mg tablet, Take 1 500mg tablet in the morning prior to surgery, then 1 tablet every 6 hours for 5-7 days  , Disp: 30 tablet, Rfl: 0    glyBURIDE (DIABETA) 5 mg tablet, Take 2 tablets (10 mg total) by mouth 2 (two) times a day, Disp: 360 tablet, Rfl: 1    ibuprofen (MOTRIN) 600 mg tablet, Take 1 600mg tablet in the morning prior to surgery, then 1 tablet every 6 hours for 5-7 days  , Disp: 30 tablet, Rfl: 0    linaGLIPtin (Tradjenta) 5 MG TABS, Take 5 mg by mouth daily, Disp: 90 tablet, Rfl: 1     ALLERGIES:       Allergies   Allergen Reactions    Meperidine      Pravastatin Myalgia         REVIEW OF SYSTEMS:  Review of Systems   ROS:   General: no fever, no chills  HEENT:  No loss of hearing or eyesight problems  Eyes:  No red eyes  Respiratory:  No coughing, shortness of breath or wheezing  Cardiovascular:  No chest pain, no palpitations  GI:  Abdomen soft nontender, denies nausea  Endocrine:  No muscle weakness, no frequent urination, no excessive thirst  Urinary:  No dysuria, no incontinence  Musculoskeletal: see HPI and PE  SKIN:  No skin rash, no dry skin  Neurological:  No headaches, no confusion  Psychiatric:  No suicide thoughts, no anxiety, no depression  Review of all other systems is negative     VITALS:  /82   Pulse 71   Ht 5' 10" (1 778 m)   Wt 85 2 kg (187 lb 12 8 oz)   BMI 26 95 kg/m²        LABS:  HgA1c:         Lab Results   Component Value Date     HGBA1C 7 0 (H) 09/22/2020      BMP:         Lab Results   Component Value Date     GLUCOSE 109 11/25/2015     CALCIUM 8 6 09/22/2020      (L) 11/25/2015     K 4 5 09/22/2020     CO2 25 09/22/2020      09/22/2020     BUN 29 (H) 09/22/2020     CREATININE 1 63 (H) 09/22/2020         _____________________________________________________  PHYSICAL EXAMINATION:  General: well developed and well nourished, alert, oriented times 3 and appears comfortable  Psychiatric: Normal  HEENT: Trachea Midline, No torticollis  Pulmonary: No audible wheezing or strider  Cardiovascular: No discernable arrhythmia   Skin: No masses, erythema, lacerations, fluctation, ulcerations  Neurovascular: Sensation Intact to the Median, Ulnar, Radial Nerve, Motor Intact to the Median, Ulnar, Radial Nerve and Pulses Intact     MUSCULOSKELETAL EXAMINATION:   left thumb   Laceration over the dorsal aspect of the thumb is healing well with sutures intact   Patient has the inability to extend the thumb at the IP joint     ___________________________________________________  STUDIES REVIEWED:  No studies reviewed          PROCEDURES PERFORMED:  Procedures  No Procedures performed today     _____________________________________________________  ASSESSMENT/PLAN:      left thumb EPL tendon laceration  -  It was discussed with the patient conservative and operative treatment  He would like to proceed with surgical intervention    Detail consent was obtained today      · Surgery: left   Thumb EPL tendon repair  · Anesthesia:  local  · Antibiotics:  ordered  · Medical clearance: not needed  · Hand therapy: ordered  · Consent: obtained  ·  patient can take Tylenol and ibuprofen as needed for pain     Surgery medication instructions: You will stop eating and drinking at midnight the night before your surgery, but you may continue to take your normal medications with a small sip of water  In the morning on the day of your surgery, we would like you to take the following medications (as long as you have never been told to avoid Tylenol or NSAIDs like ibuprofen, Naproxen, Aleve, Advil, etc):  · Ibuprofen 600mg one tablet by mouth  · Tylenol 500mg one tablet by mouth     After surgery, we would like you to take Ibuprofen 600mg one tablet by mouth every 6 hours with food (at breakfast, lunch and dinner)  AND Tylenol 500 mg one tablet by mouth every 6 hours  (at breakfast, lunch and dinner) for 5-7 days after your surgery  Please take these medication EVERYDAY after surgery for 5-7 days, and not just as needed  You can take these medications at the same time  Taking these medications after surgery will limit your need for prescription pain medication        We will also prescribe a narcotic pain medication for a limited time after surgery that you can take as needed for moderate or severe pain             Diagnoses and all orders for this visit:     Laceration of left thumb  -     Ambulatory referral to Orthopedic Surgery  -     Ambulatory referral to PT/OT hand therapy; Future  -     ibuprofen (MOTRIN) 600 mg tablet; Take 1 600mg tablet in the morning prior to surgery, then 1 tablet every 6 hours for 5-7 days  -     acetaminophen (TYLENOL) 500 mg tablet; Take 1 500mg tablet in the morning prior to surgery, then 1 tablet every 6 hours for 5-7 days    -     Case request operating room: Left thumb EPL repair; Standing  -     Case request operating room: Left thumb EPL repair     Other orders  -     Diet NPO; Sips with meds; Standing  -     Height and weight upon arrival; Standing  -     Nursing Communication [facility] Interventions Implemented; Standing  -     Nursing Communication [facility] bath, have staff wash entire body (neck down) per pre-op bathing protocol  Routine, evening prior to, and day of surgery ; Standing  -     Void on call to OR; Standing  -     Insert peripheral IV; Standing  -     ceFAZolin (ANCEF) 2,000 mg in dextrose 5 % 100 mL IVPB  -     lidocaine 1% buffered 10 mL           Follow Up:  Return for post op      Work/school status:   no restrictions     To Do Next Visit:  Re-evaluation of current issue and Sutures out                Scribe Attestation    I,:  Salvador Stallings PA-C am acting as a scribe while in the presence of the attending physician :       I,:  Coretta Bolanos MD personally performed the services described in this documentation    as scribed in my presence  :              Portions of the record may have been created with voice recognition software   Occasional wrong word or "sound a like" substitutions may have occurred due to the inherent limitations of voice recognition software   Read the chart carefully and recognize, using context, where substitutions have occurred

## 2021-03-17 NOTE — OP NOTE
OPERATIVE REPORT    PATIENT NAME: Yaz Pisano    MEDICAL RECORD NO:  [id number]    PROCEDURE DATE:  21    :  1945    SURGEON:  KASSI Ramirez , Ph D     [place] Gather:  Mariaa Parker PA-C    No qualified resident was available to assist for this surgery  A Physician Assistant was used to aid in retraction and tensioning during tendon transfer  PREOPERATIVE DIAGNOSIS:   left Extensor Pollicis Longus tendon laceration    POSTOPERATIVE DIAGNOSIS:   left Extensor Pollicis Longus tendon laceration    PROCEDURE PERFORMED:  left Extensor Pollicis Longus (EPL) tendon  repair    ANESTHESIA:  local    COMPLICATIONS:   none    TOURNIQUET TIME:  None used     DISPOSITION: Patient was sent to the PACU in stable condition  INDICATIONS: Patient is an 67 y o  y o  male with EPL tendon laceration of the left thumb  Patient cut the dorsum of his left thumb at the level of the MP joint approximately 5 days ago  Patient immediately noticed the inability to extend the left thumb IP joint  Patient was diagnosed with left thumb EPL tendon laceration  Patient was counseled on operative treatment for the repair of left EPL tendon  Risks and benefits of surgical repair were discussed with the patient and surgical consent was obtained  PROCEDURE:  The patient was identified in the preop screening area  Consent was signed and verified after identifying the correct operative site  The patient was taken back to the operating room after receiving local anesthesia in the preop holding area  The left upper extremity was then prepped and draped in normal sterile fashion with chlorhexidine solution  Sutures removed from the dorsum of the left thumb  The laceration was transverse  The laceration was extended distally and proximally  Skin flaps were elevated off the extensor mechanism  The distal and proximal ends of the extensor tendon were readily identifiable    The extensor tendon was then repaired utilizing 2-0 and 3-0 Ethibond suture interrupted figure-of-eight fashion  Following repair, there was no gapping with patient attempting to extend the thumb against resistance  No gapping was noted when passive flexion of the thumb was performed  Following repair the wound was then closed with 4-0 nylon suture in an interrupted horizontal mattress fashion  Patient tolerated the procedure well there is no complications during the case  Patient's wound was dressed in a sterile dressing and placed into a thumb spica splint immobilizing the wrist in slight extension, the thumb in full extension, and to include the IP joint  The  patient was sent to the PACU in stable condition  The patient was sent to the PACU in stable condition       I was present for the entire procedure     Patient Disposition:  PACU      SIGNATURE: Amanda Smith MD/PhD  DATE: 03/17/21  TIME:  8:45 AM

## 2021-03-18 LAB
CREAT UR-MCNC: 65.5 MG/DL
CREAT UR-MCNC: 65.5 MG/DL
MICROALBUMIN UR-MCNC: 32 MG/L (ref 0–20)
MICROALBUMIN/CREAT 24H UR: 49 MG/G CREATININE (ref 0–30)
PROT UR-MCNC: 18 MG/DL
PROT/CREAT UR: 0.27 MG/G{CREAT} (ref 0–0.1)

## 2021-03-19 ENCOUNTER — TELEPHONE (OUTPATIENT)
Dept: NEPHROLOGY | Facility: CLINIC | Age: 76
End: 2021-03-19

## 2021-03-22 ENCOUNTER — OFFICE VISIT (OUTPATIENT)
Dept: NEPHROLOGY | Facility: CLINIC | Age: 76
End: 2021-03-22
Payer: COMMERCIAL

## 2021-03-22 VITALS
HEIGHT: 70 IN | RESPIRATION RATE: 16 BRPM | TEMPERATURE: 97 F | SYSTOLIC BLOOD PRESSURE: 120 MMHG | HEART RATE: 68 BPM | WEIGHT: 190 LBS | DIASTOLIC BLOOD PRESSURE: 70 MMHG | BODY MASS INDEX: 27.2 KG/M2

## 2021-03-22 DIAGNOSIS — N18.32 TYPE 2 DIABETES MELLITUS WITH STAGE 3B CHRONIC KIDNEY DISEASE, WITHOUT LONG-TERM CURRENT USE OF INSULIN (HCC): ICD-10-CM

## 2021-03-22 DIAGNOSIS — N18.9 CHRONIC KIDNEY DISEASE-MINERAL AND BONE DISORDER: ICD-10-CM

## 2021-03-22 DIAGNOSIS — M89.9 CHRONIC KIDNEY DISEASE-MINERAL AND BONE DISORDER: ICD-10-CM

## 2021-03-22 DIAGNOSIS — E66.3 OVERWEIGHT (BMI 25.0-29.9): ICD-10-CM

## 2021-03-22 DIAGNOSIS — I10 BENIGN ESSENTIAL HYPERTENSION: ICD-10-CM

## 2021-03-22 DIAGNOSIS — E11.22 TYPE 2 DIABETES MELLITUS WITH STAGE 3B CHRONIC KIDNEY DISEASE, WITHOUT LONG-TERM CURRENT USE OF INSULIN (HCC): ICD-10-CM

## 2021-03-22 DIAGNOSIS — E83.9 CHRONIC KIDNEY DISEASE-MINERAL AND BONE DISORDER: ICD-10-CM

## 2021-03-22 DIAGNOSIS — E78.00 HYPERCHOLESTEROLEMIA: ICD-10-CM

## 2021-03-22 DIAGNOSIS — N18.32 STAGE 3B CHRONIC KIDNEY DISEASE (HCC): Primary | ICD-10-CM

## 2021-03-22 PROCEDURE — 99214 OFFICE O/P EST MOD 30 MIN: CPT | Performed by: INTERNAL MEDICINE

## 2021-03-22 PROCEDURE — 1160F RVW MEDS BY RX/DR IN RCRD: CPT | Performed by: INTERNAL MEDICINE

## 2021-03-22 NOTE — ASSESSMENT & PLAN NOTE
Lab Results   Component Value Date    HGBA1C 7 3 (H) 03/17/2021    diabetes not well control    Importance of diabetic control and kidney disease discussed with the patient

## 2021-03-22 NOTE — PROGRESS NOTES
NEPHROLOGY OFFICE FOLLOW UP  Bree Hatch 67 y o  male MRN: [id number]    Encounter: [id number] 3/22/2021    REASON FOR VISIT: Justino Lazaro is a 67 y o  male who is here on 3/22/2021 for Chronic Kidney Disease and Follow-up    HPI:    Bree  Came in today for follow-up of CKD  Since I saw him last he had injury to his left thumb requiring stitches and he is being monitored by Orthopedics    He denies any other complaint cannot lose weight     Denies any chest pain palpitation or any shortness of breath  Denies any urinary complaint in spite of being enlarged prostate  Patient took some nonsteroidal pain killer during this hand surgery but is better now      REVIEW OF SYSTEMS:    Review of Systems   Constitutional: Negative for activity change and fatigue  HENT: Negative for congestion and ear discharge  Eyes: Negative for photophobia and pain  Respiratory: Negative for apnea and choking  Cardiovascular: Negative for chest pain and palpitations  Gastrointestinal: Negative for abdominal distention and blood in stool  Endocrine: Negative for heat intolerance and polyphagia  Genitourinary: Negative for flank pain and urgency  Musculoskeletal: Negative for neck pain and neck stiffness  Skin: Negative for color change and wound  Allergic/Immunologic: Negative for food allergies and immunocompromised state  Neurological: Negative for seizures and facial asymmetry  Hematological: Negative for adenopathy  Does not bruise/bleed easily  Psychiatric/Behavioral: Negative for self-injury and suicidal ideas           PAST MEDICAL HISTORY:  Past Medical History:   Diagnosis Date    Asthma     last assessed: April 1, 2015    Balanitis     last assessed: July 9, 2014    Cataract     Chronic kidney disease     Chronic kidney disease, stage 3     last assessed: Jan 17, 2018    Diabetes mellitus [facility])     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     last assessed: Jan 17, 2018    Hypercholesterolemia     Hypertension     Pneumonia     last assessed: 2013       PAST SURGICAL HISTORY:  Past Surgical History:   Procedure Laterality Date    ABDOMINAL SURGERY      APPENDECTOMY  1965    CATARACT EXTRACTION      HERNIA REPAIR      HERNIA REPAIR  2007    x2    OTHER SURGICAL HISTORY  1965    Perforated duodenol ulcer surgery     IL REPAIR FLEXOR TENDON,HAND,W/O GRAFT,EA Left 3/17/2021    Procedure: Left thumb EPL repair;  Surgeon: Yamila Witt MD;  Location: [facility];  Service: Orthopedics       SOCIAL HISTORY:  Social History     Substance and Sexual Activity   Alcohol Use Yes    Alcohol/week: 1 0 standard drinks    Types: 1 Standard drinks or equivalent per week    Frequency: Monthly or less    Drinks per session: 1 or 2    Binge frequency: Never    Comment: rare  socially     Social History     Substance and Sexual Activity   Drug Use No     Social History     Tobacco Use   Smoking Status Former Smoker    Packs/day: 2 00    Years: 10 00    Pack years:  00    Types: Cigarettes    Quit date: 26    Years since quittin 2   Smokeless Tobacco Former User    Quit date:    Tobacco Comment    former smoker noted in "allscripts" Quit in  - never used chewing tobacco        FAMILY HISTORY:  Family History   Problem Relation Age of Onset    Pancreatic cancer Mother     Colon cancer Father     Diabetes Father         mellitus     No Known Problems Sister     Diabetes Sister     Nephrolithiasis Sister        MEDICATIONS:    Current Outpatient Medications:     albuterol (PROVENTIL HFA,VENTOLIN HFA) 90 mcg/act inhaler, inhale 2 puffs by mouth daily if needed for shortness of breath, Disp: 8 5 g, Rfl: 1    ERYN MICROLET LANCETS lancets, Test twice a day, Disp: 100 each, Rfl: 3    glucose blood test strip, TEST twice a day, Disp: 100 each, Rfl: 5    glyBURIDE (DIABETA) 5 mg tablet, Take 2 tablets (10 mg total) by mouth 2 (two) times a day, Disp: 360 tablet, Rfl: 1    linaGLIPtin (Tradjenta) 5 MG TABS, Take 5 mg by mouth daily, Disp: 90 tablet, Rfl: 1    lisinopril-hydrochlorothiazide (PRINZIDE,ZESTORETIC) 20-25 MG per tablet, Take 1 tablet by mouth daily, Disp: 90 tablet, Rfl: 1    acetaminophen (TYLENOL) 500 mg tablet, Take 1 500mg tablet in the morning prior to surgery, then 1 tablet every 6 hours for 5-7 days  (Patient not taking: Reported on 3/22/2021), Disp: 30 tablet, Rfl: 0    ibuprofen (MOTRIN) 600 mg tablet, Take 1 600mg tablet in the morning prior to surgery, then 1 tablet every 6 hours for 5-7 days  (Patient not taking: Reported on 3/22/2021), Disp: 30 tablet, Rfl: 0    PHYSICAL EXAM:  Vitals:    03/22/21 0946   BP: 120/70   BP Location: Right arm   Patient Position: Sitting   Pulse: 68   Resp: 16   Temp: (!) 97 °F (36 1 °C)   TempSrc: Temporal   Weight: 86 2 kg (190 lb)   Height: 5' 10" (1 778 m)     Body mass index is 27 26 kg/m²  Physical Exam  Constitutional:       General: He is not in acute distress  Appearance: He is well-developed  He is obese  HENT:      Head: Normocephalic and atraumatic  Mouth/Throat:      Mouth: Mucous membranes are moist    Eyes:      General: No scleral icterus  Extraocular Movements: Extraocular movements intact  Conjunctiva/sclera: Conjunctivae normal       Pupils: Pupils are equal, round, and reactive to light  Neck:      Musculoskeletal: Normal range of motion and neck supple  Vascular: No JVD  Cardiovascular:      Rate and Rhythm: Normal rate and regular rhythm  Heart sounds: Normal heart sounds  Pulmonary:      Effort: Pulmonary effort is normal       Breath sounds: Normal breath sounds  No wheezing  Abdominal:      General: Bowel sounds are normal       Palpations: Abdomen is soft  Tenderness: There is no abdominal tenderness  Musculoskeletal: Normal range of motion  Skin:     General: Skin is warm  Findings: No rash     Neurological:      Mental Status: He is alert and oriented to person, place, and time     Psychiatric:         Behavior: Behavior normal          LAB RESULTS:  Results for orders placed or performed in visit on 03/17/21   CBC and differential   Result Value Ref Range    WBC 6 74 4 31 - 10 16 Thousand/uL    RBC 4 60 3 88 - 5 62 Million/uL    Hemoglobin 13 9 12 0 - 17 0 g/dL    Hematocrit 42 1 36 5 - 49 3 %    MCV 92 82 - 98 fL    MCH 30 2 26 8 - 34 3 pg    MCHC 33 0 31 4 - 37 4 g/dL    RDW 12 5 11 6 - 15 1 %    MPV 10 9 8 9 - 12 7 fL    Platelets 833 091 - 804 Thousands/uL    nRBC 0 /100 WBCs    Neutrophils Relative 63 43 - 75 %    Immat GRANS % 0 0 - 2 %    Lymphocytes Relative 25 14 - 44 %    Monocytes Relative 8 4 - 12 %    Eosinophils Relative 3 0 - 6 %    Basophils Relative 1 0 - 1 %    Neutrophils Absolute 4 18 1 85 - 7 62 Thousands/µL    Immature Grans Absolute 0 02 0 00 - 0 20 Thousand/uL    Lymphocytes Absolute 1 70 0 60 - 4 47 Thousands/µL    Monocytes Absolute 0 56 0 17 - 1 22 Thousand/µL    Eosinophils Absolute 0 21 0 00 - 0 61 Thousand/µL    Basophils Absolute 0 07 0 00 - 0 10 Thousands/µL   Protein / creatinine ratio, urine   Result Value Ref Range    Creatinine, Ur 65 5 mg/dL    Protein Urine Random 18 mg/dL    Prot/Creat Ratio, Ur 0 27 (H) 0 00 - 0 10   Phosphorus   Result Value Ref Range    Phosphorus 2 6 2 3 - 4 1 mg/dL   PTH, intact   Result Value Ref Range     7 (H) 18 4 - 80 1 pg/mL   UA (URINE) with reflex to Scope   Result Value Ref Range    Color, UA Yellow     Clarity, UA Clear     Specific [place], UA 1 016 1 003 - 1 030    pH, UA 6 5 4 5, 5 0, 5 5, 6 0, 6 5, 7 0, 7 5, 8 0    Leukocytes, UA Negative Negative    Nitrite, UA Negative Negative    Protein, UA Negative Negative mg/dl    Glucose, UA Negative Negative mg/dl    Ketones, UA Negative Negative mg/dl    Urobilinogen, UA 0 2 0 2, 1 0 E U /dl E U /dl    Bilirubin, UA Negative Negative    Blood, UA Negative Negative   Vitamin D 25 hydroxy   Result Value Ref Range Vit D, 25-Hydroxy 22 0 (L) 30 0 - 100 0 ng/mL   Hemoglobin A1C   Result Value Ref Range    Hemoglobin A1C 7 3 (H) Normal 3 8-5 6%; PreDiabetic 5 7-6 4%; Diabetic >=6 5%; Glycemic control for adults with diabetes <7 0% %     mg/dl   Lipid Panel with Direct LDL reflex   Result Value Ref Range    Cholesterol 164 50 - 200 mg/dL    Triglycerides 71 <=150 mg/dL    HDL, Direct 34 (L) >=40 mg/dL    LDL Calculated 116 (H) 0 - 100 mg/dL   Microalbumin / creatinine urine ratio   Result Value Ref Range    Creatinine, Ur 65 5 mg/dL    Microalbum  ,U,Random 32 0 (H) 0 0 - 20 0 mg/L    Microalb Creat Ratio 49 (H) 0 - 30 mg/g creatinine   Comprehensive metabolic panel   Result Value Ref Range    Sodium 136 136 - 145 mmol/L    Potassium 4 8 3 5 - 5 3 mmol/L    Chloride 105 100 - 108 mmol/L    CO2 26 21 - 32 mmol/L    ANION GAP 5 4 - 13 mmol/L    BUN 34 (H) 5 - 25 mg/dL    Creatinine 1 81 (H) 0 60 - 1 30 mg/dL    Glucose, Fasting 160 (H) 65 - 99 mg/dL    Calcium 8 5 8 3 - 10 1 mg/dL    AST 8 5 - 45 U/L    ALT 22 12 - 78 U/L    Alkaline Phosphatase 72 46 - 116 U/L    Total Protein 7 4 6 4 - 8 2 g/dL    Albumin 4 3 3 5 - 5 0 g/dL    Total Bilirubin 0 38 0 20 - 1 00 mg/dL    eGFR 36 ml/min/1 73sq m       ASSESSMENT and PLAN:      Chronic kidney disease, stage 3  Lab Results   Component Value Date    EGFR 36 03/17/2021    EGFR 41 09/22/2020    EGFR 35 09/10/2020    CREATININE 1 81 (H) 03/17/2021    CREATININE 1 63 (H) 09/22/2020    CREATININE 1 86 (H) 09/10/2020    patient GFR is about 36 and overall seems to be stable  Advised hydration and avoiding any nephrotoxic medicine    Chronic kidney disease-mineral and bone disorder  Lab Results   Component Value Date    EGFR 36 03/17/2021    EGFR 41 09/22/2020    EGFR 35 09/10/2020    CREATININE 1 81 (H) 03/17/2021    CREATININE 1 63 (H) 09/22/2020    CREATININE 1 86 (H) 09/10/2020    PTH and phosphorus are within acceptable range  Vitamin-D level is low    Advised to take over-the-counter vitamin-D at this point    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([facility] )    Lab Results   Component Value Date    HGBA1C 7 3 (H) 03/17/2021    diabetes not well control  Importance of diabetic control and kidney disease discussed with the patient    Benign essential hypertension   Blood pressure is very well control with present medication which include ARB blocker         everything discussed the patient at length  Advised present management  Asymptomatic and progressive nature of kidney disease discussed with him  I will see him back in 6 months and will get blood and urine test before that visit      Portions of the record may have been created with voice recognition software  Occasional wrong word or "sound a like" substitutions may have occurred due to the inherent limitations of voice recognition software  Read the chart carefully and recognize, using context, where substitutions have occurred  If you have any questions, please contact the dictating provider

## 2021-03-22 NOTE — PATIENT INSTRUCTIONS

## 2021-03-22 NOTE — ASSESSMENT & PLAN NOTE
Lab Results   Component Value Date    EGFR 36 03/17/2021    EGFR 41 09/22/2020    EGFR 35 09/10/2020    CREATININE 1 81 (H) 03/17/2021    CREATININE 1 63 (H) 09/22/2020    CREATININE 1 86 (H) 09/10/2020    patient GFR is about 36 and overall seems to be stable    Advised hydration and avoiding any nephrotoxic medicine

## 2021-03-22 NOTE — ASSESSMENT & PLAN NOTE
Lab Results   Component Value Date    EGFR 36 03/17/2021    EGFR 41 09/22/2020    EGFR 35 09/10/2020    CREATININE 1 81 (H) 03/17/2021    CREATININE 1 63 (H) 09/22/2020    CREATININE 1 86 (H) 09/10/2020    PTH and phosphorus are within acceptable range  Vitamin-D level is low    Advised to take over-the-counter vitamin-D at this point

## 2021-03-23 ENCOUNTER — EVALUATION (OUTPATIENT)
Dept: OCCUPATIONAL THERAPY | Facility: CLINIC | Age: 76
End: 2021-03-23
Payer: COMMERCIAL

## 2021-03-23 DIAGNOSIS — S61.012A LACERATION OF LEFT THUMB: ICD-10-CM

## 2021-03-23 DIAGNOSIS — S61.012D LACERATION OF LEFT THUMB WITHOUT FOREIGN BODY WITHOUT DAMAGE TO NAIL, SUBSEQUENT ENCOUNTER: ICD-10-CM

## 2021-03-23 PROCEDURE — 97165 OT EVAL LOW COMPLEX 30 MIN: CPT

## 2021-03-23 PROCEDURE — L3808 WHFO, RIGID W/O JOINTS: HCPCS

## 2021-03-23 NOTE — PROGRESS NOTES
OT Evaluation     Today's date: 3/23/2021  Patient name: Shaista Sifuentes  : 1945  MRN: [id number]  Referring provider: Elmer Mukherjee  Dx:   Encounter Diagnosis     ICD-10-CM    1  Laceration of left thumb  S61 012A Ambulatory referral to PT/OT hand therapy                  Assessment  Assessment details: This is a 67year old, RHD male being seen in OT following and EPL repair of the left thumb on 3/17/21  Patient presented this date in post op dressing/splint  This was removed  Incision is healing well with no signs or symptoms of infection  Wound dressed with adaptic and hilda  Patient educated in wound care  Wear the splint in the shower with a plastic bag over it  Wash hand with soap and water, but keep thumb and wrist in extended position  Fabricated and issued custom forearm based thumb spica with with wrist in 10 degrees extension and thumb, including IP, in full extension  Wear splint at all times except to wash the hand  The EPL repair is intact and patient was able to actively hold the thumb in full extension with place and hold exercise  Flexion not tested at this time  AROM wrist not tested  Digits 2-5 WNL  Patient is a good candidate for OT services to restore ROM and strength in the left thumb and hand following EPL repair protocol  Impairments: abnormal coordination, abnormal or restricted ROM, activity intolerance, impaired physical strength, lacks appropriate home exercise program, pain with function and weight-bearing intolerance  Other impairment: Healing incision  Functional limitations: No use of the right thumb for daily tasks  No lifting with left hand over 1 lb  Symptom irritability: lowBarriers to therapy: DM, HTN, asthma, kidney disease  Understanding of Dx/Px/POC: excellent   Prognosis: good    Goals  STG ( 4 weeks)  1  Patient will be independent in wound care, splint use for HEP  2  Patient will demonstrate full wound healing  3    Patient will report a maximum pain level of 4/10 in the left hand  4  Patient will demonstrate AROM thumb IP to 30 degrees flexion and full extension  LTGs ( 12 weeks)  1  Patient will demonstrate independence in HEP to maintain left hand ROM and strength to WNL  2  Patient will report a maximum pain level of 2/10 during daily tasks  3  Patient will demonstrate AROM in the left wrist to WNL for independence in self care  4  Patient will demonstrate AROM in the left thumb to 100 degrees GARCÍA to fasten clothing  5  Patient will demonstrate left hand  and pinch strength within 40% of the right hand to be MI for IADL tasks    Plan  Patient would benefit from: custom splinting, OT eval and skilled occupational therapy  Planned modality interventions: thermotherapy: hydrocollator packs, cryotherapy and ultrasound  Planned therapy interventions: activity modification, compression, dressing changes, fine motor coordination training, graded activity, graded exercise, home exercise program, therapeutic exercise, therapeutic activities, patient education, orthotic fitting/training and manual therapy  Other planned therapy interventions: wound care; edema mgt  Frequency: 2x week  Duration in weeks: 12  Plan of Care beginning date: 3/23/2021  Plan of Care expiration date: 2021  Treatment plan discussed with: patient        Subjective Evaluation    History of Present Illness  Date of onset: 3/11/2021  Date of surgery: 3/17/2021  Mechanism of injury: surgery and trauma  Mechanism of injury: Patient reports that on 3/11/21 he was cutting drywall and the utility knife slipped and lacerated his left thumb  He went to the ER the next day  Had a surgical repair of the EPL on 3/17/21 and now presents in post op thumb spica for OT evaluation and treatment    Quality of life: good    Pain  Current pain ratin  At best pain ratin  At worst pain ratin  Location: Left thumb  Quality: dull ache and throbbing  Relieving factors: medications and rest  Aggravating factors: lifting  Progression: improved    Social Support  Lives with: spouse    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)  Hand dominance: right    Treatments  No previous or current treatments  Patient Goals  Patient goals for therapy: decreased edema, decreased pain, increased motion, increased strength and independence with ADLs/IADLs  Patient goal: "Be able to use my thumb"        Objective     Observations     Left Wrist/Hand   Positive for edema and incision  Negative for drainage  Additional Observation Details  3/23/21: In post op thumb spica  Neurological Testing     Sensation     Wrist/Hand   Left   Intact: light touch    Active Range of Motion     Left Thumb   Opposition: 3/23/21:  Patient able to achieve full thumb extension in [place] [address], MP, and IP with place and hold exercise  EPL repair is intact  Flexion not tested per protocol    Additional Active Range of Motion Details  3/23/21:  Not tested per protocol  3/23/21:  AROM left digits 2-5 WNL             Precautions EPL repair zone 3 3/17/21  DM, HTN, asthma, kidney disease  Week 1-2: PROM IP 0-30 with proximal ext, MP 0-30 w/ wrist and IP extended  Week 3-4:  Active IP flexion to 30 with passive ext to 0; cont with passive  Week 5-6:  Gradually increase active IP and MP flexion with proximal joints extended    Consult with MD about cutting down splint  Week 6:  Wean from splint  Week 8-10 strengthening       Manuals 3/23       Dressing chg Adaptic, hilda       MEM        Scar massage                Ortho fit/train 3/23        Forearm based thumb spica full ext incl IP                                                       Ther Ex                                                                        Ther Activity                        HEP 3/23        Wound care; tendon repair precautions               Modalities

## 2021-03-24 ENCOUNTER — RA CDI HCC (OUTPATIENT)
Dept: OTHER | Facility: HOSPITAL | Age: 76
End: 2021-03-24

## 2021-03-24 NOTE — PROGRESS NOTES
[place] [address]  coding oppertunities             Chart reviewed, (number of) suggestions sent to provider: 2     Problem listed updated   Provider Accepted, (number of) suggestions accepted: 2              [place] [address]  coding oppertunities             Chart reviewed, (number of) suggestions sent to provider: 2                   E11 22, N18 32 T2DM with diabetic stage 3b CKD ([place] [address] )     If this is correct, please document and assess at your next visit 3/31/21

## 2021-03-25 ENCOUNTER — OFFICE VISIT (OUTPATIENT)
Dept: OCCUPATIONAL THERAPY | Facility: CLINIC | Age: 76
End: 2021-03-25
Payer: COMMERCIAL

## 2021-03-25 DIAGNOSIS — S61.012D LACERATION OF LEFT THUMB WITHOUT FOREIGN BODY WITHOUT DAMAGE TO NAIL, SUBSEQUENT ENCOUNTER: Primary | ICD-10-CM

## 2021-03-25 PROBLEM — N18.32 STAGE 3B CHRONIC KIDNEY DISEASE (HCC): Status: ACTIVE | Noted: 2018-02-08

## 2021-03-25 PROBLEM — E11.22 TYPE 2 DIABETES MELLITUS WITH DIABETIC CHRONIC KIDNEY DISEASE (HCC): Status: ACTIVE | Noted: 2018-02-08

## 2021-03-25 PROCEDURE — 97110 THERAPEUTIC EXERCISES: CPT

## 2021-03-25 PROCEDURE — 97140 MANUAL THERAPY 1/> REGIONS: CPT

## 2021-03-25 NOTE — PROGRESS NOTES
Daily Note     Today's date: 3/25/2021  Patient name: Cristiano Cantu  : 1945  MRN: [id number]  Referring provider: Alejandra Esparza PA-C  Dx:   Encounter Diagnosis     ICD-10-CM    1  Laceration of left thumb without foreign body without damage to nail, subsequent encounter  S61 012D                   Subjective: I don't have any pain, but my thumb feels different  The splint feels fine  Objective: See treatment diary below  EPL repair is intact  Assessment: Tolerated treatment well  Patient exhibited good technique with therapeutic exercises  Patient able to gently actively flex IP to 20 and able to hold full extension with place and hold  Splint fitting well  Plan: Continue per plan of care  Precautions EPL repair zone 3 3/17/21  DM, HTN, asthma, kidney disease  Week 1-2: PROM IP 0-30 with proximal ext, MP 0-30 w/ wrist and IP extended  Week 3-4:  Active IP flexion to 30 with passive ext to 0; cont with passive  Week 5-6:  Gradually increase active IP and MP flexion with proximal joints extended  Consult with MD about cutting down splint  Week 6:  Wean from splint  Week 8-10 strengthening       Manuals 3/23 3/25      Dressing chg Adaptic, hilda Cleaned   Dressed w/adaptic, hilda      MEM  10'      Scar massage                Ortho fit/train 3/23        Forearm based thumb spica full ext incl IP                                                       Ther Ex  3/25      PROM IP  0-30 flex 5'      PROM MP  0-30 flex 5'      PROM wrist  45 ext to 10 flex 5'      Active IP flex/passive ext  20 flex 5'      AROM digits  5'                              Ther Activity                        HEP 3/23 3/25       Wound care; tendon repair precautions Reviewed              Modalities  3/25      MHP thumb and wrist in full ext  5'

## 2021-03-30 ENCOUNTER — OFFICE VISIT (OUTPATIENT)
Dept: OCCUPATIONAL THERAPY | Facility: CLINIC | Age: 76
End: 2021-03-30
Payer: COMMERCIAL

## 2021-03-30 DIAGNOSIS — S61.012D LACERATION OF LEFT THUMB WITHOUT FOREIGN BODY WITHOUT DAMAGE TO NAIL, SUBSEQUENT ENCOUNTER: Primary | ICD-10-CM

## 2021-03-30 DIAGNOSIS — I10 ESSENTIAL HYPERTENSION: ICD-10-CM

## 2021-03-30 PROCEDURE — 97140 MANUAL THERAPY 1/> REGIONS: CPT

## 2021-03-30 PROCEDURE — 97110 THERAPEUTIC EXERCISES: CPT

## 2021-03-30 RX ORDER — LISINOPRIL AND HYDROCHLOROTHIAZIDE 25; 20 MG/1; MG/1
TABLET ORAL
Qty: 90 TABLET | Refills: 1 | Status: SHIPPED | OUTPATIENT
Start: 2021-03-30 | End: 2021-09-22

## 2021-03-30 NOTE — PROGRESS NOTES
Daily Note     Today's date: 3/30/2021  Patient name: Santhosh Zurita  : 1945  MRN: [id number]  Referring provider: Marene Goodell, PA-C  Dx:   Encounter Diagnosis     ICD-10-CM    1  Laceration of left thumb without foreign body without damage to nail, subsequent encounter  S61 012D                   Subjective: I only take the splint off to wash my hand and change the dressing  Then I put it back on  Objective: See treatment diary below  EPL repair is intact    Assessment: Tolerated treatment well  Patient exhibited good technique with therapeutic exercises  Patient instructed in HEP of hand palm down on table, passive thumb retropulsion and active flexion to table  Also issued exercise splint holding MP in extension and blocking IP flexion at 30 for active flexion and passive extension  Plan: Continue per plan of care  Precautions EPL repair zone 3 3/17/21  DM, HTN, asthma, kidney disease  Week 1-2: PROM IP 0-30 with proximal ext, MP 0-30 w/ wrist and IP extended  Week 3-4:  Active IP flexion to 30 with passive ext to 0; cont with passive  Week 5-6:  Gradually increase active IP and MP flexion with proximal joints extended  Consult with MD about cutting down splint  Week 6:  Wean from splint  Week 8-10 strengthening       Manuals 3/23 3/25 3/29     Dressing chg Adaptic, hilda Cleaned   Dressed w/adaptic, hilda Cleaned, hilda     MEM  10' 10'     Scar massage                Ortho fit/train 3/23        Forearm based thumb spica full ext incl IP                                                       Ther Ex  3/25 3/29     PROM IP  0-30 flex 5' 0-30 5'     PROM MP  0-30 flex 5' 0-30 5'     PROM wrist  45 ext to 10 flex 5' 45 ext to 20 flex 5'     Active IP flex/passive ext  20 flex 5' 30 degrees flex 5'     AROM digits  5' 5'                             Ther Activity                        HEP 3/23 3/25 3/30      Wound care; tendon repair precautions Reviewed Active IP flex to 30 w/ passive ext; blocking splint             Modalities  3/25 3/30     MHP thumb and wrist in full ext  5' 5'

## 2021-03-31 ENCOUNTER — OFFICE VISIT (OUTPATIENT)
Dept: FAMILY MEDICINE CLINIC | Facility: CLINIC | Age: 76
End: 2021-03-31
Payer: COMMERCIAL

## 2021-03-31 VITALS
HEART RATE: 94 BPM | HEIGHT: 70 IN | WEIGHT: 185.6 LBS | DIASTOLIC BLOOD PRESSURE: 74 MMHG | OXYGEN SATURATION: 96 % | BODY MASS INDEX: 26.57 KG/M2 | SYSTOLIC BLOOD PRESSURE: 136 MMHG | TEMPERATURE: 97.4 F | RESPIRATION RATE: 18 BRPM

## 2021-03-31 DIAGNOSIS — Z12.5 SCREENING PSA (PROSTATE SPECIFIC ANTIGEN): ICD-10-CM

## 2021-03-31 DIAGNOSIS — E78.00 HYPERCHOLESTEROLEMIA: ICD-10-CM

## 2021-03-31 DIAGNOSIS — E11.22 TYPE 2 DIABETES MELLITUS WITH STAGE 3B CHRONIC KIDNEY DISEASE, WITHOUT LONG-TERM CURRENT USE OF INSULIN (HCC): Primary | ICD-10-CM

## 2021-03-31 DIAGNOSIS — I10 BENIGN ESSENTIAL HYPERTENSION: ICD-10-CM

## 2021-03-31 DIAGNOSIS — N18.32 STAGE 3B CHRONIC KIDNEY DISEASE (HCC): ICD-10-CM

## 2021-03-31 DIAGNOSIS — Z00.00 MEDICARE ANNUAL WELLNESS VISIT, SUBSEQUENT: ICD-10-CM

## 2021-03-31 DIAGNOSIS — S61.112D LACERATION OF LEFT THUMB WITHOUT FOREIGN BODY WITH DAMAGE TO NAIL, SUBSEQUENT ENCOUNTER: ICD-10-CM

## 2021-03-31 DIAGNOSIS — M67.472 GANGLION CYST OF LEFT FOOT: ICD-10-CM

## 2021-03-31 DIAGNOSIS — N18.32 TYPE 2 DIABETES MELLITUS WITH STAGE 3B CHRONIC KIDNEY DISEASE, WITHOUT LONG-TERM CURRENT USE OF INSULIN (HCC): Primary | ICD-10-CM

## 2021-03-31 PROCEDURE — 3288F FALL RISK ASSESSMENT DOCD: CPT | Performed by: FAMILY MEDICINE

## 2021-03-31 PROCEDURE — 1170F FXNL STATUS ASSESSED: CPT | Performed by: FAMILY MEDICINE

## 2021-03-31 PROCEDURE — G0439 PPPS, SUBSEQ VISIT: HCPCS | Performed by: FAMILY MEDICINE

## 2021-03-31 PROCEDURE — 1036F TOBACCO NON-USER: CPT | Performed by: FAMILY MEDICINE

## 2021-03-31 PROCEDURE — 1160F RVW MEDS BY RX/DR IN RCRD: CPT | Performed by: FAMILY MEDICINE

## 2021-03-31 PROCEDURE — 1125F AMNT PAIN NOTED PAIN PRSNT: CPT | Performed by: FAMILY MEDICINE

## 2021-03-31 PROCEDURE — 3725F SCREEN DEPRESSION PERFORMED: CPT | Performed by: FAMILY MEDICINE

## 2021-03-31 PROCEDURE — 99214 OFFICE O/P EST MOD 30 MIN: CPT | Performed by: FAMILY MEDICINE

## 2021-03-31 NOTE — PROGRESS NOTES
Assessment/Plan:       Problem List Items Addressed This Visit        Endocrine    Type 2 diabetes mellitus with diabetic chronic kidney disease ([facility] [address] ) - Primary     Stable, will repeat in 6 months  Lab Results   Component Value Date    HGBA1C 7 3 (H) 03/17/2021            Relevant Orders    Hemoglobin A1C       Cardiovascular and Mediastinum    Benign essential hypertension       Genitourinary    Stage 3b chronic kidney disease       Other    Hypercholesterolemia    Relevant Orders    Lipid Panel with Direct LDL reflex    Medicare annual wellness visit, subsequent    Laceration of left thumb    Ganglion cyst of left foot     Offered referral to podiatry - declined at this time           Other Visit Diagnoses     Screening PSA (prostate specific antigen)        Relevant Orders    PSA, Total Screen            Subjective:      Patient ID: Bree Medellin is a 67 y o  male  He is here for a diabetic check up  DM - A1c 7 3%  currently on Glyburide 10 mg BID, Tradjenta 5 mg daily  Fasting glucoses are in the 120s  Lipids - , not on statin therapy  He had intolerance to statins in the past   HTN - BP is well controlled  CKD - creatinine 1 81  sees nephrology  He has pain in between the 4-5th digits on the left foot  Noticed a lump there about 2 weeks ago  Bothers him mostly at night  Denies any swelling, numbness, tingling  He recently injured his left thumb  He had surgery with Dr Delma Lange  The following portions of the patient's history were reviewed and updated as appropriate:   He  has a past medical history of Asthma, Balanitis, Cataract, Chronic kidney disease, Chronic kidney disease, stage 3, Diabetes mellitus ([facility] [address] ), 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([facility] [address] ), Hypercholesterolemia, Hypertension, and Pneumonia    He   Patient Active Problem List    Diagnosis Date Noted    Ganglion cyst of left foot 03/31/2021    Laceration of left thumb 03/16/2021    Chronic kidney disease-mineral and bone disorder 09/12/2019    Overweight (BMI 25 0-29 9) 08/22/2019    Medicare annual wellness visit, subsequent 08/22/2019    Benign prostatic hyperplasia with lower urinary tract symptoms 08/22/2019    Statin intolerance 02/15/2019    Degenerative cervical spinal stenosis 02/15/2019    Stage 3b chronic kidney disease 02/08/2018    Type 2 diabetes mellitus with diabetic chronic kidney disease ([facility] [address] ) 02/08/2018    Benign essential hypertension 02/08/2018    Hypercholesterolemia 04/02/2014     He  has a past surgical history that includes Hernia repair; Abdominal surgery; Appendectomy (1965); Cataract extraction; Hernia repair (2007); Other surgical history (1965); and pr repair flexor tendon,hand,w/o graft,ea (Left, 3/17/2021)  His family history includes Colon cancer in his father; Diabetes in his father and sister; Nephrolithiasis in his sister; No Known Problems in his sister; Pancreatic cancer in his mother  He  reports that he quit smoking about 50 years ago  His smoking use included cigarettes  He has a 20 00 pack-year smoking history  He quit smokeless tobacco use about 14 years ago  He reports current alcohol use of about 1 0 standard drinks of alcohol per week  He reports that he does not use drugs    Current Outpatient Medications   Medication Sig Dispense Refill    albuterol (PROVENTIL HFA,VENTOLIN HFA) 90 mcg/act inhaler inhale 2 puffs by mouth daily if needed for shortness of breath 8 5 g 1    ERYN MICROLET LANCETS lancets Test twice a day 100 each 3    glucose blood test strip TEST twice a day 100 each 5    glyBURIDE (DIABETA) 5 mg tablet Take 2 tablets (10 mg total) by mouth 2 (two) times a day 360 tablet 1    linaGLIPtin (Tradjenta) 5 MG TABS Take 5 mg by mouth daily 90 tablet 1    lisinopril-hydrochlorothiazide (PRINZIDE,ZESTORETIC) 20-25 MG per tablet take 1 tablet by mouth once daily 90 tablet 1    acetaminophen (TYLENOL) 500 mg tablet Take 1 500mg tablet in the morning prior to surgery, then 1 tablet every 6 hours for 5-7 days  (Patient not taking: Reported on 3/22/2021) 30 tablet 0     No current facility-administered medications for this visit  Current Outpatient Medications on File Prior to Visit   Medication Sig    albuterol (PROVENTIL HFA,VENTOLIN HFA) 90 mcg/act inhaler inhale 2 puffs by mouth daily if needed for shortness of breath    ERYN MICROLET LANCETS lancets Test twice a day    glucose blood test strip TEST twice a day    glyBURIDE (DIABETA) 5 mg tablet Take 2 tablets (10 mg total) by mouth 2 (two) times a day    linaGLIPtin (Tradjenta) 5 MG TABS Take 5 mg by mouth daily    lisinopril-hydrochlorothiazide (PRINZIDE,ZESTORETIC) 20-25 MG per tablet take 1 tablet by mouth once daily    acetaminophen (TYLENOL) 500 mg tablet Take 1 500mg tablet in the morning prior to surgery, then 1 tablet every 6 hours for 5-7 days  (Patient not taking: Reported on 3/22/2021)    [DISCONTINUED] ibuprofen (MOTRIN) 600 mg tablet Take 1 600mg tablet in the morning prior to surgery, then 1 tablet every 6 hours for 5-7 days  (Patient not taking: Reported on 3/22/2021)     No current facility-administered medications on file prior to visit  He is allergic to meperidine and pravastatin       Review of Systems   Constitutional: Negative for activity change, appetite change, fatigue and unexpected weight change  Respiratory: Negative for chest tightness and shortness of breath  Cardiovascular: Negative for chest pain and leg swelling  Gastrointestinal: Negative for abdominal pain  Musculoskeletal: Positive for arthralgias  Neurological: Negative for headaches  Objective:      /74 (BP Location: Left arm, Patient Position: Sitting, Cuff Size: Adult)   Pulse 94   Temp (!) 97 4 °F (36 3 °C)   Resp 18   Ht 5' 10" (1 778 m)   Wt 84 2 kg (185 lb 9 6 oz)   SpO2 96%   BMI 26 63 kg/m²          Physical Exam  Vitals signs and nursing note reviewed     Constitutional: General: He is not in acute distress  Appearance: Normal appearance  He is well-developed  He is not diaphoretic  HENT:      Head: Normocephalic and atraumatic  Cardiovascular:      Rate and Rhythm: Normal rate and regular rhythm  Pulses: no weak pulses          Dorsalis pedis pulses are 2+ on the right side and 2+ on the left side  Posterior tibial pulses are 2+ on the right side and 2+ on the left side  Heart sounds: Normal heart sounds  No murmur  No friction rub  No gallop  Pulmonary:      Effort: Pulmonary effort is normal  No respiratory distress  Breath sounds: Normal breath sounds  No wheezing or rales  Chest:      Chest wall: No tenderness  Musculoskeletal:         General: Signs of injury (wearing cast on left thumb/hand) present  No swelling  Right lower leg: No edema  Left lower leg: No edema  Feet:      Right foot:      Skin integrity: No ulcer, skin breakdown, erythema, warmth, callus or dry skin  Left foot:      Skin integrity: No ulcer, skin breakdown, erythema, warmth, callus or dry skin  Neurological:      Mental Status: He is alert and oriented to person, place, and time  Psychiatric:         Mood and Affect: Mood normal          Behavior: Behavior normal          Thought Content: Thought content normal          Judgment: Judgment normal          Patient's shoes and socks removed  Right Foot/Ankle   Right Foot Inspection  Skin Exam: skin normal and skin intact no dry skin, no warmth, no callus, no erythema, no maceration, no abnormal color, no pre-ulcer, no ulcer and no callus                          Toe Exam: no swelling, no tenderness, erythema and  no right toe deformity  Sensory   Vibration: intact    Monofilament testing: intact  Vascular  Capillary refills: < 3 seconds  The right DP pulse is 2+  The right PT pulse is 2+       Left Foot/Ankle  Left Foot Inspection  Skin Exam: skin normal and skin intactno dry skin, no warmth, no erythema, no maceration, normal color, no pre-ulcer, no ulcer and no callus                         Toe Exam: tenderness (small cyst between 4-5th digis, tender)no swelling, no erythema and no left toe deformity                   Sensory   Vibration: intact    Monofilament: intact  Vascular  Capillary refills: < 3 seconds  The left DP pulse is 2+  The left PT pulse is 2+  Assign Risk Category:  No deformity present; No loss of protective sensation;  No weak pulses       Risk: 0

## 2021-03-31 NOTE — PROGRESS NOTES
Assessment and Plan:     Problem List Items Addressed This Visit        Endocrine    Type 2 diabetes mellitus with diabetic chronic kidney disease ([facility] [address] ) - Primary     Stable, will repeat in 6 months  Lab Results   Component Value Date    HGBA1C 7 3 (H) 03/17/2021            Relevant Orders    Hemoglobin A1C       Cardiovascular and Mediastinum    Benign essential hypertension       Genitourinary    Stage 3b chronic kidney disease       Other    Hypercholesterolemia    Relevant Orders    Lipid Panel with Direct LDL reflex    Medicare annual wellness visit, subsequent    Laceration of left thumb    Ganglion cyst of left foot     Offered referral to podiatry - declined at this time           Other Visit Diagnoses     Screening PSA (prostate specific antigen)        Relevant Orders    PSA, Total Screen           Preventive health issues were discussed with patient, and age appropriate screening tests were ordered as noted in patient's After Visit Summary  Personalized health advice and appropriate referrals for health education or preventive services given if needed, as noted in patient's After Visit Summary  History of Present Illness:     Patient presents for Medicare Annual Wellness visit    Patient Care Team:  Shwetha Portillo MD as PCP - Jo-Ann Peñaloza MD as Consulting Physician     Problem List:     Patient Active Problem List   Diagnosis    Stage 3b chronic kidney disease    Type 2 diabetes mellitus with diabetic chronic kidney disease ([facility] )    Benign essential hypertension    Hypercholesterolemia    Statin intolerance    Degenerative cervical spinal stenosis    Overweight (BMI 25 0-29  9)    Medicare annual wellness visit, subsequent    Benign prostatic hyperplasia with lower urinary tract symptoms    Chronic kidney disease-mineral and bone disorder    Laceration of left thumb    Ganglion cyst of left foot      Past Medical and Surgical History:     Past Medical History:   Diagnosis Date  Asthma     last assessed: 2015    Balanitis     last assessed: 2014    Cataract     Chronic kidney disease     Chronic kidney disease, stage 3     last assessed: 2018    Diabetes mellitus [facility])     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([email or web address]     last assessed: 2018    Hypercholesterolemia     Hypertension     Pneumonia     last assessed: 2013     Past Surgical History:   Procedure Laterality Date    ABDOMINAL SURGERY      APPENDECTOMY  1965    CATARACT EXTRACTION     [address] HERNIA REPAIR  2007    x2    OTHER SURGICAL HISTORY  1965    Perforated duodenol ulcer surgery     AL REPAIR FLEXOR TENDON,HAND,W/O GRAFT,EA Left 3/17/2021    Procedure: Left thumb EPL repair;  Surgeon: César Diaz MD;  Location: [facility];  Service: Orthopedics      Family History:     Family History   Problem Relation Age of Onset    Pancreatic cancer Mother     Colon cancer Father     Diabetes Father         mellitus     No Known Problems Sister     Diabetes Sister     Nephrolithiasis Sister       Social History:     E-Cigarette/Vaping    E-Cigarette Use Never User      E-Cigarette/Vaping Substances    Nicotine No     THC No     CBD No     Flavoring No     Other No     Unknown No      Social History     Socioeconomic History    Marital status:      Spouse name: Not on file    Number of children: Not on file    Years of education: Not on file    Highest education level: Not on file   Occupational History    Occupation: Not employed - Retired    Social Needs    Financial resource strain: Not on file    Food insecurity     Worry: Not on file     Inability: Not on file   [employer] needs     Medical: Not on file     Non-medical: Not on file   Tobacco Use    Smoking status: Former Smoker     Packs/day: 2 00     Years: 10 00     Pack years: 20 00     Types: Cigarettes     Quit date:      Years since quittin 2    Smokeless tobacco: Former User     Quit date: 2007    Tobacco comment: former smoker noted in "allscripts" Quit in [address] - never used chewing tobacco    Substance and Sexual Activity    Alcohol use:  Yes     Alcohol/week: 1 0 standard drinks     Types: 1 Standard drinks or equivalent per week     Frequency: Monthly or less     Drinks per session: 1 or 2     Binge frequency: Never     Comment: rare  socially    Drug use: No    Sexual activity: Not on file   Lifestyle    Physical activity     Days per week: Not on file     Minutes per session: Not on file    Stress: Not on file   Relationships    Social connections     Talks on phone: Not on file     Gets together: Not on file     Attends Orthodox service: Not on file     Active member of club or organization: Not on file     Attends meetings of clubs or organizations: Not on file     Relationship status: Not on file    Intimate partner violence     Fear of current or ex partner: Not on file     Emotionally abused: Not on file     Physically abused: Not on file     Forced sexual activity: Not on file   Other Topics Concern    Not on file   Social History Narrative    Voluntary Childlessness     Advance directive on file - No     Exercises occasionally     Occasional caffeine consumption       Medications and Allergies:     Current Outpatient Medications   Medication Sig Dispense Refill    albuterol (PROVENTIL HFA,VENTOLIN HFA) 90 mcg/act inhaler inhale 2 puffs by mouth daily if needed for shortness of breath 8 5 g 1    ERYN MICROLET LANCETS lancets Test twice a day 100 each 3    glucose blood test strip TEST twice a day 100 each 5    glyBURIDE (DIABETA) 5 mg tablet Take 2 tablets (10 mg total) by mouth 2 (two) times a day 360 tablet 1    linaGLIPtin (Tradjenta) 5 MG TABS Take 5 mg by mouth daily 90 tablet 1    lisinopril-hydrochlorothiazide (PRINZIDE,ZESTORETIC) 20-25 MG per tablet take 1 tablet by mouth once daily 90 tablet 1    acetaminophen (TYLENOL) 500 mg tablet Take 1 500mg tablet in the morning prior to surgery, then 1 tablet every 6 hours for 5-7 days  (Patient not taking: Reported on 3/22/2021) 30 tablet 0     No current facility-administered medications for this visit  Allergies   Allergen Reactions    Meperidine     Pravastatin Myalgia      Immunizations:     Immunization History   Administered Date(s) Administered    Tdap 01/11/2018, 01/11/2018      Health Maintenance: There are no preventive care reminders to display for this patient  Topic Date Due    COVID-19 Vaccine (1) Never done    Pneumococcal Vaccine: 65+ Years (1 of 1 - PPSV23) Never done    Influenza Vaccine (1) Never done      Medicare Health Risk Assessment:     /74 (BP Location: Left arm, Patient Position: Sitting, Cuff Size: Adult)   Pulse 94   Temp (!) 97 4 °F (36 3 °C)   Resp 18   Ht 5' 10" (1 778 m)   Wt 84 2 kg (185 lb 9 6 oz)   SpO2 96%   BMI 26 63 kg/m²      Bree is here for his Subsequent Wellness visit  Last Medicare Wellness visit information reviewed, patient interviewed and updates made to the record today  Health Risk Assessment:   Patient rates overall health as good  Patient feels that their physical health rating is same  Patient is satisfied with their life  Eyesight was rated as slightly worse  Hearing was rated as slightly worse  Patient feels that their emotional and mental health rating is same  Patients states they are often angry  Patient states they are sometimes unusually tired/fatigued  Pain experienced in the last 7 days has been some  Patient's pain rating has been 8/10  Patient states that he has experienced no weight loss or gain in last 6 months  Feet pain     Depression Screening:   PHQ-2 Score: 0      Fall Risk Screening:    In the past year, patient has experienced: history of falling in past year    Number of falls: 2 or more  Injured during fall?: No    Feels unsteady when standing or walking?: Yes    Worried about falling?: Yes Home Safety:  Patient has trouble with stairs inside or outside of their home  Patient has working smoke alarms and has no working carbon monoxide detector  Home safety hazards include: none  Nutrition:   Current diet is Regular and Low Carb  Medications:   Patient is currently taking over-the-counter supplements  OTC medications include: see medication list  Patient is able to manage medications  Activities of Daily Living (ADLs)/Instrumental Activities of Daily Living (IADLs):   Walk and transfer into and out of bed and chair?: Yes  Dress and groom yourself?: Yes    Bathe or shower yourself?: Yes    Feed yourself?  Yes  Do your laundry/housekeeping?: Yes  Manage your money, pay your bills and track your expenses?: Yes  Make your own meals?: Yes    Do your own shopping?: Yes    Durable Medical Equipment Suppliers  cane sometimes     Previous Hospitalizations:   Any hospitalizations or ED visits within the last 12 months?: Yes    How many hospitalizations have you had in the last year?: 1-2    Advance Care Planning:   Living will: No    Durable POA for healthcare: No    Advanced directive: No    Five wishes given: Yes      Cognitive Screening:   Provider or family/friend/caregiver concerned regarding cognition?: Yes    Cognition Comments: Short term - does not want to see neurology at this point    PREVENTIVE SCREENINGS      Cardiovascular Screening:    General: Screening Not Indicated, History Lipid Disorder, Risks and Benefits Discussed and Screening Current      Diabetes Screening:     General: Screening Not Indicated, History Diabetes, Screening Current and Risks and Benefits Discussed      Colorectal Cancer Screening:     General: Risks and Benefits Discussed    Due for: Colonoscopy - Low Risk      Prostate Cancer Screening:    General: Screening Not Indicated      Osteoporosis Screening:    General: Screening Not Indicated      Abdominal Aortic Aneurysm (AAA) Screening:    Risk factors include: tobacco use        General: Screening Not Indicated      Lung Cancer Screening:     General: Screening Not Indicated      Hepatitis C Screening:    General: Screening Not Indicated    Screening, Brief Intervention, and Referral to Treatment (SBIRT)    Screening  Typical number of drinks in a day: 0  Typical number of drinks in a week: 0  Interpretation: Low risk drinking behavior  Single Item Drug Screening:  How often have you used an illegal drug (including marijuana) or a prescription medication for non-medical reasons in the past year? never    Single Item Drug Screen Score: 0  Interpretation: Negative screen for possible drug use disorder    Brief Intervention  Alcohol & drug use screenings were reviewed  No concerns regarding substance use disorder identified         Julianna Roche MD

## 2021-03-31 NOTE — PATIENT INSTRUCTIONS
Medicare Preventive Visit Patient Instructions  Thank you for completing your Welcome to Medicare Visit or Medicare Annual Wellness Visit today  Your next wellness visit will be due in one year (4/1/2022)  The screening/preventive services that you may require over the next 5-10 years are detailed below  Some tests may not apply to you based off risk factors and/or age  Screening tests ordered at today's visit but not completed yet may show as past due  Also, please note that scanned in results may not display below  Preventive Screenings:  Service Recommendations Previous Testing/Comments   Colorectal Cancer Screening  · Colonoscopy    · Fecal Occult Blood Test (FOBT)/Fecal Immunochemical Test (FIT)  · Fecal DNA/Cologuard Test  · Flexible Sigmoidoscopy Age: 54-65 years old   Colonoscopy: every 10 years (May be performed more frequently if at higher risk)  OR  FOBT/FIT: every 1 year  OR  Cologuard: every 3 years  OR  Sigmoidoscopy: every 5 years  Screening may be recommended earlier than age 48 if at higher risk for colorectal cancer  Also, an individualized decision between you and your healthcare provider will decide whether screening between the ages of 74-80 would be appropriate   Colonoscopy: 12/02/2014  FOBT/FIT: Not on file  Cologuard: Not on file  Sigmoidoscopy: Not on file          Prostate Cancer Screening Individualized decision between patient and health care provider in men between ages of 53-78   Medicare will cover every 12 months beginning on the day after your 50th birthday PSA: 1 8 ng/mL     Screening Not Indicated     Hepatitis C Screening Once for adults born between 1945 and 1965  More frequently in patients at high risk for Hepatitis C Hep C Antibody: Not on file        Diabetes Screening 1-2 times per year if you're at risk for diabetes or have pre-diabetes Fasting glucose: 160 mg/dL   A1C: 7 3 %    Screening Not Indicated  History Diabetes   Cholesterol Screening Once every 5 years if you don't have a lipid disorder  May order more often based on risk factors  Lipid panel: 03/17/2021    Screening Not Indicated  History Lipid Disorder      Other Preventive Screenings Covered by Medicare:  1  Abdominal Aortic Aneurysm (AAA) Screening: covered once if your at risk  You're considered to be at risk if you have a family history of AAA or a male between the age of 73-68 who smoking at least 100 cigarettes in your lifetime  2  Lung Cancer Screening: covers low dose CT scan once per year if you meet all of the following conditions: (1) Age 50-69; (2) No signs or symptoms of lung cancer; (3) Current smoker or have quit smoking within the last 15 years; (4) You have a tobacco smoking history of at least 30 pack years (packs per day x number of years you smoked); (5) You get a written order from a healthcare provider  3  Glaucoma Screening: covered annually if you're considered high risk: (1) You have diabetes OR (2) Family history of glaucoma OR (3)  aged 48 and older OR (3)  American aged 72 and older  3  Osteoporosis Screening: covered every 2 years if you meet one of the following conditions: (1) Have a vertebral abnormality; (2) On glucocorticoid therapy for more than 3 months; (3) Have primary hyperparathyroidism; (4) On osteoporosis medications and need to assess response to drug therapy  5  HIV Screening: covered annually if you're between the age of 12-76  Also covered annually if you are younger than 13 and older than 72 with risk factors for HIV infection  For pregnant patients, it is covered up to 3 times per pregnancy      Immunizations:  Immunization Recommendations   Influenza Vaccine Annual influenza vaccination during flu season is recommended for all persons aged >= 6 months who do not have contraindications   Pneumococcal Vaccine (Prevnar and Pneumovax)  * Prevnar = PCV13  * Pneumovax = PPSV23 Adults 25-60 years old: 1-3 doses may be recommended based on certain risk factors  Adults 72 years old: Prevnar (PCV13) vaccine recommended followed by Pneumovax (PPSV23) vaccine  If already received PPSV23 since turning 65, then PCV13 recommended at least one year after PPSV23 dose  Hepatitis B Vaccine 3 dose series if at intermediate or high risk (ex: diabetes, end stage renal disease, liver disease)   Tetanus (Td) Vaccine - COST NOT COVERED BY MEDICARE PART B Following completion of primary series, a booster dose should be given every 10 years to maintain immunity against tetanus  Td may also be given as tetanus wound prophylaxis  Tdap Vaccine - COST NOT COVERED BY MEDICARE PART B Recommended at least once for all adults  For pregnant patients, recommended with each pregnancy  Shingles Vaccine (Shingrix) - COST NOT COVERED BY MEDICARE PART B  2 shot series recommended in those aged 48 and above     Health Maintenance Due:  There are no preventive care reminders to display for this patient  Immunizations Due:      Topic Date Due    COVID-19 Vaccine (1) Never done    Pneumococcal Vaccine: 65+ Years (1 of 1 - PPSV23) Never done    Influenza Vaccine (1) Never done     Advance Directives   What are advance directives? Advance directives are legal documents that state your wishes and plans for medical care  These plans are made ahead of time in case you lose your ability to make decisions for yourself  Advance directives can apply to any medical decision, such as the treatments you want, and if you want to donate organs  What are the types of advance directives? There are many types of advance directives, and each state has rules about how to use them  You may choose a combination of any of the following:  · Living will: This is a written record of the treatment you want  You can also choose which treatments you do not want, which to limit, and which to stop at a certain time  This includes surgery, medicine, IV fluid, and tube feedings     · Durable power of  for [facility]): This is a written record that states who you want to make healthcare choices for you when you are unable to make them for yourself  This person, called a proxy, is usually a family member or a friend  You may choose more than 1 proxy  · Do not resuscitate (DNR) order:  A DNR order is used in case your heart stops beating or you stop breathing  It is a request not to have certain forms of treatment, such as CPR  A DNR order may be included in other types of advance directives  · Medical directive: This covers the care that you want if you are in a coma, near death, or unable to make decisions for yourself  You can list the treatments you want for each condition  Treatment may include pain medicine, surgery, blood transfusions, dialysis, IV or tube feedings, and a ventilator (breathing machine)  · Values history: This document has questions about your views, beliefs, and how you feel and think about life  This information can help others choose the care that you would choose  Why are advance directives important? An advance directive helps you control your care  Although spoken wishes may be used, it is better to have your wishes written down  Spoken wishes can be misunderstood, or not followed  Treatments may be given even if you do not want them  An advance directive may make it easier for your family to make difficult choices about your care  Fall Prevention    Fall prevention  includes ways to make your home and other areas safer  It also includes ways you can move more carefully to prevent a fall  Health conditions that cause changes in your blood pressure, vision, or muscle strength and coordination may increase your risk for falls  Medicines may also increase your risk for falls if they make you dizzy, weak, or sleepy  Fall prevention tips:   · Stand or sit up slowly  · Use assistive devices as directed  · Wear shoes that fit well and have soles that       · Wear a personal alarm  · Stay active  · Manage your medical conditions  Home Safety Tips:  · Add items to prevent falls in the bathroom  · Keep paths clear  · Install bright lights in your home  · Keep items you use often on shelves within reach  · Paint or place reflective tape on the edges of your stairs  Weight Management   Why it is important to manage your weight:  Being overweight increases your risk of health conditions such as heart disease, high blood pressure, type 2 diabetes, and certain types of cancer  It can also increase your risk for osteoarthritis, sleep apnea, and other respiratory problems  Aim for a slow, steady weight loss  Even a small amount of weight loss can lower your risk of health problems  How to lose weight safely:  A safe and healthy way to lose weight is to eat fewer calories and get regular exercise  You can lose up about 1 pound a week by decreasing the number of calories you eat by 500 calories each day  Healthy meal plan for weight management:  A healthy meal plan includes a variety of foods, contains fewer calories, and helps you stay healthy  A healthy meal plan includes the following:  · Eat whole-grain foods more often  A healthy meal plan should contain fiber  Fiber is the part of grains, fruits, and vegetables that is not broken down by your body  Whole-grain foods are healthy and provide extra fiber in your diet  Some examples of whole-grain foods are whole-wheat breads and pastas, oatmeal, brown rice, and bulgur  · Eat a variety of vegetables every day  Include dark, leafy greens such as spinach, kale, anderson greens, and mustard greens  Eat yellow and orange vegetables such as carrots, sweet potatoes, and winter squash  · Eat a variety of fruits every day  Choose fresh or canned fruit (canned in its own juice or light syrup) instead of juice  Fruit juice has very little or no fiber  · Eat low-fat dairy foods    Drink fat-free (skim) milk or 1% milk  Eat fat-free yogurt and low-fat cottage cheese  Try low-fat cheeses such as mozzarella and other reduced-fat cheeses  · Choose meat and other protein foods that are low in fat  Choose beans or other legumes such as split peas or lentils  Choose fish, skinless poultry (chicken or turkey), or lean cuts of red meat (beef or pork)  Before you cook meat or poultry, cut off any visible fat  · Use less fat and oil  Try baking foods instead of frying them  Add less fat, such as margarine, sour cream, regular salad dressing and mayonnaise to foods  Eat fewer high-fat foods  Some examples of high-fat foods include french fries, doughnuts, ice cream, and cakes  · Eat fewer sweets  Limit foods and drinks that are high in sugar  This includes candy, cookies, regular soda, and sweetened drinks  Exercise:  Exercise at least 30 minutes per day on most days of the week  Some examples of exercise include walking, biking, dancing, and swimming  You can also fit in more physical activity by taking the stairs instead of the elevator or parking farther away from stores  Ask your healthcare provider about the best exercise plan for you  © Copyright [employer] 2018 Information is for End User's use only and may not be sold, redistributed or otherwise used for commercial purposes   All illustrations and images included in CareNotes® are the copyrighted property of A [employer] M [employer]  or [address] [place] [address] [employer]

## 2021-04-01 ENCOUNTER — OFFICE VISIT (OUTPATIENT)
Dept: OBGYN CLINIC | Facility: CLINIC | Age: 76
End: 2021-04-01

## 2021-04-01 VITALS
HEART RATE: 87 BPM | HEIGHT: 70 IN | BODY MASS INDEX: 27.2 KG/M2 | SYSTOLIC BLOOD PRESSURE: 119 MMHG | DIASTOLIC BLOOD PRESSURE: 68 MMHG | WEIGHT: 190 LBS

## 2021-04-01 DIAGNOSIS — S61.112D LACERATION OF LEFT THUMB WITHOUT FOREIGN BODY WITH DAMAGE TO NAIL, SUBSEQUENT ENCOUNTER: Primary | ICD-10-CM

## 2021-04-01 PROBLEM — S61.112A LACERATION OF LEFT THUMB WITHOUT FOREIGN BODY WITH DAMAGE TO NAIL: Status: ACTIVE | Noted: 2021-03-16

## 2021-04-01 PROCEDURE — 99024 POSTOP FOLLOW-UP VISIT: CPT | Performed by: ORTHOPAEDIC SURGERY

## 2021-04-01 NOTE — PROGRESS NOTES
SUBJECTIVE:  Bree Sharma is a 67y o  year old male who presents for follow up after surgery,  Left thumb EPL tendon repair performed on  3/17/2021  Today patient has  Been going to physical therapy of which she has been wearing his splint  He has been working on gentle glides of the IP joint  He denies any numbness or tingling  VITALS:  Vitals:    04/01/21 0903   BP: 119/68   Pulse: 87       PHYSICAL EXAMINATION:  General: well developed and well nourished, alert, oriented times 3 and appears comfortable  Psychiatric: Normal    MUSCULOSKELETAL EXAMINATION:   left thumb  Incision: Clean, dry, with sutures intact  Range of Motion:  Decreased range of motion at the IP joint which is normal for postoperative range of motion given extensor tendon repair  Neurovascular status: Neuro intact, good cap refill      STUDIES REVIEWED:  No studies reviewed  PROCEDURES PERFORMED:  Procedures  No Procedures performed today      ASSESSMENT/PLAN:    S/P  Left thumb EPL tendon repair  Done today: Sutures out  -  Patient was advised to continue wearing the splint which can be removed for hygiene purposes  -  He can continue working with occupational therapy with gentle  glides of the IP joint   -  He will follow-up with us in 6 weeks for re-evaluation    FOLLOW UP:  Return in about 6 weeks (around 5/13/2021)  Work/school status:   no restrictions      TO DO AT NEXT VISIT:  Re-evaluation of current issue      Scribe Attestation    I,:  Ania Luther PA-C am acting as a scribe while in the presence of the attending physician :       I,:  Alexis Christopher MD personally performed the services described in this documentation    as scribed in my presence :           Portions of the record may have been created with voice recognition software  Occasional wrong word or "sound a like" substitutions may have occurred due to the inherent limitations of voice recognition software    Read the chart carefully and recognize, using context, where substitutions have occurred

## 2021-04-06 ENCOUNTER — OFFICE VISIT (OUTPATIENT)
Dept: OCCUPATIONAL THERAPY | Facility: CLINIC | Age: 76
End: 2021-04-06
Payer: COMMERCIAL

## 2021-04-06 DIAGNOSIS — S61.012D LACERATION OF LEFT THUMB WITHOUT FOREIGN BODY WITHOUT DAMAGE TO NAIL, SUBSEQUENT ENCOUNTER: Primary | ICD-10-CM

## 2021-04-06 PROCEDURE — 97110 THERAPEUTIC EXERCISES: CPT

## 2021-04-06 PROCEDURE — 97140 MANUAL THERAPY 1/> REGIONS: CPT

## 2021-04-06 NOTE — PROGRESS NOTES
Daily Note     Today's date: 2021  Patient name: Chris Harris  : 1945  MRN: [id number]  Referring provider: Christine Mirza  Dx:   Encounter Diagnosis     ICD-10-CM    1  Laceration of left thumb without foreign body without damage to nail, subsequent encounter  S61 012D                   Subjective: My hand sweats a lot in the splint      Objective: See treatment diary below  EPL repair is intact    Assessment: Tolerated treatment well  Patient exhibited good technique with therapeutic exercises  Patient instructed to use talcom powder in splint to decrease moisture  Adjusted splint to make larger holes in splint for better air flow  Good gliding noted in tendon  Plan: Continue per plan of care  Precautions EPL repair zone 3 3/17/21  DM, HTN, asthma, kidney disease  Week 1-2: PROM IP 0-30 with proximal ext, MP 0-30 w/ wrist and IP extended  Week 3-4:  Active IP flexion to 30 with passive ext to 0; cont with passive  Week 5-6:  Gradually increase active IP and MP flexion with proximal joints extended  Consult with MD about cutting down splint  Week 6:  Wean from splint  Week 8-10 strengthening       Manuals 3/23 3/25 3/29 4/6    Dressing chg Adaptic, hilda Cleaned   Dressed w/adaptic, hilda Cleaned, hilda DC    MEM  10' 10' 5'    Scar massage    5'            Ortho fit/train 3/23   4/6     Forearm based thumb spica full ext incl IP   Made larger holes in splint                                                    Ther Ex  3/25 3/29 4/6    PROM IP  0-30 flex 5' 0-30 5' 0-40 5'    PROM MP  0-30 flex 5' 0-30 5' 0-40 5'    PROM wrist  45 ext to 10 flex 5' 45 ext to 20 flex 5' 45 ext to 30 flex 5'    Active IP flex/passive ext  20 flex 5' 30 degrees flex 5' 30 degrees 5'    AROM digits  5' 5' x10    Passive retropulsion to active neutral CMC    x20                    Ther Activity                        HEP 3/23 3/25 3/30 4     Wound care; tendon repair precautions Reviewed Active IP flex to 30 w/ passive ext; blocking splint Wrist in 45 ext, passive retropulsion, active thumb to index w/ IP/MP ext            Modalities  3/25 3/30     MHP thumb and wrist in full ext  5' 5'

## 2021-04-08 ENCOUNTER — OFFICE VISIT (OUTPATIENT)
Dept: OCCUPATIONAL THERAPY | Facility: CLINIC | Age: 76
End: 2021-04-08
Payer: COMMERCIAL

## 2021-04-08 DIAGNOSIS — S61.012D LACERATION OF LEFT THUMB WITHOUT FOREIGN BODY WITHOUT DAMAGE TO NAIL, SUBSEQUENT ENCOUNTER: Primary | ICD-10-CM

## 2021-04-08 PROCEDURE — 97110 THERAPEUTIC EXERCISES: CPT

## 2021-04-08 PROCEDURE — 97140 MANUAL THERAPY 1/> REGIONS: CPT

## 2021-04-08 NOTE — PROGRESS NOTES
Daily Note     Today's date: 2021  Patient name: Marva Conroy  : 1945  MRN: [id number]  Referring provider: Shawn Ulrich  Dx:   Encounter Diagnosis     ICD-10-CM    1  Laceration of left thumb without foreign body without damage to nail, subsequent encounter  S61 012D                   Subjective: My thumb feels fine  Objective: See treatment diary below  EPL repair is intact    Assessment: Tolerated treatment well  Patient exhibited good technique with therapeutic exercises  Scar is intact and supple  Plan: Continue per plan of care  Precautions EPL repair zone 3 3/17/21  DM, HTN, asthma, kidney disease  Week 1-2: PROM IP 0-30 with proximal ext, MP 0-30 w/ wrist and IP extended  Week 3-4:  Active IP flexion to 30 with passive ext to 0; cont with passive  Week 5-6:  Gradually increase active IP and MP flexion with proximal joints extended  Consult with MD about cutting down splint  Week 6:  Wean from splint  Week 8-10 strengthening       Manuals 3/23 3/25 3/29 4/6 4/8   Dressing chg Adaptic, hilda Cleaned   Dressed w/adaptic, hilda Cleaned, hilda DC    MEM  10' 10' 5' 5'   Scar massage    5' 5'           Ortho fit/train 3/23   4/6     Forearm based thumb spica full ext incl IP   Made larger holes in splint                                                    Ther Ex  3/25 3/29 4/6 4/8   PROM IP  0-30 flex 5' 0-30 5' 0-40 5' 0-40 5'   PROM MP  0-30 flex 5' 0-30 5' 0-40 5' 0-40 5'   PROM wrist  45 ext to 10 flex 5' 45 ext to 20 flex 5' 45 ext to 30 flex 5' 45 ext to 30 flex 5'   Active IP flex/passive ext  20 flex 5' 30 degrees flex 5' 30 degrees 5' 30 degrees 5'   AROM digits  5' 5' x10 x20   Passive retropulsion to active neutral CMC    x20 x20                   Ther Activity                        HEP 3/23 3/25 3/30 4/6     Wound care; tendon repair precautions Reviewed Active IP flex to 30 w/ passive ext; blocking splint Wrist in 45 ext, passive retropulsion, active thumb to index w/ IP/MP ext            Modalities  3/25 3/30  4/8   MHP thumb and wrist in full ext  5' 5'  5'

## 2021-04-13 ENCOUNTER — OFFICE VISIT (OUTPATIENT)
Dept: OCCUPATIONAL THERAPY | Facility: CLINIC | Age: 76
End: 2021-04-13
Payer: COMMERCIAL

## 2021-04-13 DIAGNOSIS — S61.012D LACERATION OF LEFT THUMB WITHOUT FOREIGN BODY WITHOUT DAMAGE TO NAIL, SUBSEQUENT ENCOUNTER: Primary | ICD-10-CM

## 2021-04-13 PROCEDURE — 97110 THERAPEUTIC EXERCISES: CPT

## 2021-04-13 PROCEDURE — 97140 MANUAL THERAPY 1/> REGIONS: CPT

## 2021-04-13 NOTE — PROGRESS NOTES
Daily Note     Today's date: 2021  Patient name: Clark Winkler  : 1945  MRN: [id number]  Referring provider: Sameer Perez  Dx:   Encounter Diagnosis     ICD-10-CM    1  Laceration of left thumb without foreign body without damage to nail, subsequent encounter  S61 012D                   Subjective: The tip of my thumb is tight      Objective: See treatment diary below  EPL repair is intact    Assessment: Tolerated treatment well  Patient exhibited good technique with therapeutic exercises  Patient now 4 weeks post op  Allowing thumb opposition to index and middle fingers  Active thumb IP motion 0/30  Will gradually increase thumb IP flexion      Plan: Continue per plan of care  Precautions EPL repair zone 3 3/17/21  DM, HTN, asthma, kidney disease  Week 1-2: PROM IP 0-30 with proximal ext, MP 0-30 w/ wrist and IP extended  Week 3-4:  Active IP flexion to 30 with passive ext to 0; cont with passive  Week 5-6:  Gradually increase active IP and MP flexion with proximal joints extended  Consult with MD about cutting down splint  Week 6:  Wean from splint  Week 8-10 strengthening       Manuals 4/13 3/25 3/29 4/6 4/8   Dressing chg  Cleaned   Dressed w/adaptic, hilda Cleaned, hilda DC    MEM 5' 10' 10' 5' 5'   Scar massage 5'   5' 5'           Ortho fit/train      Continue with splint   Made larger holes in splint                                                    Ther Ex 4/13 3/25 3/29 4/6 4/8   PROM IP 0-40 0-30 flex 5' 0-30 5' 0-40 5' 0-40 5'   PROM MP 0-40 0-30 flex 5' 0-30 5' 0-40 5' 0-40 5'   PROM wrist Tenodesis 45/45 active 45 ext to 10 flex 5' 45 ext to 20 flex 5' 45 ext to 30 flex 5' 45 ext to 30 flex 5'   Active IP flex/passive ext 0-30 flex 20 flex 5' 30 degrees flex 5' 30 degrees 5' 30 degrees 5'   AROM digits x10 5' 5' x10 x20   Passive retropulsion to active neutral CMC x20   x20 x20   AROM thumb add/abd x20       Opposition IF, MF x 20       Ther Activity HEP 4/13 3/25 3/30 4/6     Active thumb abd/add and opposition to index and middle fingers Reviewed Active IP flex to 30 w/ passive ext; blocking splint Wrist in 45 ext, passive retropulsion, active thumb to index w/ IP/MP ext            Modalities 4/13 3/25 3/30  4/8   MHP thumb and wrist in full ext 5' 5' 5'  5'

## 2021-04-15 ENCOUNTER — OFFICE VISIT (OUTPATIENT)
Dept: OCCUPATIONAL THERAPY | Facility: CLINIC | Age: 76
End: 2021-04-15
Payer: COMMERCIAL

## 2021-04-15 DIAGNOSIS — S61.012D LACERATION OF LEFT THUMB WITHOUT FOREIGN BODY WITHOUT DAMAGE TO NAIL, SUBSEQUENT ENCOUNTER: Primary | ICD-10-CM

## 2021-04-15 PROCEDURE — 97110 THERAPEUTIC EXERCISES: CPT

## 2021-04-15 PROCEDURE — 97140 MANUAL THERAPY 1/> REGIONS: CPT

## 2021-04-15 PROCEDURE — 97763 ORTHC/PROSTC MGMT SBSQ ENC: CPT

## 2021-04-15 NOTE — PROGRESS NOTES
OT Re-Evaluation     Today's date: 4/15/2021  Patient name: Lawanda Guaman  : 1945  MRN: [id number]  Referring provider: Ginette Saint  Dx:   Encounter Diagnosis     ICD-10-CM    1  Laceration of left thumb without foreign body without damage to nail, subsequent encounter  S61 012D                   Assessment  Assessment details: This is a 67year old, RHD male being seen in OT following and EPL repair of the left thumb on 3/17/21  Patient presented this date in post op dressing/splint  This was removed  Incision is healing well with no signs or symptoms of infection  Wound dressed with adaptic and hilda  Patient educated in wound care  Wear the splint in the shower with a plastic bag over it  Wash hand with soap and water, but keep thumb and wrist in extended position  Fabricated and issued custom forearm based thumb spica with with wrist in 10 degrees extension and thumb, including IP, in full extension  Wear splint at all times except to wash the hand  The EPL repair is intact and patient was able to actively hold the thumb in full extension with place and hold exercise  Flexion not tested at this time  AROM wrist not tested  Digits 2-5 WNL  Patient is a good candidate for OT services to restore ROM and strength in the left thumb and hand following EPL repair protocol  4/15/21:  Patient attended / OT session this treatment period  He is independent in HEP and splint use  Patient is 4 weeks s/p EPL repair at zone 3  The repair is intact  Patient presents this date with thumb extension WNL at the [facility] and MP joints  He is -5 at the IP joint  Good composite thumb extension  Active flexion of the thumb is 50% normal at this time  Mild edema noted  Pain is decreasing  Splint re molded this date to allow IP flexion during the day and IP extension at night with MP, CMC, and wrist still held in extension    Continue OT 2x/wk to gradually increase active and passive thumb flexion and extension  Begin to wean from splint at 6 weeks post op  Continue OT 2x/wk x 8 weeks  Impairments: abnormal coordination, abnormal or restricted ROM, activity intolerance, impaired physical strength and lacks appropriate home exercise program  Other impairment: Edema thumb  Functional limitations: No use of the right thumb for daily tasks  No lifting with left hand over 1 lb  Symptom irritability: lowBarriers to therapy: DM, HTN, asthma, kidney disease  Understanding of Dx/Px/POC: excellent   Prognosis: good    Goals  STG ( 4 weeks)  1  Patient will be independent in wound care, splint use for HEP  MET  2  Patient will demonstrate full wound healing  MET  3  Patient will report a maximum pain level of 4/10 in the left hand  MET  4  Patient will demonstrate AROM thumb IP to 30 degrees flexion and full extension  MET  LTGs ( 12 weeks)  1  Patient will demonstrate independence in HEP to maintain left hand ROM and strength to WNL  ON GOING  2  Patient will report a maximum pain level of 2/10 during daily tasks  NOT MET  3  Patient will demonstrate AROM in the left wrist to WNL for independence in self care  NOT MET  4  Patient will demonstrate AROM in the left thumb to 100 degrees GARCÍA to fasten clothing  NOT MET  5  Patient will demonstrate left hand  and pinch strength within 40% of the right hand to be MI for IADL tasks    NOT ADDRESSED    Plan  Plan details: 4/15/21:  Continue OT 2x x 8 weeks  Patient would benefit from: custom splinting, OT eval and skilled occupational therapy  Planned modality interventions: thermotherapy: hydrocollator packs, cryotherapy and ultrasound  Planned therapy interventions: activity modification, compression, dressing changes, fine motor coordination training, graded activity, graded exercise, home exercise program, therapeutic exercise, therapeutic activities, patient education, orthotic fitting/training, manual therapy and strengthening  Other planned therapy interventions: scar and edema mgt  Frequency: 2x week  Duration in weeks: 12  Plan of Care beginning date: 3/23/2021  Plan of Care expiration date: 2021  Treatment plan discussed with: patient        Subjective Evaluation    History of Present Illness  Date of onset: 3/11/2021  Date of surgery: 3/17/2021  Mechanism of injury: surgery and trauma  Mechanism of injury: Patient reports that on 3/11/21 he was cutting drywall and the utility knife slipped and lacerated his left thumb  He went to the ER the next day  Had a surgical repair of the EPL on 3/17/21 and now presents in post op thumb spica for OT evaluation and treatment  4/15/21:  I don't have any pain  I think everything is going along well  The tip of my thumb is stiff  Quality of life: good    Pain  Current pain ratin  At best pain ratin  At worst pain ratin  Location: Left thumb  Quality: dull ache and tight  Relieving factors: rest  Exacerbated by: stretching the thumb  Progression: improved    Social Support  Lives with: spouse    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)  Hand dominance: right    Treatments  Current treatment: occupational therapy  Patient Goals  Patient goals for therapy: decreased edema, decreased pain, increased motion, increased strength and independence with ADLs/IADLs  Patient goal: "Be able to use my thumb"        Objective     Observations     Left Wrist/Hand   Positive for adhesive scar and edema  Negative for drainage  Additional Observation Details  3/23/21: In post op thumb spica  4/15/21:  Wound fully healed    Some scar adherence    Neurological Testing     Sensation     Wrist/Hand   Left   Intact: light touch    Active Range of Motion     Left Wrist   Wrist flexion: 50 degrees   Wrist extension: 50 degrees   Radial deviation: 20 degrees   Ulnar deviation: 30 degrees     Left Thumb   Flexion     CMC: 0 degrees    MP: 35 degrees    DIP: 30 degrees  Extension     DIP: -5 degrees  Palmar Abduction     CMC: 45 degrees  Radial abduction    CMC: 45 degrees  Opposition: 3/23/21:  Patient able to achieve full thumb extension in Aia 16, MP, and IP with place and hold exercise  EPL repair is intact  Flexion not tested per protocol    4/15/21:  GARCÍA = 60  Opposition 5/10 Kapandji Scale    Additional Active Range of Motion Details  3/23/21:  Not tested per protocol  3/23/21:  AROM left digits 2-5 WNL    Swelling     Left Wrist/Hand   Thumb     Proximal: 7 5 cm    Distal: 7 cm    Right Wrist/Hand   Thumb     Proximal: 7 cm    Distal: 7 cm             Precautions EPL repair zone 3 3/17/21  DM, HTN, asthma, kidney disease  Week 1-2: PROM IP 0-30 with proximal ext, MP 0-30 w/ wrist and IP extended  Week 3-4:  Active IP flexion to 30 with passive ext to 0; cont with passive  Week 5-6:  Gradually increase active IP and MP flexion with proximal joints extended  Week 6:  Wean from splint  Week 8-10 strengthening       Manuals 4/13 4/15 3/29 4/6 4/8   Dressing chg   Cleaned, hilda DC    MEM 5' 5' 10' 5' 5'   Scar massage 5' 5'  5' 5'           Ortho fit/train 4/13 4/15  4/6     Continue with splint Cut down splint to allow IP flexion hourly    Strap in extension at night  Made larger holes in splint                                                    Ther Ex 4/13 4/15 3/29 4/6 4/8   PROM IP 0-40 0-40 flex 5' 0-30 5' 0-40 5' 0-40 5'   PROM MP 0-40 0-40 flex 5' 0-30 5' 0-40 5' 0-40 5'   PROM wrist Tenodesis 45/45 active Active tenodesis 45/45 45 ext to 20 flex 5' 45 ext to 30 flex 5' 45 ext to 30 flex 5'   Active IP flex/passive ext 0-30 flex 0-30  flex 5' 30 degrees flex 5' 30 degrees 5' 30 degrees 5'   AROM digits x10 x10 5' x10 x20   Passive retropulsion to active neutral CMC x20 x20  x20 x20   AROM thumb add/abd x20 x20      Opposition IF, MF x 20 All digits x 20      Ther Activity                        HEP 4/13 4/15 3/30 4/6     Active thumb abd/add and opposition to index and middle fingers Active opposition and tenodesis    Active IP flexion in splint 20x/hrly Active IP flex to 30 w/ passive ext; blocking splint Wrist in 45 ext, passive retropulsion, active thumb to index w/ IP/MP ext            Modalities 4/13 4/15 3/30  4/8   MHP thumb and wrist in full ext 5' 5' 5'  5'

## 2021-04-20 ENCOUNTER — OFFICE VISIT (OUTPATIENT)
Dept: OCCUPATIONAL THERAPY | Facility: CLINIC | Age: 76
End: 2021-04-20
Payer: COMMERCIAL

## 2021-04-20 DIAGNOSIS — S61.012D LACERATION OF LEFT THUMB WITHOUT FOREIGN BODY WITHOUT DAMAGE TO NAIL, SUBSEQUENT ENCOUNTER: Primary | ICD-10-CM

## 2021-04-20 PROCEDURE — 97110 THERAPEUTIC EXERCISES: CPT | Performed by: OCCUPATIONAL THERAPIST

## 2021-04-20 PROCEDURE — 97140 MANUAL THERAPY 1/> REGIONS: CPT | Performed by: OCCUPATIONAL THERAPIST

## 2021-04-20 NOTE — PROGRESS NOTES
Daily Note     Today's date: 2021  Patient name: Dickson Almazan  : 1945  MRN: [id number]  Referring provider: Shabbir Oneal PA-C  Dx:   No diagnosis found  Subjective: The tip of my thumb is tight      Objective: See treatment diary below  EPL repair is intact    Assessment: Tolerated treatment well  Patient exhibited good technique with therapeutic exercises  Patient now 4 weeks post op  Allowing thumb opposition to index and middle fingers  Active thumb IP motion 0/30  Will gradually increase thumb IP flexion      Plan: Continue per plan of care  Precautions EPL repair zone 3 3/17/21  DM, HTN, asthma, kidney disease  Week 1-2: PROM IP 0-30 with proximal ext, MP 0-30 w/ wrist and IP extended  Week 3-4:  Active IP flexion to 30 with passive ext to 0; cont with passive  Week 5-6:  Gradually increase active IP and MP flexion with proximal joints extended  Consult with MD about cutting down splint  Week 6:  Wean from splint  Week 8-10 strengthening         Manuals 4/13 4/15   4/20 4/6 4/8   Dressing chg    DC    MEM 5' 5' 5' 5' 5'   Scar massage 5' 5' 5' 5' 5'           Ortho fit/train 4/13 4/15 4/20 4/6     Continue with splint Cut down splint to allow IP flexion hourly  Strap in extension at night Cont  With splint    Kinesiotape  Scar post care   Made larger holes in splint                                                    Ther Ex 4/13 4/15  4/20 4/6 4/8   PROM IP 0-40 0-40 flex 5' 0-30 5' 0-40 5' 0-40 5'   PROM MP 0-40 0-40 flex 5' 0-30 5' 0-40 5' 0-40 5'   PROM wrist Tenodesis 45/45 active Active tenodesis 45/45 45 ext to 20 flex 5' 45 ext to 30 flex 5' 45 ext to 30 flex 5'   Active IP flex/passive ext 0-30 flex 0-30  flex 5' 30 degrees flex 5' 30 degrees 5' 30 degrees 5'   AROM digits x10 x10 x10 x10 x20   Passive retropulsion to active neutral CMC x20 x20 x20 x20 x20   AROM thumb add/abd x20 x20 x20     Opposition IF, MF x 20 All digits x 20 x20     Ther Activity HEP 4/13 4/15  4/20 4/6     Active thumb abd/add and opposition to index and middle fingers Active opposition and tenodesis    Active IP flexion in splint 20x/hrly Active IP flex to 30 w/ passive ext; blocking splint Wrist in 45 ext, passive retropulsion, active thumb to index w/ IP/MP ext            Modalities 4/13 4/15  4/20  4/8   MHP thumb and wrist in full ext 5' 5' 5'  5'

## 2021-04-23 DIAGNOSIS — E11.8 TYPE 2 DIABETES MELLITUS WITH COMPLICATION, WITHOUT LONG-TERM CURRENT USE OF INSULIN (HCC): ICD-10-CM

## 2021-04-23 RX ORDER — LINAGLIPTIN 5 MG/1
TABLET, FILM COATED ORAL
Qty: 90 TABLET | Refills: 1 | Status: SHIPPED | OUTPATIENT
Start: 2021-04-23 | End: 2021-10-25

## 2021-04-27 ENCOUNTER — OFFICE VISIT (OUTPATIENT)
Dept: OCCUPATIONAL THERAPY | Facility: CLINIC | Age: 76
End: 2021-04-27
Payer: COMMERCIAL

## 2021-04-27 DIAGNOSIS — S61.012D LACERATION OF LEFT THUMB WITHOUT FOREIGN BODY WITHOUT DAMAGE TO NAIL, SUBSEQUENT ENCOUNTER: Primary | ICD-10-CM

## 2021-04-27 PROCEDURE — 97110 THERAPEUTIC EXERCISES: CPT

## 2021-04-27 PROCEDURE — 97530 THERAPEUTIC ACTIVITIES: CPT

## 2021-04-27 NOTE — PROGRESS NOTES
Daily Note     Today's date: 2021  Patient name: Ian Jeff  : 1945  MRN: [id number]  Referring provider: Kris Landau, PA-C  Dx:   Encounter Diagnosis     ICD-10-CM    1  Laceration of left thumb without foreign body without damage to nail, subsequent encounter  S61 012D                   Subjective: My shoulder is sore from not using my arm  Objective: See treatment diary below  EPL repair intact  AROM wrist ext = 65; flex = 65; UD = 30;  RD = 25  AROM thumb CMC  +50/0   MP 0/45   IP 0/15  GARCÍA = 60    Assessment: Tolerated treatment well  Patient exhibited good technique with therapeutic exercises  Initiated unrestricted AROM  Continue with splint at night and for heavy activities  Begin to wean from splint      Plan: Continue per plan of care  Precautions EPL repair zone 3 3/17/21  DM, HTN, asthma, kidney disease  Week 1-2: PROM IP 0-30 with proximal ext, MP 0-30 w/ wrist and IP extended  Week 3-4:  Active IP flexion to 30 with passive ext to 0; cont with passive  Week 5-6:  Gradually increase active IP and MP flexion with proximal joints extended  Consult with MD about cutting down splint  Week 6:  Wean from splint  Week 8-10 strengthening         Manuals 4/13 4/15   4/20 4/27 4/8   Dressing chg    DC    MEM 5' 5' 5' 5' 5'   Scar massage 5' 5' 5' 5' 5'           Ortho fit/train 4/13 4/15 4/20 4/27     Continue with splint Cut down splint to allow IP flexion hourly  Strap in extension at night Cont  With splint    Kinesiotape  Scar post care   Wear at night and during heavy activities                                                    Ther Ex 4/13 4/15  4/20 4/27 48   PROM IP 0-40 0-40 flex 5' 0-30 5' 5'  0-40 5'   PROM MP 0-40 0-40 flex 5' 0-30 5' 5' 0-40 5'   PROM wrist Tenodesis 45/45 active Active tenodesis 45/45 45 ext to 20 flex 5' AROM x 20 all planes 45 ext to 30 flex 5'   Active IP flex/passive ext 0-30 flex 0-30  flex 5' 30 degrees flex 5' x20 30 degrees 5'   AROM digits x10 x10 x10 x10 x20   Passive retropulsion to active neutral CMC x20 x20 x20 DC x20   AROM thumb add/abd x20 x20 x20 AROM thumb all planes x20 ea    Opposition IF, MF x 20 All digits x 20 x20 x20    Ther Activity    4/27    Opposition    Buttons            HEP 4/13 4/15  4/20 4/27     Active thumb abd/add and opposition to index and middle fingers Active opposition and tenodesis  Active IP flexion in splint 20x/hrly Active IP flex to 30 w/ passive ext; blocking splint Remove splint for AROM thumb and wrist 3x/day               Modalities 4/13 4/15  4/20 4/27 4/8   MHP thumb and wrist in full ext 5' 5' 5' 5' 5'

## 2021-04-29 ENCOUNTER — OFFICE VISIT (OUTPATIENT)
Dept: OCCUPATIONAL THERAPY | Facility: CLINIC | Age: 76
End: 2021-04-29
Payer: COMMERCIAL

## 2021-04-29 DIAGNOSIS — S61.012D LACERATION OF LEFT THUMB WITHOUT FOREIGN BODY WITHOUT DAMAGE TO NAIL, SUBSEQUENT ENCOUNTER: Primary | ICD-10-CM

## 2021-04-29 PROCEDURE — 97530 THERAPEUTIC ACTIVITIES: CPT

## 2021-04-29 PROCEDURE — 97140 MANUAL THERAPY 1/> REGIONS: CPT

## 2021-04-29 PROCEDURE — 97110 THERAPEUTIC EXERCISES: CPT

## 2021-04-29 NOTE — PROGRESS NOTES
Occupational Therapy Daily Note:    Today's date: 2021  Patient name: Fatmata Poon  : 1945  MRN: [id number]  Referring provider: Mu Nelson  Dx:   Encounter Diagnosis   Name Primary?  Laceration of left thumb without foreign body without damage to nail, subsequent encounter Yes            Pt was treated by GIO Ely and treatment was supervised and documentation was reviewed by Darnell Agosto OTR/L       Subjective: "I can't control the tremoring " "I'm having hypersensitivity at the tip of my thumb "    Objective: See treatment diary below  EPL repair intact    Assessment: Tolerated treatment well  Pt demo tremoring throughout and difficulty w/sustained grasp of pen  Patient would benefit from continued skilled OT  Plan: Continued skilled OT per POC  Precautions EPL repair zone 3 3/17/21  DM, HTN, asthma, kidney disease  Week 1-2: PROM IP 0-30 with proximal ext, MP 0-30 w/ wrist and IP extended  Week 3-4:  Active IP flexion to 30 with passive ext to 0; cont with passive  Week 5-6:  Gradually increase active IP and MP flexion with proximal joints extended  Consult with MD about cutting down splint  Week 6:  Wean from splint  Week 8-10 strengthening         Manuals 4/13 4/15   4/20 4/27 4/29   Dressing chg    DC    MEM 5' 5' 5' 5' 5'   Scar massage 5' 5' 5' 5' 5'           Ortho fit/train 4/13 4/15 4/20 4/27 4/29    Continue with splint Cut down splint to allow IP flexion hourly  Strap in extension at night Cont  With splint    Kinesiotape  Scar post care   Wear at night and during heavy activities Weaning off splint during the day                                                   Ther Ex 4/13 4/15  4/20 4/27 4/29   PROM IP 0-40 0-40 flex 5' 0-30 5' 5'  5'   PROM MP 0-40 0-40 flex 5' 0-30 5' 5' 5'   PROM wrist Tenodesis 45/45 active Active tenodesis 45/45 45 ext to 20 flex 5' AROM x 20 all planes AROM x20 all planes   Active IP flex/passive ext 0-30 flex 0-30  flex 5' 30 degrees flex 5' x20 x20 blocking   AROM digits x10 x10 x10 x10 x10   Passive retropulsion to active neutral CMC x20 x20 x20 DC    AROM thumb add/abd x20 x20 x20 AROM thumb all planes x20 ea AROM thumb all planes x 20 ea   Opposition IF, MF x 20 All digits x 20 x20 x20 x20   Ther Activity    4/27 4/29   Opposition    Buttons Buttons   Pen Hop     x20   HEP 4/13 4/15  4/20 4/27 4/29    Active thumb abd/add and opposition to index and middle fingers Active opposition and tenodesis  Active IP flexion in splint 20x/hrly Active IP flex to 30 w/ passive ext; blocking splint Remove splint for AROM thumb and wrist 3x/day    Weaning off splint during the day           Modalities 4/13 4/15  4/20 4/27 4/29   MHP thumb and wrist in full ext 5' 5' 5' 5' 5'

## 2021-05-04 ENCOUNTER — OFFICE VISIT (OUTPATIENT)
Dept: OCCUPATIONAL THERAPY | Facility: CLINIC | Age: 76
End: 2021-05-04
Payer: COMMERCIAL

## 2021-05-04 DIAGNOSIS — S61.012D LACERATION OF LEFT THUMB WITHOUT FOREIGN BODY WITHOUT DAMAGE TO NAIL, SUBSEQUENT ENCOUNTER: Primary | ICD-10-CM

## 2021-05-04 PROCEDURE — 97110 THERAPEUTIC EXERCISES: CPT

## 2021-05-04 PROCEDURE — 97140 MANUAL THERAPY 1/> REGIONS: CPT

## 2021-05-04 PROCEDURE — 97530 THERAPEUTIC ACTIVITIES: CPT

## 2021-05-04 NOTE — PROGRESS NOTES
Daily Note     Today's date: 2021  Patient name: Carlo Osborn  : 1945  MRN: [id number]  Referring provider: Karri Chahal PA-C  Dx:   Encounter Diagnosis     ICD-10-CM    1  Laceration of left thumb without foreign body without damage to nail, subsequent encounter  S61 012D                   Subjective: I'm getting a rash on my thumb      Objective: See treatment diary below      Assessment: Tolerated treatment well  Patient exhibited good technique with therapeutic exercises  Patient appears to have skin rash likely from warmth in splint  DC splint this date  Plan: Continue per plan of care  Precautions EPL repair zone 3 3/17/21  DM, HTN, asthma, kidney disease  Week 1-2: PROM IP 0-30 with proximal ext, MP 0-30 w/ wrist and IP extended  Week 3-4:  Active IP flexion to 30 with passive ext to 0; cont with passive  Week 5-6:  Gradually increase active IP and MP flexion with proximal joints extended  Consult with MD about cutting down splint  Week 6:  Wean from splint  Week 8-10 strengthening         Manuals 4/13 4/15   4/20 4/27 5/4   Dressing chg    DC    MEM 5' 5' 5' 5'    Scar massage 5' 5' 5' 5' 8'           Ortho fit/train 4/13 4/15 4/20 4/27     Continue with splint Cut down splint to allow IP flexion hourly  Strap in extension at night Cont  With splint    Kinesiotape  Scar post care   Wear at night and during heavy activities DC                                                   Ther Ex 4/13 4/15  4/20 4/27 5/3   PROM IP 0-40 0-40 flex 5' 0-30 5' 5'  5'   PROM MP 0-40 0-40 flex 5' 0-30 5' 5'  5'   PROM wrist Tenodesis 45/45 active Active tenodesis 45/45 45 ext to 20 flex 5' AROM x 20 all planes AROM x 20   Active IP flex/passive ext 0-30 flex 0-30  flex 5' 30 degrees flex 5' x20 DC   AROM digits x10 x10 x10 x10 DC   Passive retropulsion to active neutral CMC x20 x20 x20 DC DC   AROM thumb add/abd x20 x20 x20 AROM thumb all planes x20 ea AROM thumb all planes x 20   Opposition IF, MF x 20 All digits x 20 x20 x20 x20   Ther Activity    4/27 5/4   Opposition    Buttons    Translation     Buttons   HEP 4/13 4/15  4/20 4/27 5/4    Active thumb abd/add and opposition to index and middle fingers Active opposition and tenodesis  Active IP flexion in splint 20x/hrly Active IP flex to 30 w/ passive ext; blocking splint Remove splint for AROM thumb and wrist 3x/day    DC splint           Modalities 4/13 4/15  4/20 4/27 5/4   [facility]  5' 5' 5' 5' 5'

## 2021-05-11 ENCOUNTER — OFFICE VISIT (OUTPATIENT)
Dept: OCCUPATIONAL THERAPY | Facility: CLINIC | Age: 76
End: 2021-05-11
Payer: COMMERCIAL

## 2021-05-11 DIAGNOSIS — S61.012D LACERATION OF LEFT THUMB WITHOUT FOREIGN BODY WITHOUT DAMAGE TO NAIL, SUBSEQUENT ENCOUNTER: Primary | ICD-10-CM

## 2021-05-11 PROCEDURE — 97110 THERAPEUTIC EXERCISES: CPT

## 2021-05-11 NOTE — PROGRESS NOTES
OT Re-Evaluation     Today's date: 2021  Patient name: Suraj Liriano  : 1945  MRN: [id number]  Referring provider: Jace Miller  Dx:   Encounter Diagnosis     ICD-10-CM    1  Laceration of left thumb without foreign body without damage to nail, subsequent encounter  S61 012D                   Assessment  Assessment details: This is a 67year old, RHD male being seen in OT following and EPL repair of the left thumb on 3/17/21  Patient presented this date in post op dressing/splint  This was removed  Incision is healing well with no signs or symptoms of infection  Wound dressed with adaptic and hilda  Patient educated in wound care  Wear the splint in the shower with a plastic bag over it  Wash hand with soap and water, but keep thumb and wrist in extended position  Fabricated and issued custom forearm based thumb spica with with wrist in 10 degrees extension and thumb, including IP, in full extension  Wear splint at all times except to wash the hand  The EPL repair is intact and patient was able to actively hold the thumb in full extension with place and hold exercise  Flexion not tested at this time  AROM wrist not tested  Digits 2-5 WNL  Patient is a good candidate for OT services to restore ROM and strength in the left thumb and hand following EPL repair protocol  4/15/21:  Patient attended  OT session this treatment period  He is independent in HEP and splint use  Patient is 4 weeks s/p EPL repair at zone 3  The repair is intact  Patient presents this date with thumb extension WNL at the [facility] and MP joints  He is -5 at the IP joint  Good composite thumb extension  Active flexion of the thumb is 50% normal at this time  Mild edema noted  Pain is decreasing  Splint re molded this date to allow IP flexion during the day and IP extension at night with MP, CMC, and wrist still held in extension    Continue OT 2x/wk to gradually increase active and passive thumb flexion and extension  Begin to wean from splint at 6 weeks post op  Continue OT 2x/wk x 8 weeks  5/11/21:  Patient attended all OT appts  He is compliant with HEP  EPL repair is intact  Significant improvements noted in left hand and wrist ROM and strength  Pain level has resolved  Splint has been discharged  Continue OT 2x/wk x 4 weeks then dc to HEP  Impairments: abnormal coordination, abnormal or restricted ROM, activity intolerance, impaired physical strength and lacks appropriate home exercise program  Other impairment: Edema thumb  Functional limitations: Using the left hand and thumb as a mod assist for self care and light home management tasks  Symptom irritability: lowBarriers to therapy: DM, HTN, asthma, kidney disease  Understanding of Dx/Px/POC: excellent   Prognosis: good    Goals  STG ( 4 weeks)  1  Patient will be independent in wound care, splint use for HEP  MET  2  Patient will demonstrate full wound healing  MET  3  Patient will report a maximum pain level of 4/10 in the left hand  MET  4  Patient will demonstrate AROM thumb IP to 30 degrees flexion and full extension  MET  LTGs ( 12 weeks)  1  Patient will demonstrate independence in HEP to maintain left hand ROM and strength to WNL  ON GOING  2  Patient will report a maximum pain level of 2/10 during daily tasks  MET  3  Patient will demonstrate AROM in the left wrist to WNL for independence in self care  MET  4  Patient will demonstrate AROM in the left thumb to 100 degrees GARCÍA to fasten clothing  MET  NEW GOAL : GARCÍA to 115  5  Patient will demonstrate left hand  and pinch strength within 40% of the right hand to be MI for IADL tasks  MET    NEW GOAL:  To 20% of the right hand    Plan  Plan details: 4/15/21:  Continue OT 2x x 8 weeks  5/11/21:  OT 2x/wk x 4 weeks  Patient would benefit from: custom splinting, OT eval and skilled occupational therapy  Planned modality interventions: thermotherapy: hydrocollator packs, cryotherapy and ultrasound  Planned therapy interventions: activity modification, compression, dressing changes, fine motor coordination training, graded activity, graded exercise, home exercise program, therapeutic exercise, therapeutic activities, patient education, orthotic fitting/training, manual therapy and strengthening  Other planned therapy interventions: scar and edema mgt  Frequency: 2x week  Duration in weeks: 12  Plan of Care beginning date: 3/23/2021  Plan of Care expiration date: 2021  Treatment plan discussed with: patient        Subjective Evaluation    History of Present Illness  Date of onset: 3/11/2021  Date of surgery: 3/17/2021  Mechanism of injury: surgery and trauma  Mechanism of injury: Patient reports that on 3/11/21 he was cutting drywall and the utility knife slipped and lacerated his left thumb  He went to the ER the next day  Had a surgical repair of the EPL on 3/17/21 and now presents in post op thumb spica for OT evaluation and treatment  4/15/21:  I don't have any pain  I think everything is going along well  The tip of my thumb is stiff    21:  I haven't worn the splint in 5 days  I'm almost able to use my hand like normal  Quality of life: good    Pain  No pain reported  Current pain ratin  At best pain ratin  At worst pain ratin  Exacerbated by: stretching the thumb  Progression: resolved    Social Support  Lives with: spouse    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)  Hand dominance: right    Treatments  Current treatment: occupational therapy  Patient Goals  Patient goals for therapy: decreased edema, decreased pain, increased motion, increased strength and independence with ADLs/IADLs  Patient goal: "Be able to use my thumb"        Objective     Observations     Left Wrist/Hand   Positive for adhesive scar  Negative for drainage and edema  Additional Observation Details  3/23/21: In post op thumb spica  4/15/21:  Wound fully healed  Some scar adherence    Neurological Testing     Sensation     Wrist/Hand   Left   Intact: light touch    Active Range of Motion     Left Wrist   Normal active range of motion  Wrist flexion: 70 degrees WFL  Wrist extension: 70 degrees WFL  Radial deviation: 20 degrees WFL  Ulnar deviation: 40 degrees WFL      Left Thumb   Flexion     CMC: 10 degrees    MP: 55 degrees    DIP: 40 degrees  Extension     DIP: -5 degrees  Palmar Abduction     CMC: 55 degrees  Radial abduction    CMC: 55 degrees  Opposition: 3/23/21:  Patient able to achieve full thumb extension in [address], MP, and IP with place and hold exercise  EPL repair is intact  Flexion not tested per protocol    4/15/21:  GARCÍA = 60  Opposition 5/10 Kapandji Scale    5/11/21:  GARCÍA = 100    Kapandji Scale 9/10    Additional Active Range of Motion Details  3/23/21:  Not tested per protocol    5/11/21:  Now WNL  3/23/21:  AROM left digits 2-5 WNL    Strength/Myotome Testing     Left Wrist/Hand   Wrist extension: 5  Wrist flexion: 5  Radial deviation: 5  Ulnar deviation: 5     (2nd hand position)     Trial 1: 73    Thumb Strength  Key/Lateral Pinch     Trial 1: 10  Tip/Two-Point Pinch     Trial 1: 8 5  Palmar/Three-Point Pinch     Trial 1: 10 5    Right Wrist/Hand      (2nd hand position)     Trial 1: 93    Thumb Strength   Key/Lateral Pinch     Trial 1: 18  Tip/Two-Point Pinch     Trial 1: 14  Palmar/Three-Point Pinch     Trial 1: 15    Additional Strength Details  5/11/21:  Initial measurements    Swelling     Left Wrist/Hand   Thumb     Proximal: 7 3 cm    Distal: 7 cm    Right Wrist/Hand   Thumb     Proximal: 7 cm    Distal: 7 cm

## 2021-05-11 NOTE — PROGRESS NOTES
Daily Note     Today's date: 2021  Patient name: Nova Vazquez  : 1945  MRN: [id number]  Referring provider: Demian Barker  Dx:   Encounter Diagnosis     ICD-10-CM    1  Laceration of left thumb without foreign body without damage to nail, subsequent encounter  S61 012D                   Subjective: "I can touch all my fingers now with my thumb"  SEE RE COMPLETED BY OTR FOR DETAILS    Objective: See treatment diary below      Assessment: Tolerated treatment well  Pt demo-G understanding of HEP as evidenced by participation in exercises  Pt tolerated 3# weights to increase wrist strength demo-G technique during exercises  Patient would benefit from continued OT      Plan: Continue per plan of care  Precautions EPL repair zone 3 3/17/21  DM, HTN, asthma, kidney disease  Week 1-2: PROM IP 0-30 with proximal ext, MP 0-30 w/ wrist and IP extended  Week 3-4:  Active IP flexion to 30 with passive ext to 0; cont with passive  Week 5-6:  Gradually increase active IP and MP flexion with proximal joints extended  Consult with MD about cutting down splint  Week 6:  Wean from splint  Week 8-10 strengthening    Next RE 2021       Manuals 5/11 4/15   4/20 4/27 5/4   Dressing chg    DC    MEM  5' 5' 5'    Scar massage  5' 5' 5' 8'           Ortho fit/train  4/15 4/20 4/27      Cut down splint to allow IP flexion hourly  Strap in extension at night Cont  With splint    Kinesiotape  Scar post care   Wear at night and during heavy activities DC                                                   Ther Ex 5/11 4/15  4/20 4/27 5/3   PROM DIP Flex L thumb x10 0-40 flex 5' 0-30 5' 5'  5'   PROM MP  0-40 flex 5' 0-30 5' 5'  5'   PROM wrist  Active tenodesis 45/45 45 ext to 20 flex 5' AROM x 20 all planes AROM x 20   Active IP flex/passive ext  0-30  flex 5' 30 degrees flex 5' x20 DC   AROM digits  x10 x10 x10 DC   Passive retropulsion to active neutral CMC  x20 x20 DC DC   AROM thumb add/abd  x20 x20 AROM thumb all planes x20 ea AROM thumb all planes x 20   Opposition  All digits x 20 x20 x20 x20   Wrist flex/ext 20x's       Wrist deviation D/T 20x's       Ther Activity    4/27 5/4   Opposition    Buttons    Translation     Buttons   HEP 5/11 4/15  4/20 4/27 5/4   T-putty , pinch and roll (digit ext) Active opposition and tenodesis  Active IP flexion in splint 20x/hrly Active IP flex to 30 w/ passive ext; blocking splint Remove splint for AROM thumb and wrist 3x/day    DC splint           Modalities 5/11 4/15  4/20 4/27 5/4   RUST   5' 5' 5' 5'

## 2021-05-13 ENCOUNTER — OFFICE VISIT (OUTPATIENT)
Dept: OCCUPATIONAL THERAPY | Facility: CLINIC | Age: 76
End: 2021-05-13
Payer: COMMERCIAL

## 2021-05-13 DIAGNOSIS — S61.012D LACERATION OF LEFT THUMB WITHOUT FOREIGN BODY WITHOUT DAMAGE TO NAIL, SUBSEQUENT ENCOUNTER: Primary | ICD-10-CM

## 2021-05-13 PROCEDURE — 97110 THERAPEUTIC EXERCISES: CPT

## 2021-05-13 PROCEDURE — 97530 THERAPEUTIC ACTIVITIES: CPT

## 2021-05-13 PROCEDURE — 97140 MANUAL THERAPY 1/> REGIONS: CPT

## 2021-05-13 NOTE — PROGRESS NOTES
Daily Note     Today's date: 2021  Patient name: Tianna Faust  : 1945  MRN: [id number]  Referring provider: Auther Duverney  Dx:   Encounter Diagnosis     ICD-10-CM    1  Laceration of left thumb without foreign body without damage to nail, subsequent encounter  S61 012D                   Subjective: I'm using my hand for everything at home    Objective: See treatment diary below      Assessment: Tolerated treatment well  Patient exhibited good technique with therapeutic exercises  Faricated and issued thumb IP extension splint to wear while sleeping only to maintain IP extension      Plan: Continue per plan of care  Precautions EPL repair zone 3 3/17/21  DM, HTN, asthma, kidney disease  Week 1-2: PROM IP 0-30 with proximal ext, MP 0-30 w/ wrist and IP extended  Week 3-4:  Active IP flexion to 30 with passive ext to 0; cont with passive  Week 5-6:  Gradually increase active IP and MP flexion with proximal joints extended  Consult with MD about cutting down splint  Week 6:  Wean from splint  Week 8-10 strengthening    Next RE 2021       Manuals  5/4   Dressing chg    DC    MEM  3' 5' 5'    Scar massage  5' 5' 5' 8'           Ortho fit/train        Thumb IP extension splint for night Cont  With splint    Kinesiotape  Scar post care   Wear at night and during heavy activities DC                                                   Ther Ex  5/3   PROM DIP Flex L thumb x10  5' 0-30 5' 5'  5'   PROM MP   0-30 5' 5'  5'   PROM wrist  DC 45 ext to 20 flex 5' AROM x 20 all planes AROM x 20   Active IP flex/passive ext   30 degrees flex 5' x20 DC   AROM digits  x10 x10 x10 DC   Passive retropulsion to active neutral CMC   x20 DC DC   AROM thumb add/abd  x20 x20 AROM thumb all planes x20 ea AROM thumb all planes x 20   Opposition  All digits x 20 x20 x20 x20   Wrist flex/ext 20x's       Wrist deviation D/T 20x's       Ther Activity     5/4   Opposition    Buttons    Translation     Buttons   HEP 5/11 5/13 4/20 4/27 5/4    , pinch and roll (digit ext) Wear thumb IP ext splint at night only Active IP flex to 30 w/ passive ext; blocking splint Remove splint for AROM thumb and wrist 3x/day    DC splint           Modalities 5/11 5/13 4/20 4/27 5/4   [facility]   5' 5' 5' 5'

## 2021-05-18 ENCOUNTER — OFFICE VISIT (OUTPATIENT)
Dept: OBGYN CLINIC | Facility: CLINIC | Age: 76
End: 2021-05-18

## 2021-05-18 VITALS
HEART RATE: 88 BPM | WEIGHT: 190 LBS | HEIGHT: 70 IN | SYSTOLIC BLOOD PRESSURE: 146 MMHG | DIASTOLIC BLOOD PRESSURE: 82 MMHG | BODY MASS INDEX: 27.2 KG/M2

## 2021-05-18 DIAGNOSIS — S61.112D LACERATION OF LEFT THUMB WITHOUT FOREIGN BODY WITH DAMAGE TO NAIL, SUBSEQUENT ENCOUNTER: Primary | ICD-10-CM

## 2021-05-18 PROCEDURE — 99024 POSTOP FOLLOW-UP VISIT: CPT | Performed by: ORTHOPAEDIC SURGERY

## 2021-05-18 NOTE — PROGRESS NOTES
CHIEF COMPLAINT:  Chief Complaint   Patient presents with    Left Thumb - Post-op       SUBJECTIVE:  Bree Livingston is a 67y o  year old male who presents for follow-up regarding s/p left thumb EPL tendon repair  Patient states he is doing well  He continues to wear his splint while at nighttime  He has been going to physical therapy to work on his range of motion  He still notes some soft tissue adhesions over the proximal phalanx but is able to move the IP joint with no discomfort        PAST MEDICAL HISTORY:  Past Medical History:   Diagnosis Date    Asthma     last assessed: 2015    Balanitis     last assessed: 2014    Cataract     Chronic kidney disease     Chronic kidney disease, stage 3 ([facility] [address] )     last assessed: 2018    Diabetes mellitus [facility])     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([facility] )     last assessed: 2018    Hypercholesterolemia     Hypertension     Pneumonia     last assessed: 2013       PAST SURGICAL HISTORY:  Past Surgical History:   Procedure Laterality Date    ABDOMINAL SURGERY      APPENDECTOMY  1965    CATARACT EXTRACTION     [address] HERNIA REPAIR  2007    x2    OTHER SURGICAL HISTORY  1965    Perforated duodenol ulcer surgery     AK REPAIR FLEXOR TENDON,HAND,W/O GRAFT,EA Left 3/17/2021    Procedure: Left thumb EPL repair;  Surgeon: Liliana Albrecht MD;  Location: [facility] [place] OR;  Service: Orthopedics       FAMILY HISTORY:  Family History   Problem Relation Age of Onset    Pancreatic cancer Mother     Colon cancer Father     Diabetes Father         mellitus     No Known Problems Sister     Diabetes Sister     Nephrolithiasis Sister        SOCIAL HISTORY:  Social History     Tobacco Use    Smoking status: Former Smoker     Packs/day: 2 00     Years: 10 00     Pack years: 20 00     Types: Cigarettes     Quit date:      Years since quittin 4    Smokeless tobacco: Former User     Quit date:     Tobacco comment: former smoker noted in "allscripts" Quit in [address] - never used chewing tobacco    Substance Use Topics    Alcohol use:  Yes     Alcohol/week: 1 0 standard drinks     Types: 1 Standard drinks or equivalent per week     Frequency: Monthly or less     Drinks per session: 1 or 2     Binge frequency: Never     Comment: rare  socially    Drug use: No       MEDICATIONS:    Current Outpatient Medications:     albuterol (PROVENTIL HFA,VENTOLIN HFA) 90 mcg/act inhaler, inhale 2 puffs by mouth daily if needed for shortness of breath, Disp: 8 5 g, Rfl: 1    ERYN MICROLET LANCETS lancets, Test twice a day, Disp: 100 each, Rfl: 3    glucose blood test strip, TEST twice a day, Disp: 100 each, Rfl: 5    lisinopril-hydrochlorothiazide (PRINZIDE,ZESTORETIC) 20-25 MG per tablet, take 1 tablet by mouth once daily, Disp: 90 tablet, Rfl: 1    Tradjenta 5 MG TABS, take 1 tablet by mouth daily, Disp: 90 tablet, Rfl: 1    glyBURIDE (DIABETA) 5 mg tablet, Take 2 tablets (10 mg total) by mouth 2 (two) times a day, Disp: 360 tablet, Rfl: 1    ALLERGIES:  Allergies   Allergen Reactions    Meperidine     Pravastatin Myalgia       REVIEW OF SYSTEMS:  Review of Systems  ROS:   General: no fever, no chills  HEENT:  No loss of hearing or eyesight problems  Eyes:  No red eyes  Respiratory:  No coughing, shortness of breath or wheezing  Cardiovascular:  No chest pain, no palpitations  GI:  Abdomen soft nontender, denies nausea  Endocrine:  No muscle weakness, no frequent urination, no excessive thirst  Urinary:  No dysuria, no incontinence  Musculoskeletal: see HPI and PE  SKIN:  No skin rash, no dry skin  Neurological:  No headaches, no confusion  Psychiatric:  No suicide thoughts, no anxiety, no depression  Review of all other systems is negative    VITALS:  Vitals:    05/18/21 0901   BP: 146/82   Pulse: 88       LABS:  HgA1c:   Lab Results   Component Value Date    HGBA1C 7 3 (H) 03/17/2021     BMP:   Lab Results   Component Value Date GLUCOSE 109 11/25/2015    CALCIUM 8 5 03/17/2021     (L) 11/25/2015    K 4 8 03/17/2021    CO2 26 03/17/2021     03/17/2021    BUN 34 (H) 03/17/2021    CREATININE 1 81 (H) 03/17/2021       _____________________________________________________  PHYSICAL EXAMINATION:  General: well developed and well nourished, alert, oriented times 3 and appears comfortable  Psychiatric: Normal  HEENT: Trachea Midline, No torticollis  Pulmonary: No audible wheezing or respiratory distress   Skin: No masses, erythema, lacerations, fluctation, ulcerations  Neurovascular: Sensation Intact to the Median, Ulnar, Radial Nerve, Motor Intact to the Median, Ulnar, Radial Nerve and Pulses Intact    MUSCULOSKELETAL EXAMINATION:  Left thumb   Incision is well healed no signs or symptoms of infection   Skin adhesion noted over the proximal phalanx over the scar with motion of the IP joint   Patient is able to extend the IP joint and hold against resistance    ___________________________________________________  STUDIES REVIEWED:  No studies reviewed  PROCEDURES PERFORMED:  Procedures  No Procedures performed today    _____________________________________________________  ASSESSMENT/PLAN:    S/p left thumb EPL tendon repair performed 3/17/2021  -patient was advised to use massage over the scar tissue to help break up adhesions   -he can continue to wear the splint at nighttime  -he was advised to avoid golf for 4 weeks   -will follow up in 6 weeks for re-evaluation        Follow Up:  Return in about 6 weeks (around 6/29/2021)      Work/school status:  No restrictions    To Do Next Visit:  Re-evaluation of current issue        Scribe Attestation    I,:  Ania Luther PA-C am acting as a scribe while in the presence of the attending physician :       I,:  Alexis Christopher MD personally performed the services described in this documentation    as scribed in my presence :           Portions of the record may have been created with voice recognition software  Occasional wrong word or "sound a like" substitutions may have occurred due to the inherent limitations of voice recognition software  Read the chart carefully and recognize, using context, where substitutions have occurred

## 2021-05-26 DIAGNOSIS — E11.8 TYPE 2 DIABETES MELLITUS WITH COMPLICATION, WITHOUT LONG-TERM CURRENT USE OF INSULIN (HCC): ICD-10-CM

## 2021-05-26 RX ORDER — GLYBURIDE 5 MG/1
TABLET ORAL
Qty: 360 TABLET | Refills: 1 | Status: SHIPPED | OUTPATIENT
Start: 2021-05-26 | End: 2021-11-29

## 2021-06-24 DIAGNOSIS — E11.9 TYPE 2 DIABETES MELLITUS WITHOUT COMPLICATION, WITHOUT LONG-TERM CURRENT USE OF INSULIN (HCC): ICD-10-CM

## 2021-06-24 RX ORDER — BLOOD SUGAR DIAGNOSTIC
STRIP MISCELLANEOUS
Qty: 100 STRIP | Refills: 2 | Status: SHIPPED | OUTPATIENT
Start: 2021-06-24 | End: 2021-12-29 | Stop reason: SDUPTHER

## 2021-07-01 ENCOUNTER — OFFICE VISIT (OUTPATIENT)
Dept: OBGYN CLINIC | Facility: CLINIC | Age: 76
End: 2021-07-01
Payer: COMMERCIAL

## 2021-07-01 VITALS
HEART RATE: 69 BPM | HEIGHT: 70 IN | WEIGHT: 185.4 LBS | SYSTOLIC BLOOD PRESSURE: 135 MMHG | DIASTOLIC BLOOD PRESSURE: 78 MMHG | BODY MASS INDEX: 26.54 KG/M2

## 2021-07-01 DIAGNOSIS — S61.112D LACERATION OF LEFT THUMB WITHOUT FOREIGN BODY WITH DAMAGE TO NAIL, SUBSEQUENT ENCOUNTER: Primary | ICD-10-CM

## 2021-07-01 PROCEDURE — 1036F TOBACCO NON-USER: CPT | Performed by: ORTHOPAEDIC SURGERY

## 2021-07-01 PROCEDURE — 1160F RVW MEDS BY RX/DR IN RCRD: CPT | Performed by: ORTHOPAEDIC SURGERY

## 2021-07-01 PROCEDURE — 3078F DIAST BP <80 MM HG: CPT | Performed by: ORTHOPAEDIC SURGERY

## 2021-07-01 PROCEDURE — 99213 OFFICE O/P EST LOW 20 MIN: CPT | Performed by: ORTHOPAEDIC SURGERY

## 2021-07-01 PROCEDURE — 3075F SYST BP GE 130 - 139MM HG: CPT | Performed by: ORTHOPAEDIC SURGERY

## 2021-07-01 NOTE — PROGRESS NOTES
CHIEF COMPLAINT:  Chief Complaint   Patient presents with    Left Thumb - Post-op       SUBJECTIVE:  Bree Roche is a 67y o  year old male who presents today for a follow up visit about 4 months s/p left thumb EPL tendon repair performed on 3/17/2021  Today, patient notes that he continues to do very well postoperatively  He states that he feels about 98% back to normal function  He performs his daily activities without pain  No numbness or tingling  No fevers or chills         PAST MEDICAL HISTORY:  Past Medical History:   Diagnosis Date    Asthma     last assessed: 2015    Balanitis     last assessed: 2014    Cataract     Chronic kidney disease     Chronic kidney disease, stage 3 ([facility] [address] )     last assessed: 2018    Diabetes mellitus [facility])     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([facility] )     last assessed: 2018    Hypercholesterolemia     Hypertension     Pneumonia     last assessed: 2013       PAST SURGICAL HISTORY:  Past Surgical History:   Procedure Laterality Date    ABDOMINAL SURGERY      APPENDECTOMY  1965    CATARACT EXTRACTION     [address] HERNIA REPAIR  2007    x2    OTHER SURGICAL HISTORY  1965    Perforated duodenol ulcer surgery     MT REPAIR FLEXOR TENDON,HAND,W/O GRAFT,EA Left 3/17/2021    Procedure: Left thumb EPL repair;  Surgeon: Elijah Nyhan, MD;  Location: [facility];  Service: Orthopedics       FAMILY HISTORY:  Family History   Problem Relation Age of Onset    Pancreatic cancer Mother     Colon cancer Father     Diabetes Father         mellitus     No Known Problems Sister     Diabetes Sister     Nephrolithiasis Sister        SOCIAL HISTORY:  Social History     Tobacco Use    Smoking status: Former Smoker     Packs/day: 2 00     Years: 10 00     Pack years: 20 00     Types: Cigarettes     Quit date:      Years since quittin 5    Smokeless tobacco: Former User     Quit date:     Tobacco comment: former smoker noted in "allscripts" Quit in 1971 - never used chewing tobacco    Vaping Use    Vaping Use: Never used   Substance Use Topics    Alcohol use: Yes     Alcohol/week: 1 0 standard drinks     Types: 1 Standard drinks or equivalent per week     Comment: rare  socially    Drug use: No       MEDICATIONS:    Current Outpatient Medications:     albuterol (PROVENTIL HFA,VENTOLIN HFA) 90 mcg/act inhaler, inhale 2 puffs by mouth daily if needed for shortness of breath, Disp: 8 5 g, Rfl: 1    ERYN MICROLET LANCETS lancets, Test twice a day, Disp: 100 each, Rfl: 3    Contour Test test strip, TEST twice a day, Disp: 100 strip, Rfl: 2    glyBURIDE (DIABETA) 5 mg tablet, take 2 tablets by mouth twice a day, Disp: 360 tablet, Rfl: 1    lisinopril-hydrochlorothiazide (PRINZIDE,ZESTORETIC) 20-25 MG per tablet, take 1 tablet by mouth once daily, Disp: 90 tablet, Rfl: 1    Tradjenta 5 MG TABS, take 1 tablet by mouth daily, Disp: 90 tablet, Rfl: 1    ALLERGIES:  Allergies   Allergen Reactions    Meperidine     Pravastatin Myalgia       REVIEW OF SYSTEMS:  Review of Systems   Constitutional: Negative for chills, fatigue, fever and unexpected weight change  HENT: Negative for hearing loss, nosebleeds and sore throat  Eyes: Negative for pain, redness and visual disturbance  Respiratory: Negative for cough, shortness of breath and wheezing  Cardiovascular: Negative for chest pain, palpitations and leg swelling  Gastrointestinal: Negative for abdominal pain, nausea and vomiting  Endocrine: Negative for polydipsia and polyuria  Genitourinary: Negative for frequency and urgency  Skin: Negative for color change, rash and wound  Neurological: Negative for dizziness, weakness, numbness and headaches  Psychiatric/Behavioral: Negative for behavioral problems, self-injury and suicidal ideas         VITALS:  Vitals:    07/01/21 0900   BP: 135/78   Pulse: 69       LABS:  HgA1c:   Lab Results   Component Value Date HGBA1C 7 3 (H) 03/17/2021     BMP:   Lab Results   Component Value Date    GLUCOSE 109 11/25/2015    CALCIUM 8 5 03/17/2021     (L) 11/25/2015    K 4 8 03/17/2021    CO2 26 03/17/2021     03/17/2021    BUN 34 (H) 03/17/2021    CREATININE 1 81 (H) 03/17/2021       _____________________________________________________  PHYSICAL EXAMINATION:  General: well developed and well nourished, alert, oriented times 3 and appears comfortable  Psychiatric: Normal  HEENT: Trachea Midline, No torticollis  Pulmonary: No audible wheezing or strider  Cardiovascular: No discernable arrhythmia   Skin: No masses, erythema, lacerations, fluctation, ulcerations  Neurovascular: Sensation Intact to the Median, Ulnar, Radial Nerve, Motor Intact to the Median, Ulnar, Radial Nerve and Pulses Intact    MUSCULOSKELETAL EXAMINATION:  Left Thumb:  Skin intact  No erythema or ecchymosis  Well-healed incision  Skin adhesion noted over proximal phalanx over the scar with motion of the IP joint  Patient is able to extend the IP joint and hold against resistance  Brisk capillary refill  2+ radial pulse     ___________________________________________________  STUDIES REVIEWED:  No studies reviewed  PROCEDURES PERFORMED:  Procedures  No Procedures performed today    _____________________________________________________  ASSESSMENT/PLAN:    4 months s/p left thumb EPL tendon repair performed on 3/17/2021  · Return to normal activities as tolerated  Avoid painful maneuvers  · OTC meds and ice prn for pain relief  · Follow up on an as needed basis    Follow Up:  Return if symptoms worsen or fail to improve      Work/school status:  No restrictions    Scribe Attestation    I,:  Glorine Seaboard am acting as a scribe while in the presence of the attending physician :       I,:  Lawyer Sheila MD personally performed the services described in this documentation    as scribed in my presence :

## 2021-09-15 ENCOUNTER — TELEPHONE (OUTPATIENT)
Dept: NEPHROLOGY | Facility: CLINIC | Age: 76
End: 2021-09-15

## 2021-09-16 ENCOUNTER — APPOINTMENT (OUTPATIENT)
Dept: LAB | Facility: CLINIC | Age: 76
End: 2021-09-16
Payer: COMMERCIAL

## 2021-09-16 DIAGNOSIS — E83.9 CHRONIC KIDNEY DISEASE-MINERAL AND BONE DISORDER: ICD-10-CM

## 2021-09-16 DIAGNOSIS — N18.32 STAGE 3B CHRONIC KIDNEY DISEASE (HCC): ICD-10-CM

## 2021-09-16 DIAGNOSIS — E78.00 HYPERCHOLESTEROLEMIA: ICD-10-CM

## 2021-09-16 DIAGNOSIS — Z12.5 SCREENING PSA (PROSTATE SPECIFIC ANTIGEN): ICD-10-CM

## 2021-09-16 DIAGNOSIS — M89.9 CHRONIC KIDNEY DISEASE-MINERAL AND BONE DISORDER: ICD-10-CM

## 2021-09-16 DIAGNOSIS — E11.22 TYPE 2 DIABETES MELLITUS WITH STAGE 3B CHRONIC KIDNEY DISEASE, WITHOUT LONG-TERM CURRENT USE OF INSULIN (HCC): ICD-10-CM

## 2021-09-16 DIAGNOSIS — N18.9 CHRONIC KIDNEY DISEASE-MINERAL AND BONE DISORDER: ICD-10-CM

## 2021-09-16 DIAGNOSIS — N18.32 TYPE 2 DIABETES MELLITUS WITH STAGE 3B CHRONIC KIDNEY DISEASE, WITHOUT LONG-TERM CURRENT USE OF INSULIN (HCC): ICD-10-CM

## 2021-09-16 LAB
25(OH)D3 SERPL-MCNC: 22.6 NG/ML (ref 30–100)
ANION GAP SERPL CALCULATED.3IONS-SCNC: 5 MMOL/L (ref 4–13)
BASOPHILS # BLD AUTO: 0.09 THOUSANDS/ΜL (ref 0–0.1)
BASOPHILS NFR BLD AUTO: 1 % (ref 0–1)
BILIRUB UR QL STRIP: NEGATIVE
BUN SERPL-MCNC: 39 MG/DL (ref 5–25)
CALCIUM SERPL-MCNC: 9 MG/DL (ref 8.3–10.1)
CHLORIDE SERPL-SCNC: 103 MMOL/L (ref 100–108)
CHOLEST SERPL-MCNC: 197 MG/DL (ref 50–200)
CLARITY UR: CLEAR
CO2 SERPL-SCNC: 26 MMOL/L (ref 21–32)
COLOR UR: YELLOW
CREAT SERPL-MCNC: 1.91 MG/DL (ref 0.6–1.3)
CREAT UR-MCNC: 90.3 MG/DL
EOSINOPHIL # BLD AUTO: 0.35 THOUSAND/ΜL (ref 0–0.61)
EOSINOPHIL NFR BLD AUTO: 5 % (ref 0–6)
ERYTHROCYTE [DISTWIDTH] IN BLOOD BY AUTOMATED COUNT: 12.9 % (ref 11.6–15.1)
EST. AVERAGE GLUCOSE BLD GHB EST-MCNC: 174 MG/DL
GFR SERPL CREATININE-BSD FRML MDRD: 33 ML/MIN/1.73SQ M
GLUCOSE P FAST SERPL-MCNC: 144 MG/DL (ref 65–99)
GLUCOSE UR STRIP-MCNC: NEGATIVE MG/DL
HBA1C MFR BLD: 7.7 %
HCT VFR BLD AUTO: 45.6 % (ref 36.5–49.3)
HDLC SERPL-MCNC: 34 MG/DL
HGB BLD-MCNC: 15.3 G/DL (ref 12–17)
HGB UR QL STRIP.AUTO: NEGATIVE
IMM GRANULOCYTES # BLD AUTO: 0.04 THOUSAND/UL (ref 0–0.2)
IMM GRANULOCYTES NFR BLD AUTO: 1 % (ref 0–2)
KETONES UR STRIP-MCNC: NEGATIVE MG/DL
LDLC SERPL CALC-MCNC: 139 MG/DL (ref 0–100)
LEUKOCYTE ESTERASE UR QL STRIP: NEGATIVE
LYMPHOCYTES # BLD AUTO: 1.92 THOUSANDS/ΜL (ref 0.6–4.47)
LYMPHOCYTES NFR BLD AUTO: 26 % (ref 14–44)
MCH RBC QN AUTO: 30.7 PG (ref 26.8–34.3)
MCHC RBC AUTO-ENTMCNC: 33.6 G/DL (ref 31.4–37.4)
MCV RBC AUTO: 92 FL (ref 82–98)
MONOCYTES # BLD AUTO: 0.67 THOUSAND/ΜL (ref 0.17–1.22)
MONOCYTES NFR BLD AUTO: 9 % (ref 4–12)
NEUTROPHILS # BLD AUTO: 4.27 THOUSANDS/ΜL (ref 1.85–7.62)
NEUTS SEG NFR BLD AUTO: 58 % (ref 43–75)
NITRITE UR QL STRIP: NEGATIVE
NRBC BLD AUTO-RTO: 0 /100 WBCS
PH UR STRIP.AUTO: 5.5 [PH]
PHOSPHATE SERPL-MCNC: 3.3 MG/DL (ref 2.3–4.1)
PLATELET # BLD AUTO: 250 THOUSANDS/UL (ref 149–390)
PMV BLD AUTO: 11.2 FL (ref 8.9–12.7)
POTASSIUM SERPL-SCNC: 5.1 MMOL/L (ref 3.5–5.3)
PROT UR STRIP-MCNC: NEGATIVE MG/DL
PROT UR-MCNC: 10 MG/DL
PROT/CREAT UR: 0.11 MG/G{CREAT} (ref 0–0.1)
PSA SERPL-MCNC: 2.9 NG/ML (ref 0–4)
PTH-INTACT SERPL-MCNC: 150.6 PG/ML (ref 18.4–80.1)
RBC # BLD AUTO: 4.98 MILLION/UL (ref 3.88–5.62)
SODIUM SERPL-SCNC: 134 MMOL/L (ref 136–145)
SP GR UR STRIP.AUTO: 1.01 (ref 1–1.03)
TRIGL SERPL-MCNC: 122 MG/DL
UROBILINOGEN UR QL STRIP.AUTO: 0.2 E.U./DL
WBC # BLD AUTO: 7.34 THOUSAND/UL (ref 4.31–10.16)

## 2021-09-16 PROCEDURE — 83036 HEMOGLOBIN GLYCOSYLATED A1C: CPT

## 2021-09-16 PROCEDURE — 80061 LIPID PANEL: CPT

## 2021-09-16 PROCEDURE — 82570 ASSAY OF URINE CREATININE: CPT

## 2021-09-16 PROCEDURE — 83970 ASSAY OF PARATHORMONE: CPT

## 2021-09-16 PROCEDURE — G0103 PSA SCREENING: HCPCS

## 2021-09-16 PROCEDURE — 85025 COMPLETE CBC W/AUTO DIFF WBC: CPT

## 2021-09-16 PROCEDURE — 81003 URINALYSIS AUTO W/O SCOPE: CPT

## 2021-09-16 PROCEDURE — 84100 ASSAY OF PHOSPHORUS: CPT

## 2021-09-16 PROCEDURE — 84156 ASSAY OF PROTEIN URINE: CPT

## 2021-09-16 PROCEDURE — 80048 BASIC METABOLIC PNL TOTAL CA: CPT

## 2021-09-16 PROCEDURE — 82306 VITAMIN D 25 HYDROXY: CPT

## 2021-09-16 PROCEDURE — 36415 COLL VENOUS BLD VENIPUNCTURE: CPT

## 2021-09-21 ENCOUNTER — TELEPHONE (OUTPATIENT)
Dept: NEPHROLOGY | Facility: CLINIC | Age: 76
End: 2021-09-21

## 2021-09-21 ENCOUNTER — RA CDI HCC (OUTPATIENT)
Dept: OTHER | Facility: HOSPITAL | Age: 76
End: 2021-09-21

## 2021-09-21 NOTE — PROGRESS NOTES
[facility]  coding opportunities       Chart reviewed, no opportunity found: CHART REVIEWED, NO OPPORTUNITY FOUND                        Patients insurance company: [facility] [address]  (Medicare Advantage and Commercial)

## 2021-09-22 ENCOUNTER — OFFICE VISIT (OUTPATIENT)
Dept: NEPHROLOGY | Facility: CLINIC | Age: 76
End: 2021-09-22
Payer: COMMERCIAL

## 2021-09-22 VITALS
BODY MASS INDEX: 26.43 KG/M2 | WEIGHT: 184.6 LBS | SYSTOLIC BLOOD PRESSURE: 120 MMHG | DIASTOLIC BLOOD PRESSURE: 60 MMHG | TEMPERATURE: 97 F | HEART RATE: 68 BPM | RESPIRATION RATE: 16 BRPM | HEIGHT: 70 IN

## 2021-09-22 DIAGNOSIS — N18.9 CHRONIC KIDNEY DISEASE-MINERAL AND BONE DISORDER: ICD-10-CM

## 2021-09-22 DIAGNOSIS — M89.9 CHRONIC KIDNEY DISEASE-MINERAL AND BONE DISORDER: ICD-10-CM

## 2021-09-22 DIAGNOSIS — I10 ESSENTIAL HYPERTENSION: ICD-10-CM

## 2021-09-22 DIAGNOSIS — N18.32 TYPE 2 DIABETES MELLITUS WITH STAGE 3B CHRONIC KIDNEY DISEASE, WITHOUT LONG-TERM CURRENT USE OF INSULIN (HCC): ICD-10-CM

## 2021-09-22 DIAGNOSIS — N18.32 STAGE 3B CHRONIC KIDNEY DISEASE (HCC): Primary | ICD-10-CM

## 2021-09-22 DIAGNOSIS — E11.22 TYPE 2 DIABETES MELLITUS WITH STAGE 3B CHRONIC KIDNEY DISEASE, WITHOUT LONG-TERM CURRENT USE OF INSULIN (HCC): ICD-10-CM

## 2021-09-22 DIAGNOSIS — I10 BENIGN ESSENTIAL HYPERTENSION: ICD-10-CM

## 2021-09-22 DIAGNOSIS — N40.1 BENIGN PROSTATIC HYPERPLASIA WITH LOWER URINARY TRACT SYMPTOMS, SYMPTOM DETAILS UNSPECIFIED: ICD-10-CM

## 2021-09-22 DIAGNOSIS — E83.9 CHRONIC KIDNEY DISEASE-MINERAL AND BONE DISORDER: ICD-10-CM

## 2021-09-22 PROCEDURE — 99214 OFFICE O/P EST MOD 30 MIN: CPT | Performed by: INTERNAL MEDICINE

## 2021-09-22 PROCEDURE — 99284 EMERGENCY DEPT VISIT MOD MDM: CPT

## 2021-09-22 RX ORDER — LISINOPRIL AND HYDROCHLOROTHIAZIDE 25; 20 MG/1; MG/1
TABLET ORAL
Qty: 90 TABLET | Refills: 1 | Status: SHIPPED | OUTPATIENT
Start: 2021-09-22 | End: 2021-12-29 | Stop reason: SDUPTHER

## 2021-09-22 NOTE — ASSESSMENT & PLAN NOTE
Lab Results   Component Value Date    EGFR 33 09/16/2021    EGFR 36 03/17/2021    EGFR 41 09/22/2020    CREATININE 1 91 (H) 09/16/2021    CREATININE 1 81 (H) 03/17/2021    CREATININE 1 63 (H) 09/22/2020   Renal function is fluctuating with slow progression  May be related to uncontrolled diabetes    Advised to drink lots of liquid and avoid any nephrotoxic medication and I will monitor him closely

## 2021-09-22 NOTE — ASSESSMENT & PLAN NOTE
Lab Results   Component Value Date    HGBA1C 7 7 (H) 09/16/2021   Diabetes not well controlled  He claims when he was on metformin does better controlled  As GFR is still more than 30 he can be on metformin  He can also get SGLT 2 inhibitor like Invokana    He is going to discussed with primary doctor

## 2021-09-22 NOTE — PATIENT INSTRUCTIONS
Chronic Kidney Disease   AMBULATORY CARE:   Chronic kidney disease (CKD)  is the gradual and permanent loss of kidney function  Normally, the kidneys remove fluid, chemicals, and waste from your blood  These wastes are turned into urine by your kidneys  CKD may worsen over time and lead to kidney failure  Common signs and symptoms include the following:   · Changes in how often you need to urinate    · Swelling in your arms, legs, or feet    · Shortness of breath    · Fatigue or weakness    · Bad or bitter taste in your mouth    · Nausea, vomiting, or loss of appetite    Call your local emergency number (911 in the [address] [facility]) if:   · You have a seizure  · You have shortness of breath  Seek care immediately if:   · You are confused and very drowsy  Call your doctor or nephrologist if:   · You suddenly gain or lose more weight than your healthcare provider has told you is okay  · You have itchy skin or a rash  · You urinate more or less than you normally do  · You have blood in your urine  · You have nausea and are vomiting  · You have fatigue or muscle weakness  · You have hiccups that will not stop  · You have questions or concerns about your condition or care  How CKD is diagnosed:  CKD has 5 stages  Your healthcare provider will use results from the following tests to find the stage of CKD you have:  · Blood and urine tests  show how well your kidneys are working  They may also show the cause of your CKD  · Ultrasound, CT scan, or MRI  pictures may be used to check your kidneys  You may be given contrast liquid to help your kidneys show up better in the pictures  Tell the healthcare provider if you have ever had an allergic reaction to contrast liquid  Do not enter the MRI room with anything metal  Metal can cause serious injury  Tell the healthcare provider if you have any metal in or on your body      · A biopsy  is a procedure to remove a small piece of tissue from your kidney  It is done to find the cause of your CKD  Treatment  can help control signs and symptoms, and prevent a worse stage of CKD  Your care team may include specialists, such as a dietitian or a heart specialist  This depends on the stage of your CKD and if you have other health conditions to manage  Healthcare providers will work with you to create a plan based on your decisions for treatment  Your treatment plan may include any of the following:  · Medicines  may be given to decrease your blood pressure and get rid of extra fluid  You may also receive medicine to manage health conditions that may occur with CKD, such as anemia, diabetes, and heart disease  · Dialysis  is a treatment to remove chemicals and waste from your blood when your kidneys can no longer do this  · Surgery  may be needed to create an arteriovenous fistula (AVF) in your arm or insert a catheter into your abdomen  This is done so you can receive dialysis  · A kidney transplant  may be done if your CKD becomes severe  What you can do to manage CKD: Management may include making some lifestyle changes  Tell your healthcare provider if you have any concerns about being able to make changes  He or she can help you find solutions, including working with specialists  Ask for help creating a plan to break large goals into smaller steps  Your plan may include any of the following:  · Manage other health conditions  Your healthcare provider will work with you to make a care plan that meets your needs  You will be checked regularly for heart disease or other conditions that can make CKD worse, such as diabetes  Your blood pressure will be closely monitored  You will also get a target blood pressure and help making a plan to reach your target  This may include taking your blood pressure at home  · Maintain a healthy weight  Your weight and body mass index (BMI) will be checked regularly   BMI helps find if your weight is healthy for your height  Your healthcare provider will use other tests to check your muscle and protein levels  Extra weight can strain your kidneys  A low weight or low muscle mass can make you feel more tired  You may have trouble doing your daily activities  Ask your provider what a healthy weight is for you  He or she can help you create a plan to lose or gain weight safely, if needed  The plan may include keeping a food diary  This is a list of foods and liquids you have each day  Your provider will use the diary to help you make changes, if needed  Changes are based on your health and any other conditions you have, such as diabetes  · Create an exercise plan  Regular exercise can help you manage CKD, high blood pressure, and diabetes  Exercise also helps control weight  Your provider can help you create exercise goals and a plan to reach those goals  For example, your goal may be to exercise for 30 minutes in a day  Your plan can include breaking exercise into 10 minute sessions, 3 times during the day  · Create a healthy eating plan  Your provider may tell you to eat food low in potassium, phosphorus, or protein  Your provider may also recommend vitamin or mineral supplements  Do not take any supplements without talking to your provider  A dietitian can help you plan meals if needed  Ask how much liquid to drink each day and which liquids are best for you  · Limit sodium (salt) as directed  You may need to limit sodium to less than 2,300 milligrams (mg) each day  Ask your dietitian or healthcare provider how much sodium you can have each day  The amount depends on your stage of kidney disease  Table salt, canned foods, soups, salted snacks, and processed meats, like deli meats and sausage, are high in sodium  Your provider or a dietitian can show you how to read food labels for sodium  · Limit alcohol as directed  Alcohol can cause fluid retention and can affect your kidneys   Ask how much alcohol is safe for you  A drink of alcohol is 12 ounces of beer, 5 ounces of wine, or 1½ ounces of liquor  · Do not smoke  Nicotine and other chemicals in cigarettes and cigars can cause kidney damage  Ask your provider for information if you currently smoke and need help to quit  E-cigarettes or smokeless tobacco still contain nicotine  Talk to your provider before you use these products  · Ask about over-the-counter medicines  Medicines such as NSAIDs and laxatives may harm your kidneys  Some cough and cold medicines can raise your blood pressure  Always ask if a medicine is safe before you take it  · Ask about vaccines you may need  CKD can increase your risk for infections such as pneumonia, influenza, and hepatitis  Vaccines lower your risk for infection  Your healthcare provider will tell you which vaccines you need and when to get them  Follow up with your doctor or nephrologist as directed: You will need to return for tests to monitor your kidney and nerve function, and your parathyroid hormone level  Your medicines may be changed, based on certain test results  Write down your questions so you remember to ask them during your visits  © Copyright [employer] 2021 Information is for End User's use only and may not be sold, redistributed or otherwise used for commercial purposes  All illustrations and images included in CareNotes® are the copyrighted property of [employer]  or Natasha Phillips  The above information is an  only  It is not intended as medical advice for individual conditions or treatments  Talk to your doctor, nurse or pharmacist before following any medical regimen to see if it is safe and effective for you

## 2021-09-22 NOTE — ASSESSMENT & PLAN NOTE
Lab Results   Component Value Date    EGFR 33 09/16/2021    EGFR 36 03/17/2021    EGFR 41 09/22/2020    CREATININE 1 91 (H) 09/16/2021    CREATININE 1 81 (H) 03/17/2021    CREATININE 1 63 (H) 09/22/2020   PTH and phosphorus are within acceptable range  Vitamin-D level is on lower side    Advised to take over-the-counter vitamin-D 2000 unit once a day

## 2021-09-22 NOTE — PROGRESS NOTES
NEPHROLOGY OFFICE FOLLOW UP  Bree Mathew 67 y o  male MRN: [id number]    Encounter: [id number] 9/22/2021    REASON FOR VISIT: Latoya Owens is a 67 y o  male who is here on 9/22/2021 for Chronic Kidney Disease and Follow-up    HPI:    Rupamanjit in today for follow-up of CKD  76-year-old gentleman who is doing well overall  He claims he does have frequent fall at home  Since I saw him last he also has some visual problem and was seen by ophthalmologist   He was told he does not have any diabetic retinopathy     He denies any chest pain or palpitation  He is losing weight     No urinary complaint      REVIEW OF SYSTEMS:    Review of Systems   Constitutional: Negative for activity change and fatigue  HENT: Negative for congestion and ear discharge  Eyes: Negative for photophobia and pain  Respiratory: Negative for apnea and choking  Cardiovascular: Negative for chest pain and palpitations  Gastrointestinal: Negative for abdominal distention and blood in stool  Endocrine: Negative for heat intolerance and polyphagia  Genitourinary: Negative for flank pain and urgency  Musculoskeletal: Negative for neck pain and neck stiffness  Skin: Negative for color change and wound  Allergic/Immunologic: Negative for food allergies and immunocompromised state  Neurological: Negative for seizures and facial asymmetry  Hematological: Negative for adenopathy  Does not bruise/bleed easily  Psychiatric/Behavioral: Negative for self-injury and suicidal ideas           PAST MEDICAL HISTORY:  Past Medical History:   Diagnosis Date    Asthma     last assessed: April 1, 2015    Balanitis     last assessed: July 9, 2014    Cataract     Chronic kidney disease     Chronic kidney disease, stage 3 ([facility] )     last assessed: Jan 17, 2018    Diabetes mellitus [facility]     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([facility] )     last assessed: Jan 17, 2018    Hypercholesterolemia     Hypertension     Pneumonia     last assessed: 2013       PAST SURGICAL HISTORY:  Past Surgical History:   Procedure Laterality Date    ABDOMINAL SURGERY      APPENDECTOMY  1965    CATARACT EXTRACTION      HERNIA REPAIR      HERNIA REPAIR  2007    x2   [facility] OTHER SURGICAL HISTORY  1965    Perforated duodenol ulcer surgery     IA REPAIR FLEXOR TENDON,HAND,W/O GRAFT,EA Left 3/17/2021    Procedure: Left thumb EPL repair;  Surgeon: Shannan Veronica MD;  Location: [facility];  Service: Orthopedics       SOCIAL HISTORY:  Social History     Substance and Sexual Activity   Alcohol Use Yes    Alcohol/week: 1 0 standard drinks    Types: 1 Standard drinks or equivalent per week    Comment: rare  socially     Social History     Substance and Sexual Activity   Drug Use No     Social History     Tobacco Use   Smoking Status Former Smoker    Packs/day: 2 00    Years: 10 00    Pack years:     Types: Cigarettes    Quit date:     Years since quittin 7   Smokeless Tobacco Former User    Quit date:    Tobacco Comment    former smoker noted in "allscripts" Quit in  - never used chewing tobacco        FAMILY HISTORY:  Family History   Problem Relation Age of Onset    Pancreatic cancer Mother     Colon cancer Father     Diabetes Father         mellitus     No Known Problems Sister     Diabetes Sister     Nephrolithiasis Sister        MEDICATIONS:    Current Outpatient Medications:     albuterol (PROVENTIL HFA,VENTOLIN HFA) 90 mcg/act inhaler, inhale 2 puffs by mouth daily if needed for shortness of breath, Disp: 8 5 g, Rfl: 1    ERYN MICROLET LANCETS lancets, Test twice a day, Disp: 100 each, Rfl: 3    Contour Test test strip, TEST twice a day, Disp: 100 strip, Rfl: 2    glyBURIDE (DIABETA) 5 mg tablet, take 2 tablets by mouth twice a day, Disp: 360 tablet, Rfl: 1    lisinopril-hydrochlorothiazide (PRINZIDE,ZESTORETIC) 20-25 MG per tablet, take 1 tablet by mouth once daily, Disp: 90 tablet, Rfl: 1    Tradjenta 5 MG TABS, take 1 tablet by mouth daily, Disp: 90 tablet, Rfl: 1    PHYSICAL EXAM:  Vitals:    09/22/21 1014   BP: 120/60   BP Location: Right arm   Patient Position: Sitting   Pulse: 68   Resp: 16   Temp: (!) 97 °F (36 1 °C)   TempSrc: Temporal   Weight: 83 7 kg (184 lb 9 6 oz)   Height: 5' 10" (1 778 m)     Body mass index is 26 49 kg/m²  Physical Exam  Constitutional:       General: He is not in acute distress  Appearance: He is well-developed  HENT:      Head: Normocephalic  Eyes:      General: No scleral icterus  Conjunctiva/sclera: Conjunctivae normal    Neck:      Vascular: No JVD  Cardiovascular:      Rate and Rhythm: Normal rate  Heart sounds: Normal heart sounds  Pulmonary:      Effort: Pulmonary effort is normal       Breath sounds: Normal breath sounds  No wheezing  Abdominal:      General: Bowel sounds are normal       Palpations: Abdomen is soft  Tenderness: There is no abdominal tenderness  Musculoskeletal:         General: No swelling  Normal range of motion  Cervical back: Neck supple  Skin:     General: Skin is warm  Findings: No rash  Neurological:      General: No focal deficit present  Mental Status: He is alert and oriented to person, place, and time     Psychiatric:         Behavior: Behavior normal          LAB RESULTS:  Results for orders placed or performed in visit on 31/98/50   Basic metabolic panel   Result Value Ref Range    Sodium 134 (L) 136 - 145 mmol/L    Potassium 5 1 3 5 - 5 3 mmol/L    Chloride 103 100 - 108 mmol/L    CO2 26 21 - 32 mmol/L    ANION GAP 5 4 - 13 mmol/L    BUN 39 (H) 5 - 25 mg/dL    Creatinine 1 91 (H) 0 60 - 1 30 mg/dL    Glucose, Fasting 144 (H) 65 - 99 mg/dL    Calcium 9 0 8 3 - 10 1 mg/dL    eGFR 33 ml/min/1 73sq m   CBC and differential   Result Value Ref Range    WBC 7 34 4 31 - 10 16 Thousand/uL    RBC 4 98 3 88 - 5 62 Million/uL    Hemoglobin 15 3 12 0 - 17 0 g/dL    Hematocrit 45 6 36 5 - 49 3 %    MCV 92 82 - 98 fL MCH 30 7 26 8 - 34 3 pg    MCHC 33 6 31 4 - 37 4 g/dL    RDW 12 9 11 6 - 15 1 %    MPV 11 2 8 9 - 12 7 fL    Platelets 084 396 - 373 Thousands/uL    nRBC 0 /100 WBCs    Neutrophils Relative 58 43 - 75 %    Immat GRANS % 1 0 - 2 %    Lymphocytes Relative 26 14 - 44 %    Monocytes Relative 9 4 - 12 %    Eosinophils Relative 5 0 - 6 %    Basophils Relative 1 0 - 1 %    Neutrophils Absolute 4 27 1 85 - 7 62 Thousands/µL    Immature Grans Absolute 0 04 0 00 - 0 20 Thousand/uL    Lymphocytes Absolute 1 92 0 60 - 4 47 Thousands/µL    Monocytes Absolute 0 67 0 17 - 1 22 Thousand/µL    Eosinophils Absolute 0 35 0 00 - 0 61 Thousand/µL    Basophils Absolute 0 09 0 00 - 0 10 Thousands/µL   Phosphorus   Result Value Ref Range    Phosphorus 3 3 2 3 - 4 1 mg/dL   Protein / creatinine ratio, urine   Result Value Ref Range    Creatinine, Ur 90 3 mg/dL    Protein Urine Random 10 mg/dL    Prot/Creat Ratio, Ur 0 11 (H) 0 00 - 0 10   PTH, intact   Result Value Ref Range     6 (H) 18 4 - 80 1 pg/mL   UA (URINE) with reflex to Scope   Result Value Ref Range    Color, UA Yellow     Clarity, UA Clear     Specific Bern, UA 1 014 1 003 - 1 030    pH, UA 5 5 4 5, 5 0, 5 5, 6 0, 6 5, 7 0, 7 5, 8 0    Leukocytes, UA Negative Negative    Nitrite, UA Negative Negative    Protein, UA Negative Negative mg/dl    Glucose, UA Negative Negative mg/dl    Ketones, UA Negative Negative mg/dl    Urobilinogen, UA 0 2 0 2, 1 0 E U /dl E U /dl    Bilirubin, UA Negative Negative    Blood, UA Negative Negative   Vitamin D 25 hydroxy   Result Value Ref Range    Vit D, 25-Hydroxy 22 6 (L) 30 0 - 100 0 ng/mL   Lipid Panel with Direct LDL reflex   Result Value Ref Range    Cholesterol 197 50 - 200 mg/dL    Triglycerides 122 <=150 mg/dL    HDL, Direct 34 (L) >=40 mg/dL    LDL Calculated 139 (H) 0 - 100 mg/dL   Hemoglobin A1C   Result Value Ref Range    Hemoglobin A1C 7 7 (H) Normal 3 8-5 6%; PreDiabetic 5 7-6 4%;  Diabetic >=6 5%; Glycemic control for adults with diabetes <7 0% %     mg/dl   PSA, Total Screen   Result Value Ref Range    PSA 2 9 0 0 - 4 0 ng/mL       ASSESSMENT and PLAN:      Stage 3b chronic kidney disease (HCC)  Lab Results   Component Value Date    EGFR 33 09/16/2021    EGFR 36 03/17/2021    EGFR 41 09/22/2020    CREATININE 1 91 (H) 09/16/2021    CREATININE 1 81 (H) 03/17/2021    CREATININE 1 63 (H) 09/22/2020   Renal function is fluctuating with slow progression  May be related to uncontrolled diabetes  Advised to drink lots of liquid and avoid any nephrotoxic medication and I will monitor him closely    Benign prostatic hyperplasia with lower urinary tract symptoms  Overall seems to be stable    Chronic kidney disease-mineral and bone disorder  Lab Results   Component Value Date    EGFR 33 09/16/2021    EGFR 36 03/17/2021    EGFR 41 09/22/2020    CREATININE 1 91 (H) 09/16/2021    CREATININE 1 81 (H) 03/17/2021    CREATININE 1 63 (H) 09/22/2020   PTH and phosphorus are within acceptable range  Vitamin-D level is on lower side  Advised to take over-the-counter vitamin-D 2000 unit once a day    Type 2 diabetes mellitus with diabetic chronic kidney disease ([facility] )    Lab Results   Component Value Date    HGBA1C 7 7 (H) 09/16/2021   Diabetes not well controlled  He claims when he was on metformin does better controlled  As GFR is still more than 30 he can be on metformin  He can also get SGLT 2 inhibitor like [employer]  He is going to discussed with primary doctor    Benign essential hypertension  Blood pressure is very well control with present medication which include lisinopril      Discussed everything at length with the patient  I will see him back in 4 months  Will get blood and urine test before that visit        Portions of the record may have been created with voice recognition software  Occasional wrong word or "sound a like" substitutions may have occurred due to the inherent limitations of voice recognition software   Read the chart carefully and recognize, using context, where substitutions have occurred  If you have any questions, please contact the dictating provider

## 2021-09-23 ENCOUNTER — APPOINTMENT (EMERGENCY)
Dept: CT IMAGING | Facility: HOSPITAL | Age: 76
End: 2021-09-23
Payer: COMMERCIAL

## 2021-09-23 ENCOUNTER — HOSPITAL ENCOUNTER (EMERGENCY)
Facility: HOSPITAL | Age: 76
Discharge: HOME | End: 2021-09-23
Attending: EMERGENCY MEDICINE | Admitting: EMERGENCY MEDICINE
Payer: COMMERCIAL

## 2021-09-23 VITALS
TEMPERATURE: 98.9 F | OXYGEN SATURATION: 98 % | SYSTOLIC BLOOD PRESSURE: 151 MMHG | DIASTOLIC BLOOD PRESSURE: 70 MMHG | HEART RATE: 70 BPM | RESPIRATION RATE: 18 BRPM
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
## 2021-09-23 DIAGNOSIS — S01.81XA FACIAL LACERATION, INITIAL ENCOUNTER: ICD-10-CM

## 2021-09-23 DIAGNOSIS — S09.90XA INJURY OF HEAD, INITIAL ENCOUNTER: ICD-10-CM

## 2021-09-23 PROCEDURE — 99284 EMERGENCY DEPT VISIT MOD MDM: CPT | Performed by: PHYSICIAN ASSISTANT

## 2021-09-23 PROCEDURE — 70486 CT MAXILLOFACIAL W/O DYE: CPT

## 2021-09-23 PROCEDURE — 70450 CT HEAD/BRAIN W/O DYE: CPT

## 2021-09-23 PROCEDURE — 12013 RPR F/E/E/N/L/M 2.6-5.0 CM: CPT | Performed by: PHYSICIAN ASSISTANT

## 2021-09-23 PROCEDURE — G1004 CDSM NDSC: HCPCS

## 2021-09-23 NOTE — ED PROVIDER NOTES
History  Chief Complaint   Patient presents with    Fall     pt fell while walking his dog at [phone] when he tripped and fell face first  pt has small laceration on L eyebrow that bleeding has slowed  pt c/o headache currently and dizziness initally after fall  pt denies any bt and is unsure of loc     27-year-old male with relevant past medical history significant for type 2 diabetes mellitus and hypertension who presents to the emergency department accompanied by wife at bedside status post fall at approximately 10:15 for complaint facial pain and laceration  Patient states he was walking his dog, lost balance, and fell forward, striking the left side of his face on asphalt ground  He presents with laceration, swelling, and tenderness of the left superior orbit  He is unsure of loss of consciousness  He reports moderate headache and dizziness initially, which has since improved  He denies blurred or double vision, neck pain or stiffness, nausea or vomiting, photosensitivity, extremity numbness or weakness, difficulty ambulating or gait imbalance, back pain, arthralgia or joint swelling  Wife denies any confusion or abnormal behavior  He is not on aspirin or other blood thinners  States he is up-to-date on tetanus  Prior to Admission Medications   Prescriptions Last Dose Informant Patient Reported? Taking?    ERYN MICROLET LANCETS lancets  Self No No   Sig: Test twice a day   Contour Test test strip  Self No No   Sig: TEST twice a day   Tradjenta 5 MG TABS  Self No No   Sig: take 1 tablet by mouth daily   albuterol (PROVENTIL HFA,VENTOLIN HFA) 90 mcg/act inhaler  Self No No   Sig: inhale 2 puffs by mouth daily if needed for shortness of breath   glyBURIDE (DIABETA) 5 mg tablet  Self No No   Sig: take 2 tablets by mouth twice a day   lisinopril-hydrochlorothiazide (PRINZIDE,ZESTORETIC) 20-25 MG per tablet  Self No No   Sig: take 1 tablet by mouth once daily      Facility-Administered Medications: None       Past Medical History:   Diagnosis Date    Asthma     last assessed: 2015    Balanitis     last assessed: 2014    Cataract     Chronic kidney disease     [facility] disease[facility] stage 3 ([facility] )     last assessed: 2018    Diabetes mellitus [facility])     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([facility] )     last assessed: 2018    Hypercholesterolemia     Hypertension     Pneumonia     last assessed: 2013       Past Surgical History:   Procedure Laterality Date    ABDOMINAL SURGERY      APPENDECTOMY  1965    CATARACT EXTRACTION     [facility] HERNIA REPAIR  2007    x2    OTHER SURGICAL HISTORY  1965    Perforated duodenol ulcer surgery     NE REPAIR FLEXOR TENDON,HAND,W/O GRAFT,EA Left 3/17/2021    Procedure: Left thumb EPL repair;  Surgeon: Merary Puri MD;  Location: [facility];  Service: Orthopedics       Family History   Problem Relation Age of Onset    Pancreatic cancer Mother     Colon cancer Father     Diabetes Father         mellitus     No Known Problems Sister     Diabetes Sister     Nephrolithiasis Sister      I have reviewed and agree with the history as documented  E-Cigarette/Vaping    E-Cigarette Use Never User      E-Cigarette/Vaping Substances    Nicotine No     THC No     CBD No     Flavoring No     Other No     Unknown No      Social History     Tobacco Use    Smoking status: Former Smoker     Packs/day: 2 00     Years: 10 00     Pack years: 20 00     Types: Cigarettes     Quit date:      Years since quittin 7    Smokeless tobacco: Former User     Quit date:     Tobacco comment: former smoker noted in "allscripts" Quit in  - never used chewing tobacco    Vaping Use    Vaping Use: Never used   Substance Use Topics    Alcohol use:  Yes     Alcohol/week: 1 0 standard drinks     Types: 1 Standard drinks or equivalent per week     Comment: rare  socially    Drug use: No       Review of Systems Constitutional: Negative for activity change and fatigue  HENT: Positive for facial swelling  Negative for dental problem and nosebleeds  Eyes: Negative for photophobia, pain and visual disturbance  Respiratory: Negative for shortness of breath  Cardiovascular: Negative for chest pain  Gastrointestinal: Negative for abdominal pain, nausea and vomiting  Musculoskeletal: Negative for arthralgias, back pain, gait problem, joint swelling, neck pain and neck stiffness  Skin: Positive for color change and wound  Neurological: Positive for dizziness, light-headedness and headaches  Negative for tremors, seizures, syncope, facial asymmetry, speech difficulty, weakness and numbness  Psychiatric/Behavioral: Negative for confusion  All other systems reviewed and are negative  Physical Exam  Physical Exam  Vitals reviewed  Constitutional:       General: He is awake  He is not in acute distress  Appearance: Normal appearance  He is well-developed  He is not ill-appearing or toxic-appearing  HENT:      Head: Normocephalic  Contusion and laceration (3cm laceration above left eyebrow, bleeding controlled, +underlying hematoma and bony tenderness) present  Jaw: There is normal jaw occlusion  Mouth/Throat:      Lips: Pink  Mouth: Mucous membranes are moist       Pharynx: Oropharynx is clear  Uvula midline  Eyes:      General: Vision grossly intact  Gaze aligned appropriately  No visual field deficit  Extraocular Movements: Extraocular movements intact  Conjunctiva/sclera: Conjunctivae normal       Pupils: Pupils are equal, round, and reactive to light        Comments: TTP at left superior orbital rim without palpable step off, instability, or crepitus; no proptosis, enophthalmos or dystopia, painful or decreased EOMs; no pupillary defect; no extrusion of ocular contents, conjunctival hemorrhage, or tear shaped pupil; no hard/stiff eyelids    Cardiovascular:      Rate and Rhythm: Normal rate and regular rhythm  Pulses: Normal pulses  Pulmonary:      Effort: Pulmonary effort is normal       Breath sounds: Normal breath sounds and air entry  Musculoskeletal:         General: Normal range of motion  Cervical back: Full passive range of motion without pain, normal range of motion and neck supple  Skin:     General: Skin is warm  Capillary Refill: Capillary refill takes less than 2 seconds  Findings: No erythema, lesion or rash  Neurological:      Mental Status: He is alert and oriented to person, place, and time  GCS: GCS eye subscore is 4  GCS verbal subscore is 5  GCS motor subscore is 6  Cranial Nerves: Cranial nerves are intact  Sensory: Sensation is intact  Motor: Motor function is intact  Coordination: Coordination is intact  Gait: Gait is intact  Psychiatric:         Behavior: Behavior is cooperative  Vital Signs  ED Triage Vitals [09/22/21 2303]   Temperature Pulse Respirations Blood Pressure SpO2   98 9 °F (37 2 °C) 73 16 156/75 97 %      Temp Source Heart Rate Source Patient Position - Orthostatic VS BP Location FiO2 (%)   Oral Monitor Sitting Left arm --      Pain Score       6           Vitals:    09/22/21 2303 09/23/21 0205   BP: 156/75 151/70   Pulse: 73 70   Patient Position - Orthostatic VS: Sitting          Visual Acuity  Visual Acuity      Most Recent Value   L Pupil Size (mm)  3   R Pupil Size (mm)  3          ED Medications  Medications - No data to display    Diagnostic Studies  Results Reviewed     None                 CT head without contrast   Final Result by Lorena Montes MD (09/23 0135)      No acute intracranial abnormality  Workstation performed: [id number]         CT facial bones without contrast   Final Result by Lorena Montes MD (09/23 0138)      No evidence of acute traumatic injury to the facial bones        Periapical lucencies involving the left maxillary central incisor and maxillary canine tooth         Workstation performed: [id number]                    Procedures  Laceration repair    Date/Time: 9/24/2021 12:32 AM  Performed by: Shorty Vaughn PA-C  Authorized by: Shorty Vaughn PA-C   Consent: Verbal consent obtained  Risks and benefits: risks, benefits and alternatives were discussed  Consent given by: patient  Patient understanding: patient states understanding of the procedure being performed  Patient identity confirmed: verbally with patient  Body area: head/neck  Location details: forehead  Laceration length: 3 cm  Foreign bodies: no foreign bodies  Tendon involvement: none      Procedure Details:  Irrigation solution: saline  Irrigation method: syringe  Amount of cleaning: standard  Skin closure: glue  Approximation: loose  Approximation difficulty: simple  Patient tolerance: patient tolerated the procedure well with no immediate complications               ED Course  ED Course as of Sep 24 0033   Thu Sep 23, 2021   0149 CTs unremarkable  Head injury return precautions discussed  MDM  Number of Diagnoses or Management Options  Facial laceration, initial encounter  Fall, initial encounter  Injury of head, initial encounter  Diagnosis management comments: On exam, well-appearing male, no acute distress, nontoxic appearance, vitals unremarkable, resting comfortably lying down, laceration of the left superior orbital rim with surrounding swelling and bony tenderness, no other facial tenderness or swelling, no scalp hematoma, AAOx3, normal neuro exam without focal deficit, able to stand and ambulate independently, remainder of exam unremarkable as above  Will proceed with CT head and facial bones to assess for any acute fracture and/or intracranial bleeding  Laceration is superficial, patient opts to have laceration closed with glue         Amount and/or Complexity of Data Reviewed  Tests in the radiology section of CPT®: ordered and reviewed  Discussion of test results with the performing providers: yes  Decide to obtain previous medical records or to obtain history from someone other than the patient: yes  Obtain history from someone other than the patient: yes  Review and summarize past medical records: yes  Discuss the patient with other providers: yes  Independent visualization of images, tracings, or specimens: yes    Patient Progress  Patient progress: improved (See ED course note for dispo and plan  I reviewed and discussed imaging findings with the patient at bedside  I discussed emergency department return parameters  I answered any and all questions the patient had regarding emergency department course of evaluation and treatment  The patient verbalized understanding of and agreement with plan   )      Disposition  Final diagnoses:   Fall, initial encounter   Injury of head, initial encounter   Facial laceration, initial encounter     Time reflects when diagnosis was documented in both MDM as applicable and the Disposition within this note     Time User Action Codes Description Comment    9/23/2021  1:49 AM Maile Arriaga Add [Q16  QZSE] Fall, initial encounter     9/23/2021  1:49 AM Maile Arriaga Add [S09 90XA] Injury of head, initial encounter     9/23/2021  1:49 AM Maile Arriaga Add [S01 81XA] Facial laceration, initial encounter       ED Disposition     ED Disposition Condition Date/Time Comment    Discharge Stable Thu Sep 23, 2021  1:49 AM Bree Marrufo discharge to home/self care              Follow-up Information     Follow up With Specialties Details Why Contact Info Additional [address] [facility] Emergency Department Emergency Medicine Go to  If symptoms worsen [address] Emergency Department, [address] [place] [address], [id number]          Discharge Medication List as of 9/23/2021  1:49 AM      CONTINUE these medications which have NOT CHANGED    Details   albuterol (PROVENTIL HFA,VENTOLIN HFA) 90 mcg/act inhaler inhale 2 puffs by mouth daily if needed for shortness of breath, Normal      ERYN MICROLET LANCETS lancets Test twice a day, Normal      Contour Test test strip TEST twice a day, Normal      glyBURIDE (DIABETA) 5 mg tablet take 2 tablets by mouth twice a day, Normal      lisinopril-hydrochlorothiazide (PRINZIDE,ZESTORETIC) 20-25 MG per tablet take 1 tablet by mouth once daily, Normal      Tradjenta 5 MG TABS take 1 tablet by mouth daily, Normal           No discharge procedures on file      PDMP Review     None          ED Provider  Electronically Signed by           Adia Avalos PA-C  09/24/21 8714

## 2021-09-23 NOTE — ED NOTES
Discharge instructions and medications reviewed  Pt with no questions or concerns at this time  Pt ambulatory off unit with steady gait        Marjan Umanzor RN  09/23/21 3630

## 2021-09-27 ENCOUNTER — OFFICE VISIT (OUTPATIENT)
Dept: FAMILY MEDICINE CLINIC | Facility: CLINIC | Age: 76
End: 2021-09-27
Payer: COMMERCIAL

## 2021-09-27 VITALS
TEMPERATURE: 98.7 F | BODY MASS INDEX: 26.34 KG/M2 | SYSTOLIC BLOOD PRESSURE: 118 MMHG | HEIGHT: 70 IN | HEART RATE: 72 BPM | OXYGEN SATURATION: 98 % | DIASTOLIC BLOOD PRESSURE: 60 MMHG | WEIGHT: 184 LBS

## 2021-09-27 DIAGNOSIS — E11.8 TYPE 2 DIABETES MELLITUS WITH COMPLICATION, WITHOUT LONG-TERM CURRENT USE OF INSULIN (HCC): ICD-10-CM

## 2021-09-27 DIAGNOSIS — H53.9 VISUAL CHANGES: ICD-10-CM

## 2021-09-27 DIAGNOSIS — E11.22 TYPE 2 DIABETES MELLITUS WITH STAGE 3B CHRONIC KIDNEY DISEASE, WITHOUT LONG-TERM CURRENT USE OF INSULIN (HCC): Primary | ICD-10-CM
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
## 2021-09-27 DIAGNOSIS — Z78.9 STATIN INTOLERANCE: ICD-10-CM

## 2021-09-27 DIAGNOSIS — E78.00 HYPERCHOLESTEROLEMIA: ICD-10-CM

## 2021-09-27 DIAGNOSIS — E55.9 VITAMIN D DEFICIENCY: ICD-10-CM

## 2021-09-27 DIAGNOSIS — N18.32 TYPE 2 DIABETES MELLITUS WITH STAGE 3B CHRONIC KIDNEY DISEASE, WITHOUT LONG-TERM CURRENT USE OF INSULIN (HCC): Primary | ICD-10-CM

## 2021-09-27 DIAGNOSIS — R09.82 POST-NASAL DRIP: ICD-10-CM
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
## 2021-09-27 PROBLEM — S61.112A LACERATION OF LEFT THUMB WITHOUT FOREIGN BODY WITH DAMAGE TO NAIL: Status: RESOLVED | Noted: 2021-03-16 | Resolved: 2021-09-27

## 2021-09-27 PROCEDURE — 99214 OFFICE O/P EST MOD 30 MIN: CPT | Performed by: FAMILY MEDICINE

## 2021-09-27 PROCEDURE — 1160F RVW MEDS BY RX/DR IN RCRD: CPT | Performed by: FAMILY MEDICINE

## 2021-09-27 PROCEDURE — 1036F TOBACCO NON-USER: CPT | Performed by: FAMILY MEDICINE

## 2021-09-27 RX ORDER — MELATONIN
2000 DAILY
COMMUNITY

## 2021-09-27 NOTE — ASSESSMENT & PLAN NOTE
Fall precautions  Falls Plan of Care: Balance, strength, and gait training instructions were provided

## 2021-09-27 NOTE — ASSESSMENT & PLAN NOTE
Lab Results   Component Value Date    HGBA1C 7 7 (H) 97/06/0463   will add Trulicity   Continue Glyburide and Tradjenta for now (will D/C Tradjenta if Trulicity covered and if he tolerates)

## 2021-09-27 NOTE — PROGRESS NOTES
Assessment/Plan:     Problem List Items Addressed This Visit        Endocrine    Type 2 diabetes mellitus with diabetic chronic kidney disease ([facility] [address] ) - Primary       Lab Results   Component Value Date    HGBA1C 7 7 (H) 57/74/5308   will add Trulicity  Continue Glyburide and Tradjenta for now (will D/C Tradjenta if Trulicity covered and if he tolerates)         Relevant Medications    Dulaglutide 0 75 MG/0 5ML SOPN    Other Relevant Orders    Ambulatory referral to Diabetic Education       Other    Hypercholesterolemia     advised statin therapy - he declined  discussed increased ASCVD risk         Statin intolerance    Vitamin D deficiency     Advised Vit D 2000 IU per day         Fall     Fall precautions  Falls Plan of Care: Balance, strength, and gait training instructions were provided  Post-nasal drip     Advised claritin and Flonase  mucinex as needed  Offered CXR, PFTs - will consider in future  Visual changes     Discussed recent normal eye exam  Normal brain CT  Offered MRI brain, Carotid dopplers, Neuro referral - patient will call if symptoms persist or return           Other Visit Diagnoses     Type 2 diabetes mellitus with complication, without long-term current use of insulin ([facility])        Relevant Medications    Dulaglutide 0 75 MG/0 5ML SOPN    Other Relevant Orders    Ambulatory referral to Diabetic Education          BMI Counseling: Body mass index is 26 4 kg/m²  The BMI is above normal  Nutrition recommendations include moderation in carbohydrate intake  Subjective:      Patient ID: Latoya Owens is a 67 y o  male  67year old here for lab review  DM - A1c is 7 7%  it went up from 7 3%  is on glyburide 10 mg BID and Tradjenta 5 mg daily  No changes in diet or exercise  He does check his sugars usually run in 130s-160s  Cannot use Metformin due to CKD  PSA WNL  CBC WNL    CKD - creatinine 1 91 - sees nephrology       Lipids  Lab Results       Component Value               Date                       CHOLESTEROL              197                 09/16/2021                 CHOLESTEROL              164                 03/17/2021                 CHOLESTEROL              182                 09/22/2020            Lab Results       Component                Value               Date                       HDL                      34 (L)              09/16/2021                 HDL                      34 (L)              03/17/2021                 HDL                      34 (L)              09/22/2020            Lab Results       Component                Value               Date                       TRIG                     122                 09/16/2021                 TRIG                     71                  03/17/2021                 TRIG                     99                  09/22/2020            Lab Results       Component                Value               Date                       [place]                  133                 02/07/2019            Says he didn't tolerate statins - he says he had muscle aches  Vit D - 22  Not on supplements  He recently had a fall - he was in the ER  Says he was out walking his dog at night and was on uneven terrain  He hit his head  CT was done - was negative  He does have poor balance - he says ever since he injured his back  Reports episodes of coughing up a lot of phlegm in the mornings  Says he had asthma in the past  reports smoking hx remotely - quit in 1971  occasionally has wheezing  Uses albuterol prn-very rarely  +nasal congestion  He says he saw patterns in his vision, talked to his eye doctor about it was advised it was not from his eyes  Described as flashing lights  He denies any headache  Recent head CT was negative  He says the symptoms have since resolved and is not really interested in further work up at this time         The following portions of the patient's history were reviewed and updated as appropriate:   He  has a past medical history of Asthma, Balanitis, Cataract, Chronic kidney disease, Chronic kidney disease, stage 3 ([facility] [address] ), Diabetes mellitus ([facility] [address] ), 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[facility] ), Hypercholesterolemia, Hypertension, and Pneumonia  He   Patient Active Problem List    Diagnosis Date Noted    Vitamin D deficiency 09/27/2021    Fall 09/27/2021    Post-nasal drip 09/27/2021    Visual changes 09/27/2021    Ganglion cyst of left foot 03/31/2021    Chronic kidney disease-mineral and bone disorder 09/12/2019    Overweight (BMI 25 0-29 9) 08/22/2019    Medicare annual wellness visit, subsequent 08/22/2019    Benign prostatic hyperplasia with lower urinary tract symptoms 08/22/2019    Statin intolerance 02/15/2019    Degenerative cervical spinal stenosis 02/15/2019    Stage 3b chronic kidney disease ([facility] [address] ) 02/08/2018    Type 2 diabetes mellitus with diabetic chronic kidney disease ([facility] ) 02/08/2018    Benign essential hypertension 02/08/2018    Hypercholesterolemia 04/02/2014     He  has a past surgical history that includes Hernia repair; Abdominal surgery; Appendectomy (1965); Cataract extraction; Hernia repair (2007); Other surgical history (1965); and pr repair flexor tendon,hand,w/o graft,ea (Left, 3/17/2021)  His family history includes Colon cancer in his father; Diabetes in his father and sister; Nephrolithiasis in his sister; No Known Problems in his sister; Pancreatic cancer in his mother  He  reports that he quit smoking about 50 years ago  His smoking use included cigarettes  He has a 20 00 pack-year smoking history  He quit smokeless tobacco use about 14 years ago  He reports current alcohol use of about 1 0 standard drinks of alcohol per week  He reports that he does not use drugs    Current Outpatient Medications   Medication Sig Dispense Refill    cholecalciferol (VITAMIN D3) 1,000 units tablet Take 2,000 Units by mouth daily      albuterol (PROVENTIL HFA,VENTOLIN HFA) 90 mcg/act inhaler inhale 2 puffs by mouth daily if needed for shortness of breath 8 5 g 1    ERYN MICROLET LANCETS lancets Test twice a day 100 each 3    Contour Test test strip TEST twice a day 100 strip 2    Dulaglutide 0 75 MG/0 5ML SOPN Inject 0 5 mL (0 75 mg total) under the skin once a week 3 mL 3    glyBURIDE (DIABETA) 5 mg tablet take 2 tablets by mouth twice a day 360 tablet 1    lisinopril-hydrochlorothiazide (PRINZIDE,ZESTORETIC) 20-25 MG per tablet take 1 tablet by mouth once daily 90 tablet 1    Tradjenta 5 MG TABS take 1 tablet by mouth daily 90 tablet 1     No current facility-administered medications for this visit  Current Outpatient Medications on File Prior to Visit   Medication Sig    cholecalciferol (VITAMIN D3) 1,000 units tablet Take 2,000 Units by mouth daily    albuterol (PROVENTIL HFA,VENTOLIN HFA) 90 mcg/act inhaler inhale 2 puffs by mouth daily if needed for shortness of breath    ERYN MICROLET LANCETS lancets Test twice a day    Contour Test test strip TEST twice a day    glyBURIDE (DIABETA) 5 mg tablet take 2 tablets by mouth twice a day    lisinopril-hydrochlorothiazide (PRINZIDE,ZESTORETIC) 20-25 MG per tablet take 1 tablet by mouth once daily    Tradjenta 5 MG TABS take 1 tablet by mouth daily     No current facility-administered medications on file prior to visit  He is allergic to meperidine and pravastatin       Review of Systems   Constitutional: Negative for activity change, appetite change, fatigue and unexpected weight change  HENT: Positive for congestion  Eyes: Positive for visual disturbance  Negative for photophobia, pain, discharge, redness and itching  Respiratory: Positive for cough and wheezing  Negative for chest tightness and shortness of breath  Cardiovascular: Negative for chest pain and leg swelling  Gastrointestinal: Negative for abdominal pain  Musculoskeletal: Positive for gait problem     Neurological: Negative for dizziness, tremors, seizures, syncope, facial asymmetry, speech difficulty, weakness, light-headedness, numbness and headaches  Objective:      /60 (BP Location: Left arm, Patient Position: Sitting, Cuff Size: Large)   Pulse 72   Temp 98 7 °F (37 1 °C)   Ht 5' 10" (1 778 m)   Wt 83 5 kg (184 lb)   SpO2 98%   BMI 26 40 kg/m²          Physical Exam  Vitals and nursing note reviewed  Constitutional:       General: He is not in acute distress  Appearance: Normal appearance  He is well-developed  He is not diaphoretic  HENT:      Head: Normocephalic and atraumatic  Cardiovascular:      Rate and Rhythm: Normal rate and regular rhythm  Pulses: no weak pulses          Dorsalis pedis pulses are 2+ on the right side and 1+ on the left side  Posterior tibial pulses are 1+ on the right side and 1+ on the left side  Heart sounds: Normal heart sounds  No murmur heard  No friction rub  No gallop  Pulmonary:      Effort: Pulmonary effort is normal  No respiratory distress  Breath sounds: Normal breath sounds  No wheezing or rales  Chest:      Chest wall: No tenderness  Musculoskeletal:         General: No swelling  Right lower leg: No edema  Left lower leg: No edema  Feet:      Right foot:      Skin integrity: No ulcer, skin breakdown, erythema, warmth, callus or dry skin  Left foot:      Skin integrity: No ulcer, skin breakdown, erythema, warmth, callus or dry skin  Skin:         Neurological:      Mental Status: He is alert and oriented to person, place, and time  Psychiatric:         Mood and Affect: Mood normal          Behavior: Behavior normal          Thought Content: Thought content normal          Judgment: Judgment normal        Patient's shoes and socks removed  Right Foot/Ankle   Right Foot Inspection  Skin Exam: skin normal and skin intact no dry skin, no warmth, no callus, no erythema, no maceration, no abnormal color, no pre-ulcer, no ulcer and no callus                          Toe Exam: no swelling, no tenderness, erythema and  no right toe deformity  Sensory   Vibration: intact    Monofilament testing: intact  Vascular  Capillary refills: < 3 seconds  The right DP pulse is 2+  The right PT pulse is 1+  Left Foot/Ankle  Left Foot Inspection  Skin Exam: skin normal and skin intactno dry skin, no warmth, no erythema, no maceration, normal color, no pre-ulcer, no ulcer and no callus                         Toe Exam: no swelling, no tenderness, no erythema and no left toe deformity                   Sensory   Vibration: intact    Monofilament: intact  Vascular  Capillary refills: < 3 seconds  The left DP pulse is 1+  The left PT pulse is 1+  Assign Risk Category:  No deformity present; No loss of protective sensation;  No weak pulses       Risk: 0

## 2021-09-27 NOTE — ASSESSMENT & PLAN NOTE
Discussed recent normal eye exam  Normal brain CT  Offered MRI brain, Carotid dopplers, Neuro referral - patient will call if symptoms persist or return

## 2021-10-07 LAB
LEFT EYE DIABETIC RETINOPATHY: NORMAL
RIGHT EYE DIABETIC RETINOPATHY: NORMAL

## 2021-10-08 ENCOUNTER — TELEPHONE (OUTPATIENT)
Dept: NEPHROLOGY | Facility: CLINIC | Age: 76
End: 2021-10-08

## 2021-10-19 ENCOUNTER — HOSPITAL ENCOUNTER (EMERGENCY)
Facility: HOSPITAL | Age: 76
Discharge: HOME | End: 2021-10-20
Attending: EMERGENCY MEDICINE
Payer: COMMERCIAL

## 2021-10-19 ENCOUNTER — APPOINTMENT (EMERGENCY)
Dept: RADIOLOGY | Facility: HOSPITAL | Age: 76
End: 2021-10-19
Payer: COMMERCIAL

## 2021-10-19 DIAGNOSIS — R06.02 SHORTNESS OF BREATH: Primary | ICD-10-CM

## 2021-10-19 LAB
FLUAV RNA RESP QL NAA+PROBE: NEGATIVE
FLUBV RNA RESP QL NAA+PROBE: NEGATIVE
RSV RNA RESP QL NAA+PROBE: NEGATIVE
SARS-COV-2 RNA RESP QL NAA+PROBE: NEGATIVE

## 2021-10-19 PROCEDURE — 0241U HB NFCT DS VIR RESP RNA 4 TRGT: CPT | Performed by: EMERGENCY MEDICINE

## 2021-10-19 PROCEDURE — 94640 AIRWAY INHALATION TREATMENT: CPT

## 2021-10-19 PROCEDURE — 71046 X-RAY EXAM CHEST 2 VIEWS: CPT

## 2021-10-19 PROCEDURE — 99285 EMERGENCY DEPT VISIT HI MDM: CPT

## 2021-10-20 VITALS
HEART RATE: 71 BPM | OXYGEN SATURATION: 93 % | WEIGHT: 184 LBS | RESPIRATION RATE: 16 BRPM | DIASTOLIC BLOOD PRESSURE: 72 MMHG | HEIGHT: 70 IN | BODY MASS INDEX: 26.34 KG/M2 | TEMPERATURE: 97.8 F | SYSTOLIC BLOOD PRESSURE: 137 MMHG

## 2021-10-20 LAB
ANION GAP SERPL CALCULATED.3IONS-SCNC: 11 MMOL/L (ref 4–13)
ATRIAL RATE: 76 BPM
BASOPHILS # BLD AUTO: 0.1 THOUSANDS/ΜL (ref 0–0.1)
BASOPHILS NFR BLD AUTO: 1 % (ref 0–1)
BUN SERPL-MCNC: 37 MG/DL (ref 5–25)
CALCIUM SERPL-MCNC: 9.5 MG/DL (ref 8.3–10.1)
CHLORIDE SERPL-SCNC: 98 MMOL/L (ref 100–108)
CO2 SERPL-SCNC: 27 MMOL/L (ref 21–32)
CREAT SERPL-MCNC: 2.07 MG/DL (ref 0.6–1.3)
EOSINOPHIL # BLD AUTO: 0.76 THOUSAND/ΜL (ref 0–0.61)
EOSINOPHIL NFR BLD AUTO: 8 % (ref 0–6)
ERYTHROCYTE [DISTWIDTH] IN BLOOD BY AUTOMATED COUNT: 12.1 % (ref 11.6–15.1)
GFR SERPL CREATININE-BSD FRML MDRD: 30 ML/MIN/1.73SQ M
GLUCOSE SERPL-MCNC: 65 MG/DL (ref 65–140)
HCT VFR BLD AUTO: 42.2 % (ref 36.5–49.3)
HGB BLD-MCNC: 14.6 G/DL (ref 12–17)
IMM GRANULOCYTES # BLD AUTO: 0.03 THOUSAND/UL (ref 0–0.2)
IMM GRANULOCYTES NFR BLD AUTO: 0 % (ref 0–2)
LYMPHOCYTES # BLD AUTO: 2.45 THOUSANDS/ΜL (ref 0.6–4.47)
LYMPHOCYTES NFR BLD AUTO: 24 % (ref 14–44)
MCH RBC QN AUTO: 30.5 PG (ref 26.8–34.3)
MCHC RBC AUTO-ENTMCNC: 34.6 G/DL (ref 31.4–37.4)
MCV RBC AUTO: 88 FL (ref 82–98)
MONOCYTES # BLD AUTO: 0.91 THOUSAND/ΜL (ref 0.17–1.22)
MONOCYTES NFR BLD AUTO: 9 % (ref 4–12)
NEUTROPHILS # BLD AUTO: 5.87 THOUSANDS/ΜL (ref 1.85–7.62)
NEUTS SEG NFR BLD AUTO: 58 % (ref 43–75)
NRBC BLD AUTO-RTO: 0 /100 WBCS
P AXIS: 52 DEGREES
PLATELET # BLD AUTO: 287 THOUSANDS/UL (ref 149–390)
PMV BLD AUTO: 10.2 FL (ref 8.9–12.7)
POTASSIUM SERPL-SCNC: 4.1 MMOL/L (ref 3.5–5.3)
PR INTERVAL: 154 MS
QRS AXIS: 48 DEGREES
QRSD INTERVAL: 86 MS
QT INTERVAL: 388 MS
QTC INTERVAL: 436 MS
RBC # BLD AUTO: 4.79 MILLION/UL (ref 3.88–5.62)
SODIUM SERPL-SCNC: 136 MMOL/L (ref 136–145)
T WAVE AXIS: 73 DEGREES
TROPONIN I SERPL-MCNC: <0.02 NG/ML
VENTRICULAR RATE: 76 BPM
WBC # BLD AUTO: 10.12 THOUSAND/UL (ref 4.31–10.16)

## 2021-10-20 PROCEDURE — 85025 COMPLETE CBC W/AUTO DIFF WBC: CPT | Performed by: SURGERY

## 2021-10-20 PROCEDURE — 99284 EMERGENCY DEPT VISIT MOD MDM: CPT | Performed by: SURGERY

## 2021-10-20 PROCEDURE — 93005 ELECTROCARDIOGRAM TRACING: CPT

## 2021-10-20 PROCEDURE — 80048 BASIC METABOLIC PNL TOTAL CA: CPT | Performed by: SURGERY

## 2021-10-20 PROCEDURE — 84484 ASSAY OF TROPONIN QUANT: CPT | Performed by: SURGERY

## 2021-10-20 PROCEDURE — 96374 THER/PROPH/DIAG INJ IV PUSH: CPT

## 2021-10-20 PROCEDURE — 36415 COLL VENOUS BLD VENIPUNCTURE: CPT | Performed by: SURGERY

## 2021-10-20 PROCEDURE — 93010 ELECTROCARDIOGRAM REPORT: CPT | Performed by: INTERNAL MEDICINE

## 2021-10-20 RX ORDER — PREDNISONE 20 MG/1
40 TABLET ORAL ONCE
Status: COMPLETED | OUTPATIENT
Start: 2021-10-20 | End: 2021-10-20

## 2021-10-20 RX ORDER — METHYLPREDNISOLONE SODIUM SUCCINATE 125 MG/2ML
125 INJECTION, POWDER, LYOPHILIZED, FOR SOLUTION INTRAMUSCULAR; INTRAVENOUS ONCE
Status: COMPLETED | OUTPATIENT
Start: 2021-10-20 | End: 2021-10-20

## 2021-10-20 RX ORDER — IPRATROPIUM BROMIDE AND ALBUTEROL SULFATE 2.5; .5 MG/3ML; MG/3ML
3 SOLUTION RESPIRATORY (INHALATION) 3 TIMES DAILY
Qty: 54 ML | Refills: 0 | Status: SHIPPED | OUTPATIENT
Start: 2021-10-20 | End: 2021-10-26

## 2021-10-20 RX ORDER — ALBUTEROL SULFATE 2.5 MG/3ML
2.5 SOLUTION RESPIRATORY (INHALATION) ONCE
Status: COMPLETED | OUTPATIENT
Start: 2021-10-20 | End: 2021-10-20

## 2021-10-20 RX ORDER — PREDNISONE 20 MG/1
40 TABLET ORAL DAILY
Qty: 8 TABLET | Refills: 0 | Status: SHIPPED | OUTPATIENT
Start: 2021-10-20 | End: 2021-10-24

## 2021-10-20 RX ADMIN — PREDNISONE 40 MG: 20 TABLET ORAL at 02:23

## 2021-10-20 RX ADMIN — IPRATROPIUM BROMIDE 0.5 MG: 0.5 SOLUTION RESPIRATORY (INHALATION) at 01:01

## 2021-10-20 RX ADMIN — ALBUTEROL SULFATE 2.5 MG: 2.5 SOLUTION RESPIRATORY (INHALATION) at 01:02

## 2021-10-20 RX ADMIN — METHYLPREDNISOLONE SODIUM SUCCINATE 125 MG: 125 INJECTION, POWDER, FOR SOLUTION INTRAMUSCULAR; INTRAVENOUS at 01:01

## 2021-10-22 ENCOUNTER — TELEPHONE (OUTPATIENT)
Dept: NEPHROLOGY | Facility: CLINIC | Age: 76
End: 2021-10-22

## 2021-10-23 ENCOUNTER — HOSPITAL ENCOUNTER (OUTPATIENT)
Dept: RADIOLOGY | Facility: HOSPITAL | Age: 76
Discharge: HOME | End: 2021-10-23
Payer: COMMERCIAL

## 2021-10-23 DIAGNOSIS — R93.89 ABNORMAL CXR: ICD-10-CM

## 2021-10-23 PROCEDURE — 71046 X-RAY EXAM CHEST 2 VIEWS: CPT

## 2021-10-24 DIAGNOSIS — E11.8 TYPE 2 DIABETES MELLITUS WITH COMPLICATION, WITHOUT LONG-TERM CURRENT USE OF INSULIN (HCC): ICD-10-CM

## 2021-10-25 RX ORDER — LINAGLIPTIN 5 MG/1
TABLET, FILM COATED ORAL
Qty: 90 TABLET | Refills: 1 | Status: SHIPPED | OUTPATIENT
Start: 2021-10-25 | End: 2021-10-27 | Stop reason: SDUPTHER

## 2021-10-27 DIAGNOSIS — E11.8 TYPE 2 DIABETES MELLITUS WITH COMPLICATION, WITHOUT LONG-TERM CURRENT USE OF INSULIN (HCC): ICD-10-CM

## 2021-10-27 RX ORDER — LINAGLIPTIN 5 MG/1
5 TABLET, FILM COATED ORAL DAILY
Qty: 90 TABLET | Refills: 1 | Status: SHIPPED | OUTPATIENT
Start: 2021-10-27 | End: 2021-12-29 | Stop reason: SDUPTHER

## 2021-11-29 DIAGNOSIS — E11.8 TYPE 2 DIABETES MELLITUS WITH COMPLICATION, WITHOUT LONG-TERM CURRENT USE OF INSULIN (HCC): ICD-10-CM

## 2021-11-29 RX ORDER — GLYBURIDE 5 MG/1
TABLET ORAL
Qty: 360 TABLET | Refills: 1 | Status: SHIPPED | OUTPATIENT
Start: 2021-11-29 | End: 2021-12-29 | Stop reason: SDUPTHER

## 2021-12-29 ENCOUNTER — OFFICE VISIT (OUTPATIENT)
Dept: FAMILY MEDICINE CLINIC | Facility: CLINIC | Age: 76
End: 2021-12-29
Payer: COMMERCIAL

## 2021-12-29 ENCOUNTER — TELEPHONE (OUTPATIENT)
Dept: FAMILY MEDICINE CLINIC | Facility: CLINIC | Age: 76
End: 2021-12-29

## 2021-12-29 ENCOUNTER — APPOINTMENT (OUTPATIENT)
Dept: LAB | Facility: CLINIC | Age: 76
End: 2021-12-29
Payer: COMMERCIAL

## 2021-12-29 VITALS
OXYGEN SATURATION: 97 % | SYSTOLIC BLOOD PRESSURE: 138 MMHG | HEIGHT: 70 IN | DIASTOLIC BLOOD PRESSURE: 86 MMHG | BODY MASS INDEX: 26.05 KG/M2 | HEART RATE: 65 BPM | WEIGHT: 182 LBS | TEMPERATURE: 97.5 F

## 2021-12-29 DIAGNOSIS — E83.9 CHRONIC KIDNEY DISEASE-MINERAL AND BONE DISORDER: ICD-10-CM

## 2021-12-29 DIAGNOSIS — I10 ESSENTIAL HYPERTENSION: ICD-10-CM

## 2021-12-29 DIAGNOSIS — M89.9 CHRONIC KIDNEY DISEASE-MINERAL AND BONE DISORDER: ICD-10-CM

## 2021-12-29 DIAGNOSIS — E11.22 TYPE 2 DIABETES MELLITUS WITH STAGE 3B CHRONIC KIDNEY DISEASE, WITHOUT LONG-TERM CURRENT USE OF INSULIN (HCC): ICD-10-CM

## 2021-12-29 DIAGNOSIS — N18.32 STAGE 3B CHRONIC KIDNEY DISEASE (HCC): ICD-10-CM

## 2021-12-29 DIAGNOSIS — J45.909 MODERATE ASTHMA WITHOUT COMPLICATION, UNSPECIFIED WHETHER PERSISTENT: ICD-10-CM

## 2021-12-29 DIAGNOSIS — N18.9 CHRONIC KIDNEY DISEASE-MINERAL AND BONE DISORDER: ICD-10-CM

## 2021-12-29 DIAGNOSIS — N18.32 TYPE 2 DIABETES MELLITUS WITH STAGE 3B CHRONIC KIDNEY DISEASE, WITHOUT LONG-TERM CURRENT USE OF INSULIN (HCC): ICD-10-CM

## 2021-12-29 DIAGNOSIS — E11.9 TYPE 2 DIABETES MELLITUS WITHOUT COMPLICATION, WITHOUT LONG-TERM CURRENT USE OF INSULIN (HCC): ICD-10-CM

## 2021-12-29 DIAGNOSIS — E11.8 TYPE 2 DIABETES MELLITUS WITH COMPLICATION, WITHOUT LONG-TERM CURRENT USE OF INSULIN (HCC): Primary | ICD-10-CM

## 2021-12-29 DIAGNOSIS — Z23 NEED FOR PNEUMOCOCCAL VACCINE: ICD-10-CM
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
## 2021-12-29 LAB
25(OH)D3 SERPL-MCNC: 40.2 NG/ML (ref 30–100)
ANION GAP SERPL CALCULATED.3IONS-SCNC: 2 MMOL/L (ref 4–13)
BASOPHILS # BLD AUTO: 0.05 THOUSANDS/ΜL (ref 0–0.1)
BASOPHILS NFR BLD AUTO: 1 % (ref 0–1)
BILIRUB UR QL STRIP: NEGATIVE
BUN SERPL-MCNC: 24 MG/DL (ref 5–25)
CALCIUM SERPL-MCNC: 8.8 MG/DL (ref 8.3–10.1)
CHLORIDE SERPL-SCNC: 105 MMOL/L (ref 100–108)
CLARITY UR: CLEAR
CO2 SERPL-SCNC: 30 MMOL/L (ref 21–32)
COLOR UR: YELLOW
CREAT SERPL-MCNC: 1.66 MG/DL (ref 0.6–1.3)
CREAT UR-MCNC: 62.6 MG/DL
EOSINOPHIL # BLD AUTO: 0.28 THOUSAND/ΜL (ref 0–0.61)
EOSINOPHIL NFR BLD AUTO: 5 % (ref 0–6)
ERYTHROCYTE [DISTWIDTH] IN BLOOD BY AUTOMATED COUNT: 12.6 % (ref 11.6–15.1)
GFR SERPL CREATININE-BSD FRML MDRD: 39 ML/MIN/1.73SQ M
GLUCOSE P FAST SERPL-MCNC: 60 MG/DL (ref 65–99)
GLUCOSE UR STRIP-MCNC: NEGATIVE MG/DL
HCT VFR BLD AUTO: 42.6 % (ref 36.5–49.3)
HGB BLD-MCNC: 14.3 G/DL (ref 12–17)
HGB UR QL STRIP.AUTO: NEGATIVE
IMM GRANULOCYTES # BLD AUTO: 0.02 THOUSAND/UL (ref 0–0.2)
IMM GRANULOCYTES NFR BLD AUTO: 0 % (ref 0–2)
KETONES UR STRIP-MCNC: NEGATIVE MG/DL
LEUKOCYTE ESTERASE UR QL STRIP: NEGATIVE
LYMPHOCYTES # BLD AUTO: 1.66 THOUSANDS/ΜL (ref 0.6–4.47)
LYMPHOCYTES NFR BLD AUTO: 27 % (ref 14–44)
MCH RBC QN AUTO: 30.5 PG (ref 26.8–34.3)
MCHC RBC AUTO-ENTMCNC: 33.6 G/DL (ref 31.4–37.4)
MCV RBC AUTO: 91 FL (ref 82–98)
MONOCYTES # BLD AUTO: 0.7 THOUSAND/ΜL (ref 0.17–1.22)
MONOCYTES NFR BLD AUTO: 11 % (ref 4–12)
NEUTROPHILS # BLD AUTO: 3.49 THOUSANDS/ΜL (ref 1.85–7.62)
NEUTS SEG NFR BLD AUTO: 56 % (ref 43–75)
NITRITE UR QL STRIP: NEGATIVE
NRBC BLD AUTO-RTO: 0 /100 WBCS
PH UR STRIP.AUTO: 5.5 [PH]
PHOSPHATE SERPL-MCNC: 2.8 MG/DL (ref 2.3–4.1)
PLATELET # BLD AUTO: 243 THOUSANDS/UL (ref 149–390)
PMV BLD AUTO: 11 FL (ref 8.9–12.7)
POTASSIUM SERPL-SCNC: 3.9 MMOL/L (ref 3.5–5.3)
PROT UR STRIP-MCNC: NEGATIVE MG/DL
PROT UR-MCNC: 11 MG/DL
PROT/CREAT UR: 0.18 MG/G{CREAT} (ref 0–0.1)
PTH-INTACT SERPL-MCNC: 139.4 PG/ML (ref 18.4–80.1)
RBC # BLD AUTO: 4.69 MILLION/UL (ref 3.88–5.62)
SL AMB POCT HEMOGLOBIN AIC: 7 (ref ?–6.5)
SODIUM SERPL-SCNC: 137 MMOL/L (ref 136–145)
SP GR UR STRIP.AUTO: 1.01 (ref 1–1.03)
UROBILINOGEN UR QL STRIP.AUTO: 0.2 E.U./DL
WBC # BLD AUTO: 6.2 THOUSAND/UL (ref 4.31–10.16)

## 2021-12-29 PROCEDURE — 82570 ASSAY OF URINE CREATININE: CPT

## 2021-12-29 PROCEDURE — 84156 ASSAY OF PROTEIN URINE: CPT

## 2021-12-29 PROCEDURE — 83970 ASSAY OF PARATHORMONE: CPT

## 2021-12-29 PROCEDURE — 84100 ASSAY OF PHOSPHORUS: CPT

## 2021-12-29 PROCEDURE — 81003 URINALYSIS AUTO W/O SCOPE: CPT

## 2021-12-29 PROCEDURE — 3075F SYST BP GE 130 - 139MM HG: CPT | Performed by: FAMILY MEDICINE

## 2021-12-29 PROCEDURE — 3079F DIAST BP 80-89 MM HG: CPT | Performed by: FAMILY MEDICINE

## 2021-12-29 PROCEDURE — 85025 COMPLETE CBC W/AUTO DIFF WBC: CPT

## 2021-12-29 PROCEDURE — 1160F RVW MEDS BY RX/DR IN RCRD: CPT | Performed by: FAMILY MEDICINE

## 2021-12-29 PROCEDURE — 4040F PNEUMOC VAC/ADMIN/RCVD: CPT | Performed by: FAMILY MEDICINE

## 2021-12-29 PROCEDURE — 99214 OFFICE O/P EST MOD 30 MIN: CPT | Performed by: FAMILY MEDICINE

## 2021-12-29 PROCEDURE — 83036 HEMOGLOBIN GLYCOSYLATED A1C: CPT | Performed by: FAMILY MEDICINE

## 2021-12-29 PROCEDURE — G0009 ADMIN PNEUMOCOCCAL VACCINE: HCPCS

## 2021-12-29 PROCEDURE — 80048 BASIC METABOLIC PNL TOTAL CA: CPT

## 2021-12-29 PROCEDURE — 36415 COLL VENOUS BLD VENIPUNCTURE: CPT

## 2021-12-29 PROCEDURE — 1036F TOBACCO NON-USER: CPT | Performed by: FAMILY MEDICINE

## 2021-12-29 PROCEDURE — 82306 VITAMIN D 25 HYDROXY: CPT

## 2021-12-29 PROCEDURE — 90732 PPSV23 VACC 2 YRS+ SUBQ/IM: CPT

## 2021-12-29 RX ORDER — ALBUTEROL SULFATE 90 UG/1
2 AEROSOL, METERED RESPIRATORY (INHALATION) EVERY 4 HOURS PRN
Qty: 8.5 G | Refills: 1 | Status: SHIPPED | OUTPATIENT
Start: 2021-12-29

## 2021-12-29 RX ORDER — GLYBURIDE 5 MG/1
5 TABLET ORAL 2 TIMES DAILY
Qty: 180 TABLET | Refills: 1
Start: 2021-12-29 | End: 2021-12-29 | Stop reason: SDUPTHER

## 2021-12-29 RX ORDER — LISINOPRIL AND HYDROCHLOROTHIAZIDE 25; 20 MG/1; MG/1
1 TABLET ORAL DAILY
Qty: 90 TABLET | Refills: 1 | Status: SHIPPED | OUTPATIENT
Start: 2021-12-29 | End: 2022-03-15 | Stop reason: SDUPTHER

## 2021-12-29 RX ORDER — LINAGLIPTIN 5 MG/1
5 TABLET, FILM COATED ORAL DAILY
Qty: 90 TABLET | Refills: 1 | Status: SHIPPED | OUTPATIENT
Start: 2021-12-29 | End: 2022-03-15 | Stop reason: SDUPTHER

## 2021-12-29 RX ORDER — GLYBURIDE 5 MG/1
5 TABLET ORAL 2 TIMES DAILY
Qty: 180 TABLET | Refills: 1
Start: 2021-12-29 | End: 2022-01-13 | Stop reason: SDUPTHER

## 2021-12-29 RX ORDER — BLOOD SUGAR DIAGNOSTIC
STRIP MISCELLANEOUS
Qty: 100 STRIP | Refills: 2 | Status: SHIPPED | OUTPATIENT
Start: 2021-12-29

## 2022-01-07 ENCOUNTER — TELEPHONE (OUTPATIENT)
Dept: NEPHROLOGY | Facility: CLINIC | Age: 77
End: 2022-01-07

## 2022-01-10 ENCOUNTER — OFFICE VISIT (OUTPATIENT)
Dept: NEPHROLOGY | Facility: CLINIC | Age: 77
End: 2022-01-10
Payer: COMMERCIAL

## 2022-01-10 VITALS
SYSTOLIC BLOOD PRESSURE: 110 MMHG | TEMPERATURE: 97.5 F | BODY MASS INDEX: 25.11 KG/M2 | DIASTOLIC BLOOD PRESSURE: 70 MMHG | RESPIRATION RATE: 16 BRPM | HEART RATE: 87 BPM | HEIGHT: 71 IN | WEIGHT: 179.4 LBS

## 2022-01-10 DIAGNOSIS — M89.9 CHRONIC KIDNEY DISEASE-MINERAL AND BONE DISORDER: ICD-10-CM

## 2022-01-10 DIAGNOSIS — I10 BENIGN ESSENTIAL HYPERTENSION: ICD-10-CM

## 2022-01-10 DIAGNOSIS — N40.1 BENIGN PROSTATIC HYPERPLASIA WITH LOWER URINARY TRACT SYMPTOMS, SYMPTOM DETAILS UNSPECIFIED: ICD-10-CM

## 2022-01-10 DIAGNOSIS — N18.9 CHRONIC KIDNEY DISEASE-MINERAL AND BONE DISORDER: ICD-10-CM

## 2022-01-10 DIAGNOSIS — N18.32 STAGE 3B CHRONIC KIDNEY DISEASE (HCC): Primary | ICD-10-CM

## 2022-01-10 DIAGNOSIS — N18.32 TYPE 2 DIABETES MELLITUS WITH STAGE 3B CHRONIC KIDNEY DISEASE, WITHOUT LONG-TERM CURRENT USE OF INSULIN (HCC): ICD-10-CM

## 2022-01-10 DIAGNOSIS — E11.22 TYPE 2 DIABETES MELLITUS WITH STAGE 3B CHRONIC KIDNEY DISEASE, WITHOUT LONG-TERM CURRENT USE OF INSULIN (HCC): ICD-10-CM

## 2022-01-10 DIAGNOSIS — E83.9 CHRONIC KIDNEY DISEASE-MINERAL AND BONE DISORDER: ICD-10-CM

## 2022-01-10 PROCEDURE — 99214 OFFICE O/P EST MOD 30 MIN: CPT | Performed by: INTERNAL MEDICINE

## 2022-01-10 NOTE — PATIENT INSTRUCTIONS
Chronic Kidney Disease   AMBULATORY CARE:   Chronic kidney disease (CKD)  is the gradual and permanent loss of kidney function  Normally, the kidneys remove fluid, chemicals, and waste from your blood  These wastes are turned into urine by your kidneys  CKD may worsen over time and lead to kidney failure  Common signs and symptoms include the following:   · Changes in how often you need to urinate    · Swelling in your arms, legs, or feet    · Shortness of breath    · Fatigue or weakness    · Bad or bitter taste in your mouth    · Nausea, vomiting, or loss of appetite    Call your local emergency number (911 in the [address] [facility]) if:   · You have a seizure  · You have shortness of breath  Seek care immediately if:   · You are confused and very drowsy  Call your doctor or nephrologist if:   · You suddenly gain or lose more weight than your healthcare provider has told you is okay  · You have itchy skin or a rash  · You urinate more or less than you normally do  · You have blood in your urine  · You have nausea and are vomiting  · You have fatigue or muscle weakness  · You have hiccups that will not stop  · You have questions or concerns about your condition or care  How CKD is diagnosed:  CKD has 5 stages  Your healthcare provider will use results from the following tests to find the stage of CKD you have:  · Blood and urine tests  show how well your kidneys are working  They may also show the cause of your CKD  · Ultrasound, CT scan, or MRI  pictures may be used to check your kidneys  You may be given contrast liquid to help your kidneys show up better in the pictures  Tell the healthcare provider if you have ever had an allergic reaction to contrast liquid  Do not enter the MRI room with anything metal  Metal can cause serious injury  Tell the healthcare provider if you have any metal in or on your body      · A biopsy  is a procedure to remove a small piece of tissue from your kidney  It is done to find the cause of your CKD  Treatment  can help control signs and symptoms, and prevent a worse stage of CKD  Your care team may include specialists, such as a dietitian or a heart specialist  This depends on the stage of your CKD and if you have other health conditions to manage  Healthcare providers will work with you to create a plan based on your decisions for treatment  Your treatment plan may include any of the following:  · Medicines  may be given to decrease your blood pressure and get rid of extra fluid  You may also receive medicine to manage health conditions that may occur with CKD, such as anemia, diabetes, and heart disease  · Dialysis  is a treatment to remove chemicals and waste from your blood when your kidneys can no longer do this  · Surgery  may be needed to create an arteriovenous fistula (AVF) in your arm or insert a catheter into your abdomen  This is done so you can receive dialysis  · A kidney transplant  may be done if your CKD becomes severe  What you can do to manage CKD: Management may include making some lifestyle changes  Tell your healthcare provider if you have any concerns about being able to make changes  He or she can help you find solutions, including working with specialists  Ask for help creating a plan to break large goals into smaller steps  Your plan may include any of the following:  · Manage other health conditions  Your healthcare provider will work with you to make a care plan that meets your needs  You will be checked regularly for heart disease or other conditions that can make CKD worse, such as diabetes  Your blood pressure will be closely monitored  You will also get a target blood pressure and help making a plan to reach your target  This may include taking your blood pressure at home  · Maintain a healthy weight  Your weight and body mass index (BMI) will be checked regularly   BMI helps find if your weight is healthy for your height  Your healthcare provider will use other tests to check your muscle and protein levels  Extra weight can strain your kidneys  A low weight or low muscle mass can make you feel more tired  You may have trouble doing your daily activities  Ask your provider what a healthy weight is for you  He or she can help you create a plan to lose or gain weight safely, if needed  The plan may include keeping a food diary  This is a list of foods and liquids you have each day  Your provider will use the diary to help you make changes, if needed  Changes are based on your health and any other conditions you have, such as diabetes  · Create an exercise plan  Regular exercise can help you manage CKD, high blood pressure, and diabetes  Exercise also helps control weight  Your provider can help you create exercise goals and a plan to reach those goals  For example, your goal may be to exercise for 30 minutes in a day  Your plan can include breaking exercise into 10 minute sessions, 3 times during the day  · Create a healthy eating plan  Your provider may tell you to eat food low in potassium, phosphorus, or protein  Your provider may also recommend vitamin or mineral supplements  Do not take any supplements without talking to your provider  A dietitian can help you plan meals if needed  Ask how much liquid to drink each day and which liquids are best for you  · Limit sodium (salt) as directed  You may need to limit sodium to less than 2,300 milligrams (mg) each day  Ask your dietitian or healthcare provider how much sodium you can have each day  The amount depends on your stage of kidney disease  Table salt, canned foods, soups, salted snacks, and processed meats, like deli meats and sausage, are high in sodium  Your provider or a dietitian can show you how to read food labels for sodium  · Limit alcohol as directed  Alcohol can cause fluid retention and can affect your kidneys   Ask how much alcohol is safe for you  A drink of alcohol is 12 ounces of beer, 5 ounces of wine, or 1½ ounces of liquor  · Do not smoke  Nicotine and other chemicals in cigarettes and cigars can cause kidney damage  Ask your provider for information if you currently smoke and need help to quit  E-cigarettes or smokeless tobacco still contain nicotine  Talk to your provider before you use these products  · Ask about over-the-counter medicines  Medicines such as NSAIDs and laxatives may harm your kidneys  Some cough and cold medicines can raise your blood pressure  Always ask if a medicine is safe before you take it  · Ask about vaccines you may need  CKD can increase your risk for infections such as pneumonia, influenza, and hepatitis  Vaccines lower your risk for infection  Your healthcare provider will tell you which vaccines you need and when to get them  Follow up with your doctor or nephrologist as directed: You will need to return for tests to monitor your kidney and nerve function, and your parathyroid hormone level  Your medicines may be changed, based on certain test results  Write down your questions so you remember to ask them during your visits  © Copyright [employer] 2021 Information is for End User's use only and may not be sold, redistributed or otherwise used for commercial purposes  All illustrations and images included in CareNotes® are the copyrighted property of [employer]  or Natasha Phillips  The above information is an  only  It is not intended as medical advice for individual conditions or treatments  Talk to your doctor, nurse or pharmacist before following any medical regimen to see if it is safe and effective for you

## 2022-01-10 NOTE — PROGRESS NOTES
NEPHROLOGY OFFICE FOLLOW UP  Bree Smiley 67 y o  male MRN: [id number]    Encounter: [id number] 1/10/2022    REASON FOR VISIT: Kayce Abreu is a 67 y o  male who is here on 1/10/2022 for Chronic Kidney Disease and Follow-up    HPI:    Bree came in today for follow-up of CKD  He is overall doing quite well  He still following quite often and was hospitalized once    Denies any acute complaint     No chest pain no palpitation or shortness of breath     No nausea no vomiting     No abdominal discomfort      REVIEW OF SYSTEMS:    Review of Systems   Constitutional: Negative for activity change and fatigue  HENT: Negative for congestion and ear discharge  Eyes: Negative for photophobia and pain  Respiratory: Negative for apnea and choking  Cardiovascular: Negative for chest pain and palpitations  Gastrointestinal: Negative for abdominal distention and blood in stool  Endocrine: Negative for heat intolerance and polyphagia  Genitourinary: Negative for flank pain and urgency  Musculoskeletal: Negative for neck pain and neck stiffness  Skin: Negative for color change and wound  Allergic/Immunologic: Negative for food allergies and immunocompromised state  Neurological: Negative for seizures and facial asymmetry  Hematological: Negative for adenopathy  Does not bruise/bleed easily  Psychiatric/Behavioral: Negative for self-injury and suicidal ideas           PAST MEDICAL HISTORY:  Past Medical History:   Diagnosis Date    Asthma     last assessed: April 1, 2015    Balanitis     last assessed: July 9, 2014    Cataract     Chronic kidney disease     Chronic kidney disease, stage 3 ([facility] )     last assessed: Jan 17, 2018    Diabetes mellitus [facility])     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([facility] )     last assessed: Jan 17, 2018    Hypercholesterolemia     Hypertension     Pneumonia     last assessed: Feb 25, 2013       PAST SURGICAL HISTORY:  Past Surgical History:   Procedure Laterality Date    ABDOMINAL SURGERY      APPENDECTOMY  1965    CATARACT EXTRACTION      HERNIA REPAIR      HERNIA REPAIR  2007    x2   Jason Merida OTHER SURGICAL HISTORY  1965    Perforated duodenol ulcer surgery     [place] REPAIR FLEXOR TENDON,HAND,W/O GRAFT,EA Left 3/17/2021    Procedure: Left thumb EPL repair;  Surgeon: Liliana Albrecht MD;  Location: [facility];  Service: Orthopedics       SOCIAL HISTORY:  Social History     Substance and Sexual Activity   Alcohol Use Yes    Alcohol/week: 1 0 standard drink    Types: 1 Standard drinks or equivalent per week    Comment: rare  socially     Social History     Substance and Sexual Activity   Drug Use No     Social History     Tobacco Use   Smoking Status Former Smoker    Packs/day: 2 00    Years: 10 00    Pack years: 20 00    Types: Cigarettes    Quit date: 26    Years since quittin 0   Smokeless Tobacco Former User    Quit date:    Tobacco Comment    former smoker noted in "allscripts" Quit in  - never used chewing tobacco        FAMILY HISTORY:  Family History   Problem Relation Age of Onset    Pancreatic cancer Mother     Colon cancer Father     Diabetes Father         mellitus     No Known Problems Sister     Diabetes Sister     Nephrolithiasis Sister        MEDICATIONS:    Current Outpatient Medications:     albuterol (PROVENTIL HFA,VENTOLIN HFA) 90 mcg/act inhaler, Inhale 2 puffs every 4 (four) hours as needed for wheezing, Disp: 8 5 g, Rfl: 1    ERYN MICROLET LANCETS lancets, Test twice a day, Disp: 100 each, Rfl: 3    cholecalciferol (VITAMIN D3) 1,000 units tablet, Take 2,000 Units by mouth daily, Disp: , Rfl:     Contour Test test strip, Test fasting glucose daily, Disp: 100 strip, Rfl: 2    Dulaglutide 0 75 MG/0 5ML SOPN, Inject 0 5 mL (0 75 mg total) under the skin once a week, Disp: 3 mL, Rfl: 3    glyBURIDE (DIABETA) 5 mg tablet, Take 1 tablet (5 mg total) by mouth 2 (two) times a day, Disp: 180 tablet, Rfl: 1    linaGLIPtin (Tradjenta) 5 MG TABS, Take 5 mg by mouth daily, Disp: 90 tablet, Rfl: 1    lisinopril-hydrochlorothiazide (PRINZIDE,ZESTORETIC) 20-25 MG per tablet, Take 1 tablet by mouth daily, Disp: 90 tablet, Rfl: 1    PHYSICAL EXAM:  Vitals:    01/10/22 0914   BP: 110/70   BP Location: Right arm   Patient Position: Sitting   Pulse: 87   Resp: 16   Temp: 97 5 °F (36 4 °C)   TempSrc: Temporal   Weight: 81 4 kg (179 lb 6 4 oz)   Height: 5' 10 5" (1 791 m)     Body mass index is 25 38 kg/m²  Physical Exam  Constitutional:       General: He is not in acute distress  Appearance: He is well-developed  HENT:      Head: Normocephalic  Eyes:      General: No scleral icterus  Conjunctiva/sclera: Conjunctivae normal    Neck:      Vascular: No JVD  Cardiovascular:      Rate and Rhythm: Normal rate  Heart sounds: Normal heart sounds  Pulmonary:      Effort: Pulmonary effort is normal       Breath sounds: No wheezing  Abdominal:      General: Bowel sounds are normal       Palpations: Abdomen is soft  Tenderness: There is no abdominal tenderness  Musculoskeletal:         General: No swelling  Normal range of motion  Cervical back: Neck supple  Skin:     General: Skin is warm  Findings: No rash  Neurological:      General: No focal deficit present  Mental Status: He is alert and oriented to person, place, and time  Psychiatric:         Behavior: Behavior normal          LAB RESULTS:  Results for orders placed or performed in visit on 36/26/30   Basic metabolic panel   Result Value Ref Range    Sodium 137 136 - 145 mmol/L    Potassium 3 9 3 5 - 5 3 mmol/L    Chloride 105 100 - 108 mmol/L    CO2 30 21 - 32 mmol/L    ANION GAP 2 (L) 4 - 13 mmol/L    BUN 24 5 - 25 mg/dL    Creatinine 1 66 (H) 0 60 - 1 30 mg/dL    Glucose, Fasting 60 (L) 65 - 99 mg/dL    Calcium 8 8 8 3 - 10 1 mg/dL    eGFR 39 ml/min/1 73sq m   CBC and differential   Result Value Ref Range    WBC 6 20 4  31 - 10 16 Thousand/uL RBC 4 69 3 88 - 5 62 Million/uL    Hemoglobin 14 3 12 0 - 17 0 g/dL    Hematocrit 42 6 36 5 - 49 3 %    MCV 91 82 - 98 fL    MCH 30 5 26 8 - 34 3 pg    MCHC 33 6 31 4 - 37 4 g/dL    RDW 12 6 11 6 - 15 1 %    MPV 11 0 8 9 - 12 7 fL    Platelets 634 144 - 024 Thousands/uL    nRBC 0 /100 WBCs    Neutrophils Relative 56 43 - 75 %    Immat GRANS % 0 0 - 2 %    Lymphocytes Relative 27 14 - 44 %    Monocytes Relative 11 4 - 12 %    Eosinophils Relative 5 0 - 6 %    Basophils Relative 1 0 - 1 %    Neutrophils Absolute 3 49 1 85 - 7 62 Thousands/µL    Immature Grans Absolute 0 02 0 00 - 0 20 Thousand/uL    Lymphocytes Absolute 1 66 0 60 - 4 47 Thousands/µL    Monocytes Absolute 0 70 0 17 - 1 22 Thousand/µL    Eosinophils Absolute 0 28 0 00 - 0 61 Thousand/µL    Basophils Absolute 0 05 0 00 - 0 10 Thousands/µL   Phosphorus   Result Value Ref Range    Phosphorus 2 8 2 3 - 4 1 mg/dL   Protein / creatinine ratio, urine   Result Value Ref Range    Creatinine, Ur 62 6 mg/dL    Protein Urine Random 11 mg/dL    Prot/Creat Ratio, Ur 0 18 (H) 0 00 - 0 10   PTH, intact   Result Value Ref Range     4 (H) 18 4 - 80 1 pg/mL   UA (URINE) with reflex to Scope   Result Value Ref Range    Color, UA Yellow     Clarity, UA Clear     Specific [place], UA 1 011 1 003 - 1 030    pH, UA 5 5 4 5, 5 0, 5 5, 6 0, 6 5, 7 0, 7 5, 8 0    Leukocytes, UA Negative Negative    Nitrite, UA Negative Negative    Protein, UA Negative Negative mg/dl    Glucose, UA Negative Negative mg/dl    Ketones, UA Negative Negative mg/dl    Urobilinogen, UA 0 2 0 2, 1 0 E U /dl E U /dl    Bilirubin, UA Negative Negative    Blood, UA Negative Negative   Vitamin D 25 hydroxy   Result Value Ref Range    Vit D, 25-Hydroxy 40 2 30 0 - 100 0 ng/mL       ASSESSMENT and PLAN:      Stage 3b chronic kidney disease (HCC)  Lab Results   Component Value Date    EGFR 39 12/29/2021    EGFR 30 10/20/2021    EGFR 33 09/16/2021    CREATININE 1 66 (H) 12/29/2021    CREATININE 2 07 (H) 10/20/2021    CREATININE 1 91 (H) 09/16/2021   Kidney function is quite stable at GFR of about 40  Advised to continue what is doing with good hydration and avoiding nephrotoxic medicine    Chronic kidney disease-mineral and bone disorder  Lab Results   Component Value Date    EGFR 39 12/29/2021    EGFR 30 10/20/2021    EGFR 33 09/16/2021    CREATININE 1 66 (H) 12/29/2021    CREATININE 2 07 (H) 10/20/2021    CREATININE 1 91 (H) 09/16/2021   PTH and phosphorus along with vitamin-D are within acceptable range and will continue to monitor    Benign prostatic hyperplasia with lower urinary tract symptoms  Seems asymptomatic at this point    Type 2 diabetes mellitus with diabetic chronic kidney disease ([facility] )    Lab Results   Component Value Date    HGBA1C 7 0 (A) 12/29/2021   Much better control  Advised to avoid hypoglycemia    Benign essential hypertension  Very well control        Everything discussed at length with the patient  Patient still falling quite often  Advised to use walker or some sort of support  I will see him back in 6 months will get blood and urine test before that visit      Portions of the record may have been created with voice recognition software  Occasional wrong word or "sound a like" substitutions may have occurred due to the inherent limitations of voice recognition software  Read the chart carefully and recognize, using context, where substitutions have occurred  If you have any questions, please contact the dictating provider

## 2022-01-10 NOTE — ASSESSMENT & PLAN NOTE
Lab Results   Component Value Date    HGBA1C 7 0 (A) 12/29/2021   Much better control    Advised to avoid hypoglycemia

## 2022-01-10 NOTE — ASSESSMENT & PLAN NOTE
Lab Results   Component Value Date    EGFR 39 12/29/2021    EGFR 30 10/20/2021    EGFR 33 09/16/2021    CREATININE 1 66 (H) 12/29/2021    CREATININE 2 07 (H) 10/20/2021    CREATININE 1 91 (H) 09/16/2021   PTH and phosphorus along with vitamin-D are within acceptable range and will continue to monitor

## 2022-01-10 NOTE — ASSESSMENT & PLAN NOTE
Lab Results   Component Value Date    EGFR 39 12/29/2021    EGFR 30 10/20/2021    EGFR 33 09/16/2021    CREATININE 1 66 (H) 12/29/2021    CREATININE 2 07 (H) 10/20/2021    CREATININE 1 91 (H) 09/16/2021   Kidney function is quite stable at GFR of about 40    Advised to continue what is doing with good hydration and avoiding nephrotoxic medicine

## 2022-01-13 DIAGNOSIS — E11.22 TYPE 2 DIABETES MELLITUS WITH STAGE 3B CHRONIC KIDNEY DISEASE, WITHOUT LONG-TERM CURRENT USE OF INSULIN (HCC): ICD-10-CM

## 2022-01-13 DIAGNOSIS — N18.32 TYPE 2 DIABETES MELLITUS WITH STAGE 3B CHRONIC KIDNEY DISEASE, WITHOUT LONG-TERM CURRENT USE OF INSULIN (HCC): ICD-10-CM

## 2022-01-13 DIAGNOSIS — E11.8 TYPE 2 DIABETES MELLITUS WITH COMPLICATION, WITHOUT LONG-TERM CURRENT USE OF INSULIN (HCC): ICD-10-CM

## 2022-01-13 RX ORDER — GLYBURIDE 5 MG/1
5 TABLET ORAL 2 TIMES DAILY
Qty: 180 TABLET | Refills: 1
Start: 2022-01-13 | End: 2022-01-17 | Stop reason: SDUPTHER

## 2022-01-17 DIAGNOSIS — E11.8 TYPE 2 DIABETES MELLITUS WITH COMPLICATION, WITHOUT LONG-TERM CURRENT USE OF INSULIN (HCC): ICD-10-CM

## 2022-01-17 RX ORDER — GLYBURIDE 5 MG/1
5 TABLET ORAL 2 TIMES DAILY
Qty: 180 TABLET | Refills: 1
Start: 2022-01-17 | End: 2022-03-15 | Stop reason: SDUPTHER

## 2022-01-21 ENCOUNTER — OFFICE VISIT (OUTPATIENT)
Dept: SURGERY | Facility: CLINIC | Age: 77
End: 2022-01-21
Payer: COMMERCIAL

## 2022-01-21 VITALS
BODY MASS INDEX: 25.2 KG/M2 | HEIGHT: 71 IN | RESPIRATION RATE: 16 BRPM | SYSTOLIC BLOOD PRESSURE: 124 MMHG | DIASTOLIC BLOOD PRESSURE: 70 MMHG | TEMPERATURE: 98 F | OXYGEN SATURATION: 99 % | HEART RATE: 82 BPM | WEIGHT: 180 LBS

## 2022-01-21 DIAGNOSIS — J45.909 MODERATE ASTHMA WITHOUT COMPLICATION, UNSPECIFIED WHETHER PERSISTENT: Primary | ICD-10-CM

## 2022-01-21 DIAGNOSIS — N18.32 TYPE 2 DIABETES MELLITUS WITH STAGE 3B CHRONIC KIDNEY DISEASE, WITHOUT LONG-TERM CURRENT USE OF INSULIN (HCC): ICD-10-CM

## 2022-01-21 DIAGNOSIS — E11.22 TYPE 2 DIABETES MELLITUS WITH STAGE 3B CHRONIC KIDNEY DISEASE, WITHOUT LONG-TERM CURRENT USE OF INSULIN (HCC): ICD-10-CM

## 2022-01-21 DIAGNOSIS — N18.32 STAGE 3B CHRONIC KIDNEY DISEASE (HCC): ICD-10-CM

## 2022-01-21 DIAGNOSIS — K40.90 INGUINAL HERNIA WITHOUT OBSTRUCTION OR GANGRENE, RECURRENCE NOT SPECIFIED, UNSPECIFIED LATERALITY: ICD-10-CM

## 2022-01-21 PROCEDURE — 1160F RVW MEDS BY RX/DR IN RCRD: CPT | Performed by: SURGERY

## 2022-01-21 PROCEDURE — 3074F SYST BP LT 130 MM HG: CPT | Performed by: SURGERY

## 2022-01-21 PROCEDURE — 3078F DIAST BP <80 MM HG: CPT | Performed by: SURGERY

## 2022-01-21 PROCEDURE — 99214 OFFICE O/P EST MOD 30 MIN: CPT | Performed by: SURGERY

## 2022-01-21 PROCEDURE — 1036F TOBACCO NON-USER: CPT | Performed by: SURGERY

## 2022-01-21 RX ORDER — TAMSULOSIN HYDROCHLORIDE 0.4 MG/1
0.4 CAPSULE ORAL
Qty: 5 CAPSULE | Refills: 0 | Status: SHIPPED | OUTPATIENT
Start: 2022-01-21 | End: 2022-03-01 | Stop reason: ALTCHOICE

## 2022-01-21 NOTE — PROGRESS NOTES
Assessment/Plan:     1  Moderate asthma without complication, unspecified whether persistent    2  Inguinal hernia without obstruction or gangrene, recurrence not specified, unspecified laterality  -     Ambulatory referral to General Surgery  -     tamsulosin (FLOMAX) 0 4 mg; Take 1 capsule (0 4 mg total) by mouth daily with dinner    3  Type 2 diabetes mellitus with stage 3b chronic kidney disease, without long-term current use of insulin (HCC)    4  Stage 3b chronic kidney disease (HCC)      We discussed the risks and benefits of open inguinal hernia repair including but not limited to mesh placement to decrease risk of recurrence, risk of bleeding, infection, damage to nerves or other structures, acute and chronic pain, recurrence, reactions to medications  He wants to return to his PCP and discuss risk prior to proceeding and he will return to me after  Doing an open repair does require less anesthesia than laparoscopy  Subjective:      Patient ID: Benja Milian is a 67 y o  male  Triage Notes:    Mr Corey Blount is following up for a left inguinal hernia  He was seen for the same back in 2019  No recent changes in bowel habits and urinary habits  Recently started trulicity injection for diabetes  Most recently A1c is down from 7 7 to 7 0  Thinks he has lost maybe 10 pounds over the past year and a half  The following portions of the patient's history were reviewed and updated as appropriate: allergies, current medications, past family history, past medical history, past social history, past surgical history and problem list     Review of Systems   Respiratory: Shortness of breath: intermittent 2/2 asthma  All other systems reviewed and are negative          Objective:      /70   Pulse 82   Temp 98 °F (36 7 °C) (Temporal)   Resp 16   Ht 5' 10 5" (1 791 m)   Wt 81 6 kg (180 lb)   SpO2 99%   BMI 25 46 kg/m²     Below is the patient's most recent value for Albumin, ALT, AST, BUN, Calcium, Chloride, Cholesterol, CO2, Creatinine, GFR, Glucose, HDL, Hematocrit, Hemoglobin, Hemoglobin A1C, LDL, Magnesium, Phosphorus, Platelets, Potassium, PSA, Sodium, Triglycerides, and WBC  Lab Results   Component Value Date    ALT 22 03/17/2021    AST 8 03/17/2021    BUN 24 12/29/2021    CALCIUM 8 8 12/29/2021     12/29/2021    CHOL 168 10/01/2015    CO2 30 12/29/2021    CREATININE 1 66 (H) 12/29/2021    HDL 34 (L) 09/16/2021    HCT 42 6 12/29/2021    HGB 14 3 12/29/2021    HGBA1C 7 0 (A) 12/29/2021    MG 1 6 04/02/2014    PHOS 2 8 12/29/2021     12/29/2021    K 3 9 12/29/2021    PSA 2 9 09/16/2021     (L) 11/25/2015    TRIG 122 09/16/2021    WBC 6 20 12/29/2021     Note: for a comprehensive list of the patient's lab results, access the Results Review activity  Physical Exam  Vitals and nursing note reviewed  Constitutional:       General: He is not in acute distress  Appearance: Normal appearance  He is well-developed  He is not diaphoretic  HENT:      Head: Normocephalic and atraumatic  Right Ear: External ear normal       Left Ear: External ear normal       Mouth/Throat:      Mouth: Mucous membranes are moist    Eyes:      General: No scleral icterus  Cardiovascular:      Rate and Rhythm: Normal rate and regular rhythm  Heart sounds: Normal heart sounds  No murmur heard  No friction rub  No gallop  Pulmonary:      Effort: Pulmonary effort is normal  No respiratory distress  Abdominal:      General: Abdomen is flat  There is no distension  Genitourinary:     Comments: Left inguinoscrotal hernia  Musculoskeletal:         General: No swelling or deformity  Right lower leg: No edema  Left lower leg: No edema  Neurological:      Mental Status: He is alert and oriented to person, place, and time  Psychiatric:         Mood and Affect: Mood normal          Behavior: Behavior normal          Thought Content:  Thought content normal          Judgment: Judgment normal              Procedures

## 2022-01-31 ENCOUNTER — RA CDI HCC (OUTPATIENT)
Dept: OTHER | Facility: HOSPITAL | Age: 77
End: 2022-01-31

## 2022-01-31 NOTE — PROGRESS NOTES
[facility]  coding opportunities       Chart reviewed, no opportunity found: CHART REVIEWED, NO OPPORTUNITY FOUND                        Patients insurance company: [facility] [address]  (Medicare Advantage and Commercial)

## 2022-02-04 ENCOUNTER — OFFICE VISIT (OUTPATIENT)
Dept: FAMILY MEDICINE CLINIC | Facility: CLINIC | Age: 77
End: 2022-02-04
Payer: COMMERCIAL

## 2022-02-04 VITALS
BODY MASS INDEX: 25.48 KG/M2 | HEART RATE: 61 BPM | SYSTOLIC BLOOD PRESSURE: 126 MMHG | WEIGHT: 182 LBS | OXYGEN SATURATION: 98 % | HEIGHT: 71 IN | DIASTOLIC BLOOD PRESSURE: 62 MMHG | TEMPERATURE: 97.8 F

## 2022-02-04 DIAGNOSIS — K40.90 LEFT INGUINAL HERNIA: Primary | ICD-10-CM

## 2022-02-04 DIAGNOSIS — Z01.818 PRE-OP EXAMINATION: ICD-10-CM

## 2022-02-04 DIAGNOSIS — N18.32 TYPE 2 DIABETES MELLITUS WITH STAGE 3B CHRONIC KIDNEY DISEASE, WITHOUT LONG-TERM CURRENT USE OF INSULIN (HCC): ICD-10-CM

## 2022-02-04 DIAGNOSIS — F41.8 ANXIETY ABOUT HEALTH: ICD-10-CM

## 2022-02-04 DIAGNOSIS — E11.22 TYPE 2 DIABETES MELLITUS WITH STAGE 3B CHRONIC KIDNEY DISEASE, WITHOUT LONG-TERM CURRENT USE OF INSULIN (HCC): ICD-10-CM

## 2022-02-04 PROBLEM — H53.9 VISUAL CHANGES: Status: RESOLVED | Noted: 2021-09-27 | Resolved: 2022-02-04

## 2022-02-04 PROBLEM — R45.89 ANXIETY ABOUT HEALTH: Status: ACTIVE | Noted: 2022-02-04

## 2022-02-04 PROCEDURE — 93000 ELECTROCARDIOGRAM COMPLETE: CPT | Performed by: FAMILY MEDICINE

## 2022-02-04 PROCEDURE — 99214 OFFICE O/P EST MOD 30 MIN: CPT | Performed by: FAMILY MEDICINE

## 2022-02-04 NOTE — PROGRESS NOTES
Conner Hurtado 1945 male MRN: [id number]        ASSESSMENT/PLAN  Problem List Items Addressed This Visit        Endocrine    Type 2 diabetes mellitus with diabetic chronic kidney disease ([address] )       Other    Anxiety about health     Offered medication to help with anxiety  Patient declined at this time  Left inguinal hernia - Primary      Other Visit Diagnoses     Pre-op examination        Relevant Orders    POCT ECG (Completed)          High Risk Surgery: no  CAD: no  CHF: no  CVD: no  DM2 on insulin: no  Cr>2: no        Bree HORTENCIA Stafford is undergoing a Low Risk surgery  He is at [address] for major adverse cardiac event (MACE)  He may proceed with surgery as planned without further workup  Hold diabetic medications the morning of surgery  He is hesitant about scheduling surgery  We discussed risks and benefits of surgery and anesthesia  I discussed that his chronic medical problems are well controlled at this time and that this would be a good time to get this done rather than in an emergency situation  Patient is still considering and I advised him to follow up with the surgeon  SUBJECTIVE  CC: Anxiety (patient has a lot of anxiety would like to discuss surgery before its performed ) and Pre-op Exam      HPI:  Conner Hurtado is a 67 y o  male who is planning to undergo left inguinal hernia repair under general by Dr Fabiola Carrera  Patient has not had complications with anesthesia in the past   Functional status: fair    He has questions about the surgery  He is having anxiety about going under anesthesia  He has no pain or discomfort from the hernia  He is unsure what to do  He has been worrying about this since 2019  He is diabetic - last A1c 7%  He is on Glyburide 5 mg BID, Trulicity, and Tradjenta  CKD - stable, recently creatinine 1 66   He follows regularly with nephrologist     Review of Systems   Constitutional: Negative for activity change, appetite change, fatigue and unexpected weight change  Respiratory: Negative for chest tightness and shortness of breath  Cardiovascular: Negative for chest pain and leg swelling  Gastrointestinal: Negative for abdominal pain  Hernia   Genitourinary: Negative for testicular pain  Neurological: Negative for headaches           Historical Information   The patient history was reviewed as follows:    Past Medical History:   Diagnosis Date    Asthma     last assessed: April 1, 2015    Balanitis     last assessed: July 9, 2014    Cataract     Chronic kidney disease     Chronic kidney disease, stage 3 ([place] [address] )     last assessed: Jan 17, 2018    Diabetes mellitus [facility])     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([place] [address] )     last assessed: Jan 17, 2018    Hypercholesterolemia     Hypertension     Pneumonia     last assessed: Feb 25, 2013     Past Surgical History:   Procedure Laterality Date    ABDOMINAL SURGERY      APPENDECTOMY  1965    CATARACT EXTRACTION     [address] HERNIA REPAIR  2007    x2    OTHER SURGICAL HISTORY  1965    Perforated duodenol ulcer surgery     RI REPAIR FLEXOR TENDON,HAND,W/O GRAFT,EA Left 3/17/2021    Procedure: Left thumb EPL repair;  Surgeon: Natalya Mckeon MD;  Location: [facility] OR;  Service: Orthopedics     Family History   Problem Relation Age of Onset    Pancreatic cancer Mother     Colon cancer Father     Diabetes Father         mellitus     No Known Problems Sister     Diabetes Sister     Nephrolithiasis Sister       Social History       Medications:     Current Outpatient Medications:     albuterol (PROVENTIL HFA,VENTOLIN HFA) 90 mcg/act inhaler, Inhale 2 puffs every 4 (four) hours as needed for wheezing, Disp: 8 5 g, Rfl: 1    ERYN MICROLET LANCETS lancets, Test twice a day, Disp: 100 each, Rfl: 3    cholecalciferol (VITAMIN D3) 1,000 units tablet, Take 2,000 Units by mouth daily, Disp: , Rfl:     Contour Test test strip, Test fasting glucose daily, Disp: 100 strip, Rfl: 2   Dulaglutide 0 75 MG/0 5ML SOPN, Inject 0 5 mL (0 75 mg total) under the skin once a week, Disp: 3 mL, Rfl: 3    glyBURIDE (DIABETA) 5 mg tablet, Take 1 tablet (5 mg total) by mouth 2 (two) times a day, Disp: 180 tablet, Rfl: 1    linaGLIPtin (Tradjenta) 5 MG TABS, Take 5 mg by mouth daily, Disp: 90 tablet, Rfl: 1    lisinopril-hydrochlorothiazide (PRINZIDE,ZESTORETIC) 20-25 MG per tablet, Take 1 tablet by mouth daily, Disp: 90 tablet, Rfl: 1    tamsulosin (FLOMAX) 0 4 mg, Take 1 capsule (0 4 mg total) by mouth daily with dinner, Disp: 5 capsule, Rfl: 0  Allergies   Allergen Reactions    Meperidine     Pravastatin Myalgia       OBJECTIVE    Vitals:   Vitals:    02/04/22 1104   BP: 126/62   Pulse: 61   Temp: 97 8 °F (36 6 °C)   SpO2: 98%   Weight: 82 6 kg (182 lb)   Height: 5' 10 5" (1 791 m)           Physical Exam  Vitals and nursing note reviewed  Constitutional:       General: He is not in acute distress  Appearance: Normal appearance  He is well-developed  He is not diaphoretic  HENT:      Head: Normocephalic and atraumatic  Eyes:      Conjunctiva/sclera: Conjunctivae normal    Cardiovascular:      Rate and Rhythm: Normal rate and regular rhythm  Heart sounds: Normal heart sounds  No murmur heard  No friction rub  No gallop  Pulmonary:      Effort: Pulmonary effort is normal  No respiratory distress  Breath sounds: Normal breath sounds  No wheezing or rales  Chest:      Chest wall: No tenderness  Abdominal:      Palpations: Abdomen is soft  Comments: Obese  Left inguinal tenderness  Diastasis recti   Musculoskeletal:         General: No swelling  Right lower leg: No edema  Left lower leg: No edema  Neurological:      General: No focal deficit present  Mental Status: He is alert and oriented to person, place, and time  Mental status is at baseline     Psychiatric:         Mood and Affect: Mood normal          Behavior: Behavior normal          Thought Content: Thought content normal          Judgment: Judgment normal           EKG: normal EKG, normal sinus rhythm, occasional PVC noted, unifocal     Delma Shanks MD  [facility]   2/4/2022  11:50 AM

## 2022-02-14 ENCOUNTER — OFFICE VISIT (OUTPATIENT)
Dept: SURGERY | Facility: CLINIC | Age: 77
End: 2022-02-14
Payer: COMMERCIAL

## 2022-02-14 VITALS
SYSTOLIC BLOOD PRESSURE: 124 MMHG | BODY MASS INDEX: 26.2 KG/M2 | DIASTOLIC BLOOD PRESSURE: 68 MMHG | HEIGHT: 70 IN | WEIGHT: 183 LBS

## 2022-02-14 DIAGNOSIS — K40.90 INGUINAL HERNIA WITHOUT OBSTRUCTION OR GANGRENE, RECURRENCE NOT SPECIFIED, UNSPECIFIED LATERALITY: Primary | ICD-10-CM

## 2022-02-14 DIAGNOSIS — N18.32 TYPE 2 DIABETES MELLITUS WITH STAGE 3B CHRONIC KIDNEY DISEASE, WITHOUT LONG-TERM CURRENT USE OF INSULIN (HCC): ICD-10-CM

## 2022-02-14 DIAGNOSIS — J45.909 MODERATE ASTHMA WITHOUT COMPLICATION, UNSPECIFIED WHETHER PERSISTENT: ICD-10-CM

## 2022-02-14 DIAGNOSIS — N40.1 BENIGN PROSTATIC HYPERPLASIA WITH LOWER URINARY TRACT SYMPTOMS, SYMPTOM DETAILS UNSPECIFIED: ICD-10-CM

## 2022-02-14 DIAGNOSIS — N18.32 STAGE 3B CHRONIC KIDNEY DISEASE (HCC): ICD-10-CM

## 2022-02-14 DIAGNOSIS — E11.22 TYPE 2 DIABETES MELLITUS WITH STAGE 3B CHRONIC KIDNEY DISEASE, WITHOUT LONG-TERM CURRENT USE OF INSULIN (HCC): ICD-10-CM

## 2022-02-14 DIAGNOSIS — I10 BENIGN ESSENTIAL HYPERTENSION: ICD-10-CM

## 2022-02-14 PROCEDURE — 99214 OFFICE O/P EST MOD 30 MIN: CPT | Performed by: SURGERY

## 2022-02-14 PROCEDURE — 1160F RVW MEDS BY RX/DR IN RCRD: CPT | Performed by: SURGERY

## 2022-02-14 NOTE — PROGRESS NOTES
Assessment/Plan:     1  Inguinal hernia without obstruction or gangrene, recurrence not specified, unspecified laterality  -     Ambulatory Referral to Surgical Optimization; Future    2  Moderate asthma without complication, unspecified whether persistent  -     Ambulatory Referral to Surgical Optimization; Future    3  Type 2 diabetes mellitus with stage 3b chronic kidney disease, without long-term current use of insulin ([facility] )  -     Ambulatory Referral to Surgical Optimization; Future    4  Stage 3b chronic kidney disease ([facility] )  -     Ambulatory Referral to Surgical Optimization; Future    5  Benign essential hypertension    6  Benign prostatic hyperplasia with lower urinary tract symptoms, symptom details unspecified      I placed a referral to the preop clinic not because he has high risk but due to his desire to discuss the questions he has regarding anesthesia and discuss the types of anesthesia available and best suited to him which he would like to do prior to the day of surgery  Subjective:      Patient ID: Jerman Bain is a 67 y o  male  Triage Notes:    Mr Tamra Easton is following up for a left inguinal hernia  He was seen for the same back in 2019  No recent changes in bowel habits and urinary habits  Recently started trulicity injection for diabetes  Most recently A1c is down from 7 7 to 7 0  Thinks he has lost maybe 10 pounds over the past year and a half  Saw his PCP and was cleared for surgery  The following portions of the patient's history were reviewed and updated as appropriate: allergies, current medications, past family history, past medical history, past social history, past surgical history and problem list     Review of Systems   Respiratory: Shortness of breath: intermittent 2/2 asthma  All other systems reviewed and are negative          Objective:      /68   Ht 5' 10" (1 778 m)   Wt 83 kg (183 lb)   BMI 26 26 kg/m²     Below is the patient's most recent value for Albumin, ALT, AST, BUN, Calcium, Chloride, Cholesterol, CO2, Creatinine, GFR, Glucose, HDL, Hematocrit, Hemoglobin, Hemoglobin A1C, LDL, Magnesium, Phosphorus, Platelets, Potassium, PSA, Sodium, Triglycerides, and WBC  Lab Results   Component Value Date    ALT 22 03/17/2021    AST 8 03/17/2021    BUN 24 12/29/2021    CALCIUM 8 8 12/29/2021     12/29/2021    CHOL 168 10/01/2015    CO2 30 12/29/2021    CREATININE 1 66 (H) 12/29/2021    HDL 34 (L) 09/16/2021    HCT 42 6 12/29/2021    HGB 14 3 12/29/2021    HGBA1C 7 0 (A) 12/29/2021    MG 1 6 04/02/2014    PHOS 2 8 12/29/2021     12/29/2021    K 3 9 12/29/2021    PSA 2 9 09/16/2021     (L) 11/25/2015    TRIG 122 09/16/2021    WBC 6 20 12/29/2021     Note: for a comprehensive list of the patient's lab results, access the Results Review activity  Physical Exam  Vitals and nursing note reviewed  Constitutional:       General: He is not in acute distress  Appearance: Normal appearance  He is well-developed  He is not diaphoretic  HENT:      Head: Normocephalic and atraumatic  Right Ear: External ear normal       Left Ear: External ear normal       Mouth/Throat:      Mouth: Mucous membranes are moist    Eyes:      General: No scleral icterus  Cardiovascular:      Rate and Rhythm: Normal rate and regular rhythm  Heart sounds: Normal heart sounds  No murmur heard  No friction rub  No gallop  Pulmonary:      Effort: Pulmonary effort is normal  No respiratory distress  Abdominal:      General: Abdomen is flat  There is no distension  Genitourinary:     Comments: Left inguinoscrotal hernia  Musculoskeletal:         General: No swelling or deformity  Right lower leg: No edema  Left lower leg: No edema  Neurological:      Mental Status: He is alert and oriented to person, place, and time  Psychiatric:         Mood and Affect: Mood normal          Behavior: Behavior normal          Thought Content:  Thought content normal          Judgment: Judgment normal              Procedures

## 2022-02-16 ENCOUNTER — HOSPITAL ENCOUNTER (EMERGENCY)
Facility: HOSPITAL | Age: 77
End: 2022-02-16
Attending: EMERGENCY MEDICINE | Admitting: EMERGENCY MEDICINE
Payer: COMMERCIAL

## 2022-02-16 ENCOUNTER — HOSPITAL ENCOUNTER (INPATIENT)
Facility: HOSPITAL | Age: 77
LOS: 2 days | Discharge: HOME | DRG: 066 | End: 2022-02-18
Attending: STUDENT IN AN ORGANIZED HEALTH CARE EDUCATION/TRAINING PROGRAM | Admitting: STUDENT IN AN ORGANIZED HEALTH CARE EDUCATION/TRAINING PROGRAM
Payer: COMMERCIAL

## 2022-02-16 ENCOUNTER — IMMUNIZATIONS (OUTPATIENT)
Dept: FAMILY MEDICINE CLINIC | Facility: HOSPITAL | Age: 77
End: 2022-02-16

## 2022-02-16 ENCOUNTER — APPOINTMENT (EMERGENCY)
Dept: CT IMAGING | Facility: HOSPITAL | Age: 77
End: 2022-02-16
Payer: COMMERCIAL

## 2022-02-16 ENCOUNTER — TELEPHONE (OUTPATIENT)
Dept: ANESTHESIOLOGY | Facility: CLINIC | Age: 77
End: 2022-02-16

## 2022-02-16 VITALS
SYSTOLIC BLOOD PRESSURE: 128 MMHG | WEIGHT: 185.63 LBS | RESPIRATION RATE: 15 BRPM | HEIGHT: 70 IN | HEART RATE: 73 BPM | BODY MASS INDEX: 26.57 KG/M2 | TEMPERATURE: 97.9 F | OXYGEN SATURATION: 97 % | DIASTOLIC BLOOD PRESSURE: 83 MMHG

## 2022-02-16 DIAGNOSIS — H53.461 RIGHT HOMONYMOUS HEMIANOPSIA: Primary | ICD-10-CM

## 2022-02-16 DIAGNOSIS — J45.909 ASTHMA: ICD-10-CM

## 2022-02-16 DIAGNOSIS — Z23 ENCOUNTER FOR IMMUNIZATION: Primary | ICD-10-CM

## 2022-02-16 DIAGNOSIS — I63.9 ACUTE CVA (CEREBROVASCULAR ACCIDENT) (HCC): ICD-10-CM

## 2022-02-16 PROBLEM — H54.7 VISION LOSS: Status: ACTIVE | Noted: 2022-02-16

## 2022-02-16 LAB
ALBUMIN SERPL BCP-MCNC: 4.8 G/DL (ref 3.4–4.8)
ALP SERPL-CCNC: 55.4 U/L (ref 10–129)
ALT SERPL W P-5'-P-CCNC: 17 U/L (ref 5–63)
ANION GAP SERPL CALCULATED.3IONS-SCNC: 7 MMOL/L (ref 4–13)
APTT PPP: 29 SECONDS (ref 23–37)
AST SERPL W P-5'-P-CCNC: 13 U/L (ref 15–41)
BASOPHILS # BLD AUTO: 0.09 THOUSANDS/ΜL (ref 0–0.1)
BASOPHILS NFR BLD AUTO: 1 % (ref 0–1)
BILIRUB SERPL-MCNC: 0.5 MG/DL (ref 0.3–1.2)
BUN SERPL-MCNC: 32 MG/DL (ref 6–20)
CALCIUM SERPL-MCNC: 9.8 MG/DL (ref 8.4–10.2)
CHLORIDE SERPL-SCNC: 102 MMOL/L (ref 96–108)
CO2 SERPL-SCNC: 29 MMOL/L (ref 22–33)
CREAT SERPL-MCNC: 1.64 MG/DL (ref 0.5–1.2)
CRP SERPL QL: 0.2 MG/DL (ref 0–1)
EOSINOPHIL # BLD AUTO: 0.44 THOUSAND/ΜL (ref 0–0.61)
EOSINOPHIL NFR BLD AUTO: 6 % (ref 0–6)
ERYTHROCYTE [DISTWIDTH] IN BLOOD BY AUTOMATED COUNT: 12.6 % (ref 11.6–15.1)
ERYTHROCYTE [SEDIMENTATION RATE] IN BLOOD: 1 MM/HOUR (ref 0–20)
GFR SERPL CREATININE-BSD FRML MDRD: 39 ML/MIN/1.73SQ M
GLUCOSE SERPL-MCNC: 115 MG/DL (ref 65–140)
HCT VFR BLD AUTO: 43.3 % (ref 36.5–49.3)
HGB BLD-MCNC: 14.9 G/DL (ref 12–17)
IMM GRANULOCYTES # BLD AUTO: 0.02 THOUSAND/UL (ref 0–0.2)
IMM GRANULOCYTES NFR BLD AUTO: 0 % (ref 0–2)
INR PPP: 1.04 (ref 0.84–1.19)
LYMPHOCYTES # BLD AUTO: 2 THOUSANDS/ΜL (ref 0.6–4.47)
LYMPHOCYTES NFR BLD AUTO: 25 % (ref 14–44)
MCH RBC QN AUTO: 30.7 PG (ref 26.8–34.3)
MCHC RBC AUTO-ENTMCNC: 34.4 G/DL (ref 31.4–37.4)
MCV RBC AUTO: 89 FL (ref 82–98)
MONOCYTES # BLD AUTO: 0.73 THOUSAND/ΜL (ref 0.17–1.22)
MONOCYTES NFR BLD AUTO: 9 % (ref 4–12)
NEUTROPHILS # BLD AUTO: 4.68 THOUSANDS/ΜL (ref 1.85–7.62)
NEUTS SEG NFR BLD AUTO: 59 % (ref 43–75)
NRBC BLD AUTO-RTO: 0 /100 WBCS
PLATELET # BLD AUTO: 248 THOUSANDS/UL (ref 149–390)
PMV BLD AUTO: 10 FL (ref 8.9–12.7)
POTASSIUM SERPL-SCNC: 4.9 MMOL/L (ref 3.5–5)
PROT SERPL-MCNC: 7.7 G/DL (ref 6.4–8.3)
PROTHROMBIN TIME: 13.5 SECONDS (ref 11.6–14.5)
RBC # BLD AUTO: 4.86 MILLION/UL (ref 3.88–5.62)
SODIUM SERPL-SCNC: 138 MMOL/L (ref 133–145)
WBC # BLD AUTO: 7.96 THOUSAND/UL (ref 4.31–10.16)

## 2022-02-16 PROCEDURE — 96374 THER/PROPH/DIAG INJ IV PUSH: CPT

## 2022-02-16 PROCEDURE — 99220 PR INITIAL OBSERVATION CARE/DAY 70 MINUTES: CPT | Performed by: NURSE PRACTITIONER

## 2022-02-16 PROCEDURE — G0379 DIRECT REFER HOSPITAL OBSERV: HCPCS

## 2022-02-16 PROCEDURE — 36415 COLL VENOUS BLD VENIPUNCTURE: CPT | Performed by: EMERGENCY MEDICINE

## 2022-02-16 PROCEDURE — 70496 CT ANGIOGRAPHY HEAD: CPT

## 2022-02-16 PROCEDURE — G1004 CDSM NDSC: HCPCS

## 2022-02-16 PROCEDURE — 80053 COMPREHEN METABOLIC PANEL: CPT | Performed by: EMERGENCY MEDICINE

## 2022-02-16 PROCEDURE — 85652 RBC SED RATE AUTOMATED: CPT | Performed by: EMERGENCY MEDICINE

## 2022-02-16 PROCEDURE — 99291 CRITICAL CARE FIRST HOUR: CPT | Performed by: EMERGENCY MEDICINE

## 2022-02-16 PROCEDURE — 85610 PROTHROMBIN TIME: CPT | Performed by: EMERGENCY MEDICINE

## 2022-02-16 PROCEDURE — G0427 INPT/ED TELECONSULT70: HCPCS | Performed by: PSYCHIATRY & NEUROLOGY

## 2022-02-16 PROCEDURE — 91306 COVID-19 MODERNA VACC 0.25 ML BOOSTER: CPT

## 2022-02-16 PROCEDURE — 85730 THROMBOPLASTIN TIME PARTIAL: CPT | Performed by: EMERGENCY MEDICINE

## 2022-02-16 PROCEDURE — 0064A COVID-19 MODERNA VACC 0.25 ML BOOSTER: CPT

## 2022-02-16 PROCEDURE — 70498 CT ANGIOGRAPHY NECK: CPT

## 2022-02-16 PROCEDURE — 85025 COMPLETE CBC W/AUTO DIFF WBC: CPT | Performed by: EMERGENCY MEDICINE

## 2022-02-16 PROCEDURE — 99285 EMERGENCY DEPT VISIT HI MDM: CPT

## 2022-02-16 PROCEDURE — 86140 C-REACTIVE PROTEIN: CPT | Performed by: EMERGENCY MEDICINE

## 2022-02-16 PROCEDURE — 82948 REAGENT STRIP/BLOOD GLUCOSE: CPT

## 2022-02-16 RX ORDER — TAMSULOSIN HYDROCHLORIDE 0.4 MG/1
0.4 CAPSULE ORAL
Status: DISCONTINUED | OUTPATIENT
Start: 2022-02-17 | End: 2022-02-18 | Stop reason: HOSPADM

## 2022-02-16 RX ORDER — LABETALOL 20 MG/4 ML (5 MG/ML) INTRAVENOUS SYRINGE
10 ONCE
Status: DISCONTINUED | OUTPATIENT
Start: 2022-02-16 | End: 2022-02-16

## 2022-02-16 RX ORDER — METHYLPREDNISOLONE SODIUM SUCCINATE 125 MG/2ML
125 INJECTION, POWDER, LYOPHILIZED, FOR SOLUTION INTRAMUSCULAR; INTRAVENOUS ONCE
Status: COMPLETED | OUTPATIENT
Start: 2022-02-16 | End: 2022-02-16

## 2022-02-16 RX ORDER — CLOPIDOGREL BISULFATE 75 MG/1
300 TABLET ORAL ONCE
Status: COMPLETED | OUTPATIENT
Start: 2022-02-16 | End: 2022-02-16

## 2022-02-16 RX ORDER — SODIUM CHLORIDE 9 MG/ML
125 INJECTION, SOLUTION INTRAVENOUS CONTINUOUS
Status: DISCONTINUED | OUTPATIENT
Start: 2022-02-16 | End: 2022-02-17

## 2022-02-16 RX ORDER — CLOPIDOGREL BISULFATE 75 MG/1
75 TABLET ORAL DAILY
Status: DISCONTINUED | OUTPATIENT
Start: 2022-02-17 | End: 2022-02-18 | Stop reason: HOSPADM

## 2022-02-16 RX ORDER — ASPIRIN 325 MG
325 TABLET ORAL ONCE
Status: COMPLETED | OUTPATIENT
Start: 2022-02-16 | End: 2022-02-16

## 2022-02-16 RX ORDER — ALBUTEROL SULFATE 90 UG/1
2 AEROSOL, METERED RESPIRATORY (INHALATION) EVERY 4 HOURS PRN
Status: DISCONTINUED | OUTPATIENT
Start: 2022-02-16 | End: 2022-02-17

## 2022-02-16 RX ORDER — ASPIRIN 81 MG/1
81 TABLET, CHEWABLE ORAL DAILY
Status: DISCONTINUED | OUTPATIENT
Start: 2022-02-17 | End: 2022-02-18 | Stop reason: HOSPADM

## 2022-02-16 RX ADMIN — IOHEXOL 85 ML: 350 INJECTION, SOLUTION INTRAVENOUS at 16:06

## 2022-02-16 RX ADMIN — METHYLPREDNISOLONE SODIUM SUCCINATE 125 MG: 125 INJECTION, POWDER, FOR SOLUTION INTRAMUSCULAR; INTRAVENOUS at 15:04

## 2022-02-16 RX ADMIN — ASPIRIN 325 MG ORAL TABLET 325 MG: 325 PILL ORAL at 18:00

## 2022-02-16 RX ADMIN — CLOPIDOGREL BISULFATE 300 MG: 75 TABLET ORAL at 18:00

## 2022-02-16 NOTE — EMTALA/ACUTE CARE TRANSFER
[facility] EMERGENCY DEPARTMENT  [address] [address]  Dept: [phone]      EMTALA TRANSFER CONSENT    NAME Bree Reid 1945                              MRN [id number]    I have been informed of my rights regarding examination, treatment, and transfer   by Dr Dannielle Blue*    Benefits:      Risks:        Consent for Transfer:  I acknowledge that my medical condition has been evaluated and explained to me by the emergency department physician or other qualified medical person and/or my attending physician, who has recommended that I be transferred to the service of    at    The above potential benefits of such transfer, the potential risks associated with such transfer, and the probable risks of not being transferred have been explained to me, and I fully understand them  The doctor has explained that, in my case, the benefits of transfer outweigh the risks  I agree to be transferred  I authorize the performance of emergency medical procedures and treatments upon me in both transit and upon arrival at the receiving facility  Additionally, I authorize the release of any and all medical records to the receiving facility and request they be transported with me, if possible  I understand that the safest mode of transportation during a medical emergency is an ambulance and that the Hospital advocates the use of this mode of transport  Risks of traveling to the receiving facility by car, including absence of medical control, life sustaining equipment, such as oxygen, and medical personnel has been explained to me and I fully understand them  (SOHAN CORRECT BOX BELOW)  [  ]  I consent to the stated transfer and to be transported by ambulance/helicopter  [  ]  I consent to the stated transfer, but refuse transportation by ambulance and accept full responsibility for my transportation by car    I understand the risks of non-ambulance transfers and I exonerate the Hospital and its staff from any deterioration in my condition that results from this refusal     X___________________________________________    DATE  02/16/22  TIME________  Signature of patient or legally responsible individual signing on patient behalf           RELATIONSHIP TO PATIENT_________________________          Provider Certification    NAME Gema Mendoza 1945                              MRN [id number]    A medical screening exam was performed on the above named patient  Based on the examination:    Condition Necessitating Transfer The primary encounter diagnosis was Right homonymous hemianopsia  A diagnosis of Acute CVA (cerebrovascular accident) [facility]) was also pertinent to this visit  Patient Condition:      Reason for Transfer:      Transfer Requirements: Facility     · Space available and qualified personnel available for treatment as acknowledged by    · Agreed to accept transfer and to provide appropriate medical treatment as acknowledged by          · Appropriate medical records of the examination and treatment of the patient are provided at the time of transfer   [address] [facility], [address] _______  · Transfer will be performed by qualified personnel from    and appropriate transfer equipment as required, including the use of necessary and appropriate life support measures      Provider Certification: I have examined the patient and explained the following risks and benefits of being transferred/refusing transfer to the patient/family:         Based on these reasonable risks and benefits to the patient and/or the unborn child(abimael), and based upon the information available at the time of the patients examination, I certify that the medical benefits reasonably to be expected from the provision of appropriate medical treatments at another medical facility outweigh the increasing risks, if any, to the individuals medical condition, and in the case of labor to the unborn child, from effecting the transfer      X____________________________________________ DATE 02/16/22        TIME_______      ORIGINAL - SEND TO MEDICAL RECORDS   COPY - SEND WITH PATIENT DURING TRANSFER

## 2022-02-16 NOTE — ED NOTES
Neurologist conducting neurological exam via telemedicine   This RN at bedside assisting with neurological exam      Alfredo Berkowitz, RN  02/16/22 [place]  02/16/22 [id number]

## 2022-02-16 NOTE — ED NOTES
Dr Lauren Tripp at bedside and explained the benefits and risks of using TPA to pt and pt's wife--via telephone  Pt verbalized understanding of the treatment and the benefits and risks of TPA  Pt declined treatment with TPA  Neurologist was also present via telemedicine  After pt declined treatment with TPA, neurologist suggested administration of ASA and Plavix        Inocente Sheppard RN  02/16/22 [id number]

## 2022-02-16 NOTE — ED NOTES
VERÓNICA gustafson at [facility]  Accepting: Dr Mikala Bain   Number for report: [place], [address] [facility]  02/16/22 [id number]

## 2022-02-16 NOTE — ED PROVIDER NOTES
History  Chief Complaint   Patient presents with    Loss of Vision     states he got his Covid booter at approx 1245 [address] and around [phone] developed b/l peripheral vision loss  had vaccine at this campus and states he came to ED because he felt unsafe to drive with vision loss     65-year-old male, received his COVID booster today, afterwards had a sudden onset bilateral right sided vision loss  He reports the vision loss began as a purple colored curtain the descended over the right side of his vision, now has total inability to see anything on the right side  Patient denies pain, no recent fever, headaches, patient reports mild unsteadiness due to not having vision on the right side but no significant lightheadedness or dizziness, no recent cough, chest pain, shortness of breath, abdominal pain, urinary or GI complaints, or other complaints  Patient has no focal weakness or deficits  Prior to Admission Medications   Prescriptions Last Dose Informant Patient Reported? Taking?    ERYN MICROLET LANCETS lancets  Self No No   Sig: Test twice a day   Contour Test test strip  Self No No   Sig: Test fasting glucose daily   Dulaglutide 0 75 MG/0 5ML SOPN  Self No No   Sig: Inject 0 5 mL (0 75 mg total) under the skin once a week   albuterol (PROVENTIL HFA,VENTOLIN HFA) 90 mcg/act inhaler  Self No No   Sig: Inhale 2 puffs every 4 (four) hours as needed for wheezing   cholecalciferol (VITAMIN D3) 1,000 units tablet  Self Yes No   Sig: Take 2,000 Units by mouth daily   glyBURIDE (DIABETA) 5 mg tablet  Self No No   Sig: Take 1 tablet (5 mg total) by mouth 2 (two) times a day   linaGLIPtin (Tradjenta) 5 MG TABS  Self No No   Sig: Take 5 mg by mouth daily   lisinopril-hydrochlorothiazide (PRINZIDE,ZESTORETIC) 20-25 MG per tablet  Self No No   Sig: Take 1 tablet by mouth daily   tamsulosin (FLOMAX) 0 4 mg   No No   Sig: Take 1 capsule (0 4 mg total) by mouth daily with dinner      Facility-Administered Medications: None       Past Medical History:   Diagnosis Date    Asthma     last assessed: 2015    Balanitis     last assessed: 2014    Cataract     Chronic kidney disease     Chronic kidney disease, stage 3 ([facility] [address] )     last assessed: 2018    Diabetes mellitus [facility])     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([facility] )     last assessed: 2018    Hypercholesterolemia     Hypertension     Pneumonia     last assessed: 2013       Past Surgical History:   Procedure Laterality Date    ABDOMINAL SURGERY      APPENDECTOMY  1965    CATARACT EXTRACTION     [address] HERNIA REPAIR  2007    x2    OTHER SURGICAL HISTORY  1965    Perforated duodenol ulcer surgery     NV REPAIR FLEXOR TENDON,HAND,W/O GRAFT,EA Left 3/17/2021    Procedure: Left thumb EPL repair;  Surgeon: Sahil Young MD;  Location: [facility];  Service: Orthopedics       Family History   Problem Relation Age of Onset    Pancreatic cancer Mother     Colon cancer Father     Diabetes Father         mellitus     No Known Problems Sister     Diabetes Sister     Nephrolithiasis Sister      I have reviewed and agree with the history as documented  E-Cigarette/Vaping    E-Cigarette Use Never User      E-Cigarette/Vaping Substances    Nicotine No     THC No     CBD No     Flavoring No     Other No     Unknown No      Social History     Tobacco Use    Smoking status: Former Smoker     Packs/day: 2 00     Years: 10 00     Pack years: 20 00     Types: Cigarettes     Quit date:      Years since quittin 1    Smokeless tobacco: Former User     Quit date:     Tobacco comment: former smoker noted in "allscripts" Quit in [address] - never used chewing tobacco    Vaping Use    Vaping Use: Never used   Substance Use Topics    Alcohol use:  Yes     Alcohol/week: 1 0 standard drink     Types: 1 Standard drinks or equivalent per week     Comment: rare  socially    Drug use: No       Review of Systems Constitutional: Negative for fever  HENT: Negative for congestion  Eyes: Positive for visual disturbance  Respiratory: Negative for cough and shortness of breath  Cardiovascular: Negative for chest pain  Gastrointestinal: Negative for abdominal pain, diarrhea and vomiting  Endocrine: Negative for polyuria  Genitourinary: Negative for dysuria and hematuria  Musculoskeletal: Negative for myalgias  Neurological: Negative for dizziness and headaches  Physical Exam  Physical Exam  Constitutional:       Appearance: Normal appearance  HENT:      Head: Normocephalic and atraumatic  Right Ear: External ear normal       Left Ear: External ear normal       Mouth/Throat:      Mouth: Mucous membranes are moist       Pharynx: Oropharynx is clear  Eyes:      Conjunctiva/sclera: Conjunctivae normal       Pupils: Pupils are equal, round, and reactive to light  Cardiovascular:      Rate and Rhythm: Normal rate and regular rhythm  Heart sounds: Normal heart sounds  Pulmonary:      Breath sounds: Normal breath sounds  Abdominal:      General: Abdomen is flat  There is no distension  Palpations: Abdomen is soft  Musculoskeletal:         General: No deformity  Normal range of motion  Cervical back: Normal range of motion  Skin:     General: Skin is warm and dry  Neurological:      General: No focal deficit present  Mental Status: He is alert and oriented to person, place, and time  Sensory: No sensory deficit  Motor: No weakness        Coordination: Coordination normal       Comments: Patient cranial nerve exam is unremarkable with the exception the patient does have complete loss of vision on the right side with confrontation testing consistent with a right-sided homonymous hemianopsia   Psychiatric:         Mood and Affect: Mood normal          Behavior: Behavior normal          Vital Signs  ED Triage Vitals   Temperature Pulse Respirations Blood Pressure SpO2   02/16/22 1411 02/16/22 1411 02/16/22 1411 02/16/22 1411 02/16/22 1411   98 °F (36 7 °C) 68 18 (!) 171/91 99 %      Temp Source Heart Rate Source Patient Position - Orthostatic VS BP Location FiO2 (%)   02/16/22 1411 02/16/22 1517 02/16/22 1517 02/16/22 1517 --   Oral Monitor Lying Right arm       Pain Score       02/16/22 1411       No Pain           Vitals:    02/16/22 1752 02/16/22 1810 02/16/22 1848 02/16/22 1924   BP: (!) 172/87 142/86 145/73 128/83   Pulse: 68 70 69 73   Patient Position - Orthostatic VS:  Lying Lying Lying         Visual Acuity  Visual Acuity      Most Recent Value   L Pupil Size (mm) 2   R Pupil Size (mm) 2          ED Medications  Medications   methylPREDNISolone sodium succinate (Solu-MEDROL) injection 125 mg (125 mg Intravenous Given 2/16/22 1504)   iohexol (OMNIPAQUE) 350 MG/ML injection (SINGLE-DOSE) 100 mL (85 mL Intravenous Given 2/16/22 1606)   aspirin tablet 325 mg (325 mg Oral Given 2/16/22 1800)   clopidogrel (PLAVIX) tablet 300 mg (300 mg Oral Given 2/16/22 1800)       Diagnostic Studies  Results Reviewed     Procedure Component Value Units Date/Time    Fingerstick Glucose (POCT) [[id number]]  (Abnormal) Collected: 02/17/22 0238    Lab Status: Final result Updated: 02/17/22 0251     POC Glucose 284 mg/dl     Sedimentation rate, automated [[id number]]  (Normal) Collected: 02/16/22 1501    Lab Status: Final result Specimen: Blood from Arm, Left Updated: 02/16/22 1552     Sed Rate 1 mm/hour     C-reactive protein [[id number]]  (Normal) Collected: 02/16/22 1501    Lab Status: Final result Specimen: Blood from Arm, Left Updated: 02/16/22 1542     CRP 0 2 mg/dL     Comprehensive metabolic panel [[id number]]  (Abnormal) Collected: 02/16/22 1501    Lab Status: Final result Specimen: Blood from Arm, Left Updated: 02/16/22 1542     Sodium 138 mmol/L      Potassium 4 9 mmol/L      Chloride 102 mmol/L      CO2 29 mmol/L      ANION GAP 7 mmol/L      BUN 32 mg/dL      Creatinine 1 64 mg/dL Glucose 115 mg/dL      Calcium 9 8 mg/dL      AST 13 U/L      ALT 17 U/L      Alkaline Phosphatase 55 4 U/L      Total Protein 7 7 g/dL      Albumin 4 8 g/dL      Total Bilirubin 0 50 mg/dL      eGFR 39 ml/min/1 73sq m     Narrative:      [place] guidelines for Chronic Kidney Disease (CKD):     Stage 1 with normal or high GFR (GFR > 90 mL/min/1 73 square meters)    Stage 2 Mild CKD (GFR = 60-89 mL/min/1 73 square meters)    Stage 3A Moderate CKD (GFR = 45-59 mL/min/1 73 square meters)    Stage 3B Moderate CKD (GFR = 30-44 mL/min/1 73 square meters)    Stage 4 Severe CKD (GFR = 15-29 mL/min/1 73 square meters)    Stage 5 End Stage CKD (GFR <15 mL/min/1 73 square meters)  Note: GFR calculation is accurate only with a steady state creatinine    Protime-INR [[id number]]  (Normal) Collected: 02/16/22 1501    Lab Status: Final result Specimen: Blood from Arm, Left Updated: 02/16/22 1514     Protime 13 5 seconds      INR 1 04    APTT [[id number]]  (Normal) Collected: 02/16/22 1501    Lab Status: Final result Specimen: Blood from Arm, Left Updated: 02/16/22 1514     PTT 29 seconds     CBC and differential [[id number]] Collected: 02/16/22 1501    Lab Status: Final result Specimen: Blood from Arm, Left Updated: 02/16/22 1505     WBC 7 96 Thousand/uL      RBC 4 86 Million/uL      Hemoglobin 14 9 g/dL      Hematocrit 43 3 %      MCV 89 fL      MCH 30 7 pg      MCHC 34 4 g/dL      RDW 12 6 %      MPV 10 0 fL      Platelets 659 Thousands/uL      nRBC 0 /100 WBCs      Neutrophils Relative 59 %      Immat GRANS % 0 %      Lymphocytes Relative 25 %      Monocytes Relative 9 %      Eosinophils Relative 6 %      Basophils Relative 1 %      Neutrophils Absolute 4 68 Thousands/µL      Immature Grans Absolute 0 02 Thousand/uL      Lymphocytes Absolute 2 00 Thousands/µL      Monocytes Absolute 0 73 Thousand/µL      Eosinophils Absolute 0 44 Thousand/µL      Basophils Absolute 0 09 Thousands/µL CTA head and neck with and without contrast   Final Result by Chi Sánchez DO (02/16 5479)      CT brain: No acute intracranial abnormality  CT angiography: Primarily noncalcified atherosclerotic disease of the left distal common carotid artery extending into the origin of the internal carotid artery and proximal bulb with mild, less than 50% narrowing  Otherwise unremarkable cervical and intracranial vasculature  Workstation performed: [id number]                    Procedures  CriticalCare Time  Performed by: Mikhail Meza MD  Authorized by: Mikhail Meza MD     Critical care provider statement:     Critical care time (minutes):  30    Critical care start time:  2/16/2022 2:45 PM    Critical care end time:  2/16/2022 5:30 PM    Critical care time was exclusive of:  Separately billable procedures and treating other patients    Critical care was necessary to treat or prevent imminent or life-threatening deterioration of the following conditions:  CNS failure or compromise    Critical care was time spent personally by me on the following activities:  Development of treatment plan with patient or surrogate, discussions with consultants, examination of patient, ordering and performing treatments and interventions, ordering and review of radiographic studies and re-evaluation of patient's condition             ED Course       MDM  Number of Diagnoses or Management Options  Acute CVA (cerebrovascular accident) ([address] )  Right homonymous hemianopsia  Diagnosis management comments: Patient examined by myself and the neurologist via tele neurology, it was agreed the patient is within the window for tPA, and the tPA was appropriate given the patient's reproducible visual loss    After extensive discussion of the risks and benefits with the patient the patient's wife via telephone they decided at approximately 3:30 p m  that they did not want to receive tPA, they are concerned that the risks outweigh the benefits, tPA will be discontinued and patient will be transferred for Neurology management and MRI at [facility]  Disposition  Final diagnoses:   Right homonymous hemianopsia   Acute CVA (cerebrovascular accident) ([facility] [address] )     Time reflects when diagnosis was documented in both MDM as applicable and the Disposition within this note     Time User Action Codes Description Comment    2/16/2022  6:09 PM Fabiola Youngblood [M29 608] Right homonymous hemianopsia     2/16/2022  6:09 PM Erling Barnacle Add [I63 9] CVA (cerebral vascular accident) ([facility] [address] )     2/16/2022  6:09 PM Erling Barnacle Remove [I63 9] CVA (cere[facility] vascular accident[facility] )     2/16/2022  6:09 PM Erling Barnacle Add [I63 9] Acute CVA (cerebrovascular accident) [facility])       ED Disposition     ED Disposition Condition Date/Time Comment    Transfer to Another Facility-In Network  Wed Feb 16, 2022  6:08 PM Bree Livingston should be transferred out to Dr Aditya Wade at [facility]          MD Documentation      Most Recent Value   Patient Condition An emergency transfer is being made prior to stabilization due to the need for definitive care and the benefit of transfer outweighs the risk   Reason for Transfer Level of Care needed not available at this facility   Benefits of Transfer Specialized equipment and/or services available at the receiving facility (Include comment)________________________   Risks of Transfer Potential for delay in receiving treatment, Potential deterioration of medical condition   Accepting Physician Dr Aditya Wade   Sending MD Albino Nur MD   Provider Certification General risk, such as traffic hazards, adverse weather conditions, rough terrain or turbulence, possible failure of equipment (including vehicle or aircraft), or consequences of actions of persons outside the control of the transport personnel      RN Documentation      Most Recent Value   Transport Mode Ambulance   Level of Care Advanced life support      Follow-up Information    None         Discharge Medication List as of 2/16/2022  9:24 PM      CONTINUE these medications which have NOT CHANGED    Details   albuterol (PROVENTIL HFA,VENTOLIN HFA) 90 mcg/act inhaler Inhale 2 puffs every 4 (four) hours as needed for wheezing, Starting Wed 12/29/2021, Normal      ERYN MICROLET LANCETS lancets Test twice a day, Normal      cholecalciferol (VITAMIN D3) 1,000 units tablet Take 2,000 Units by mouth daily, Historical Med      Contour Test test strip Test fasting glucose daily, Normal      Dulaglutide 0 75 MG/0 5ML SOPN Inject 0 5 mL (0 75 mg total) under the skin once a week, Starting Thu 1/13/2022, Normal      glyBURIDE (DIABETA) 5 mg tablet Take 1 tablet (5 mg total) by mouth 2 (two) times a day, Starting Mon 1/17/2022, No Print      linaGLIPtin (Tradjenta) 5 MG TABS Take 5 mg by mouth daily, Starting Wed 12/29/2021, Normal      lisinopril-hydrochlorothiazide (PRINZIDE,ZESTORETIC) 20-25 MG per tablet Take 1 tablet by mouth daily, Starting Wed 12/29/2021, Normal      tamsulosin (FLOMAX) 0 4 mg Take 1 capsule (0 4 mg total) by mouth daily with dinner, Starting Fri 1/21/2022, Normal             No discharge procedures on file      PDMP Review     None          ED Provider  Electronically Signed by           Dannielle Blue MD  02/17/22 7867

## 2022-02-17 ENCOUNTER — TELEPHONE (OUTPATIENT)
Dept: ANESTHESIOLOGY | Facility: CLINIC | Age: 77
End: 2022-02-17

## 2022-02-17 ENCOUNTER — APPOINTMENT (OUTPATIENT)
Dept: RADIOLOGY | Facility: HOSPITAL | Age: 77
DRG: 066 | End: 2022-02-17
Payer: COMMERCIAL

## 2022-02-17 PROBLEM — I63.9 ACUTE CVA (CEREBROVASCULAR ACCIDENT) (HCC): Status: ACTIVE | Noted: 2022-02-16

## 2022-02-17 LAB
25(OH)D3 SERPL-MCNC: 42.4 NG/ML (ref 30–100)
ALBUMIN SERPL BCP-MCNC: 3.5 G/DL (ref 3.5–5)
ALP SERPL-CCNC: 63 U/L (ref 46–116)
ALT SERPL W P-5'-P-CCNC: 23 U/L (ref 12–78)
ANION GAP SERPL CALCULATED.3IONS-SCNC: 11 MMOL/L (ref 4–13)
AST SERPL W P-5'-P-CCNC: 10 U/L (ref 5–45)
BILIRUB SERPL-MCNC: 0.46 MG/DL (ref 0.2–1)
BUN SERPL-MCNC: 41 MG/DL (ref 5–25)
CALCIUM SERPL-MCNC: 8.1 MG/DL (ref 8.3–10.1)
CHLORIDE SERPL-SCNC: 104 MMOL/L (ref 100–108)
CHOLEST SERPL-MCNC: 169 MG/DL
CO2 SERPL-SCNC: 22 MMOL/L (ref 21–32)
CREAT SERPL-MCNC: 1.96 MG/DL (ref 0.6–1.3)
ERYTHROCYTE [DISTWIDTH] IN BLOOD BY AUTOMATED COUNT: 12.8 % (ref 11.6–15.1)
EST. AVERAGE GLUCOSE BLD GHB EST-MCNC: 151 MG/DL
GFR SERPL CREATININE-BSD FRML MDRD: 32 ML/MIN/1.73SQ M
GLUCOSE P FAST SERPL-MCNC: 274 MG/DL (ref 65–99)
GLUCOSE SERPL-MCNC: 193 MG/DL (ref 65–140)
GLUCOSE SERPL-MCNC: 238 MG/DL (ref 65–140)
GLUCOSE SERPL-MCNC: 245 MG/DL (ref 65–140)
GLUCOSE SERPL-MCNC: 258 MG/DL (ref 65–140)
GLUCOSE SERPL-MCNC: 274 MG/DL (ref 65–140)
GLUCOSE SERPL-MCNC: 284 MG/DL (ref 65–140)
GLUCOSE SERPL-MCNC: 369 MG/DL (ref 65–140)
HBA1C MFR BLD: 6.9 %
HCT VFR BLD AUTO: 40 % (ref 36.5–49.3)
HDLC SERPL-MCNC: 41 MG/DL
HGB BLD-MCNC: 13.3 G/DL (ref 12–17)
LDLC SERPL CALC-MCNC: 122 MG/DL (ref 0–100)
MCH RBC QN AUTO: 30.1 PG (ref 26.8–34.3)
MCHC RBC AUTO-ENTMCNC: 33.3 G/DL (ref 31.4–37.4)
MCV RBC AUTO: 91 FL (ref 82–98)
PLATELET # BLD AUTO: 238 THOUSANDS/UL (ref 149–390)
PMV BLD AUTO: 10.5 FL (ref 8.9–12.7)
POTASSIUM SERPL-SCNC: 4.5 MMOL/L (ref 3.5–5.3)
PROT SERPL-MCNC: 6.7 G/DL (ref 6.4–8.2)
RBC # BLD AUTO: 4.42 MILLION/UL (ref 3.88–5.62)
SODIUM SERPL-SCNC: 137 MMOL/L (ref 136–145)
TRIGL SERPL-MCNC: 31 MG/DL
TSH SERPL DL<=0.05 MIU/L-ACNC: 0.41 UIU/ML (ref 0.36–3.74)
VIT B12 SERPL-MCNC: 450 PG/ML (ref 100–900)
WBC # BLD AUTO: 11.16 THOUSAND/UL (ref 4.31–10.16)

## 2022-02-17 PROCEDURE — 97167 OT EVAL HIGH COMPLEX 60 MIN: CPT

## 2022-02-17 PROCEDURE — 94640 AIRWAY INHALATION TREATMENT: CPT

## 2022-02-17 PROCEDURE — 99232 SBSQ HOSP IP/OBS MODERATE 35: CPT | Performed by: STUDENT IN AN ORGANIZED HEALTH CARE EDUCATION/TRAINING PROGRAM

## 2022-02-17 PROCEDURE — G1004 CDSM NDSC: HCPCS

## 2022-02-17 PROCEDURE — 82948 REAGENT STRIP/BLOOD GLUCOSE: CPT

## 2022-02-17 PROCEDURE — 84443 ASSAY THYROID STIM HORMONE: CPT | Performed by: NURSE PRACTITIONER

## 2022-02-17 PROCEDURE — 82306 VITAMIN D 25 HYDROXY: CPT | Performed by: NURSE PRACTITIONER

## 2022-02-17 PROCEDURE — 82607 VITAMIN B-12: CPT | Performed by: NURSE PRACTITIONER

## 2022-02-17 PROCEDURE — 80053 COMPREHEN METABOLIC PANEL: CPT | Performed by: NURSE PRACTITIONER

## 2022-02-17 PROCEDURE — 70551 MRI BRAIN STEM W/O DYE: CPT

## 2022-02-17 PROCEDURE — 83036 HEMOGLOBIN GLYCOSYLATED A1C: CPT | Performed by: NURSE PRACTITIONER

## 2022-02-17 PROCEDURE — 99233 SBSQ HOSP IP/OBS HIGH 50: CPT | Performed by: PSYCHIATRY & NEUROLOGY

## 2022-02-17 PROCEDURE — 80061 LIPID PANEL: CPT | Performed by: NURSE PRACTITIONER

## 2022-02-17 PROCEDURE — 97163 PT EVAL HIGH COMPLEX 45 MIN: CPT

## 2022-02-17 PROCEDURE — 97530 THERAPEUTIC ACTIVITIES: CPT

## 2022-02-17 PROCEDURE — 85027 COMPLETE CBC AUTOMATED: CPT | Performed by: NURSE PRACTITIONER

## 2022-02-17 PROCEDURE — 94760 N-INVAS EAR/PLS OXIMETRY 1: CPT

## 2022-02-17 PROCEDURE — 92610 EVALUATE SWALLOWING FUNCTION: CPT

## 2022-02-17 RX ORDER — IPRATROPIUM BROMIDE AND ALBUTEROL SULFATE 2.5; .5 MG/3ML; MG/3ML
3 SOLUTION RESPIRATORY (INHALATION)
Status: DISCONTINUED | OUTPATIENT
Start: 2022-02-17 | End: 2022-02-18 | Stop reason: HOSPADM

## 2022-02-17 RX ORDER — SODIUM CHLORIDE 9 MG/ML
125 INJECTION, SOLUTION INTRAVENOUS CONTINUOUS
Status: DISCONTINUED | OUTPATIENT
Start: 2022-02-17 | End: 2022-02-18

## 2022-02-17 RX ORDER — ATORVASTATIN CALCIUM 80 MG/1
80 TABLET, FILM COATED ORAL
Status: DISCONTINUED | OUTPATIENT
Start: 2022-02-17 | End: 2022-02-18 | Stop reason: HOSPADM

## 2022-02-17 RX ORDER — METHYLPREDNISOLONE SODIUM SUCCINATE 40 MG/ML
40 INJECTION, POWDER, LYOPHILIZED, FOR SOLUTION INTRAMUSCULAR; INTRAVENOUS EVERY 12 HOURS SCHEDULED
Status: DISCONTINUED | OUTPATIENT
Start: 2022-02-17 | End: 2022-02-18 | Stop reason: HOSPADM

## 2022-02-17 RX ORDER — IPRATROPIUM BROMIDE AND ALBUTEROL SULFATE 2.5; .5 MG/3ML; MG/3ML
3 SOLUTION RESPIRATORY (INHALATION)
Status: DISCONTINUED | OUTPATIENT
Start: 2022-02-17 | End: 2022-02-17

## 2022-02-17 RX ORDER — ATORVASTATIN CALCIUM 10 MG/1
10 TABLET, FILM COATED ORAL
Status: DISCONTINUED | OUTPATIENT
Start: 2022-02-17 | End: 2022-02-17

## 2022-02-17 RX ADMIN — ENOXAPARIN SODIUM 40 MG: 40 INJECTION SUBCUTANEOUS at 08:19

## 2022-02-17 RX ADMIN — SODIUM CHLORIDE 125 ML/HR: 0.9 INJECTION, SOLUTION INTRAVENOUS at 20:05

## 2022-02-17 RX ADMIN — CLOPIDOGREL BISULFATE 75 MG: 75 TABLET ORAL at 08:19

## 2022-02-17 RX ADMIN — INSULIN LISPRO 2 UNITS: 100 INJECTION, SOLUTION INTRAVENOUS; SUBCUTANEOUS at 16:33

## 2022-02-17 RX ADMIN — SODIUM CHLORIDE 125 ML/HR: 0.9 INJECTION, SOLUTION INTRAVENOUS at 17:19

## 2022-02-17 RX ADMIN — INSULIN LISPRO 6 UNITS: 100 INJECTION, SOLUTION INTRAVENOUS; SUBCUTANEOUS at 00:02

## 2022-02-17 RX ADMIN — INSULIN LISPRO 3 UNITS: 100 INJECTION, SOLUTION INTRAVENOUS; SUBCUTANEOUS at 12:17

## 2022-02-17 RX ADMIN — ASPIRIN 81 MG CHEWABLE TABLET 81 MG: 81 TABLET CHEWABLE at 08:19

## 2022-02-17 RX ADMIN — INSULIN LISPRO 3 UNITS: 100 INJECTION, SOLUTION INTRAVENOUS; SUBCUTANEOUS at 21:38

## 2022-02-17 RX ADMIN — INSULIN LISPRO 4 UNITS: 100 INJECTION, SOLUTION INTRAVENOUS; SUBCUTANEOUS at 02:58

## 2022-02-17 RX ADMIN — ALBUTEROL SULFATE 2 PUFF: 90 AEROSOL, METERED RESPIRATORY (INHALATION) at 08:18

## 2022-02-17 RX ADMIN — INSULIN LISPRO 3 UNITS: 100 INJECTION, SOLUTION INTRAVENOUS; SUBCUTANEOUS at 08:18

## 2022-02-17 RX ADMIN — TAMSULOSIN HYDROCHLORIDE 0.4 MG: 0.4 CAPSULE ORAL at 16:22

## 2022-02-17 RX ADMIN — SODIUM CHLORIDE 125 ML/HR: 0.9 INJECTION, SOLUTION INTRAVENOUS at 00:02

## 2022-02-17 RX ADMIN — ATORVASTATIN CALCIUM 80 MG: 80 TABLET, FILM COATED ORAL at 16:22

## 2022-02-17 RX ADMIN — SODIUM CHLORIDE 125 ML/HR: 0.9 INJECTION, SOLUTION INTRAVENOUS at 05:48

## 2022-02-17 RX ADMIN — IPRATROPIUM BROMIDE AND ALBUTEROL SULFATE 3 ML: 2.5; .5 SOLUTION RESPIRATORY (INHALATION) at 19:39

## 2022-02-17 RX ADMIN — METHYLPREDNISOLONE SODIUM SUCCINATE 40 MG: 40 INJECTION, POWDER, FOR SOLUTION INTRAMUSCULAR; INTRAVENOUS at 20:01

## 2022-02-17 NOTE — CONSULTS
[facility]   Neurology Initial Consult    Bree Boyle is a 67 y o  male  [address] [facility]          Information obtained from:   No chief complaint on file  Assessment/Plan:    1  Acute ischemic left occipital lobe infarct  2  Recent to acute ischemic left cerebellar lacunar infarct eight  3  HTN  4  HLD  5  DM  6  History of extensive tobacco use-quit approximately 14 years ago    -monitored on telemetry  -neuro assessments per stroke pathway  -Plavix 75 mg daily  -baby aspirin 81 mg daily  -Lipitor 80 mg daily  -MRI of the brain completed  -echocardiogram with shunt study-ordered and pending  -labs:  A1c and lipid profile-completed  -PT/OT/ST  -recommend not driving for at least 2 months while undergoing post stroke therapies  Will need re-evaluation of driving capabilities at that time  Patient agreeable and understands the risks    Patient is a 67-year-old male with sudden onset of right homonomous hemianopsia 02/16/2022 after receiving a COVID vaccination approximately 15-30 minutes prior  Patient reported he was on his way to his car to drive home after his vaccination when he had sudden loss of right peripheral view  Patient stated he started becoming off balance due to his visual disturbance and did not feel comfortable driving his car  Patient went to the ER for further evaluation  Patient was then a stroke alert, sent for CT, CTH negative for acute findings  CTA with mild none calcific atherosclerotic disease in the left ICA  Discussion had with patient regarding tPA however he was concern with regards to bleeding risks versus the benefits of taking the tPA  Patient had reported having some auditory deficits at baseline, difficulties hearing words if not articulated well, reports that he believes he heard CT findings of bleeding    Stated that they then told him is CT was clear bleeding, however he was still concern with regards to bleeding risk of tPA in opted for medical management  Patient was admitted as inpatient, transferred to [facility] for ongoing workup under the stroke pathway  Patient went for MRI, shows acute infarct in left occipital lobe, also recent lacunar infarct in the left cerebellum  Patient continues with right peripheral field loss bilaterally  He has steady on casual gait however some ataxia noted with heel toe walking and tandem gait  Patient does have positive Romberg noted  Patient has unequal pupils, however reports having prior surgical interventions to his bilateral eyes, left x2  Patient has 20/50 vision in the right eye, 20/220 in the left  Patient receiving PT/OT/ST evaluations and treatments  Is scheduled for echocardiogram with shunt study which has not yet been completed  Patient will need to follow-up in the outpatient clinic, who will remain on dual anti-platelet therapies x3 weeks, then Plavix can be discontinued after his dose on 03/09/2021 and will remain baby aspirin 81 mg daily long term  Patient remain on Lipitor 80 mg until followed up in the outpatient clinic  Did discuss driving with this patient, noted with visual deficits he is at risk for injury to himself and others  Recommend patient receive therapies for at least 2 months prior to re-evaluation of his abilities to drive  Bree Callaway will need follow up in 8-10 weeks with general attending or advance practitioner  He will not require outpatient neurological testing  HPI:  Bree Callaway is a 59-year-old male with PMH of diabetes, HLD, HTN, CKD in a history of 2 pack per day tobacco use, quit in 2007  Patient reports being at [facility] [place] in receiving his COVID booster vaccination, states 15 minutes post vaccination was on his way to the car to return home when he had sudden vision changes  Patient had onset of dark purple shadowing in the right periphery is that continued to develop and then turned black    Patient fell off balance while he continues to walk to his car  Patient stated he did not feel stable driving home therefore went to the ER for further evaluation  Upon arrival to the ER, patient was a stroke alert for right homonymous hemianopsia and ataxia  Patient was sent for CTA, showed primary noncalcific atherosclerotic disease in the left ICA left and 50%  CT H with no acute intracranial abnormality Patient had a creatinine of 1 64 which appears to be around his baseline for chronic kidney disease  Patient was eligible for tPA however was concerned regarding bleeding risks and the benefits of the tPA regarding his stroke symptomatology therefore had declined this treatment  Patient was then transferred to [facility] admission and ongoing workup under the stroke pathway  Met with patient at bedside today, he recanted story as documented above  Patient did note he has some difficulties understanding articulation due to prior hearing deficits  Patient had indicated during initial conversation regarding tPA, initially heard that his CT indicated positive bleed  He had indicated having concerns in taking tPA given the bleed findings on his CT scan  Patient then learned that his CT scan did not have bleeding on it and that he would be eligible for the tPA and could still be in the window to receive it  Patient felt conflicted with regards to the risk of tPA and bleeding, reports having some difficulty in understanding the conversation had with Neurology  Patient stated he had difficulties understanding the articulation of neuro MD on the particular video platform he was using  It was not sharp and he could not fully here the wording  Patient relied on a ER MD to interpret  Ultimately patient opted against tPA due to increased risk of possible bleeding    Patient was loaded with Plavix 300 mg with full-dose aspirin, he remains on dual anti-platelet therapies for 3 weeks, Plavix will then be discontinued and patient will remain on baby aspirin daily  Patient will remain on high-dose statin, his LDL this a m  Is 122  Patient's A1c is 6 9, recent A1c prior was 7 0  Patient has improving A1c control, will need to continue working on euglycemia  Patient's neurological status is generally intact with no focal deficits  He does have some anisocoria, stated having multiple surgeries to his left eye for visual correction  Currently his right eye is 20/50, his left eye is 20/220  Patient has some mild ataxia with heel toe walking, positive Romberg noted  Casual gait remains fairly steady, has some changes in gait pattern due to his visual deficit  MRI of the brain completed showing an acute ischemic infarct in left occipital lobe  Patient also with a recent lacunar infarct in the left cerebellum  Patient questioning the possibility of stroke related to [place] vaccination  Patient is aware he has multiple vascular risk factors that could possibly lead to stroke  Patient given his vascular risk factor goals, will continue to work with medical management to achieve and maintain these goals  Patient to work with ST/OT/PT, recommend outpatient OT for visual therapies as well        Past Medical History:   Diagnosis Date    Asthma     last assessed: April 1, 2015    Balanitis     last assessed: July 9, 2014    Cataract     Chronic kidney disease     Chronic kidney disease, stage 3 ([facility] [address] )     last assessed: Jan 17, 2018    Diabetes mellitus [facility])     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([facility] [address] )     last assessed: Jan 17, 2018    Hypercholesterolemia     Hypertension     Pneumonia     last assessed: Feb 25, 2013       Past Surgical History:   Procedure Laterality Date    ABDOMINAL SURGERY      APPENDECTOMY  1965    CATARACT EXTRACTION     R [address] 50 HERNIA REPAIR  2007    x2    OTHER SURGICAL HISTORY  1965    Perforated duodenol ulcer surgery     MO REPAIR FLEXOR TENDON,HAND,W/O GRAFT,EA Left 3/17/2021    Procedure: Left thumb EPL repair;  Surgeon: Yamel Blanco MD;  Location: [facility];  Service: Orthopedics       Allergies   Allergen Reactions    Meperidine     Pravastatin Myalgia         Current Facility-Administered Medications:     albuterol (PROVENTIL HFA,VENTOLIN HFA) inhaler 2 puff, 2 puff, Inhalation, Q4H PRN, Stu Cap, CRNP, 2 puff at 02/17/22 0818    aspirin chewable tablet 81 mg, 81 mg, Oral, Daily, Stu Cap, CRNP, 81 mg at 02/17/22 9215    atorvastatin (LIPITOR) tablet 80 mg, 80 mg, Oral, Daily With Dana Prince MD    clopidogrel (PLAVIX) tablet 75 mg, 75 mg, Oral, Daily, Stu Cap, CRNP, 75 mg at 02/17/22 8969    enoxaparin (LOVENOX) subcutaneous injection 40 mg, 40 mg, Subcutaneous, Daily, Stu Cap, CRNP, 40 mg at 02/17/22 3203    insulin lispro (HumaLOG) 100 units/mL subcutaneous injection 1-6 Units, 1-6 Units, Subcutaneous, TID AC, 3 Units at 02/17/22 1217 **AND** Fingerstick Glucose (POCT), , , TID AC, Stu Cap, CRNP    insulin lispro (HumaLOG) 100 units/mL subcutaneous injection 1-6 Units, 1-6 Units, Subcutaneous, HS, Stu Cap, CRNP, 6 Units at 02/17/22 0002    insulin lispro (HumaLOG) 100 units/mL subcutaneous injection 1-6 Units, 1-6 Units, Subcutaneous, 0200, Stu Cap, CRNP, 4 Units at 02/17/22 0258    sodium chloride 0 9 % infusion, 125 mL/hr, Intravenous, Continuous, Stu Cap, CRNP, Last Rate: 125 mL/hr at 02/17/22 0548, 125 mL/hr at 02/17/22 0548    tamsulosin (FLOMAX) capsule 0 4 mg, 0 4 mg, Oral, Daily With TREVIN Robbins    Social History     Socioeconomic History    Marital status:      Spouse name: Not on file    Number of children: Not on file    Years of education: Not on file    Highest education level: Not on file   Occupational History    Occupation: Not employed - Retired    Tobacco Use    Smoking status: Former Smoker     Packs/day: 2 00     Years: 10 00     Pack years: 20 00 Types: Cigarettes     Quit date:      Years since quittin 1    Smokeless tobacco: Former User     Quit date:     Tobacco comment: former smoker noted in "allscripts" Quit in 25 - never used chewing tobacco    Vaping Use    Vaping Use: Never used   Substance and Sexual Activity    Alcohol use: Yes     Alcohol/week: 1 0 standard drink     Types: 1 Standard drinks or equivalent per week     Comment: rare  socially    Drug use: No    Sexual activity: Not on file   Other Topics Concern    Not on file   Social History Narrative    Voluntary Childlessness     Advance directive on file - No     Exercises occasionally     Occasional caffeine consumption      Social Determinants of Health     Financial Resource Strain: Not on file   Food Insecurity: No Food Insecurity    Worried About Running Out of Food in the Last Year: Never true    Sparkle of Food in the Last Year: Never true   Transportation Needs: No Transportation Needs    Lack of Transportation (Medical): No    Lack of Transportation (Non-Medical): No   Physical Activity: Not on file   Stress: Not on file   Social Connections: Not on file   Intimate Partner Violence: Not on file   Housing Stability: Low Risk     Unable to Pay for Housing in the Last Year: No    Number of Places Lived in the Last Year: 1    Unstable Housing in the Last Year: No       Family History   Problem Relation Age of Onset    Pancreatic cancer Mother     Colon cancer Father     Diabetes Father         mellitus     No Known Problems Sister     Diabetes Sister     Nephrolithiasis Sister          Review of systems:  Please see HPI for positive symptoms  Constitutional: No fever, no chills, no weight change  Ocular: No diplopia, no blurred vision, spots/zigzag lines  HEENT:  No sore throat or congestion  + mild frontal headache  COR:  No chest pain  No palpitations  Lungs:  no sob  GI:  no  nausea, no vomiting, no diarrhea, no constipation, no anorexia  :  No dysuria, frequency, or urgency  No hematuria  Musculoskeletal:  No joint pain or swelling   Skin:  No rash or itching  Psychiatric:  no anxiety, no depression  Endocrine:  No polyuria or polydipsia  Physical examination:  /73   Pulse 70   Temp 98 °F (36 7 °C)   Resp 18   Ht 5' 10" (1 778 m)   Wt 83 2 kg (183 lb 6 8 oz)   SpO2 96%   BMI 26 32 kg/m²     GENERAL APPEARANCE:  The patient is alert, oriented  HEENT:  Head is normocephalic  Pupils are equal and reactive  NECK:  Supple without lymphadenopathy  HEART:  Regular rate and rhythm  LUNGS:  Audible wheezing or stridor heard  ABDOMEN:  Soft, nontender, nondistended with good bowel sounds heard  EXTREMITIES:  Without cyanosis, clubbing or edema  Mental status: The patient is alert, attentive, and oriented  Speech is clear and fluent, good repetition, comprehension, and naming  Cranial nerves:  CN II: Visual fields with right homonomous hemianopsia  Fundoscopic exam is normal with sharp discs and no vascular changes  Pupils uneven, 3 mm on right, 2 mm to the left  CN III, IV, VI: At primary gaze, there is no eye deviation  CN V: Facial sensation is intact to pinprick in all 3 divisions bilaterally  Corneal responses are intact  CN VII: Face is symmetric with normal eye closure and smile  CN VIII: Hearing is, difficulties with hearing certain articulation of words  CN IX, X: Palate elevates symmetrically  Phonation is normal   CN XI: Head turning and shoulder shrug are intact  CN XII: Tongue is midline with normal movements and no atrophy  Motor: There is no pronator drift of out-stretched arms      Muscle bulk and tone are normal    Muscle exam  Arm Right Left Leg Right Left   Deltoid 5/5 5/5 Iliopsoas 5/5 5/5   Biceps 5/5 5/5 Quads 5/5 5/5   Triceps 5/5 5/5 Hamstrings 5/5 5/5   Wrist Extension 5/5 5/5 Ankle Dorsi Flexion 5/5 5/5   Wrist Flexion 5/5 5/5 Ankle Plantar Flexion 5/5 5/5        Reflexes RJ BJ TJ KJ AJ Plantars Cruz's   Right 2+ 2+ 2+ 2+ 2+ Downgoing Not present   Left 2+ 2+ 2+ 2+ 2+ Downgoing Not present      Sensory:  Light touch, Temperature, position sense, and vibration sense are intact in fingers and toes  Coordination:  Rapid alternating movements and fine finger movements are intact  There is no dysmetria on finger-to-nose and heel-knee-shin  There are no abnormal or extraneous movements  Romberg positive  Gait/Stance:  Steady casual gait, some ataxia noted on heel-toe walking    Lab Results   Component Value Date    WBC 11 16 (H) 02/17/2022    HGB 13 3 02/17/2022    HCT 40 0 02/17/2022    MCV 91 02/17/2022     02/17/2022     Lab Results   Component Value Date    HGBA1C 6 9 (H) 02/17/2022     Lab Results   Component Value Date    ALT 23 02/17/2022    AST 10 02/17/2022    ALKPHOS 63 02/17/2022    BILITOT 0 39 10/01/2015     Lab Results   Component Value Date    GLUCOSE 109 11/25/2015    CALCIUM 8 1 (L) 02/17/2022     (L) 11/25/2015    K 4 5 02/17/2022    CO2 22 02/17/2022     02/17/2022    BUN 41 (H) 02/17/2022    CREATININE 1 96 (H) 02/17/2022     Cholesterol 169      Review of reports and notes reveal:  Independent Interpretation of images or specimens:  CTA head and neck with and without contrast  Result Date: 2/16/2022  CT brain: No acute intracranial abnormality  CT angiography: Primarily noncalcified atherosclerotic disease of the left distal common carotid artery extending into the origin of the internal carotid artery and proximal bulb with mild, less than 50% narrowing  Otherwise unremarkable cervical and intracranial vasculature  Workstation performed: [id number]     MRI brain wo contrast  Result Date: 2/17/2022  Acute infarct in the left occipital lobe  Recent lacunar infarct in the left cerebellum  No acute hemorrhage or mass effect  The study was marked in [facility] for notification   Workstation performed: [id number]           Thank you for this consult  Total time of encounter: 70 Minutes  More than 50% of time was spent in counseling and coordination of care of patient

## 2022-02-17 NOTE — UTILIZATION REVIEW
Initial Clinical Review    Observation 2/16/22 @ 2211, converted to inpatient admission 2/17/22 for continued care & tx for R homonymous hemianopsia 2nd acute stroke  Admission: Date/Time/Statement:   Admission Orders (From admission, onward)     Ordered        02/17/22 1406  Inpatient Admission  Once            02/16/22 2211  Place in Observation  Once                      Orders Placed This Encounter   Procedures   Inpatient Admission    Standing Status:   Standing    Number of Occurrences:   1    Order Specific Question:   Level of Care    Answer:   Med Surg [16]    Order Specific Question:   Estimated length of stay    Answer:   More than 2 Midnights    Order Specific Question:   Certification    Answer:   I certify that inpatient services are medically necessary for this patient for a duration of greater than two midnights  See H&P and MD Progress Notes for additional information about the patient's course of treatment  Initial Presentation: transferred from [facility]  67 y o  male with a PMH of asthma, CKD, DM, HTN, HLP who presents with vision loss  Patient reports sudden onset vision loss after receiving his COVID vaccine  No other associated symptoms  Patient was a stroke alert  CT, CTA of the head and neck revealed noncalcified atherosclerotic disease of the left distal common carotid artery extending into the origin of the internal carotid artery and proximal bulb with less than 50% narrowing  Neurology recommendations appreciated - suspect left PCA CVA  Patient refused TPA  Patient was given loading doses of aspirin and plavix  Transferred to [address] placed in observation status for R homonymous hemianopsia  Neuro consulted, scheduled for MRI  Observation to IP admission 2/17/22:  MRI+ acute infarct  Persistent diminished R visual fields  Started on plavix & lovenox, liptor dose increased to 80mg  Neuro checks, telemetry monitoring continued   SOB with activity/walking with PT  Therapies in progress      ED Triage Vitals   Temperature Pulse Respirations Blood Pressure SpO2   02/16/22 2023 02/16/22 2023 02/16/22 2023 02/16/22 2023 02/16/22 2023   (!) 97 4 °F (36 3 °C) 67 18 139/78 96 %      Temp Source Heart Rate Source Patient Position - Orthostatic VS BP Location FiO2 (%)   02/17/22 0244 02/17/22 0244 02/17/22 0244 02/17/22 0244 --   Oral Monitor Lying Left arm       Pain Score       02/16/22 2100       No Pain          Wt Readings from Last 1 Encounters:   02/16/22 83 2 kg (183 lb 6 8 oz)     Additional Vital Signs:   02/17/22 1100 37 4 °F (3 °C) Abnormal  -- -- -- -- -- -- --   02/17/22 0800 98 °F (36 7 °C) 70 -- 132/73 93 96 % -- --   02/17/22 07:59:54 -- 70 -- 132/73 93 96 % -- --   02/17/22 0700 -- 71 -- -- -- 94 % -- --   02/17/22 0500 -- 75 -- -- -- 92 % -- --   02/17/22 0300 -- 77 -- -- -- 95 % -- --   02/17/22 02:44:53 97 4 °F (36 3 °C) Abnormal  72 18 136/73 94 94 % None (Room air) Lying   02/17/22 0100 -- 89 -- -- -- 92 % -- --   02/16/22 23:07:27 97 7 °F (36 5 °C) 78 20 139/76 97 95 % -- --   02/16/22 2100 -- -- -- -- -- -- None (Room air) --   02/16/22 2023 97 4 °F (36 3 °C) Abnormal  67 18 139/78 -- 96 % -- --     Pertinent Labs/Diagnostic Test Results:   Results from last 7 days   Lab Units 02/17/22  0535 02/16/22  1501   WBC Thousand/uL 11 16* 7 96   HEMOGLOBIN g/dL 13 3 14 9   HEMATOCRIT % 40 0 43 3   PLATELETS Thousands/uL 238 248   NEUTROS ABS Thousands/µL  --  4 68     Results from last 7 days   Lab Units 02/17/22  0535 02/16/22  1501   SODIUM mmol/L 137 138   POTASSIUM mmol/L 4 5 4 9   CHLORIDE mmol/L 104 102   CO2 mmol/L 22 29   ANION GAP mmol/L 11 7   BUN mg/dL 41* 32*   CREATININE mg/dL 1 96* 1 64*   EGFR ml/min/1 73sq m 32 39   CALCIUM mg/dL 8 1* 9 8     Results from last 7 days   Lab Units 02/17/22  0535 02/16/22  1501   AST U/L 10 13*   ALT U/L 23 17   ALK PHOS U/L 63 55 4   TOTAL PROTEIN g/dL 6 7 7 7   ALBUMIN g/dL 3 5 4 8   TOTAL BILIRUBIN mg/dL 0 46 0 50     Results from last 7 days   Lab Units 02/17/22  1134 02/17/22  0722 02/17/22  0238 02/16/22  2356   POC GLUCOSE mg/dl 245* 238* 284* 369*     Results from last 7 days   Lab Units 02/17/22  0535 02/16/22  1501   GLUCOSE RANDOM mg/dL 274* 115     Results from last 7 days   Lab Units 02/17/22  0535   HEMOGLOBIN A1C % 6 9*   EAG mg/dl 151     Results from last 7 days   Lab Units 02/16/22  1501   PROTIME seconds 13 5   INR  1 04   PTT seconds 29     Results from last 7 days   Lab Units 02/17/22  0535   TSH 3RD GENERATON uIU/mL 0 415     Results from last 7 days   Lab Units 02/16/22  1501   CRP mg/dL 0 2   SED RATE mm/hour 1     2/16  CTA head & neck=  CT brain: No acute intracranial abnormality  CT angiography: Primarily noncalcified atherosclerotic disease of the left distal common carotid artery extending into the origin of the internal carotid artery and proximal bulb with mild, less than 50% narrowing  Otherwise unremarkable cervical and intracranial vasculature  2/17  MRI brain=  Acute infarct in the left occipital lobe  Recent lacunar infarct in the left cerebellum  No acute hemorrhage or mass effect      Past Medical History:   Diagnosis Date    Asthma     last assessed: April 1, 2015    Balanitis     last assessed: July 9, 2014    Cataract     Chronic kidney disease     Chronic kidney disease, stage 3 ([facility] [address] )     last assessed: Jan 17, 2018    Diabetes mellitus [facility])     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     last assessed: Jan 17, 2018    Hypercholesterolemia     Hypertension     Pneumonia     last assessed: Feb 25, 2013     Present on Admission:   Benign prostatic hyperplasia with lower urinary tract symptoms   Moderate asthma without complication   Type 2 diabetes mellitus with diabetic chronic kidney disease ([facility] [address] )   Benign essential hypertension   Right homonymous hemianopsia   Chronic kidney disease-mineral and bone disorder      Admitting Diagnosis: CVA (cerebral vascular accident) [facility]) [I63 [id number]]  Age/Sex: 67 y o  male  Admission Orders:  Telemetry  accuchecks  Pt/ot/st eval & tx  Consult neuro  Neuro checks: Every 1 hour x 4 hours, then every 2 hours x 8 hours, then every 4 hours x 72 hours  Scd/foot pumps    Scheduled Medications:  aspirin, 81 mg, Oral, Daily  atorvastatin, 80 mg, Oral, Daily With Dinner  clopidogrel, 75 mg, Oral, Daily  enoxaparin, 40 mg, Subcutaneous, Daily  insulin lispro, 1-6 Units, Subcutaneous, TID AC  insulin lispro, 1-6 Units, Subcutaneous, HS  insulin lispro, 1-6 Units, Subcutaneous, 0200  tamsulosin, 0 4 mg, Oral, Daily With Dinner    Continuous IV Infusions:  sodium chloride, 125 mL/hr, Intravenous, Continuous    PRN Meds:  albuterol, 2 puff, Inhalation, Q4H PRN    Network Utilization Review Department  ATTENTION: Please call with any questions or concerns to [phone] and carefully listen to the prompts so that you are directed to the right person  All voicemails are confidential   Tomasa Zaragoza all requests for admission clinical reviews, approved or denied determinations and any other requests to dedicated fax number below belonging to the campus where the patient is receiving treatment   List of dedicated fax numbers for the Facilities:  [address] DENIALS (Administrative/Medical Necessity) [phone]   [address] (Maternity/NICU/Pediatrics) [phone] [address] [place] [address] [facility] [phone]   [place] [address] [phone]

## 2022-02-17 NOTE — ASSESSMENT & PLAN NOTE
Lab Results   Component Value Date    EGFR 39 02/16/2022    EGFR 39 12/29/2021    EGFR 30 10/20/2021    CREATININE 1 64 (H) 02/16/2022    CREATININE 1 66 (H) 12/29/2021    CREATININE 2 07 (H) 10/20/2021   Creatinine is at baseline, patient did receive IV contrast  Hydration overnight and repeat labs in the morning

## 2022-02-17 NOTE — PHYSICAL THERAPY NOTE
PHYSICAL THERAPY EVALUATION/TREATMENT     02/17/22 0915   Note Type   Note type Evaluation   Pain Assessment   Pain Assessment Tool 0-10   Pain Score No Pain   Restrictions/Precautions   Other Precautions Fall Risk   Home Living   Type of [address] One level  (few SHAYY)   Home Equipment Cane   Prior Function   Level of [place] Independent with ADLs and functional mobility   Lives With Daughter;Family; Spouse   Receives Help From [facility] in the last 6 months 1 to 4   General   Additional Pertinent History Pt admitted with homomymous hemianopsia  Pt also has a chronic history of poor balance  Workup in progress to r/o CVA  Cognition   Overall Cognitive Status WFL   Subjective   Subjective "I feel OK"   RLE Assessment   RLE Assessment   (ROM WFL, MMT 5/5)   LLE Assessment   LLE Assessment   (ROM WFL, MMT 5/5)   Bed Mobility   Supine to Sit 7  Independent   Sit to Supine 7  Independent   Transfers   Sit to Stand 5  Supervision   Stand to Sit 5  Supervision   Stand pivot 5  Supervision   Ambulation/Elevation   Gait pattern   (unsteady at times)   Gait Assistance 4  Minimal assist   Assistive Device   (none)   Distance 25 feet   Balance   Static Sitting Good   Dynamic Sitting Fair +   Static Standing Fair +   Dynamic Standing Fair   Activity Tolerance   Activity Tolerance Patient tolerated treatment well;Patient limited by fatigue   Assessment   Prognosis Good   Problem List Impaired balance;Decreased mobility   Assessment Patient seen for Physical Therapy evaluation  Patient admitted with Right homonymous hemianopsia  Comorbidities affecting patient's physical performance include: DM, anxiety, spinal stenosis  Personal factors affecting patient at time of initial evaluation include: stairs to enter home, inability to navigate community distances, positive fall history and anxiety   Prior to admission, patient was independent with functional mobility without assistive device and independent with ADLS  Please find objective findings from Physical Therapy assessment regarding body systems outlined above with impairments and limitations including impaired balance, decreased activity tolerance, decreased functional mobility tolerance and fall risk  The Barthel Index was used as a functional outcome tool presenting with a score of Barthel Index Score: 75 today indicating moderate limitations of functional mobility and ADLS  Patient's clinical presentation is currently unstable/unpredictable as seen in patient's presentation of increased fall risk and new onset of impairment of functional mobility  Pt would benefit from continued Physical Therapy treatment to address deficits as defined above and maximize level of functional mobility  As demonstrated by objective findings, the assigned level of complexity for this evaluation is high  The patient's AM-[facility] Basic Mobility Inpatient Short Form Raw Score is 22  A Raw score of greater than 16 suggests the patient may benefit from discharge to post-acute rehabilitation services  Goals   Patient Goals "go home"   STG Expiration Date 02/24/22   Short Term Goal #1 Independent bed mobility, independent transfers, independent ambulation indoor level surfaces with a steady gait with least restrictive device 150 ft, independent up and down 4 steps the patient can enter and exit his home   LTG Expiration Date 03/03/22   Long Term Goal #1 No falls, independent ambulation outdoor level surfaces with least restrictive device 200 ft without loss of balance, include improve standing dynamic balance to at least fair plus to decrease fall risk   Plan   Treatment/Interventions ADL retraining;Functional transfer training;LE strengthening/ROM; Gait training;Bed mobility; Equipment eval/education; Endurance training; Therapeutic exercise;Elevations   PT Frequency 5-7x/wk   Recommendation   PT Discharge Recommendation Home with outpatient rehabilitation  (balance center) Equipment Recommended   (pt has a cane)   AM-PAC Basic Mobility Inpatient   Turning in Bed Without Bedrails 4   Lying on Back to Sitting on Edge of Flat Bed 4   Moving Bed to Chair 4   Standing Up From Chair 4   Walk in Room 3   Climb 3-5 Stairs 3   Basic Mobility Inpatient Raw Score 22   Basic Mobility Standardized Score 47 4   Highest Level Of Mobility   JH-HLM Goal 7: Walk 25 feet or more   JH-HLM Highest Level of Mobility 7: Walk 25 feet or more   JH-HLM Goal Achieved Yes   Modified [place] Scale   Modified [place] Scale 2   Barthel Index   Feeding 10   Bathing 0   Grooming Score 5   Dressing Score 10   Bladder Score 10   Bowels Score 10   Toilet Use Score 5   Transfers (Bed/Chair) Score 10   Mobility (Level Surface) Score 10   Stairs Score 5   Barthel Index Score 75   Additional Treatment Session   Start Time 0905   End Time 0915   Treatment Assessment Pt ambulated in the hallway with supervision/min assist with cues for visual fixation  Pt ambulated x 100 feet and c/o mild SOB with activity  Pt educated on the benefits of good lighting, wearing shoes, and using a cane to improve safety and balance when walking  Session ended as pt being called to MRI       Licensure   [place] License Number  Juancarlos REY [id number]

## 2022-02-17 NOTE — PLAN OF CARE
Problem: MOBILITY - ADULT  Goal: Maintain or return to baseline ADL function  Description: INTERVENTIONS:  -  Assess patient's ability to carry out ADLs; assess patient's baseline for ADL function and identify physical deficits which impact ability to perform ADLs (bathing, care of mouth/teeth, toileting, grooming, dressing, etc )  - Assess/evaluate cause of self-care deficits   - Assess range of motion  - Assess patient's mobility; develop plan if impaired  - Assess patient's need for assistive devices and provide as appropriate  - Encourage maximum independence but intervene and supervise when necessary  - Involve family in performance of ADLs  - Assess for home care needs following discharge   - Consider OT consult to assist with ADL evaluation and planning for discharge  - Provide patient education as appropriate  Outcome: Progressing  Goal: Maintains/Returns to pre admission functional level  Description: INTERVENTIONS:  - Perform BMAT or MOVE assessment daily    - Set and communicate daily mobility goal to care team and patient/family/caregiver  - Collaborate with rehabilitation services on mobility goals if consulted  - Perform Range of Motion 2 times a day  - Reposition patient every 2 hours    - Dangle patient 2 times a day  - Stand patient 2 times a day  - Ambulate patient 2 times a day  - Out of bed to chair 2 times a day   - Out of bed for meals 2 times a day  - Out of bed for toileting  - Record patient progress and toleration of activity level   Outcome: Progressing     Problem: NEUROSENSORY - ADULT  Goal: Achieves stable or improved neurological status  Description: INTERVENTIONS  - Monitor and report changes in neurological status  - Monitor vital signs such as temperature, blood pressure, glucose, and any other labs ordered   - Initiate measures to prevent increased intracranial pressure  - Monitor for seizure activity and implement precautions if appropriate      Outcome: Progressing  Goal: Remains free of injury related to seizures activity  Description: INTERVENTIONS  - Maintain airway, patient safety  and administer oxygen as ordered  - Monitor patient for seizure activity, document and report duration and description of seizure to physician/advanced practitioner  - If seizure occurs,  ensure patient safety during seizure  - Reorient patient post seizure  - Seizure pads on all 4 side rails  - Instruct patient/family to notify RN of any seizure activity including if an aura is experienced  - Instruct patient/family to call for assistance with activity based on nursing assessment  - Administer anti-seizure medications if ordered    Outcome: Progressing  Goal: Achieves maximal functionality and self care  Description: INTERVENTIONS  - Monitor swallowing and airway patency with patient fatigue and changes in neurological status  - Encourage and assist patient to increase activity and self care     - Encourage visually impaired, hearing impaired and aphasic patients to use assistive/communication devices  Outcome: Progressing

## 2022-02-17 NOTE — ASSESSMENT & PLAN NOTE
Lab Results   Component Value Date    HGBA1C 7 0 (A) 12/29/2021       No results for input(s): POCGLU in the last 72 hours      Blood Sugar Average: Last 72 hrs:  Hold oral medications  Accuchecks AC/HS with insulin sliding scale

## 2022-02-17 NOTE — CASE MANAGEMENT
Case Management Assessment & Discharge Planning Note    Patient name Clifford [facility]  [facility] [address] MRN [id number]  : 1945 Date 2022       Current Admission Date: 2022  Current Admission Diagnosis:Right homonymous hemianopsia   Patient Active Problem List    Diagnosis Date Noted    Right homonymous hemianopsia 2022    Anxiety about health 2022    Left inguinal hernia 2022    Moderate asthma without complication     Vitamin D deficiency 2021    Post-nasal drip 2021    Ganglion cyst of left foot 2021    Chronic kidney disease-mineral and bone disorder 2019    Overweight (BMI 25 0-29 9) 2019    Medicare annual wellness visit, subsequent 2019    Benign prostatic hyperplasia with lower urinary tract symptoms 2019    Statin intolerance 02/15/2019    Degenerative cervical spinal stenosis 02/15/2019    Stage 3b chronic kidney disease ([facility] [address] ) 2018    Type 2 diabetes mellitus with diabetic chronic kidney disease ([facility] [address] ) 2018    Benign essential hypertension 2018    Hypercholesterolemia 2014      LOS (days): 1  Geometric Mean LOS (GMLOS) (days):   Days to GMLOS:     OBJECTIVE:   Current admission status: Observation    Preferred Pharmacy:   [address] [facility], [address]  [place] [place] [facility]\"" [id number]  Phone: [phone] Fax: [phone]    Primary Care Provider: Dontrell Day MD    [facility]  Secondary Insurance:     ASSESSMENT:  [id number] [facility], [address] Representative - Other   Primary Phone: [phone] (Mobile)          Advance Directives  Does patient have a [address]?: No  Was patient offered paperwork?: Yes  Does patient currently have a Health Care decision maker?: Yes, please see Health Care Proxy section  Does patient have Advance Directives?: No  Was patient offered paperwork?: Yes    Readmission Root Cause  30 Day Readmission: No    Patient Information  Admitted from[de-identified] Other (comment) ([address])  Mental Status: Alert  During Assessment patient was accompanied by: Not accompanied during assessment  Assessment information provided by[de-identified] Patient  Primary Caregiver: Self  Support Systems: Family members,Spouse/significant other,Self  [place] of Residence: [place] [address] do you live in?: [place] in [facility] entry access options   Select all that apply : Stairs  Number of steps to enter home : 6  Do the steps have railings?: Yes  Type of Current Residence: [place]  In the last 12 months, was there a time when you were not able to pay the mortgage or rent on time?: No  In the last 12 months, how many places have you lived?: 1  In the last 12 months, was there a time when you did not have a steady place to sleep or slept in a shelter (including now)?: No  Homeless/housing insecurity resource given?: N/A  Living Arrangements: Lives w/ Spouse/significant other,Lives w/ Daughter,Lives w/ Extended Family  Is patient a ?: No    Activities of Daily Living Prior to Admission  Functional Status: Independent  Completes ADLs independently?: Yes  Ambulates independently?: Yes  Does patient use assisted devices?: No  Does patient currently own DME?: No  Does patient have a history of Outpatient Therapy (PT/OT)?: No  Does the patient have a history of Short-Term Rehab?: No  Does patient have a history of HHC?: No  Does patient currently have [facility]?: No    Patient Information Continued  Income Source: Pension/prison  Does patient have prescription coverage?: Yes  Within the past 12 months, you worried that your food would run out before you got the money to buy more : Never true  Within the past 12 months, the food you bought just didnt last and you didnt have money to get more : Never true  Food insecurity resource given?: N/A  Does patient receive dialysis treatments?: No  Does patient have a history of substance abuse?: No  Does patient have a history of Mental Health Diagnosis?: No    Means of Transportation  Means of Transport to Appts[de-identified] Drives Self  In the past 12 months, has lack of transportation kept you from medical appointments or from getting medications?: No  In the past 12 months, has lack of transportation kept you from meetings, work, or from getting things needed for daily living?: No  Was application for public transport provided?: N/A    DISCHARGE DETAILS:  Discharge planning discussed with[de-identified] Patient  Freedom of Choice: Yes  Comments - Freedom of Choice: Declines the need for any services at this time (PT has not seen patient yet)  CM contacted family/caregiver?: Yes  Were Treatment Team discharge recommendations reviewed with patient/caregiver?: Yes  Did patient/caregiver verbalize understanding of patient care needs?: Yes  Were patient/caregiver advised of the risks associated with not following Treatment Team discharge recommendations?: Yes    Contacts  Patient Contacts: Ray Shows  Relationship to Patient[de-identified] Family (daughter)  Contact Method: Phone  Phone Number: [phone]    Requested [address]         Is the patient interested in [facility] at discharge?: No    DME Referral Provided  Referral made for DME?: No    Would you like to participate in our [facility] [address] service program?  : No - Declined    Treatment Team Recommendation: Other (TBD)  Discharge Destination Plan[de-identified] Home  Transport at Discharge : Family,Automobile     CM met with patient and discussed dc planning and the role of CM  Patient states he was independent of all ADL's and ambulated without assistance  He drives and does not own any DME  He has never had any services in the past  He has yet been see by PT/OT  He states he does not any referrals made at this time because he does not know if he will need anything yet      Patient states he was a [facility] and was transferred to our facility from there  His car was picked up by family  Patient lives in [place] in [place]  If her were to need STR he would like to stay near there and if he needs [address] CM will need to take this into consideration  Patient will have a ride home with family at [place]

## 2022-02-17 NOTE — ASSESSMENT & PLAN NOTE
Patient reports sudden onset vision loss after receiving his COVID vaccine  No other associated symptoms  Patient was a stroke alert  CT, CTA of the head and neck revealed noncalcified atherosclerotic disease of the left distal common carotid artery extending into the origin of the internal carotid artery and proximal bulb with less than 50% narrowing  Neurology recommendations appreciated - suspect left PCA CVA  Patient refused TPA  Patient was given loading doses of aspirin and plavix      · Admit to telemetry  · Continue aspirin 81 daily  · Plavix 75 my daily x 3 weeks per neurology  · Statin intolerant  · Permissive HTN (-200)  · Neuro checks q 4 hours  · Lipid panel, Hga1c, TSH, B12, Vitamin D, Hga1c  · MRI in AM  · Echocardiogram  · Neuro eval

## 2022-02-17 NOTE — H&P
Tompa U  65   H&P- Chris Harris 1945, 67 y o  male MRN: [id number]  Unit/Bed#: [address] [facility] Encounter: [id number]  Primary Care Provider: Rodrick Llamas MD   Date and time admitted to hospital: 2/16/2022  9:32 PM    * Right homonymous hemianopsia  Assessment & Plan  Patient reports sudden onset vision loss after receiving his COVID vaccine  No other associated symptoms  Patient was a stroke alert  CT, CTA of the head and neck revealed noncalcified atherosclerotic disease of the left distal common carotid artery extending into the origin of the internal carotid artery and proximal bulb with less than 50% narrowing  Neurology recommendations appreciated - suspect left PCA CVA  Patient refused TPA  Patient was given loading doses of aspirin and plavix  · Admit to telemetry  · Continue aspirin 81 daily  · Plavix 75 my daily x 3 weeks per neurology  · Statin intolerant  · Permissive HTN (-200)  · Neuro checks q 4 hours  · Lipid panel, Hga1c, TSH, B12, Vitamin D, Hga1c  · MRI in AM  · Echocardiogram  · Neuro eval    Moderate asthma without complication  Assessment & Plan  Continue albuterol PRN    Chronic kidney disease-mineral and bone disorder  Assessment & Plan  Lab Results   Component Value Date    EGFR 39 02/16/2022    EGFR 39 12/29/2021    EGFR 30 10/20/2021    CREATININE 1 64 (H) 02/16/2022    CREATININE 1 66 (H) 12/29/2021    CREATININE 2 07 (H) 10/20/2021   Creatinine is at baseline, patient did receive IV contrast  Hydration overnight and repeat labs in the morning    Benign prostatic hyperplasia with lower urinary tract symptoms  Assessment & Plan  Continue flomax    Benign essential hypertension  Assessment & Plan  Hold prinzide    Type 2 diabetes mellitus with diabetic chronic kidney disease ([facility] )  Assessment & Plan  Lab Results   Component Value Date    HGBA1C 7 0 (A) 12/29/2021       No results for input(s): POCGLU in the last 72 hours      Blood Sugar Average: Last 72 hrs:  Hold oral medications  Accuchecks AC/HS with insulin sliding scale     VTE Prophylaxis: Enoxaparin (Lovenox)  / sequential compression device   Code Status: Level 1 - Full Code    Anticipated Length of Stay:  Patient will be admitted on an Observation basis with an anticipated length of stay of less than 2 midnights  Justification for Hospital Stay: right homonymous hemianopsia     Total Time for Visit, including Counseling / Coordination of Care: 15 minutes  Greater than 50% of this total time spent on direct patient counseling and coordination of care  Chief Complaint:   No chief complaint on file  History of Present Illness:    Bree Perales is a 67 y o  male with a PMH of asthma, CKD, DM, HTN, HLP who presents with vision loss  Patient reports sudden onset vision loss after receiving his COVID vaccine  No other associated symptoms  Patient was a stroke alert  CT, CTA of the head and neck revealed noncalcified atherosclerotic disease of the left distal common carotid artery extending into the origin of the internal carotid artery and proximal bulb with less than 50% narrowing  Neurology recommendations appreciated - suspect left PCA CVA  Patient refused TPA  Patient was given loading doses of aspirin and plavix  Review of Systems:    Review of Systems   Constitutional: Negative for chills and fever  HENT: Negative for ear pain and sore throat  Eyes: Positive for visual disturbance  Negative for pain  Respiratory: Negative for cough and shortness of breath  Cardiovascular: Negative for chest pain and palpitations  Gastrointestinal: Negative for abdominal pain and vomiting  Genitourinary: Negative for dysuria and hematuria  Musculoskeletal: Negative for arthralgias and back pain  Skin: Negative for color change and rash  Neurological: Negative for seizures and syncope  All other systems reviewed and are negative        Past Medical and Surgical History: Past Medical History:   Diagnosis Date    Asthma     last assessed: April 1, 2015    Balanitis     last assessed: July 9, 2014    Cataract     Chronic kidney disease     Chronic kidney disease, stage 3 ([facility] [address] )     last assessed: Jan 17, 2018    Diabetes mellitus [facility])     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([facility] )     last assessed: Jan 17, 2018    Hypercholesterolemia     Hypertension     Pneumonia     last assessed: Feb 25, 2013       Past Surgical History:   Procedure Laterality Date    ABDOMINAL SURGERY      APPENDECTOMY  1965    [address]ARACT EXTRACTION     [address]  2007    x2    OTHER SURGICAL HISTORY  1965    Perforated duodenol ulcer surgery     [place] REPAIR FLEXOR TENDON,HAND,W/O GRAFT,EA Left 3/17/2021    Procedure: Left thumb EPL repair;  Surgeon: Zach Koroma MD;  Location: [facility];  Service: Orthopedics       Meds/Allergies:    Prior to Admission medications    Medication Sig Start Date End Date Taking?  Authorizing Provider   albuterol (PROVENTIL HFA,VENTOLIN HFA) 90 mcg/act inhaler Inhale 2 puffs every 4 (four) hours as needed for wheezing 12/29/21   Trisha Larson MD   ERYN MICROLET LANCETS lancets Test twice a day 2/15/19   Trisha Larson MD   cholecalciferol (VITAMIN D3) 1,000 units tablet Take 2,000 Units by mouth daily    Historical Provider, MD   Contour Test test strip Test fasting glucose daily 12/29/21   Trisha Larson MD   Dulaglutide 0 75 MG/0 5ML SOPN Inject 0 5 mL (0 75 mg total) under the skin once a week 1/13/22   Trisha Larson MD   glyBURIDE (DIABETA) 5 mg tablet Take 1 tablet (5 mg total) by mouth 2 (two) times a day 1/17/22   Trisha Larson MD   linaGLIPtin (Tradjenta) 5 MG TABS Take 5 mg by mouth daily 12/29/21   Trisha Larson MD   lisinopril-hydrochlorothiazide (PRINZIDE,ZESTORETIC) 20-25 MG per tablet Take 1 tablet by mouth daily 12/29/21   Trisha Larson MD   tamsulosin (FLOMAX) 0 4 mg Take 1 capsule (0 4 mg total) by mouth daily with dinner 22   Mirza Sharma MD       Allergies: Allergies   Allergen Reactions    Meperidine     Pravastatin Myalgia       Social History:     Marital Status:    Substance Use History:   Social History     Substance and Sexual Activity   Alcohol Use Yes    Alcohol/week: 1 0 standard drink    Types: 1 Standard drinks or equivalent per week    Comment: rare  socially     Social History     Tobacco Use   Smoking Status Former Smoker    Packs/day: 2 00    Years: 10 00    Pack years: 20 00    Types: Cigarettes    Quit date: 26    Years since quittin 1   Smokeless Tobacco Former User    Quit date:    Tobacco Comment    former smoker noted in "allscripts" Quit in  - never used chewing tobacco      Social History     Substance and Sexual Activity   Drug Use No       Family History:    Family History   Problem Relation Age of Onset    Pancreatic cancer Mother     Colon cancer Father     Diabetes Father         mellitus     No Known Problems Sister     Diabetes Sister     Nephrolithiasis Sister        Physical Exam:     Vitals:   Blood Pressure: 139/76 (22)  Pulse: 78 (22)  Temperature: 97 7 °F (36 5 °C) (22)  Respirations: 20 (22)  Height: 5' 10" (177 8 cm) (22)  Weight - Scale: 83 2 kg (183 lb 6 8 oz) (22)  SpO2: 95 % (22)    Physical Exam  Vitals and nursing note reviewed  Constitutional:       Appearance: Normal appearance  HENT:      Head: Normocephalic and atraumatic  Nose: Nose normal    Eyes:      General: Visual field deficit present  Extraocular Movements: Extraocular movements intact  Cardiovascular:      Rate and Rhythm: Normal rate and regular rhythm  Heart sounds: No murmur heard  Pulmonary:      Effort: Pulmonary effort is normal  No respiratory distress  Breath sounds: Normal breath sounds  No wheezing  Musculoskeletal:         General: No swelling   Normal range of motion  Skin:     General: Skin is warm and dry  Neurological:      General: No focal deficit present  Mental Status: He is alert and oriented to person, place, and time  GCS: GCS eye subscore is 4  GCS verbal subscore is 5  GCS motor subscore is 6  Gait: Gait is intact  Psychiatric:         Mood and Affect: Mood normal          Behavior: Behavior normal            Additional Data:     Lab Results: I have personally reviewed pertinent reports  Results from last 7 days   Lab Units 02/16/22  1501   WBC Thousand/uL 7 96   HEMOGLOBIN g/dL 14 9   HEMATOCRIT % 43 3   PLATELETS Thousands/uL 248   NEUTROS PCT % 59     Results from last 7 days   Lab Units 02/16/22  1501   SODIUM mmol/L 138   POTASSIUM mmol/L 4 9   CHLORIDE mmol/L 102   CO2 mmol/L 29   BUN mg/dL 32*   CREATININE mg/dL 1 64*   CALCIUM mg/dL 9 8   TOTAL BILIRUBIN mg/dL 0 50   ALK PHOS U/L 55 4   ALT U/L 17   AST U/L 13*     Results from last 7 days   Lab Units 02/16/22  1501   INR  1 04                   Imaging: I have personally reviewed pertinent reports  MRI Inpatient Order    (Results Pending)       MRI Inpatient Order    (Results Pending)       EKG, Pathology, and Other Studies Reviewed on Admission:   · EKG:  Not found    Allscripts / Epic Records Reviewed: Yes     ** Please Note: This note has been constructed using a voice recognition system   **

## 2022-02-17 NOTE — SPEECH THERAPY NOTE
Speech-Language Pathology Bedside Swallow Evaluation        Patient Name: Ayla Castañeda Date: 2/17/2022     Problem List  Principal Problem:    Right homonymous hemianopsia  Active Problems:    Type 2 diabetes mellitus with diabetic chronic kidney disease ([facility] )    Benign essential hypertension    Benign prostatic hyperplasia with lower urinary tract symptoms    Chronic kidney disease-mineral and bone disorder    Moderate asthma without complication         Past Medical History  Past Medical History:   Diagnosis Date    Asthma     last assessed: April 1, 2015    Balanitis     last assessed: July 9, 2014    Cataract     Chronic kidney disease     Chronic kidney disease, stage 3 ([place] [address] )     last assessed: Jan 17, 2018    Diabetes mellitus [facility])     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([place] [address] )     last assessed: Jan 17, 2018    Hypercholesterolemia     Hypertension     Pneumonia     last assessed: Feb 25, 2013       Past Surgical History  Past Surgical History:   Procedure Laterality Date    ABDOMINAL SURGERY      APPENDECTOMY  1965    [address]AR[address] EXTRACTION     [address]  2007    x2    OTHER SURGICAL HISTORY  1965    Perforated duodenol ulcer surgery     [place] REPAIR FLEXOR TENDON,HAND,W/O GRAFT,EA Left 3/17/2021    Procedure: Left thumb EPL repair;  Surgeon: Estephania Paredes MD;  Location: [facility];  Service: Orthopedics       Summary    Pt's oral and pharyngeal stages of swallowing appear WFL at this time  Pt does not appear at risk of aspiration w/ thin liquids  No follow up necessary at this time  Recommendations:   Diet: regular diet and thin liquids   Meds: whole with liquid, discussed attempting larger pills in puree consistencies   Frequent Oral care: 2x/day  Aspiration precautions and compensatory swallowing strategies: small bites/sips and alternating bites and sips  Other Recommendations/ considerations: No follow up necessary at this time          Current Medical Status  Pt is a 67 y o  male who presented to [address] [place] [address]  with vision loss  Patient reports sudden onset vision loss after receiving his COVID vaccine  No other associated symptoms  Patient was a stroke alert  Diagnosis: Rule out CVA, suspected left PCA CVA  MRI Pending  Pt presents w/ right homonymous hemianopsia  Pt referred to speech services as part of stroke team      Past medical history:   Please see H&P for details    Special Studies:  CTA head and neck with and without contrast 2/16/2022- CT brain: No acute intracranial abnormality  CT angiography: Primarily noncalcified atherosclerotic disease of the left distal common carotid artery extending into the origin of the internal carotid artery and proximal bulb with mild, less than 50% narrowing  Otherwise unremarkable cervical and intracranial vasculature    MRI brain wo contrast 2/17/2022- ordered this AM    Social/Education/Vocational Hx: Unknown      Swallow Information   Current Risks for Dysphagia & Aspiration: suspected PCA CVA  Current Symptoms/Concerns: suspected PCA CVA  Current Diet: regular diet and thin liquids   Baseline Diet: regular diet and thin liquids  Takes pills- Whole w/ water  Baseline Assessment   Behavior/Cognition: alert, engaged in conversation throughout   Speech/Language Status: able to participate in conversation and able to follow commands  Patient Positioning: upright in chair     Swallow Mechanism Exam   Facial: symmetrical  Labial: WFL  Lingual: WFL  Velum: unable to visualize  Mandible: adequate ROM  Dentition: adequate, missing some teeth   Vocal quality:clear/adequate   Volitional Cough: strong/productive   Respiratory: Room air     Consistencies Assessed and Performance   Consistencies Administered: Pt was seen at breakfast and rec'd breakfast potatoes, scrambled eggs, toast w/ jelly, slices of feliz, coffee via cup and ice water/orange juice via straw       Oral Stage: Pt able to self feed and presented w/ adequate labial seal w/ fork, cup, and straw  Pt able to take single sips of coffee and sequential sips of orange juice/water via straw w/ adequate oral control  Pt observed to take small amnts via fork and bolus retrieval was WNL  Oral mastication w/ feliz, eggs, and toast was prolonged  Pt stated he is aware that he takes a long time to chew/swallow and this is his baseline  Bolus formation and oral transfer appeared slow but functional  Mild oral residue observed in oral cavity, but was able to successfully clear w/ sips of thin liquid throughout meal     Pharyngeal Stage: Swallow initiation appeared timely  Laryngeal elevation appeared slightly decreased when palpated, but functional  No throat clearing, coughing, or wet vocal quality observed throughout w/ single and sequential sips of thin via cup or straw       Esophageal Concerns: none reported      Results Reviewed with: patient   Dysphagia Goals: none at this time

## 2022-02-17 NOTE — TELEMEDICINE
TeleConsultation - Stroke   Bree Ambrose 67 y o  male MRN: [id number]  Unit/Bed#: ED 13 Encounter: [id number]        REQUIRED DOCUMENTATION:     1  This service was provided via Telemedicine  2  Provider located at [facility]  3  TeleMed provider: Minerva Feng MD   4  Identify all parties in room with patient during tele consult:  Patient, nurse  5  Patient was then informed that this was a Telemedicine visit and that the exam was being conducted confidentially over secure lines  My office door was closed  No one else was in the room  Patient acknowledged consent and understanding of privacy and security of the Telemedicine visit, and gave us permission to have the assistant stay in the room in order to assist with the history and to conduct the exam   I informed the patient that I have reviewed their record in Epic and presented the opportunity for them to ask any questions regarding the visit today  The patient agreed to participate  Assessment/Plan   Assessment: Probable left pca cva  Ct head with no hemorrage  cta head/neck with no lvo  Acute rt homonymous hemianopsia, no other deficits  Etiology unclear  Cannot exclude cardioembolic etiology  No recent trauma  No recent infectious symptoms  No known hx of cancer or signs/symptoms to suggest malignancy which can cause a hypercoagulable state  He did have Covid booster 15 min prior however low suspicion for any correlation  TPA Decision: pt decided against iv tpa given bleeding risk despite being within therapeutic window    Plan:   Admit on stroke pathway  Give aspirin 325 mg and plavix 300 mg then aspirin 81 mg and plavix 75 mg from tomorrow for 3 weeks then just aspirin daily  Tele  2-d echo  sbp 140-200  lipitor 40 mg daily  Flp, tsh, hba1c, b12, vit D  Mri brain w/o contrast  Stat head ct with any neuro change      History of Present Illness     Reason for Consult / Principal Problem: stroke alert    Patient last known well: 1 pm  Stroke alert called: 5:02 pm  Neurology time of arrival: 5:02 pm via phone, and 5:20 pm via teleconsult  HPI: Jerman Bain is a 67 y o   male who presents with acute visual impairment  He states he came to the hospital for his COVID booster at 12:45 pm and after about 15 min he noted a purplish haze in the rt sided peripheral vision of rt eye then vision dissapeared  Ct head with no hemorrhage or early evidence of infarct  Left extracranial artery <50% stenosis on cta head/neck  sbp 172/87  Currently states in the area of field cuts he is seeing flashing lights in these areas  His mild pain behind rt eye but no actual HA with migrainous features      Consult to neurology  Consult performed by: Adriano Arias MD  Consult ordered by: Raghu Parmar MD          Review of Systems  Per 12 point review, chronic hearing loss, tinnitus, nocturia, mild pain behind rt eye,     Historical Information   Past Medical History:   Diagnosis Date    Asthma     last assessed: April 1, 2015    Balanitis     last assessed: July 9, 2014    Cataract     Chronic kidney disease     Chronic kidney disease, stage 3 ([facility] [address] )     last assessed: Jan 17, 2018    Diabetes mellitus [facility])     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([facility] )     last assessed: Jan 17, 2018    Hypercholesterolemia     Hypertension     Pneumonia     last assessed: Feb 25, 2013     Past Surgical History:   Procedure Laterality Date    ABDOMINAL SURGERY      APPENDECTOMY  1965    [address]AR[address] EXTRACTION     [address]  [address]    x2    OTHER SURGICAL HISTORY  1965    Perforated duodenol ulcer surgery     [place] REPAIR FLEXOR TENDON,HAND,W/O GRAFT,EA Left 3/17/2021    Procedure: Left thumb EPL repair;  Surgeon: Fortunato Virk MD;  Location: [facility];  Service: Orthopedics     Social History   Social History     Substance and Sexual Activity   Alcohol Use Yes    Alcohol/week: 1 0 standard drink    Types: 1 Standard drinks or equivalent per week    Comment: rare  socially     Social History     Substance and Sexual Activity   Drug Use No     E-Cigarette/Vaping    E-Cigarette Use Never User      E-Cigarette/Vaping Substances    Nicotine No     THC No     CBD No     Flavoring No     Other No     Unknown No      Social History     Tobacco Use   Smoking Status Former Smoker    Packs/day: 2 00    Years: 10 00    Pack years: 20 00    Types: Cigarettes    Quit date: 26    Years since quittin 1   Smokeless Tobacco Former User    Quit date:    Tobacco Comment    former smoker noted in "allscripts" Quit in  - never used chewing tobacco      Family History: non-contributory        Meds/Allergies   all current active meds have been reviewed    Allergies   Allergen Reactions    Meperidine     Pravastatin Myalgia       Objective   Vitals:Blood pressure 128/83, pulse 73, temperature 97 9 °F (36 6 °C), temperature source Oral, resp  rate 15, height 5' 10" (1 778 m), weight 84 2 kg (185 lb 10 oz), SpO2 97 %  ,Body mass index is 26 63 kg/m²  No intake or output data in the 24 hours ending 22      Physical Exam   General: no acute distress  Extremities: atraumatic, normocephalic  Neurologic Exam  MS: Alert and orientedX3  Insight, attention, concentration intact  No expressive/receptive aphasia  CN 2-12: intact except rt supratemporal>infratemporal field cut, infra/supranasal field cuts  Motor: 5 power ue/le bilat  Sensory: light touch intact ue/le bilat  Coordination: finger to nose, heel to shin no dysmetria or ataxia                NIHSS:  1a Level of Consciousness: 0 = Alert   1b  LOC Questions: 0 = Answers both correctly   1c  LOC Commands: 0 = Obeys both correctly   2  Best Gaze: 0 = Normal   3  Visual: 2 = Complete hemianopia   4  Facial Palsy: 0=Normal symmetric movement   5a  Motor Right Arm: 0=No drift, limb holds 90 (or 45) degrees for full 10 seconds   5b   Motor Left Arm: 0=No drift, limb holds 90 (or 45) degrees for full 10 seconds   6a  Motor Right Le=No drift, limb holds 90 (or 45) degrees for full 10 seconds   6b  Motor Left Le=No drift, limb holds 90 (or 45) degrees for full 10 seconds   7  Limb Ataxia:  0=Absent   8  Sensory: 0=Normal; no sensory loss   9  Best Language:  0=No aphasia, normal   10  Dysarthria: 0=Normal articulation   11  Extinction and Inattention (formerly Neglect): 0=No abnormality   Total Score: 2         Modified [place] Score:  0 (No baseline symptoms/disability)    Lab Results: I have personally reviewed pertinent reports      Imaging Studies: I have personally reviewed pertinent films in PACS  EKG, Pathology, and Other Studies: I have personally reviewed pertinent films in PACS    Counseling / Coordination of Care  Total 40 min critical care time spent during the stroke alert

## 2022-02-17 NOTE — OCCUPATIONAL THERAPY NOTE
OT EVALUATION       02/17/22 1130   Note Type   Note type Evaluation   Restrictions/Precautions   Other Precautions Fall Risk;Visual impairment   Pain Assessment   Pain Assessment Tool 0-10   Pain Score No Pain   Home Living   Type of [address] [facility] One level  (few SHAYY)   Bathroom Shower/Tub Walk-in shower   [address] [facility]   Prior Function   Level of [place] Independent with ADLs and functional mobility   Lives With Family; Spouse;Daughter   Receives Help From Family   ADL Assistance Independent   IADLs Independent   Falls in the last 6 months 1 to 4   ADL   Eating Assistance 7  [address]  5  Supervision/Setup   Transfers   Sit to Stand 5  Supervision   Stand to Sit 5  Supervision   Functional Mobility   Functional Mobility 5  Supervision   Additional Comments 20 feet, unsteady at times   Balance   Static Sitting Good   Dynamic Sitting Fair +   Static Standing Fair +   Dynamic Standing Fair   Activity Tolerance   Activity Tolerance Patient limited by fatigue  (vision impairment)   RUE Assessment   RUE Assessment WFL   LUE Assessment   LUE Assessment WFL   Hand Function   Gross Motor Coordination Functional   Fine Motor Coordination Functional   Sensation   Light Touch No apparent deficits   Vision - Complex Assessment   Visual Fields Difficulty detecting stimulus with OD RUQ; Difficulty detecting stimulus with OD RLQ; Difficulty detecting stimulus with OS RUQ; Difficulty detecting stimulus with OS RLQ   Acuity Able to read employee name badge without difficulty   Visual Screen Results Right homonymous hemianopsia   Cognition   Overall Cognitive Status WFL   Arousal/Participation Cooperative   Attention Within functional limits   Orientation Level Oriented X4   Following Commands Follows all commands and directions without difficulty   Assessment   Limitation Decreased ADL status; Decreased UE strength;Decreased Safe judgement during ADL;Decreased endurance;Decreased self-care trans;Decreased high-level ADLs  (decreased balance and mobility )   Prognosis Good   Assessment Patient evaluated by Occupational Therapy  Patient admitted with Right homonymous hemianopsia  The patients occupational profile, medical and therapy history includes a extensive additional review of physical, cognitive, or psychosocial history related to current functional performance  Comorbidities affecting functional mobility and ADLS include: HTN,  DM, CKD, asthma  Prior to admission, patient was independent with functional mobility without assistive device, independent with ADLS and independent with IADLS  The evaluation identifies the following performance deficits: impaired balance, decreased endurance, increased fall risk, new onset of impairment of functional mobility, decreased ADLS, decreased IADLS, decreased activity tolerance, decreased safety awareness, impaired judgement and visual deficits, that result in activity limitations and/or participation restrictions  This evaluation requires clinical decision making of high complexity, because the patient presents with comorbidites that affect occupational performance and required significant modification of tasks or assistance with consideration of multiple treatment options  The Barthel Index was used as a functional outcome tool presenting with a score of Barthel Index Score: 75, indicating minimal limitations of functional mobility and ADLS  The patient's raw score on the AM-PAC Daily Activity inpatient short form is 21, standardized score is 44 27, greater than 39 4  Patients at this level are likely to benefit from DC to home  Please refer to the recommendation of the Occupational Therapist for safe DC planning   Patient will benefit from skilled Occupational Therapy services to address above deficits and facilitate a safe return to prior level of function  Goals   Patient Goals to go home   STG Time Frame   (1-7 days)   Short Term Goal  Goals established to promote Patient Goals: to go home:  Patient will increase standing tolerance to 5 minutes during ADL task to decrease assistance level and decrease fall risk;  Patient will increase functional mobility to and from bathroom with no assistive device independently to increase performance with ADLS and to use a toilet;  Patient will improve functional activity tolerance to 10 minutes of sustained functional tasks to increase participation in basic self-care and decrease assistance level;  Patient will increase dynamic standing balance to fair+ to improve postural stability and decrease fall risk during standing ADLS and transfers  Patient will demonstrate compensatory techniques for visual defiict with minimal verbal cues to increase safety awareness, increase independence with ADLS and decrease fall risk  LTG Time Frame   (8-14 days)   Long Term Goal Patient will increase standing tolerance to 10 minutes during ADL task to decrease assistance level and decrease fall risk; Patient will improve functional activity tolerance to 20 minutes of sustained functional tasks to increase participation in basic self-care and decrease assistance level;  Patient will increase dynamic standing balance to good to improve postural stability and decrease fall risk during standing ADLS and transfers  Patient will demonstrate compensatory techniques for visual deficit without verbal cues to increase safety awareness, increase independence with ADLS and decrease fall risk  Pt will score >/= 24/24 on AM-PAC Daily Activity Inpatient scale to promote safe independence with ADLs and functional mobility;  Pt will score >/= 100/100 on Barthel Index in order to decrease caregiver assistance needed and increase ability to perform ADLs and functional mobility  Functional Transfer Goals   Pt Will Perform All Functional Transfers   (STG independent)   ADL Goals   Pt Will Perform Grooming Standing at sink  (STG independent )   Pt Will Perform Bathing   (STG supervision LTG independent )   Pt Will Perform LE Dressing   (STG independent )   Pt Will Perform Toileting   (STG independent )   Plan   Treatment Interventions ADL retraining;Functional transfer training; Endurance training;Patient/family training;Equipment evaluation/education; Activityengagement; Compensatory technique education;Visual perceptual retraining   Goal Expiration Date 03/03/22   OT Frequency 3-5x/wk   Recommendation   OT Discharge Recommendation Home with outpatient rehabilitation  (vision therapy and balance center)   AM-PAC Daily Activity Inpatient   Lower Body Dressing 3   Bathing 3   Toileting 3   Upper Body Dressing 4   Grooming 4   Eating 4   Daily Activity Raw Score 21   Daily Activity Standardized Score (Calc for Raw Score >=11) 44 27   AM-PAC Applied Cognition Inpatient   Following a Speech/Presentation 4   Understanding Ordinary Conversation 4   Taking Medications 4   Remembering Where Things Are Placed or Put Away 4   Remembering List of 4-5 Errands 4   Taking Care of Complicated Tasks 4   Applied Cognition Raw Score 24   Applied Cognition Standardized Score 62 21   Barthel Index   Feeding 10   Bathing 0   Grooming Score 5   Dressing Score 10   Bladder Score 10   Bowels Score 10   Toilet Use Score 5   Transfers (Bed/Chair) Score 10   Mobility (Level Surface) Score 10   Stairs Score 5   Barthel Index Score 75   Modified Daniel Scale   Modified Daniel Scale 2   Licensure   [place] License Number  Coretha Maykel Hutchinson Javier 86 OTR/L [id number]

## 2022-02-18 ENCOUNTER — TELEPHONE (OUTPATIENT)
Dept: SURGERY | Facility: CLINIC | Age: 77
End: 2022-02-18

## 2022-02-18 ENCOUNTER — APPOINTMENT (INPATIENT)
Dept: NON INVASIVE DIAGNOSTICS | Facility: HOSPITAL | Age: 77
DRG: 066 | End: 2022-02-18
Payer: COMMERCIAL

## 2022-02-18 VITALS
BODY MASS INDEX: 26.2 KG/M2 | TEMPERATURE: 98.1 F | OXYGEN SATURATION: 94 % | SYSTOLIC BLOOD PRESSURE: 140 MMHG | WEIGHT: 183 LBS | DIASTOLIC BLOOD PRESSURE: 87 MMHG | RESPIRATION RATE: 18 BRPM | HEIGHT: 70 IN | HEART RATE: 65 BPM

## 2022-02-18 LAB
ANION GAP SERPL CALCULATED.3IONS-SCNC: 8 MMOL/L (ref 4–13)
AORTIC ROOT: 3.2 CM
APICAL FOUR CHAMBER EJECTION FRACTION: 65 %
BASOPHILS # BLD AUTO: 0.01 THOUSANDS/ΜL (ref 0–0.1)
BASOPHILS NFR BLD AUTO: 0 % (ref 0–1)
BUN SERPL-MCNC: 46 MG/DL (ref 5–25)
CALCIUM SERPL-MCNC: 7.5 MG/DL (ref 8.3–10.1)
CHLORIDE SERPL-SCNC: 107 MMOL/L (ref 100–108)
CO2 SERPL-SCNC: 21 MMOL/L (ref 21–32)
CREAT SERPL-MCNC: 1.93 MG/DL (ref 0.6–1.3)
E WAVE DECELERATION TIME: 206 MS
EOSINOPHIL # BLD AUTO: 0 THOUSAND/ΜL (ref 0–0.61)
EOSINOPHIL NFR BLD AUTO: 0 % (ref 0–6)
ERYTHROCYTE [DISTWIDTH] IN BLOOD BY AUTOMATED COUNT: 12.9 % (ref 11.6–15.1)
FRACTIONAL SHORTENING: 38 % (ref 28–44)
GFR SERPL CREATININE-BSD FRML MDRD: 32 ML/MIN/1.73SQ M
GLUCOSE SERPL-MCNC: 251 MG/DL (ref 65–140)
GLUCOSE SERPL-MCNC: 282 MG/DL (ref 65–140)
GLUCOSE SERPL-MCNC: 292 MG/DL (ref 65–140)
GLUCOSE SERPL-MCNC: 310 MG/DL (ref 65–140)
HCT VFR BLD AUTO: 34.6 % (ref 36.5–49.3)
HGB BLD-MCNC: 11.8 G/DL (ref 12–17)
IMM GRANULOCYTES # BLD AUTO: 0.06 THOUSAND/UL (ref 0–0.2)
IMM GRANULOCYTES NFR BLD AUTO: 1 % (ref 0–2)
INTERVENTRICULAR SEPTUM IN DIASTOLE (PARASTERNAL SHORT AXIS VIEW): 0.9 CM (ref 0.54–1.01)
INTERVENTRICULAR SEPTUM: 0.9 CM (ref 0.6–1.1)
LAAS-AP2: 22.6 CM2
LAAS-AP4: 18.9 CM2
LEFT ATRIUM AREA SYSTOLE SINGLE PLANE A4C: 18.8 CM2
LEFT ATRIUM SIZE: 3.8 CM
LEFT INTERNAL DIMENSION IN SYSTOLE: 3 CM (ref 3.03–4.59)
LEFT VENTRICULAR INTERNAL DIMENSION IN DIASTOLE: 4.8 CM (ref 4.98–7.42)
LEFT VENTRICULAR POSTERIOR WALL IN END DIASTOLE: 0.9 CM (ref 0.52–0.99)
LEFT VENTRICULAR STROKE VOLUME: 74 ML
LVSV (TEICH): 74 ML
LYMPHOCYTES # BLD AUTO: 0.52 THOUSANDS/ΜL (ref 0.6–4.47)
LYMPHOCYTES NFR BLD AUTO: 5 % (ref 14–44)
MCH RBC QN AUTO: 30.8 PG (ref 26.8–34.3)
MCHC RBC AUTO-ENTMCNC: 34.1 G/DL (ref 31.4–37.4)
MCV RBC AUTO: 90 FL (ref 82–98)
MONOCYTES # BLD AUTO: 0.26 THOUSAND/ΜL (ref 0.17–1.22)
MONOCYTES NFR BLD AUTO: 2 % (ref 4–12)
MV E'TISSUE VEL-SEP: 10 CM/S
MV PEAK A VEL: 0.66 M/S
MV PEAK E VEL: 69 CM/S
NEUTROPHILS # BLD AUTO: 10.64 THOUSANDS/ΜL (ref 1.85–7.62)
NEUTS SEG NFR BLD AUTO: 92 % (ref 43–75)
NRBC BLD AUTO-RTO: 0 /100 WBCS
PLATELET # BLD AUTO: 222 THOUSANDS/UL (ref 149–390)
PMV BLD AUTO: 11 FL (ref 8.9–12.7)
POTASSIUM SERPL-SCNC: 4.9 MMOL/L (ref 3.5–5.3)
RA PRESSURE ESTIMATED: 8 MMHG
RBC # BLD AUTO: 3.83 MILLION/UL (ref 3.88–5.62)
RIGHT ATRIUM AREA SYSTOLE A4C: 13.1 CM2
RIGHT VENTRICLE ID DIMENSION: 3.3 CM
RV PSP: 30 MMHG
SL CV LEFT ATRIUM LENGTH A2C: 5.8 CM
SL CV LV EF: 55
SL CV PED ECHO LEFT VENTRICLE DIASTOLIC VOLUME (MOD BIPLANE) 2D: 109 ML
SL CV PED ECHO LEFT VENTRICLE SYSTOLIC VOLUME (MOD BIPLANE) 2D: 34 ML
SODIUM SERPL-SCNC: 136 MMOL/L (ref 136–145)
TR MAX PG: 22 MMHG
TR PEAK VELOCITY: 2.3 M/S
TRICUSPID VALVE PEAK REGURGITATION VELOCITY: 2.34 M/S
WBC # BLD AUTO: 11.49 THOUSAND/UL (ref 4.31–10.16)
Z-SCORE OF INTERVENTRICULAR SEPTUM IN END DIASTOLE: 1.07
Z-SCORE OF LEFT VENTRICULAR DIMENSION IN END DIASTOLE: -2.37
Z-SCORE OF LEFT VENTRICULAR DIMENSION IN END SYSTOLE: -1.71
Z-SCORE OF LEFT VENTRICULAR POSTERIOR WALL IN END DIASTOLE: 1.19

## 2022-02-18 PROCEDURE — 93306 TTE W/DOPPLER COMPLETE: CPT

## 2022-02-18 PROCEDURE — 97530 THERAPEUTIC ACTIVITIES: CPT

## 2022-02-18 PROCEDURE — 99239 HOSP IP/OBS DSCHRG MGMT >30: CPT | Performed by: STUDENT IN AN ORGANIZED HEALTH CARE EDUCATION/TRAINING PROGRAM

## 2022-02-18 PROCEDURE — 82948 REAGENT STRIP/BLOOD GLUCOSE: CPT

## 2022-02-18 PROCEDURE — 85025 COMPLETE CBC W/AUTO DIFF WBC: CPT | Performed by: STUDENT IN AN ORGANIZED HEALTH CARE EDUCATION/TRAINING PROGRAM

## 2022-02-18 PROCEDURE — 94640 AIRWAY INHALATION TREATMENT: CPT

## 2022-02-18 PROCEDURE — 94760 N-INVAS EAR/PLS OXIMETRY 1: CPT

## 2022-02-18 PROCEDURE — 80048 BASIC METABOLIC PNL TOTAL CA: CPT | Performed by: STUDENT IN AN ORGANIZED HEALTH CARE EDUCATION/TRAINING PROGRAM

## 2022-02-18 PROCEDURE — 93306 TTE W/DOPPLER COMPLETE: CPT | Performed by: INTERNAL MEDICINE

## 2022-02-18 RX ORDER — CLOPIDOGREL BISULFATE 75 MG/1
75 TABLET ORAL DAILY
Qty: 30 TABLET | Refills: 0 | Status: SHIPPED | OUTPATIENT
Start: 2022-02-19 | End: 2022-03-15 | Stop reason: SDUPTHER

## 2022-02-18 RX ORDER — ASPIRIN 81 MG/1
81 TABLET, CHEWABLE ORAL DAILY
Qty: 30 TABLET | Refills: 0 | Status: SHIPPED | OUTPATIENT
Start: 2022-02-19 | End: 2022-07-05

## 2022-02-18 RX ORDER — METHYLPREDNISOLONE 4 MG/1
TABLET ORAL
Qty: 21 EACH | Refills: 0 | Status: SHIPPED | OUTPATIENT
Start: 2022-02-18 | End: 2022-05-23

## 2022-02-18 RX ORDER — ATORVASTATIN CALCIUM 80 MG/1
80 TABLET, FILM COATED ORAL
Qty: 30 TABLET | Refills: 0 | Status: SHIPPED | OUTPATIENT
Start: 2022-02-18 | End: 2022-03-15 | Stop reason: SDUPTHER

## 2022-02-18 RX ADMIN — CLOPIDOGREL BISULFATE 75 MG: 75 TABLET ORAL at 08:13

## 2022-02-18 RX ADMIN — ENOXAPARIN SODIUM 40 MG: 40 INJECTION SUBCUTANEOUS at 08:13

## 2022-02-18 RX ADMIN — INSULIN LISPRO 4 UNITS: 100 INJECTION, SOLUTION INTRAVENOUS; SUBCUTANEOUS at 11:08

## 2022-02-18 RX ADMIN — ATORVASTATIN CALCIUM 80 MG: 80 TABLET, FILM COATED ORAL at 17:11

## 2022-02-18 RX ADMIN — TAMSULOSIN HYDROCHLORIDE 0.4 MG: 0.4 CAPSULE ORAL at 17:11

## 2022-02-18 RX ADMIN — INSULIN LISPRO 5 UNITS: 100 INJECTION, SOLUTION INTRAVENOUS; SUBCUTANEOUS at 03:14

## 2022-02-18 RX ADMIN — INSULIN LISPRO 3 UNITS: 100 INJECTION, SOLUTION INTRAVENOUS; SUBCUTANEOUS at 08:13

## 2022-02-18 RX ADMIN — IPRATROPIUM BROMIDE AND ALBUTEROL SULFATE 3 ML: 2.5; .5 SOLUTION RESPIRATORY (INHALATION) at 08:38

## 2022-02-18 RX ADMIN — IPRATROPIUM BROMIDE AND ALBUTEROL SULFATE 3 ML: 2.5; .5 SOLUTION RESPIRATORY (INHALATION) at 14:56

## 2022-02-18 RX ADMIN — SODIUM CHLORIDE 125 ML/HR: 0.9 INJECTION, SOLUTION INTRAVENOUS at 03:17

## 2022-02-18 RX ADMIN — ASPIRIN 81 MG CHEWABLE TABLET 81 MG: 81 TABLET CHEWABLE at 08:13

## 2022-02-18 RX ADMIN — METHYLPREDNISOLONE SODIUM SUCCINATE 40 MG: 40 INJECTION, POWDER, FOR SOLUTION INTRAMUSCULAR; INTRAVENOUS at 08:13

## 2022-02-18 RX ADMIN — SODIUM CHLORIDE 125 ML/HR: 0.9 INJECTION, SOLUTION INTRAVENOUS at 11:07

## 2022-02-18 NOTE — OCCUPATIONAL THERAPY NOTE
OT TREATMENT       02/18/22 1120   Note Type   Note Type Treatment   Restrictions/Precautions   Other Precautions Fall Risk   Pain Assessment   Pain Assessment Tool 0-10   Pain Score No Pain   ADL   Toileting Assistance  7  Independent   Bed Mobility   Supine to Sit 7  Independent   Sit to Supine 7  Independent   Transfers   Sit to Stand 5  Supervision   Stand to Sit 5  Supervision   Functional Mobility   Functional Mobility 5  Supervision   Additional Comments 40 feet, unsteady at times    Vision   Occulomotor Tracking;Scanning;Education   Visual Field Cut Compensatory techniques;Education   Vision Comments pt with homonymous hemianopia  Greater deficts in R upper quadrant that lower R  Activity Tolerance   Activity Tolerance Patient tolerated treatment well   Assessment   Assessment Patient seen for OT treatment  Patient demonstrates good understanding of compensatory techniques vision to the right  Patient with unsteady gait at times upon first getting out of bed  Patient will benefit from balance center and vision therapy upon discharge  The patient's raw score on the AM-PAC Daily Activity inpatient short form is 21, standardized score is 44 27, greater than 39 4  Patients at this level are likely to benefit from DC to home  Please refer to the recommendation of the Occupational Therapist for safe DC planning  Plan   Treatment Interventions ADL retraining;Functional transfer training; Endurance training; Compensatory technique education   OT Frequency 5x/wk   Recommendation   OT Discharge Recommendation Home with outpatient rehabilitation   AM-[facility] Daily Activity Inpatient   Lower Body Dressing 3   Bathing 3   Toileting 3   Upper Body Dressing 4   Grooming 4   Eating 4   Daily Activity Raw Score 21   Daily Activity Standardized Score (Calc for Raw Score >=11) 44 27   AM-[facility] Applied Cognition Inpatient   Following a Speech/Presentation 4   Understanding Ordinary Conversation 4   Taking Medications 4 Remembering Where Things Are Placed or Put Away 4   Remembering List of 4-5 Errands 4   Taking Care of Complicated Tasks 4   Applied Cognition Raw Score 24   Applied Cognition Standardized Score 78 55   Licensure   [place] License Number  Yolis Alvarado Javier 86 OTR/L [id number]

## 2022-02-18 NOTE — PLAN OF CARE
Problem: MOBILITY - ADULT  Goal: Maintain or return to baseline ADL function  Description: INTERVENTIONS:  -  Assess patient's ability to carry out ADLs; assess patient's baseline for ADL function and identify physical deficits which impact ability to perform ADLs (bathing, care of mouth/teeth, toileting, grooming, dressing, etc )  - Assess/evaluate cause of self-care deficits   - Assess range of motion  - Assess patient's mobility; develop plan if impaired  - Assess patient's need for assistive devices and provide as appropriate  - Encourage maximum independence but intervene and supervise when necessary  - Involve family in performance of ADLs  - Assess for home care needs following discharge   - Consider OT consult to assist with ADL evaluation and planning for discharge  - Provide patient education as appropriate  Outcome: Progressing  Goal: Maintains/Returns to pre admission functional level  Description: INTERVENTIONS:  - Perform BMAT or MOVE assessment daily    - Set and communicate daily mobility goal to care team and patient/family/caregiver  - Collaborate with rehabilitation services on mobility goals if consulted  - Perform Range of Motion 3 times a day  - Reposition patient every 2 hours    - Dangle patient 3 times a day  - Stand patient 3 times a day  - Ambulate patient 2 times a day  - Out of bed to chair 3 times a day   - Out of bed for meals 3 times a day  - Out of bed for toileting  - Record patient progress and toleration of activity level   Outcome: Progressing     Problem: NEUROSENSORY - ADULT  Goal: Achieves stable or improved neurological status  Description: INTERVENTIONS  - Monitor and report changes in neurological status  - Monitor vital signs such as temperature, blood pressure, glucose, and any other labs ordered   - Initiate measures to prevent increased intracranial pressure  - Monitor for seizure activity and implement precautions if appropriate      Outcome: Progressing  Goal: Achieves maximal functionality and self care  Description: INTERVENTIONS  - Monitor swallowing and airway patency with patient fatigue and changes in neurological status  - Encourage and assist patient to increase activity and self care  - Encourage visually impaired, hearing impaired and aphasic patients to use assistive/communication devices  Outcome: Progressing     Problem: Neurological Deficit  Goal: Neurological status is stable or improving  Description: Interventions:  - Monitor and assess patient's level of consciousness, motor function, sensory function, and level of assistance needed for ADLs  - Monitor and report changes from baseline  Collaborate with interdisciplinary team to initiate plan and implement interventions as ordered  - Provide and maintain a safe environment  - Consider seizure precautions  - Consider fall precautions  - Consider aspiration precautions  - Consider bleeding precautions  Outcome: Progressing     Problem: Activity Intolerance/Impaired Mobility  Goal: Mobility/activity is maintained at optimum level for patient  Description: Interventions:  - Assess and monitor patient  barriers to mobility and need for assistive/adaptive devices  - Assess patient's emotional response to limitations  - Collaborate with interdisciplinary team and initiate plans and interventions as ordered  - Encourage independent activity per ability   - Maintain proper body alignment  - Perform active/passive rom as tolerated/ordered  - Plan activities to conserve energy   - Turn patient as appropriate  Outcome: Progressing     Problem: Communication Impairment  Goal: Ability to express needs and understand communication  Description: Assess patient's communication skills and ability to understand information  Patient will demonstrate use of effective communication techniques, alternative methods of communication and understanding even if not able to speak       - Encourage communication and provide alternate methods of communication as needed  - Collaborate with case management/ for discharge needs  - Include patient/family/caregiver in decisions related to communication  Outcome: Progressing     Problem: Potential for Aspiration  Goal: Non-ventilated patient's risk of aspiration is minimized  Description: Assess and monitor vital signs, respiratory status, and labs (WBC)  Monitor for signs of aspiration (tachypnea, cough, rales, wheezing, cyanosis, fever)  - Assess and monitor patient's ability to swallow  - Place patient up in chair to eat if possible  - HOB up at 90 degrees to eat if unable to get patient up into chair   - Supervise patient during oral intake  - Instruct patient/ family to take small bites  - Instruct patient/ family to take small single sips when taking liquids  - Follow patient-specific strategies generated by speech pathologist   Outcome: Progressing     Problem: Nutrition  Goal: Nutrition/Hydration status is improving  Description: Monitor and assess patient's nutrition/hydration status for malnutrition (ex- brittle hair, bruises, dry skin, pale skin and conjunctiva, muscle wasting, smooth red tongue, and disorientation)  Collaborate with interdisciplinary team and initiate plan and interventions as ordered  Monitor patient's weight and dietary intake as ordered or per policy  Utilize nutrition screening tool and intervene per policy  Determine patient's food preferences and provide high-protein, high-caloric foods as appropriate  - Assist patient with eating   - Allow adequate time for meals   - Encourage patient to take dietary supplement as ordered  - Collaborate with clinical nutritionist   - Include patient/family/caregiver in decisions related to nutrition    Outcome: Progressing

## 2022-02-18 NOTE — DISCHARGE INSTRUCTIONS
Follow up with PMD within one week  Follow up with neurology in 8-10 weeks    Stroke   WHAT YOU NEED TO KNOW:   A stroke happens when blood flow to part of the brain is interrupted  This can cause serious brain damage from a lack of oxygen  Your healthcare providers will help you create goals for your recovery  They will help you and your family or care providers make a plan for care at home to help you reach your goals  The plan will include lifestyle changes you can make to help you manage your health and prevent another stroke  Your discharge instructions will include information on services and equipment you or your family may need  DISCHARGE INSTRUCTIONS:   Call your local emergency number (911 in the [facility] [facility]) for any of the following:   · You have any of the following signs of a stroke:      ? Numbness or drooping on one side of your face     ? Weakness in an arm or leg    ? Confusion or difficulty speaking    ? Dizziness, a severe headache, or vision loss       · You have a seizure  · You have chest pain or shortness of breath  Seek care immediately if:   · Your arm or leg feels warm, tender, and painful  It may look swollen and red  · You have loss of balance or coordination  · You have double vision or vision loss  · You have unusual or heavy bleeding  Call your doctor or neurologist if:   · Your blood sugar level or blood pressure is higher or lower than usual     · You have trouble swallowing  · You have trouble having a bowel movement or urinating  · You have questions or concerns about your condition or care  Medicines:  Medicines may be needed to treat high cholesterol, high blood pressure, or diabetes  You may also need any of the following, depending on the kind of stroke you had:  · Antiplatelets , such as aspirin, help prevent blood clots  Take your antiplatelet medicine exactly as directed  These medicines make it more likely for you to bleed or bruise   If you are told to take aspirin, do not take acetaminophen or ibuprofen instead  · Blood thinners  help prevent blood clots  Clots can cause strokes, heart attacks, and death  The following are general safety guidelines to follow while you are taking a blood thinner:    ? Watch for bleeding and bruising while you take blood thinners  Watch for bleeding from your gums or nose  Watch for blood in your urine and bowel movements  Use a soft washcloth on your skin, and a soft toothbrush to brush your teeth  This can keep your skin and gums from bleeding  If you shave, use an electric shaver  Do not play contact sports  ? Tell your dentist and other healthcare providers that you take a blood thinner  Wear a bracelet or necklace that says you take this medicine  ? Do not start or stop any other medicines unless your healthcare provider tells you to  Many medicines cannot be used with blood thinners  ? Take your blood thinner exactly as prescribed by your healthcare provider  Do not skip does or take less than prescribed  Tell your provider right away if you forget to take your blood thinner, or if you take too much  ? Warfarin  is a blood thinner that you may need to take  The following are things you should be aware of if you take warfarin:     § Foods and medicines can affect the amount of warfarin in your blood  Do not make major changes to your diet while you take warfarin  Warfarin works best when you eat about the same amount of vitamin K every day  Vitamin K is found in green leafy vegetables and certain other foods  Ask for more information about what to eat when you are taking warfarin  § You will need to see your healthcare provider for follow-up visits when you are on warfarin  You will need regular blood tests  These tests are used to decide how much medicine you need  · Take your medicine as directed    Contact your healthcare provider if you think your medicine is not helping or if you have side effects  Tell him or her if you are allergic to any medicine  Keep a list of the medicines, vitamins, and herbs you take  Include the amounts, and when and why you take them  Bring the list or the pill bottles to follow-up visits  Carry your medicine list with you in case of an emergency  Know the warning signs of a stroke: The words BE FAST can help you remember and recognize warning signs of a stroke:  · B = Balance:  Sudden loss of balance    · E = Eyes:  Loss of vision in one or both eyes    · F = Face:  Face droops on one side    · A = Arms:  Arm drops when both arms are raised    · S = Speech:  Speech is slurred or sounds different    · T = Time:  Time to get help immediately       Recovery testing: Your healthcare provider will test your recovery 90 days (3 months) after your stroke  This may be done over the phone or in person  Your provider will ask how well you can do the activities you did before the stroke  He or she will also ask how well you can do your daily activities without help  Your provider may make recommendations for you based on your test  For example, you may need someone to help you walk safely  You may also need help with daily activities, such as getting dressed  Based on your answers, your provider may do this test again over time  Manage the effects of a stroke:   · Go to stroke rehabilitation (rehab) if directed  Rehab is a program run by specialists who will help you recover abilities you may have lost  Specialists include physical, occupational, and speech therapists  Physical therapists help you gain strength or keep your balance  Occupational therapists teach you new ways to do daily activities  Your therapy may include movements for everyday activities  An example is being able to raise yourself from a chair  A speech therapist helps you improve your ability to talk and swallow  · Make your home safe  Remove anything you might trip over  Tape electrical cords down   Keep paths clear throughout your home  Make sure your home is well lit  Put nonslip materials on surfaces that might be slippery  An example is your bathtub or shower floor  A cane or walker may help you keep your balance as you walk  Prevent another stroke:   · Manage health conditions  A condition such as diabetes can increase your risk for a stroke  Control your blood sugar level if you have hyperglycemia or diabetes  Take your prescribed medicines and check your blood sugar level as directed  · Check your blood pressure as directed  High blood pressure can increase your risk for a stroke  Follow your healthcare provider's directions for controlling your blood pressure  · Do not use nicotine products or illegal drugs  Nicotine and other chemicals in cigarettes and cigars can cause blood vessel damage  Nicotine and illegal drugs both increase your risk for a stroke  Ask your healthcare provider for information if you currently smoke or use drugs and need help to quit  E- cigarettes or smokeless tobacco still contain nicotine  Talk to your healthcare provider before you use these products  · Talk to your healthcare provider about alcohol  Alcohol can raise your blood pressure  The recommended limit is 2 drinks in a day for men and 1 drink in a day for women  Do not binge drink or save a week's worth of alcohol to drink in 1 or 2 days  Limit weekly amounts as directed by your provider  · Eat a variety of healthy foods  Healthy foods include whole-grain breads, low-fat dairy products, beans, lean meats, and fish  Eat at least 5 servings of fruits and vegetables each day  Choose foods that are low in fat, cholesterol, salt, and sugar  Eat foods that are high in potassium, such as potatoes and bananas  A dietitian can help you create healthy meal plans  · Maintain a healthy weight  Ask your healthcare provider how much you should weigh   Ask him or her to help you create a weight loss plan if you are overweight  He or she can help you create small goals if you have a lot of weight to lose  · Exercise as directed  Exercise can lower your blood pressure, cholesterol, weight, and blood sugar levels  Healthcare providers will help you create exercise goals  They can also help you make a plan to reach your goals  For example, you can break exercise into 10 minute periods, 3 times in the day  Find an exercise that you enjoy  This will make it easier for you to reach your exercise goals  · Manage stress  Stress can raise your blood pressure  Find new ways to relax, such as deep breathing or listening to music  What you need to know about depression after a stroke:  Talk to your healthcare provider if you have depression that continues or is getting worse  Your provider may be able to help treat your depression  Your provider can also recommend support groups for you to join  A support group is a place to talk with others who have had a stroke  It may also help to talk to friends and family members about how you are feeling  Tell your family and friends to let your healthcare provider know if they see any signs of depression:  · Extreme sadness    · Avoiding social interaction with family or friends    · A lack of interest in things you once enjoyed    · Irritability    · Trouble sleeping    · Low energy levels    · A change in eating habits or sudden weight gain or loss    Follow up with your doctor or neurologist as directed: You may need to come in for regular tests of your brain function  Your provider may refer you to a specialist, or to other kinds of care  An example is palliative (comfort) care  Your provider can also give information about respite care to anyone who helps care for you  Respite care is a service to help caregivers take a break or get more rest  Write down your questions so you remember to ask them during your visits    For support and more information: · American Heart Association and American Stroke Association  [address] , [address] [facility]   Phone: 2- 779 - 341-7670  Web Address: https://www strong com/  [employer] 2021 Information is for End User's use only and may not be sold, redistributed or otherwise used for commercial purposes  All illustrations and images included in CareNotes® are the copyrighted property of [employer]  or [address] [place] [address]  The above information is an  only  It is not intended as medical advice for individual conditions or treatments  Talk to your doctor, nurse or pharmacist before following any medical regimen to see if it is safe and effective for you

## 2022-02-18 NOTE — UTILIZATION REVIEW
Continued Stay Review    Date:  2-18-22                       Current Patient Class: inpatient  Current Level of Care: med surg    HPI:76 y o  male initially admitted on 2-16-22 for acute cerebral infarct of left occipital lobe    Assessment/Plan:     Neurology consult completed  Patient with persistent vision changes  Treat CVA wth neuro checks and telemetry, maintain permissive hypertension,  plavix, statin, echo, lipid panel,hba1c  PT/OT/ST evalautions with recommendation for outpatient balance therapy  Currently uses cane  O2 sats 92 % on room air and shortness of breath on ambulation  History of asthma  Continue iv  Solumedrol q12, duo neb q6 hr, lovenox sq  Consult neurology  2-17-22  Patient has left pca and left cerebellar ischemic strokes  Symptoms are rather mild  Recommend DAPT aspirin and plavix for 3 weeks and then aspirin 325mg daily  Lipitor 80mg for LDL of 122  He has myalgia from pravastatin  Patient is agreeable to trying lipitor     PT/OT    Vital Signs:    Date/Time Temp Pulse Resp BP MAP (mmHg) SpO2 O2 Device   02/18/22 0838 -- -- -- -- -- 92 % None (Room air)   02/18/22 08:14:40 98 1 °F (36 7 °C) 65 18 140/87 105 94 % None (Room air)   02/18/22 0814 -- 67 -- -- -- -- --   02/17/22 1942 -- 67 18 -- -- 97 % --   02/17/22 19:00:44 97 1 °F (36 2 °C)   Abnormal  69 20 135/76 96 94 % --   02/17/22 1600 97 8 °F (36 6 °C) 78 18 133/77 -- 97 % None (Room air)   02/17/22 1100 98 °F (36 7 °C) -- -- -- -- -- --   02/17/22 0800 98 °F (36 7 °C) 70 -- 132/73 93 96 % --   02/17/22 07:59:54 -- 70 -- 132/73 93 96 % --   02/17/22 0700 -- 71 -- -- -- 94 % --   02/17/22 0500 -- 75 -- -- -- 92 % --   02/17/22 0300 -- 77 -- -- -- 95 % --   02/17/22 02:44:53 97 4 °F (36 3 °C)   Abnormal  72 18 136/73 94 94 % None (Room air)   02/17/22 0100 -- 89 -- -- -- 92 % --     Date and Time Eye Opening Best Verbal Response Best Motor Response Oswaldo Coma Scale Score   02/18/22 0300 4 5 6 15   02/17/22 2300 4 5 6 15   02/17/22 1910 4 5 6 15   02/17/22 1900 4 5 6 15   02/17/22 0600 4 5 6 15   02/17/22 0100 4 5 6 15         Pertinent Labs/Diagnostic Results:     2-17-22 MRI significant for cute infarct in the left occipital lobe  Recent lacunar infarct in the left cerebellum   No acute hemorrhage or mass effect          Results from last 7 days   Lab Units 02/18/22  0507 02/17/22  0535 02/16/22  1501   WBC Thousand/uL 11 49* 11 16* 7 96   HEMOGLOBIN g/dL 11 8* 13 3 14 9   HEMATOCRIT % 34 6* 40 0 43 3   PLATELETS Thousands/uL 222 238 248   NEUTROS ABS Thousands/µL 10 64*  --  4 68         Results from last 7 days   Lab Units 02/18/22  0507 02/17/22  0535 02/16/22  1501   SODIUM mmol/L 136 137 138   POTASSIUM mmol/L 4 9 4 5 4 9   CHLORIDE mmol/L 107 104 102   CO2 mmol/L 21 22 29   ANION GAP mmol/L 8 11 7   BUN mg/dL 46* 41* 32*   CREATININE mg/dL 1 93* 1 96* 1 64*   EGFR ml/min/1 73sq m 32 32 39   CALCIUM mg/dL 7 5* 8 1* 9 8     Results from last 7 days   Lab Units 02/17/22  0535 02/16/22  1501   AST U/L 10 13*   ALT U/L 23 17   ALK PHOS U/L 63 55 4   TOTAL PROTEIN g/dL 6 7 7 7   ALBUMIN g/dL 3 5 4 8   TOTAL BILIRUBIN mg/dL 0 46 0 50     Results from last 7 days   Lab Units 02/18/22  0732 02/18/22  0313 02/17/22  2136 02/17/22  1613 02/17/22  1134 02/17/22  0722 02/17/22  0238 02/16/22  2356   POC GLUCOSE mg/dl 251* 310* 258* 193* 245* 238* 284* 369*     Results from last 7 days   Lab Units 02/18/22  0507 02/17/22  0535 02/16/22  1501   GLUCOSE RANDOM mg/dL 292* 274* 115         Results from last 7 days   Lab Units 02/17/22  0535   HEMOGLOBIN A1C % 6 9*   EAG mg/dl 151       Results from last 7 days   Lab Units 02/16/22  1501   PROTIME seconds 13 5   INR  1 04   PTT seconds 29     Results from last 7 days   Lab Units 02/17/22  0535   TSH 3RD GENERATON uIU/mL 0 415     Results from last 7 days   Lab Units 02/16/22  1501   CRP mg/dL 0 2   SED RATE mm/hour 1       Scheduled Medications:    aspirin, 81 mg, Oral, Daily  atorvastatin, 80 mg, Oral, Daily With Dinner  clopidogrel, 75 mg, Oral, Daily  enoxaparin, 40 mg, Subcutaneous, Daily  insulin lispro, 1-6 Units, Subcutaneous, TID AC  insulin lispro, 1-6 Units, Subcutaneous, HS  insulin lispro, 1-6 Units, Subcutaneous, 0200  ipratropium-albuterol, 3 mL, Nebulization, Q6H  methylPREDNISolone sodium succinate, 40 mg, Intravenous, Q12H LAURYN  tamsulosin, 0 4 mg, Oral, Daily With Dinner      Continuous IV Infusions:  sodium chloride, 125 mL/hr, Intravenous, Continuous      PRN Meds:       Discharge Plan: to be determined     Network Utilization Review Department  ATTENTION: Please call with any questions or concerns to [phone] and carefully listen to the prompts so that you are directed to the right person  All voicemails are confidential   Danial Mckay all requests for admission clinical reviews, approved or denied determinations and any other requests to dedicated fax number below belonging to the campus where the patient is receiving treatment   List of dedicated fax numbers for the Facilities:  [address] DENIALS (Administrative/Medical Necessity) [phone]   [address] (Maternity/NICU/Pediatrics) [phone]   [address]  [address] [facility] [place] [address] [address] [place] [address] [address] [facility] [facility] [phone]

## 2022-02-18 NOTE — PLAN OF CARE
Problem: MOBILITY - ADULT  Goal: Maintain or return to baseline ADL function  Description: INTERVENTIONS:  -  Assess patient's ability to carry out ADLs; assess patient's baseline for ADL function and identify physical deficits which impact ability to perform ADLs (bathing, care of mouth/teeth, toileting, grooming, dressing, etc )  - Assess/evaluate cause of self-care deficits   - Assess range of motion  - Assess patient's mobility; develop plan if impaired  - Assess patient's need for assistive devices and provide as appropriate  - Encourage maximum independence but intervene and supervise when necessary  - Involve family in performance of ADLs  - Assess for home care needs following discharge   - Consider OT consult to assist with ADL evaluation and planning for discharge  - Provide patient education as appropriate  2/18/2022 1753 by Jesus Alberto Powell RN  Outcome: Adequate for Discharge  2/18/2022 0947 by Jesus Alberto Powell RN  Outcome: Progressing  Goal: Maintains/Returns to pre admission functional level  Description: INTERVENTIONS:  - Perform BMAT or MOVE assessment daily    - Set and communicate daily mobility goal to care team and patient/family/caregiver  - Collaborate with rehabilitation services on mobility goals if consulted  - Perform Range of Motion x times a day  - Reposition patient every x hours    - Dangle patient x times a day  - Stand patient x times a day  - Ambulate patient x times a day  - Out of bed to chair x times a day   - Out of bed for meals x times a day  - Out of bed for toileting  - Record patient progress and toleration of activity level   2/18/2022 1753 by Jesus Alberto Powell RN  Outcome: Adequate for Discharge  2/18/2022 0947 by Jesus Alberto Powell RN  Outcome: Progressing     Problem: NEUROSENSORY - ADULT  Goal: Achieves stable or improved neurological status  Description: INTERVENTIONS  - Monitor and report changes in neurological status  - Monitor vital signs such as temperature, blood pressure, glucose, and any other labs ordered   - Initiate measures to prevent increased intracranial pressure  - Monitor for seizure activity and implement precautions if appropriate      2/18/2022 1753 by Winnie Quinones RN  Outcome: Adequate for Discharge  2/18/2022 6433 by Winnie Quinones RN  Outcome: Progressing  Goal: Achieves maximal functionality and self care  Description: INTERVENTIONS  - Monitor swallowing and airway patency with patient fatigue and changes in neurological status  - Encourage and assist patient to increase activity and self care  - Encourage visually impaired, hearing impaired and aphasic patients to use assistive/communication devices  2/18/2022 1753 by Winnie Quinones RN  Outcome: Adequate for Discharge  2/18/2022 8807 by Winnie Quinones RN  Outcome: Progressing     Problem: Neurological Deficit  Goal: Neurological status is stable or improving  Description: Interventions:  - Monitor and assess patient's level of consciousness, motor function, sensory function, and level of assistance needed for ADLs  - Monitor and report changes from baseline  Collaborate with interdisciplinary team to initiate plan and implement interventions as ordered  - Provide and maintain a safe environment  - Consider seizure precautions  - Consider fall precautions  - Consider aspiration precautions  - Consider bleeding precautions  2/18/2022 1753 by Winnie Quinones RN  Outcome: Adequate for Discharge  2/18/2022 2212 by Winnie Quinones RN  Outcome: Progressing     Problem: Activity Intolerance/Impaired Mobility  Goal: Mobility/activity is maintained at optimum level for patient  Description: Interventions:  - Assess and monitor patient  barriers to mobility and need for assistive/adaptive devices  - Assess patient's emotional response to limitations    - Collaborate with interdisciplinary team and initiate plans and interventions as ordered  - Encourage independent activity per ability   - Maintain proper body alignment  - Perform active/passive rom as tolerated/ordered  - Plan activities to conserve energy   - Turn patient as appropriate  2/18/2022 1753 by Bard Lillian RN  Outcome: Adequate for Discharge  2/18/2022 9545 by Bard Lillian RN  Outcome: Progressing     Problem: Communication Impairment  Goal: Ability to express needs and understand communication  Description: Assess patient's communication skills and ability to understand information  Patient will demonstrate use of effective communication techniques, alternative methods of communication and understanding even if not able to speak  - Encourage communication and provide alternate methods of communication as needed  - Collaborate with case management/ for discharge needs  - Include patient/family/caregiver in decisions related to communication  2/18/2022 1753 by Bard Lillian RN  Outcome: Adequate for Discharge  2/18/2022 6918 by Bard Lillian RN  Outcome: Progressing     Problem: Potential for Aspiration  Goal: Non-ventilated patient's risk of aspiration is minimized  Description: Assess and monitor vital signs, respiratory status, and labs (WBC)  Monitor for signs of aspiration (tachypnea, cough, rales, wheezing, cyanosis, fever)  - Assess and monitor patient's ability to swallow  - Place patient up in chair to eat if possible  - HOB up at 90 degrees to eat if unable to get patient up into chair   - Supervise patient during oral intake  - Instruct patient/ family to take small bites  - Instruct patient/ family to take small single sips when taking liquids    - Follow patient-specific strategies generated by speech pathologist   2/18/2022 1753 by Bard Lillian RN  Outcome: Adequate for Discharge  2/18/2022 7912 by Aleah Lomeli Jeanette Fisher RN  Outcome: Progressing     Problem: Nutrition  Goal: Nutrition/Hydration status is improving  Description: Monitor and assess patient's nutrition/hydration status for malnutrition (ex- brittle hair, bruises, dry skin, pale skin and conjunctiva, muscle wasting, smooth red tongue, and disorientation)  Collaborate with interdisciplinary team and initiate plan and interventions as ordered  Monitor patient's weight and dietary intake as ordered or per policy  Utilize nutrition screening tool and intervene per policy  Determine patient's food preferences and provide high-protein, high-caloric foods as appropriate  - Assist patient with eating   - Allow adequate time for meals   - Encourage patient to take dietary supplement as ordered  - Collaborate with clinical nutritionist   - Include patient/family/caregiver in decisions related to nutrition    2/18/2022 1753 by Melissa Enriquez RN  Outcome: Adequate for Discharge  2/18/2022 2955 by Melissa Enriquez RN  Outcome: Progressing     Problem: RESPIRATORY - ADULT  Goal: Achieves optimal ventilation and oxygenation  Description: INTERVENTIONS:  - Assess for changes in respiratory status  - Assess for changes in mentation and behavior  - Position to facilitate oxygenation and minimize respiratory effort  - Oxygen administered by appropriate delivery if ordered  - Initiate smoking cessation education as indicated  - Encourage broncho-pulmonary hygiene including cough, deep breathe, Incentive Spirometry  - Assess the need for suctioning and aspirate as needed  - Assess and instruct to report SOB or any respiratory difficulty  - Respiratory Therapy support as indicated  2/18/2022 1753 by Melissa Enriquez RN  Outcome: Adequate for Discharge  2/18/2022 0947 by Melissa Enriquez RN  Outcome: Progressing     Problem: Potential for Falls  Goal: Patient will remain free of falls  Description: INTERVENTIONS:  - Educate patient/family on patient safety including physical limitations  - Instruct patient to call for assistance with activity   - Consult OT/PT to assist with strengthening/mobility   - Keep Call bell within reach  - Keep bed low and locked with side rails adjusted as appropriate  - Keep care items and personal belongings within reach  - Initiate and maintain comfort rounds  - Make Fall Risk Sign visible to staff  - Offer Toileting every x Hours, in advance of need  - Initiate/Maintain xalarm  - Obtain necessary fall risk management equipment: x  - Apply yellow socks and bracelet for high fall risk patients  - Consider moving patient to room near nurses station  2/18/2022 9835 by Melissa Enriquez, RN  Outcome: Adequate for Discharge  2/18/2022 0930 by Melissa Enriquez, RN  Outcome: Progressing

## 2022-02-18 NOTE — ASSESSMENT & PLAN NOTE
Lab Results   Component Value Date    EGFR 32 02/17/2022    EGFR 39 02/16/2022    EGFR 39 12/29/2021    CREATININE 1 96 (H) 02/17/2022    CREATININE 1 64 (H) 02/16/2022    CREATININE 1 66 (H) 12/29/2021     Creatinine is at baseline, patient did receive IV contrast

## 2022-02-18 NOTE — ASSESSMENT & PLAN NOTE
Patient reports sudden onset vision loss after receiving his COVID vaccine  No other associated symptoms  Patient was a stroke alert  CT, CTA of the head and neck revealed noncalcified atherosclerotic disease of the left distal common carotid artery extending into the origin of the internal carotid artery and proximal bulb with less than 50% narrowing  Neurology recommendations appreciated -  Patient refused TPA  Patient was given loading doses of aspirin and plavix  · Continue telemetry  · Continue aspirin   · Plavix 75 my daily x 3 weeks per neurology  · Statin   · Permissive HTN   · Neuro checks   · Lipid panel, Hga1c, TSH, B12, Vitamin D  · MRI significant for cute infarct in the left occipital lobe  Recent lacunar infarct in the left cerebellum  No acute hemorrhage or mass effect    · Echocardiogram with bubble study  · PT/OT

## 2022-02-18 NOTE — PROGRESS NOTES
[facility]  Progress Note - Malinda Obando 1945, 67 y o  male MRN: [id number]  Unit/Bed#: [address]- Encounter: [id number]  Primary Care Provider: Kristen Queen MD   Date and time admitted to hospital: 2/16/2022  9:32 PM    * Acute CVA (cerebrovascular accident) [facility])  Assessment & Plan  Patient reports sudden onset vision loss after receiving his COVID vaccine  No other associated symptoms  Patient was a stroke alert  CT, CTA of the head and neck revealed noncalcified atherosclerotic disease of the left distal common carotid artery extending into the origin of the internal carotid artery and proximal bulb with less than 50% narrowing  Neurology recommendations appreciated -  Patient refused TPA  Patient was given loading doses of aspirin and plavix  · Continue telemetry  · Continue aspirin   · Plavix 75 my daily x 3 weeks per neurology  · Statin   · Permissive HTN   · Neuro checks   · Lipid panel, Hga1c, TSH, B12, Vitamin D  · MRI significant for cute infarct in the left occipital lobe  Recent lacunar infarct in the left cerebellum  No acute hemorrhage or mass effect    · Echocardiogram with bubble study  · PT/OT      Moderate asthma without complication  Assessment & Plan  Duonebs   Start solu-medrol      Chronic kidney disease-mineral and bone disorder  Assessment & Plan  Lab Results   Component Value Date    EGFR 32 02/17/2022    EGFR 39 02/16/2022    EGFR 39 12/29/2021    CREATININE 1 96 (H) 02/17/2022    CREATININE 1 64 (H) 02/16/2022    CREATININE 1 66 (H) 12/29/2021     Creatinine is at baseline, patient did receive IV contrast      Benign prostatic hyperplasia with lower urinary tract symptoms  Assessment & Plan  Continue flomax    Benign essential hypertension  Assessment & Plan  Hold prinzide    Type 2 diabetes mellitus with diabetic chronic kidney disease [facility])  Assessment & Plan  Lab Results   Component Value Date    HGBA1C 6 9 (H) 02/17/2022       Recent Labs 22  0238 22  0722 22  1134 22  1613   POCGLU 284* 238* 245* 193*       Blood Sugar Average: Last 72 hrs:  (P) 261 25     Hold oral medications  Accuchecks AC/HS with insulin sliding scale         VTE Pharmacologic Prophylaxis: VTE Score: 9 High Risk (Score >/= 5) - Pharmacological DVT Prophylaxis Ordered: enoxaparin (Lovenox)  Sequential Compression Devices Ordered  Patient Centered Rounds: I performed bedside rounds with nursing staff today  Discussions with Specialists or Other Care Team Provider: Yes     Education and Discussions with Family / Patient: Yes    Time Spent for Care: 45 minutes  More than 50% of total time spent on counseling and coordination of care as described above  Current Length of Stay: 1 day(s)  Current Patient Status: Inpatient   Certification Statement: The patient will continue to require additional inpatient hospital stay due to CVA  Discharge Plan: Anticipate discharge in 24-48 hrs to discharge location to be determined pending rehab evaluations  Code Status: Level 1 - Full Code    Subjective:   Patient with persistent vision changes     Objective:     Vitals:   Temp (24hrs), Av 6 °F (36 4 °C), Min:97 1 °F (36 2 °C), Max:98 °F (36 7 °C)    Temp:  [97 1 °F (36 2 °C)-98 °F (36 7 °C)] 97 1 °F (36 2 °C)  HR:  [67-89] 69  Resp:  [15-20] 20  BP: (128-139)/(73-83) 135/76  SpO2:  [92 %-97 %] 94 %  Body mass index is 26 32 kg/m²  Input and Output Summary (last 24 hours): Intake/Output Summary (Last 24 hours) at 2022 1910  Last data filed at 2022 1401  Gross per 24 hour   Intake 2329 ml   Output 1000 ml   Net 1329 ml       Physical Exam:   Physical Exam  HENT:      Head: Normocephalic and atraumatic  Mouth/Throat:      Mouth: Mucous membranes are moist    Eyes:      Extraocular Movements: Extraocular movements intact  Cardiovascular:      Rate and Rhythm: Normal rate     Pulmonary:      Effort: Pulmonary effort is normal  Breath sounds: Wheezing present  Abdominal:      General: Abdomen is flat  Palpations: Abdomen is soft  Tenderness: There is no abdominal tenderness  Skin:     General: Skin is warm and dry  Neurological:      Mental Status: He is alert and oriented to person, place, and time  Additional Data:     Labs:  Results from last 7 days   Lab Units 02/17/22  0535 02/16/22  1501 02/16/22  1501   WBC Thousand/uL 11 16*   < > 7 96   HEMOGLOBIN g/dL 13 3   < > 14 9   HEMATOCRIT % 40 0   < > 43 3   PLATELETS Thousands/uL 238   < > 248   NEUTROS PCT %  --   --  59   LYMPHS PCT %  --   --  25   MONOS PCT %  --   --  9   EOS PCT %  --   --  6    < > = values in this interval not displayed       Results from last 7 days   Lab Units 02/17/22  0535   SODIUM mmol/L 137   POTASSIUM mmol/L 4 5   CHLORIDE mmol/L 104   CO2 mmol/L 22   BUN mg/dL 41*   CREATININE mg/dL 1 96*   ANION GAP mmol/L 11   CALCIUM mg/dL 8 1*   ALBUMIN g/dL 3 5   TOTAL BILIRUBIN mg/dL 0 46   ALK PHOS U/L 63   ALT U/L 23   AST U/L 10   GLUCOSE RANDOM mg/dL 274*     Results from last 7 days   Lab Units 02/16/22  1501   INR  1 04     Results from last 7 days   Lab Units 02/17/22  1613 02/17/22  1134 02/17/22  0722 02/17/22  0238 02/16/22  2356   POC GLUCOSE mg/dl 193* 245* 238* 284* 369*     Results from last 7 days   Lab Units 02/17/22  0535   HEMOGLOBIN A1C % 6 9*           Lines/Drains:  Invasive Devices  Report    Peripheral Intravenous Line            Peripheral IV 02/16/22 Left Antecubital 1 day                  Telemetry:  Telemetry Orders (From admission, onward)             48 Hour Telemetry Monitoring  Continuous x 48 hours        References:    Telemetry Guidelines   Question:  Reason for 48 Hour Telemetry  Answer:  Acute CVA (<24 hrs old, hemispheric strokes, selected brainstem strokes, cardiac arrhythmias)                              Imaging: Reviewed radiology reports from this admission including: MRI brain    Recent Cultures (last 7 days):         Last 24 Hours Medication List:   Current Facility-Administered Medications   Medication Dose Route Frequency Provider Last Rate    aspirin  81 mg Oral Daily Thierry Core, TREVIN      atorvastatin  80 mg Oral Daily With Dinner Zuleima Carlson MD      clopidogrel  75 mg Oral Daily Thierry Core, TREVIN      enoxaparin  40 mg Subcutaneous Daily Thierry Core, TREVIN      insulin lispro  1-6 Units Subcutaneous TID AC Thierry Core, CRALEJANDRO      insulin lispro  1-6 Units Subcutaneous HS Thierry Core, CRALEJANDRO      insulin lispro  1-6 Units Subcutaneous 0200 Thierry Core, TREVIN      ipratropium-albuterol  3 mL Nebulization Q6H Clotilde Davidson DO      methylPREDNISolone sodium succinate  40 mg Intravenous Q12H [address] Clotilde Davidson DO      sodium chloride  125 mL/hr Intravenous Continuous Clotilde Davidson  mL/hr (02/17/22 3465)    tamsulosin  0 4 mg Oral Daily With [address], TREVIN          Today, Patient Was Seen By: Loren Su DO    **Please Note: This note may have been constructed using a voice recognition system  **

## 2022-02-18 NOTE — ASSESSMENT & PLAN NOTE
Lab Results   Component Value Date    HGBA1C 6 9 (H) 02/17/2022       Recent Labs     02/17/22  0238 02/17/22  0722 02/17/22  1134 02/17/22  1613   POCGLU 284* 238* 245* 193*       Blood Sugar Average: Last 72 hrs:  (P) 261 25     Hold oral medications  Accuchecks AC/HS with insulin sliding scale

## 2022-02-18 NOTE — TELEPHONE ENCOUNTER
Pt was in the hospital with what seems to be two strokes   Katty Riggs from pre admin called to verify if pt should get cardiac clearance before his 3/8 surgery

## 2022-02-18 NOTE — PLAN OF CARE
Problem: MOBILITY - ADULT  Goal: Maintain or return to baseline ADL function  Description: INTERVENTIONS:  -  Assess patient's ability to carry out ADLs; assess patient's baseline for ADL function and identify physical deficits which impact ability to perform ADLs (bathing, care of mouth/teeth, toileting, grooming, dressing, etc )  - Assess/evaluate cause of self-care deficits   - Assess range of motion  - Assess patient's mobility; develop plan if impaired  - Assess patient's need for assistive devices and provide as appropriate  - Encourage maximum independence but intervene and supervise when necessary  - Involve family in performance of ADLs  - Assess for home care needs following discharge   - Consider OT consult to assist with ADL evaluation and planning for discharge  - Provide patient education as appropriate  Outcome: Progressing  Goal: Maintains/Returns to pre admission functional level  Description: INTERVENTIONS:  - Perform BMAT or MOVE assessment daily    - Set and communicate daily mobility goal to care team and patient/family/caregiver  - Collaborate with rehabilitation services on mobility goals if consulted  - Perform Range of Motion x times a day  - Reposition patient every x hours    - Dangle patient x times a day  - Stand patient x times a day  - Ambulate patient x times a day  - Out of bed to chair x times a day   - Out of bed for meals x times a day  - Out of bed for toileting  - Record patient progress and toleration of activity level   Outcome: Progressing     Problem: NEUROSENSORY - ADULT  Goal: Achieves stable or improved neurological status  Description: INTERVENTIONS  - Monitor and report changes in neurological status  - Monitor vital signs such as temperature, blood pressure, glucose, and any other labs ordered   - Initiate measures to prevent increased intracranial pressure  - Monitor for seizure activity and implement precautions if appropriate      Outcome: Progressing  Goal: Achieves maximal functionality and self care  Description: INTERVENTIONS  - Monitor swallowing and airway patency with patient fatigue and changes in neurological status  - Encourage and assist patient to increase activity and self care  - Encourage visually impaired, hearing impaired and aphasic patients to use assistive/communication devices  Outcome: Progressing     Problem: Neurological Deficit  Goal: Neurological status is stable or improving  Description: Interventions:  - Monitor and assess patient's level of consciousness, motor function, sensory function, and level of assistance needed for ADLs  - Monitor and report changes from baseline  Collaborate with interdisciplinary team to initiate plan and implement interventions as ordered  - Provide and maintain a safe environment  - Consider seizure precautions  - Consider fall precautions  - Consider aspiration precautions  - Consider bleeding precautions  Outcome: Progressing     Problem: Activity Intolerance/Impaired Mobility  Goal: Mobility/activity is maintained at optimum level for patient  Description: Interventions:  - Assess and monitor patient  barriers to mobility and need for assistive/adaptive devices  - Assess patient's emotional response to limitations  - Collaborate with interdisciplinary team and initiate plans and interventions as ordered  - Encourage independent activity per ability   - Maintain proper body alignment  - Perform active/passive rom as tolerated/ordered  - Plan activities to conserve energy   - Turn patient as appropriate  Outcome: Progressing     Problem: Communication Impairment  Goal: Ability to express needs and understand communication  Description: Assess patient's communication skills and ability to understand information  Patient will demonstrate use of effective communication techniques, alternative methods of communication and understanding even if not able to speak       - Encourage communication and provide alternate methods of communication as needed  - Collaborate with case management/ for discharge needs  - Include patient/family/caregiver in decisions related to communication  Outcome: Progressing     Problem: Potential for Aspiration  Goal: Non-ventilated patient's risk of aspiration is minimized  Description: Assess and monitor vital signs, respiratory status, and labs (WBC)  Monitor for signs of aspiration (tachypnea, cough, rales, wheezing, cyanosis, fever)  - Assess and monitor patient's ability to swallow  - Place patient up in chair to eat if possible  - HOB up at 90 degrees to eat if unable to get patient up into chair   - Supervise patient during oral intake  - Instruct patient/ family to take small bites  - Instruct patient/ family to take small single sips when taking liquids  - Follow patient-specific strategies generated by speech pathologist   Outcome: Progressing     Problem: Nutrition  Goal: Nutrition/Hydration status is improving  Description: Monitor and assess patient's nutrition/hydration status for malnutrition (ex- brittle hair, bruises, dry skin, pale skin and conjunctiva, muscle wasting, smooth red tongue, and disorientation)  Collaborate with interdisciplinary team and initiate plan and interventions as ordered  Monitor patient's weight and dietary intake as ordered or per policy  Utilize nutrition screening tool and intervene per policy  Determine patient's food preferences and provide high-protein, high-caloric foods as appropriate  - Assist patient with eating   - Allow adequate time for meals   - Encourage patient to take dietary supplement as ordered  - Collaborate with clinical nutritionist   - Include patient/family/caregiver in decisions related to nutrition    Outcome: Progressing     Problem: RESPIRATORY - ADULT  Goal: Achieves optimal ventilation and oxygenation  Description: INTERVENTIONS:  - Assess for changes in respiratory status  - Assess for changes in mentation and behavior  - Position to facilitate oxygenation and minimize respiratory effort  - Oxygen administered by appropriate delivery if ordered  - Initiate smoking cessation education as indicated  - Encourage broncho-pulmonary hygiene including cough, deep breathe, Incentive Spirometry  - Assess the need for suctioning and aspirate as needed  - Assess and instruct to report SOB or any respiratory difficulty  - Respiratory Therapy support as indicated  Outcome: Progressing     Problem: Potential for Falls  Goal: Patient will remain free of falls  Description: INTERVENTIONS:  - Educate patient/family on patient safety including physical limitations  - Instruct patient to call for assistance with activity   - Consult OT/PT to assist with strengthening/mobility   - Keep Call bell within reach  - Keep bed low and locked with side rails adjusted as appropriate  - Keep care items and personal belongings within reach  - Initiate and maintain comfort rounds  - Make Fall Risk Sign visible to staff  - Offer Toileting every x Hours, in advance of need  - Initiate/Maintain xalarm  - Obtain necessary fall risk management equipment: x  - Apply yellow socks and bracelet for high fall risk patients  - Consider moving patient to room near nurses station  Outcome: Progressing

## 2022-02-18 NOTE — NURSING NOTE
Pt discharged from [address] [place] [address]  IV removed prior to discharge  Pt left with all their belongings  Pt left via wheelchair accompanied by PCA  Discharge instructions gone over with patient by unit CC  No prescriptions written  Prescription sent over electronically to pt's pharmacy  All questions answered

## 2022-02-18 NOTE — PLAN OF CARE
Problem: OCCUPATIONAL THERAPY ADULT  Goal: Performs self-care activities at highest level of function for planned discharge setting  See evaluation for individualized goals  Description: Treatment Interventions: ADL retraining,Functional transfer training,Endurance training,Patient/family training,Equipment evaluation/education,Activityengagement,Compensatory technique education,Visual perceptual retraining          See flowsheet documentation for full assessment, interventions and recommendations  Outcome: Progressing  Note: Limitation: Decreased ADL status,Decreased UE strength,Decreased Safe judgement during ADL,Decreased endurance,Decreased self-care trans,Decreased high-level ADLs (decreased balance and mobility )  Prognosis: Good  Assessment: Patient seen for OT treatment  Patient demonstrates good understanding of compensatory techniques vision to the right  Patient with unsteady gait at times upon first getting out of bed  Patient will benefit from balance center and vision therapy upon discharge         OT Discharge Recommendation: Home with outpatient rehabilitation

## 2022-02-19 NOTE — DISCHARGE SUMMARY
Laineedmar 44  Discharge- Bree Delgado 1945, 67 y o  male MRN: [id number]  Unit/Bed#: [address] [address] Encounter: [id number]  Primary Care Provider: Jesse Peñaloza MD   Date and time admitted to hospital: 2/16/2022  9:32 PM    No new Assessment & Plan notes have been filed under this hospital service since the last note was generated  Service: Hospitalist      Medical Problems             Resolved Problems  Date Reviewed: 2/16/2022    None              Discharging Physician / Practitioner: Nicolasa Lainez DO  PCP: Jesse Peñaloza MD  Admission Date:   Admission Orders (From admission, onward)     Ordered        02/17/22 1406  Inpatient Admission  Once            02/16/22 2211  Place in Observation  Once                      Discharge Date: 02/18/22    Consultations During Hospital Stay:  · Neurology    Procedures Performed:   · None    Significant Findings / Test Results:   · MRI: Acute infarct in the left occipital lobe  Recent lacunar infarct in the left cerebellum  No acute hemorrhage or mass effect  Incidental Findings:   · None    Test Results Pending at Discharge (will require follow up): · None     Outpatient Tests Requested:  · None    Complications:  None    Reason for Admission: Vision loss     Hospital Course:   Jerman Bain is a 67 y o  male patient who originally presented to the hospital on 2/16/2022 due to vision changes  Patient was evaluated by neurology  Patient refused tPA  MRI was significant for acute CVA  Neurology was consulted and recommendations were appreciated  Patient started on DAPT and statin  Patient was optimized for discharge home with home PT  Please see above list of diagnoses and related plan for additional information       Condition at Discharge: fair    Discharge Day Visit / Exam:   Subjective:  No acute events overnight   Vitals: Blood Pressure: 140/87 (02/18/22 0814)  Pulse: 65 (02/18/22 0814)  Temperature: 98 1 °F (36 7 °C) (02/18/22 7390)  Temp Source: Oral (02/18/22 0814)  Respirations: 18 (02/18/22 0814)  Height: 5' 10" (177 8 cm) (02/18/22 9746)  Weight - Scale: 83 kg (183 lb) (02/18/22 0814)  SpO2: 94 % (02/18/22 1456)  Exam:   Physical Exam  HENT:      Head: Normocephalic and atraumatic  Mouth/Throat:      Mouth: Mucous membranes are moist    Eyes:      Extraocular Movements: Extraocular movements intact  Cardiovascular:      Rate and Rhythm: Normal rate  Pulmonary:      Effort: Pulmonary effort is normal    Abdominal:      General: Abdomen is flat  Palpations: Abdomen is soft  Tenderness: There is no abdominal tenderness  Skin:     General: Skin is warm and dry  Neurological:      Mental Status: He is alert  Mental status is at baseline  Discussion with Family: Updated  (wife) via phone  Discharge instructions/Information to patient and family:   See after visit summary for information provided to patient and family  Provisions for Follow-Up Care:  See after visit summary for information related to follow-up care and any pertinent home health orders  Disposition:   Home    Planned Readmission: none      Discharge Statement:  I spent greater than 30 minutes discharging the patient  This time was spent on the day of discharge  I had direct contact with the patient on the day of discharge  Greater than 50% of the total time was spent examining patient, answering all patient questions, arranging and discussing plan of care with patient as well as directly providing post-discharge instructions  Additional time then spent on discharge activities  Discharge Medications:  See after visit summary for reconciled discharge medications provided to patient and/or family        **Please Note: This note may have been constructed using a voice recognition system**

## 2022-02-21 ENCOUNTER — TRANSITIONAL CARE MANAGEMENT (OUTPATIENT)
Dept: FAMILY MEDICINE CLINIC | Facility: CLINIC | Age: 77
End: 2022-02-21

## 2022-02-21 NOTE — UTILIZATION REVIEW
Notification of Discharge   This is a Notification of Discharge from our facility [address]  Please be advised that this patient has been discharge from our facility  Below you will find the admission and discharge date and time including the patients disposition  UTILIZATION REVIEW CONTACT:  Jose Luis Smith  Utilization   Network Utilization Review Department  Phone: [phone] x carefully listen to the prompts  All voicemails are confidential   Email: [email or web address]     PHYSICIAN ADVISORY SERVICES:  FOR [email or web address] REVIEW - MEDICAL NECESSITY DENIAL  Phone: [phone]  Fax: [phone]  Email: [email or web address]     PRESENTATION DATE: 2/16/2022  9:32 PM  OBERVATION ADMISSION DATE:   INPATIENT ADMISSION DATE: 2/16/22  9:32 PM   DISCHARGE DATE: 2/18/2022  5:56 PM  DISPOSITION: Home/Self Care Home/Self Care      IMPORTANT INFORMATION:  Send all requests for admission clinical reviews, approved or denied determinations and any other requests to dedicated fax number below belonging to the campus where the patient is receiving treatment   List of dedicated fax numbers:  [address] DENIALS (Administrative/Medical Necessity) [phone]   [address] (Maternity/NICU/Pediatrics) [phone]   [facility] [phone]   [address] [facility] [phone]   [address] [place] [address] [phone]   [address] [place] [address] [phone]   [facility]  [phone]   [address] [place] [address], [facility]  [address] [facility] [address] [facility] [phone]

## 2022-02-23 NOTE — TELEPHONE ENCOUNTER
PT CALLED AND STATED HE CAN NOT SEE NEURO UNTIL 6/ PT WILL TRY TO SEEK FOLLOW UP FROM PROVIDER SEEN AT ED FOR NEURO BUT IS AWARE SURGERY WILL BE CANCELLED PT STATED HE WILL CALL BACK TO SCHEDULE SURGERY AGAIN

## 2022-02-24 ENCOUNTER — TELEPHONE (OUTPATIENT)
Dept: NEUROLOGY | Facility: CLINIC | Age: 77
End: 2022-02-24

## 2022-02-24 DIAGNOSIS — E11.22 TYPE 2 DIABETES MELLITUS WITH STAGE 3B CHRONIC KIDNEY DISEASE, WITHOUT LONG-TERM CURRENT USE OF INSULIN (HCC): ICD-10-CM

## 2022-02-24 DIAGNOSIS — N18.32 TYPE 2 DIABETES MELLITUS WITH STAGE 3B CHRONIC KIDNEY DISEASE, WITHOUT LONG-TERM CURRENT USE OF INSULIN (HCC): ICD-10-CM

## 2022-02-24 RX ORDER — DULAGLUTIDE 0.75 MG/.5ML
INJECTION, SOLUTION SUBCUTANEOUS
Qty: 6 ML | Refills: 3 | Status: SHIPPED | OUTPATIENT
Start: 2022-02-24

## 2022-02-28 ENCOUNTER — OFFICE VISIT (OUTPATIENT)
Dept: NEUROLOGY | Facility: CLINIC | Age: 77
End: 2022-02-28
Payer: COMMERCIAL

## 2022-02-28 VITALS
DIASTOLIC BLOOD PRESSURE: 72 MMHG | HEIGHT: 70 IN | SYSTOLIC BLOOD PRESSURE: 126 MMHG | BODY MASS INDEX: 25.97 KG/M2 | TEMPERATURE: 97.8 F | WEIGHT: 181.4 LBS

## 2022-02-28 DIAGNOSIS — E11.42 DIABETIC POLYNEUROPATHY ASSOCIATED WITH TYPE 2 DIABETES MELLITUS (HCC): ICD-10-CM

## 2022-02-28 DIAGNOSIS — I63.532 CEREBROVASCULAR ACCIDENT (CVA) DUE TO OCCLUSION OF LEFT POSTERIOR CEREBRAL ARTERY (HCC): Primary | ICD-10-CM

## 2022-02-28 DIAGNOSIS — I63.81 LEFT SIDED LACUNAR STROKE (HCC): ICD-10-CM

## 2022-02-28 DIAGNOSIS — R27.8 SENSORY ATAXIA: ICD-10-CM

## 2022-02-28 PROCEDURE — 99215 OFFICE O/P EST HI 40 MIN: CPT | Performed by: PSYCHIATRY & NEUROLOGY

## 2022-02-28 PROCEDURE — 1160F RVW MEDS BY RX/DR IN RCRD: CPT | Performed by: PSYCHIATRY & NEUROLOGY

## 2022-02-28 PROCEDURE — 3078F DIAST BP <80 MM HG: CPT | Performed by: PSYCHIATRY & NEUROLOGY

## 2022-02-28 PROCEDURE — 1036F TOBACCO NON-USER: CPT | Performed by: PSYCHIATRY & NEUROLOGY

## 2022-02-28 PROCEDURE — 3074F SYST BP LT 130 MM HG: CPT | Performed by: PSYCHIATRY & NEUROLOGY

## 2022-02-28 NOTE — PROGRESS NOTES
Chris Harris is a 67 y o  male  presents today with history of recent CVA    Assessment:  1  Cerebrovascular accident (CVA) due to occlusion of left posterior cerebral artery ([address] )    2  Left sided lacunar stroke [facility])        Plan:  Continue Plavix through March 9th   Continue aspirin and statin therapy  Physical therapy   Follow-up 3 months    Discussion:  Bere had a stroke 12 days ago causing a right visual field deficit and balance difficulties  Embolic workup was negative and he was placed on dual anti-platelet therapy for 3 weeks changing to aspirin following that and was also placed on Lipitor  He will continue present management maintaining blood pressure and glucose  He also has findings consistent with peripheral neuropathy secondary to diabetes and is having balance issues  Have recommended initiating physical therapy  Have recommended ambulating with a straight cane  He has been told that he cannot drive until cleared by Ophthalmology  He was scheduled for hernia repair in the near future but this was reschedule till June  I will see him back in follow-up in 3 months      Subjective:    TERRANCE Giron is a right-handed man who is accompanied by his daughter today with the above complaints  He states that he had gotten his COVID vaccine on February 12th and just after leaving the clinic he lost the vision in his right side  He went to the emergency room and had a CTA done which failed to demonstrate an infarct or significant large vessel disease  He later had an MRI of the brain done which demonstrated an acute infarct in the left occipital region as well as a lacunar infarct that was subacute in the cerebellum on the left with evidence of some old cerebellar strokes  He had not been taking aspirin at the time  He was offered tPA but declined for fear bleeding  He was placed on dual anti-platelet therapy as well as Lipitor  Cardiac workup failed to demonstrate evidence of an embolic source  He has not noticed any improvement in his vision following the stroke and has noticed some worsening of balance issues  He was scheduled to have hernia repair surgery in the near future however this was postponed till June  Past Medical History:   Diagnosis Date    Asthma     last assessed: April 1, 2015    Balanitis     last assessed: July 9, 2014    Cataract     Chronic kidney disease     Chronic kidney disease, stage 3 ([facility] [address] )     last assessed: Jan 17, 2018    Diabetes mellitus [facility])     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([facility] )     last assessed: Jan 17, 2018    Hypercholesterolemia     Hypertension     Pneumonia     last assessed: Feb 25, 2013    Stroke [facility])        Family History:  Family History   Problem Relation Age of Onset    Pancreatic cancer Mother     Colon cancer Father     Diabetes Father         mellitus     No Known Problems Sister     Diabetes Sister     Nephrolithiasis Sister        Past Surgical History:  Past Surgical History:   Procedure Laterality Date    ABDOMINAL SURGERY      APPENDECTOMY  1965    CATARACT EXTRACTION      HERNIA REPAIR      HERNIA REPAIR  2007    x2   Robert Polio OTHER SURGICAL HISTORY  1965    Perforated duodenol ulcer surgery     ME REPAIR FLEXOR TENDON,HAND,W/O GRAFT,EA Left 3/17/2021    Procedure: Left thumb EPL repair;  Surgeon: Adwoa Rodriguez MD;  Location: [facility];  Service: Orthopedics       Social History:   reports that he quit smoking about 51 years ago  His smoking use included cigarettes  He has a 20 00 pack-year smoking history  He quit smokeless tobacco use about 15 years ago  He reports current alcohol use of about 1 0 standard drink of alcohol per week  He reports that he does not use drugs      Allergies:  Meperidine and Pravastatin      Current Outpatient Medications:     albuterol (PROVENTIL HFA,VENTOLIN HFA) 90 mcg/act inhaler, Inhale 2 puffs every 4 (four) hours as needed for wheezing, Disp: 8 5 g, Rfl: 1    aspirin 81 mg chewable tablet, Chew 1 tablet (81 mg total) daily, Disp: 30 tablet, Rfl: 0    atorvastatin (LIPITOR) 80 mg tablet, Take 1 tablet (80 mg total) by mouth daily with dinner, Disp: 30 tablet, Rfl: 0    ERYN MICROLET LANCETS lancets, Test twice a day, Disp: 100 each, Rfl: 3    cholecalciferol (VITAMIN D3) 1,000 units tablet, Take 2,000 Units by mouth daily, Disp: , Rfl:     clopidogrel (PLAVIX) 75 mg tablet, Take 1 tablet (75 mg total) by mouth daily for 30 doses, Disp: 30 tablet, Rfl: 0    Contour Test test strip, Test fasting glucose daily, Disp: 100 strip, Rfl: 2    glyBURIDE (DIABETA) 5 mg tablet, Take 1 tablet (5 mg total) by mouth 2 (two) times a day, Disp: 180 tablet, Rfl: 1    linaGLIPtin (Tradjenta) 5 MG TABS, Take 5 mg by mouth daily, Disp: 90 tablet, Rfl: 1    lisinopril-hydrochlorothiazide (PRINZIDE,ZESTORETIC) 20-25 MG per tablet, Take 1 tablet by mouth daily, Disp: 90 tablet, Rfl: 1    methylPREDNISolone 4 MG tablet therapy pack, Use as directed on package, Disp: 21 each, Rfl: 0    Trulicity 9 95 UD/8 7IE SOPN, INJECT THE CONTENTS OF ONE  PEN SUBCUTANEOUSLY WEEKLY  AS DIRECTED, Disp: 6 mL, Rfl: 3    tamsulosin (FLOMAX) 0 4 mg, Take 1 capsule (0 4 mg total) by mouth daily with dinner (Patient not taking: Reported on 2/28/2022 ), Disp: 5 capsule, Rfl: 0    I have reviewed the past medical, social and family history, current medications, allergies, vitals, review of systems and updated this information as appropriate today     Objective:    Vitals:  Blood pressure 126/72, temperature 97 8 °F (36 6 °C), temperature source Tympanic, height 5' 10" (1 778 m), weight 82 3 kg (181 lb 6 4 oz)  Physical Exam    Neurological Exam    GENERAL:  Cooperative in no acute distress  Well-developed and well-nourished    HEAD and NECK   Head is atraumatic normocephalic with no lesions or masses  Neck is supple with full range of motion    CARDIOVASCULAR  Carotid Arteries-no carotid bruits      NEUROLOGIC:  Mental Status-the patient is awake alert and oriented without aphasia or apraxia  Cranial Nerves: Visual fields demonstrate a dense right visual field deficit  Extraocular movements are full without nystagmus  Pupils are 2-1/2 mm and reactive  Face is symmetrical to light touch  Movements of facial expression move symmetrically  Hearing is normal to finger rub bilaterally  Soft palate lifts symmetrically  Shoulder shrug is symmetrical  Tongue is midline without atrophy  Motor: No drift is noted on arm extension  Strength is full in the upper and lower extremities with normal bulk and tone  Sensory:  Diminished temperature and vibratory sensation in the distal lower extremities bilaterally  Cortical function is intact  Coordination: Finger to nose testing is remarkable for slight dysmetria on the left  Romberg is positive  Gait reveals an increased base with symmetrical arm swing  He is unsteady 10 follow left  Tandem walk not be performed  Reflexes:  Reflexes are hypoactive in the biceps, triceps, brachioradialis, knee jerk and ankle jerk regions  Toes are downgoing            ROS:    Review of Systems   Constitutional: Negative  Negative for appetite change and fever  HENT: Positive for hearing loss  Negative for tinnitus, trouble swallowing and voice change  Eyes: Positive for pain  Negative for photophobia  Respiratory: Negative  Negative for shortness of breath  Cardiovascular: Negative  Negative for palpitations  Gastrointestinal: Negative  Negative for nausea and vomiting  Endocrine: Negative  Negative for cold intolerance  Genitourinary: Positive for frequency  Negative for dysuria and urgency  Musculoskeletal: Positive for gait problem (balance and steadiness concerns)  Negative for myalgias and neck pain  Bilateral cramping in the shins and toes at night   Skin: Negative  Negative for rash  Neurological: Positive for light-headedness and headaches   Negative for dizziness, tremors, seizures, syncope, facial asymmetry, speech difficulty, weakness and numbness  Hematological: Negative  Does not bruise/bleed easily  Psychiatric/Behavioral: Positive for sleep disturbance  Negative for confusion and hallucinations

## 2022-03-01 ENCOUNTER — OFFICE VISIT (OUTPATIENT)
Dept: FAMILY MEDICINE CLINIC | Facility: CLINIC | Age: 77
End: 2022-03-01
Payer: COMMERCIAL

## 2022-03-01 VITALS
WEIGHT: 181 LBS | DIASTOLIC BLOOD PRESSURE: 72 MMHG | HEIGHT: 70 IN | SYSTOLIC BLOOD PRESSURE: 128 MMHG | TEMPERATURE: 98.4 F | HEART RATE: 81 BPM | BODY MASS INDEX: 25.91 KG/M2 | OXYGEN SATURATION: 96 %

## 2022-03-01 DIAGNOSIS — I63.9 ACUTE CVA (CEREBROVASCULAR ACCIDENT) (HCC): Primary | ICD-10-CM

## 2022-03-01 DIAGNOSIS — Z76.89 ENCOUNTER FOR SUPPORT AND COORDINATION OF TRANSITION OF CARE: ICD-10-CM

## 2022-03-01 PROBLEM — Z00.00 MEDICARE ANNUAL WELLNESS VISIT, SUBSEQUENT: Status: RESOLVED | Noted: 2019-08-22 | Resolved: 2022-03-01

## 2022-03-01 PROBLEM — R09.82 POST-NASAL DRIP: Status: RESOLVED | Noted: 2021-09-27 | Resolved: 2022-03-01

## 2022-03-01 PROCEDURE — 1111F DSCHRG MED/CURRENT MED MERGE: CPT | Performed by: FAMILY MEDICINE

## 2022-03-01 PROCEDURE — 99495 TRANSJ CARE MGMT MOD F2F 14D: CPT | Performed by: FAMILY MEDICINE

## 2022-03-01 NOTE — PROGRESS NOTES
Assessment/Plan:        Problem List Items Addressed This Visit        Cardiovascular and Mediastinum    Acute CVA (cerebrovascular accident) [facility]) - Primary    Relevant Orders    Comprehensive metabolic panel    CBC and differential      Other Visit Diagnoses     Encounter for support and coordination of transition of care              Overall stable from discharge  Continue Plavix through 3/9, then continue ASA + statin indefinitely  Encouraged to schedule f/u with Ophtho sooner and to scheduled with PT  Update labs as above  Subjective:     Patient ID: Clark Winkler is a 67 y o  male  HPI     Pt presents for hospital follow up s/p admission to [facility] from 2/16-2/18  Pt initially presented to [facility] ED due to vision changes  CT Head was benign, pt deferred tPA, and was transferred to [facility] for medical management  MRI subsequently demonstrated L occipital lobe infarct and recent L cerebellar lacunar infarct  A1c 6 9%, Lipids: Total 169, , HDL 41, TG 31, TSH WNL   ECHO: EF 55%, Grade 1 DD, no PFO     Neuro f/u 2/28: Plavix through 3/9, then ASA + Statin; encouraged PT     Today:   Feels "up and down" -- not sure if it is medication or stroke that is bothering him; he intermittently feels "blah", fatigued   Still having visual disturbances -- scheduled with ophtho for follow up in 10/2022  No weakness/numbness in extremities -- is somewhat off balance    Hasn't made appointment for PT yet     Review of Systems   Constitutional: Negative for appetite change  Eyes: Positive for visual disturbance ("blind" in R periphery -- lights flashing, movement of stuff)  Respiratory: Negative for shortness of breath ("I've had trouble breathing for 35 years")  Cardiovascular: Negative for chest pain and palpitations  Gastrointestinal: Negative for abdominal pain, constipation and diarrhea  Genitourinary: Negative for difficulty urinating     Musculoskeletal: Positive for gait problem (off balance)  Neurological: Positive for light-headedness  Negative for weakness and numbness  Objective:     Physical Exam  Vitals and nursing note reviewed  Constitutional:       General: He is not in acute distress  Appearance: Normal appearance  HENT:      Head: Normocephalic and atraumatic  Right Ear: Tympanic membrane, ear canal and external ear normal       Left Ear: Tympanic membrane, ear canal and external ear normal    Eyes:      Conjunctiva/sclera: Conjunctivae normal    Cardiovascular:      Rate and Rhythm: Normal rate and regular rhythm  Pulmonary:      Effort: Pulmonary effort is normal  No respiratory distress  Breath sounds: Normal breath sounds  Abdominal:      General: Bowel sounds are normal  There is no distension  Palpations: Abdomen is soft  Tenderness: There is no abdominal tenderness  Musculoskeletal:      Right lower leg: No edema  Left lower leg: No edema  Lymphadenopathy:      Cervical: No cervical adenopathy  Skin:     General: Skin is warm and dry  Neurological:      General: No focal deficit present  Mental Status: He is alert  Psychiatric:         Mood and Affect: Mood normal            Vitals:    03/01/22 1430   BP: 128/72   Pulse: 81   Temp: 98 4 °F (36 9 °C)   SpO2: 96%   Weight: 82 1 kg (181 lb)   Height: 5' 10" (1 778 m)       Transitional Care Management Review:  Komal Lynch is a 67 y o  male here for TCM follow up       During the TCM phone call patient stated:    TCM Call (since 1/29/2022)     Date and time call was made  2/21/2022  1:59 PM    Hospital care reviewed  Records reviewed        Patient was hospitialized at  [address] [place] [address]        Date of Admission  02/16/22    Date of discharge  02/18/22    Diagnosis  Acute CVA     Disposition  Home    Were the patients medications reviewed and updated  No    Current Symptoms  None      TCM Call (since 1/29/2022)     Post hospital issues  None    Should patient be enrolled in anticoag monitoring? No    Scheduled for follow up?   Yes    Did you obtain your prescribed medications  Yes    Do you need help managing your prescriptions or medications  No    Is transportation to your appointment needed  No    I have advised the patient to call PCP with any new or worsening symptoms  T>Sergio Huerta, DO

## 2022-03-11 ENCOUNTER — APPOINTMENT (OUTPATIENT)
Dept: LAB | Facility: CLINIC | Age: 77
End: 2022-03-11
Payer: COMMERCIAL

## 2022-03-11 DIAGNOSIS — I63.9 ACUTE CVA (CEREBROVASCULAR ACCIDENT) (HCC): ICD-10-CM

## 2022-03-11 DIAGNOSIS — K40.90 INGUINAL HERNIA WITHOUT OBSTRUCTION OR GANGRENE, RECURRENCE NOT SPECIFIED, UNSPECIFIED LATERALITY: ICD-10-CM

## 2022-03-11 LAB
ALBUMIN SERPL BCP-MCNC: 3.9 G/DL (ref 3.5–5)
ALP SERPL-CCNC: 67 U/L (ref 46–116)
ALT SERPL W P-5'-P-CCNC: 33 U/L (ref 12–78)
ANION GAP SERPL CALCULATED.3IONS-SCNC: 6 MMOL/L (ref 4–13)
AST SERPL W P-5'-P-CCNC: 16 U/L (ref 5–45)
BASOPHILS # BLD AUTO: 0.08 THOUSANDS/ΜL (ref 0–0.1)
BASOPHILS NFR BLD AUTO: 1 % (ref 0–1)
BILIRUB SERPL-MCNC: 0.85 MG/DL (ref 0.2–1)
BUN SERPL-MCNC: 46 MG/DL (ref 5–25)
CALCIUM SERPL-MCNC: 8.9 MG/DL (ref 8.3–10.1)
CHLORIDE SERPL-SCNC: 104 MMOL/L (ref 100–108)
CO2 SERPL-SCNC: 26 MMOL/L (ref 21–32)
CREAT SERPL-MCNC: 1.9 MG/DL (ref 0.6–1.3)
EOSINOPHIL # BLD AUTO: 0.22 THOUSAND/ΜL (ref 0–0.61)
EOSINOPHIL NFR BLD AUTO: 2 % (ref 0–6)
ERYTHROCYTE [DISTWIDTH] IN BLOOD BY AUTOMATED COUNT: 12.5 % (ref 11.6–15.1)
GFR SERPL CREATININE-BSD FRML MDRD: 33 ML/MIN/1.73SQ M
GLUCOSE SERPL-MCNC: 196 MG/DL (ref 65–140)
HCT VFR BLD AUTO: 40.3 % (ref 36.5–49.3)
HGB BLD-MCNC: 14.2 G/DL (ref 12–17)
IMM GRANULOCYTES # BLD AUTO: 0.07 THOUSAND/UL (ref 0–0.2)
IMM GRANULOCYTES NFR BLD AUTO: 1 % (ref 0–2)
LYMPHOCYTES # BLD AUTO: 2.12 THOUSANDS/ΜL (ref 0.6–4.47)
LYMPHOCYTES NFR BLD AUTO: 21 % (ref 14–44)
MCH RBC QN AUTO: 30.6 PG (ref 26.8–34.3)
MCHC RBC AUTO-ENTMCNC: 35.2 G/DL (ref 31.4–37.4)
MCV RBC AUTO: 87 FL (ref 82–98)
MONOCYTES # BLD AUTO: 0.82 THOUSAND/ΜL (ref 0.17–1.22)
MONOCYTES NFR BLD AUTO: 8 % (ref 4–12)
NEUTROPHILS # BLD AUTO: 6.89 THOUSANDS/ΜL (ref 1.85–7.62)
NEUTS SEG NFR BLD AUTO: 67 % (ref 43–75)
NRBC BLD AUTO-RTO: 0 /100 WBCS
PLATELET # BLD AUTO: 280 THOUSANDS/UL (ref 149–390)
PMV BLD AUTO: 11.1 FL (ref 8.9–12.7)
POTASSIUM SERPL-SCNC: 4.3 MMOL/L (ref 3.5–5.3)
PROT SERPL-MCNC: 7.1 G/DL (ref 6.4–8.2)
RBC # BLD AUTO: 4.64 MILLION/UL (ref 3.88–5.62)
SODIUM SERPL-SCNC: 136 MMOL/L (ref 136–145)
WBC # BLD AUTO: 10.2 THOUSAND/UL (ref 4.31–10.16)

## 2022-03-11 PROCEDURE — 36415 COLL VENOUS BLD VENIPUNCTURE: CPT

## 2022-03-11 PROCEDURE — 80053 COMPREHEN METABOLIC PANEL: CPT

## 2022-03-11 PROCEDURE — 85025 COMPLETE CBC W/AUTO DIFF WBC: CPT

## 2022-03-15 DIAGNOSIS — I10 ESSENTIAL HYPERTENSION: ICD-10-CM

## 2022-03-15 DIAGNOSIS — E11.8 TYPE 2 DIABETES MELLITUS WITH COMPLICATION, WITHOUT LONG-TERM CURRENT USE OF INSULIN (HCC): ICD-10-CM

## 2022-03-15 DIAGNOSIS — I63.9 ACUTE CVA (CEREBROVASCULAR ACCIDENT) (HCC): ICD-10-CM

## 2022-03-15 RX ORDER — CLOPIDOGREL BISULFATE 75 MG/1
75 TABLET ORAL DAILY
Qty: 90 TABLET | Refills: 1 | Status: SHIPPED | OUTPATIENT
Start: 2022-03-15 | End: 2022-03-16 | Stop reason: SDUPTHER

## 2022-03-15 RX ORDER — LISINOPRIL AND HYDROCHLOROTHIAZIDE 25; 20 MG/1; MG/1
1 TABLET ORAL DAILY
Qty: 90 TABLET | Refills: 1 | Status: SHIPPED | OUTPATIENT
Start: 2022-03-15

## 2022-03-15 RX ORDER — GLYBURIDE 5 MG/1
5 TABLET ORAL 2 TIMES DAILY
Qty: 180 TABLET | Refills: 1 | Status: SHIPPED | OUTPATIENT
Start: 2022-03-15

## 2022-03-15 RX ORDER — LINAGLIPTIN 5 MG/1
5 TABLET, FILM COATED ORAL DAILY
Qty: 90 TABLET | Refills: 1 | Status: SHIPPED | OUTPATIENT
Start: 2022-03-15

## 2022-03-15 RX ORDER — ATORVASTATIN CALCIUM 80 MG/1
80 TABLET, FILM COATED ORAL
Qty: 90 TABLET | Refills: 1 | Status: SHIPPED | OUTPATIENT
Start: 2022-03-15 | End: 2022-03-16 | Stop reason: SDUPTHER

## 2022-03-16 DIAGNOSIS — I63.9 ACUTE CVA (CEREBROVASCULAR ACCIDENT) (HCC): ICD-10-CM

## 2022-03-16 RX ORDER — CLOPIDOGREL BISULFATE 75 MG/1
75 TABLET ORAL DAILY
Qty: 90 TABLET | Refills: 1 | Status: SHIPPED | OUTPATIENT
Start: 2022-03-16 | End: 2022-06-22 | Stop reason: SDUPTHER

## 2022-03-16 RX ORDER — ATORVASTATIN CALCIUM 80 MG/1
80 TABLET, FILM COATED ORAL
Qty: 90 TABLET | Refills: 1 | Status: SHIPPED | OUTPATIENT
Start: 2022-03-16 | End: 2022-06-22 | Stop reason: SDUPTHER

## 2022-03-21 ENCOUNTER — TELEPHONE (OUTPATIENT)
Dept: FAMILY MEDICINE CLINIC | Facility: CLINIC | Age: 77
End: 2022-03-21

## 2022-03-21 NOTE — TELEPHONE ENCOUNTER
Kidney function is stable from last check  Liver function normal  Electrolytes normal    Hemoglobin has improved back to normal  WBC count slightly above normal, but improved from last check  Thanks! Asthma    Gastritis    UTI (urinary tract infection)

## 2022-04-01 ENCOUNTER — OFFICE VISIT (OUTPATIENT)
Dept: FAMILY MEDICINE CLINIC | Facility: CLINIC | Age: 77
End: 2022-04-01
Payer: COMMERCIAL

## 2022-04-01 VITALS
WEIGHT: 182 LBS | DIASTOLIC BLOOD PRESSURE: 73 MMHG | TEMPERATURE: 98.7 F | OXYGEN SATURATION: 97 % | HEART RATE: 75 BPM | HEIGHT: 70 IN | SYSTOLIC BLOOD PRESSURE: 137 MMHG | BODY MASS INDEX: 26.05 KG/M2

## 2022-04-01 DIAGNOSIS — Z00.00 MEDICARE ANNUAL WELLNESS VISIT, SUBSEQUENT: Primary | ICD-10-CM

## 2022-04-01 DIAGNOSIS — Z13.31 ENCOUNTER FOR SCREENING FOR DEPRESSION: ICD-10-CM

## 2022-04-01 DIAGNOSIS — Z11.59 NEED FOR HEPATITIS C SCREENING TEST: ICD-10-CM

## 2022-04-01 DIAGNOSIS — E11.8 TYPE 2 DIABETES MELLITUS WITH COMPLICATION, WITHOUT LONG-TERM CURRENT USE OF INSULIN (HCC): ICD-10-CM

## 2022-04-01 PROCEDURE — G0444 DEPRESSION SCREEN ANNUAL: HCPCS | Performed by: FAMILY MEDICINE

## 2022-04-01 PROCEDURE — 3075F SYST BP GE 130 - 139MM HG: CPT | Performed by: FAMILY MEDICINE

## 2022-04-01 PROCEDURE — 1003F LEVEL OF ACTIVITY ASSESS: CPT | Performed by: FAMILY MEDICINE

## 2022-04-01 PROCEDURE — G0439 PPPS, SUBSEQ VISIT: HCPCS | Performed by: FAMILY MEDICINE

## 2022-04-01 PROCEDURE — 1036F TOBACCO NON-USER: CPT | Performed by: FAMILY MEDICINE

## 2022-04-01 PROCEDURE — 3078F DIAST BP <80 MM HG: CPT | Performed by: FAMILY MEDICINE

## 2022-04-01 PROCEDURE — 3288F FALL RISK ASSESSMENT DOCD: CPT | Performed by: FAMILY MEDICINE

## 2022-04-01 PROCEDURE — 3725F SCREEN DEPRESSION PERFORMED: CPT | Performed by: FAMILY MEDICINE

## 2022-04-01 PROCEDURE — 1170F FXNL STATUS ASSESSED: CPT | Performed by: FAMILY MEDICINE

## 2022-04-01 PROCEDURE — 1125F AMNT PAIN NOTED PAIN PRSNT: CPT | Performed by: FAMILY MEDICINE

## 2022-04-01 PROCEDURE — 1160F RVW MEDS BY RX/DR IN RCRD: CPT | Performed by: FAMILY MEDICINE

## 2022-04-01 NOTE — PROGRESS NOTES
Assessment and Plan:     Problem List Items Addressed This Visit     None      Visit Diagnoses     Medicare annual wellness visit, subsequent    -  Primary    Encounter for screening for depression        Need for hepatitis C screening test        Relevant Orders    Hepatitis C antibody    Type 2 diabetes mellitus with complication, without long-term current use of insulin ([address] )        Relevant Orders    Lipid Panel with Direct LDL reflex    Hemoglobin A1C    Comprehensive metabolic panel        BMI Counseling: Body mass index is 26 11 kg/m²  The BMI is above normal  Nutrition recommendations include encouraging healthy choices of fruits and vegetables  Exercise recommendations include exercising 3-5 times per week  No pharmacotherapy was ordered  Rationale for BMI follow-up plan is due to patient being overweight or obese  Depression Screening and Follow-up Plan: Patient was screened for depression during today's encounter  They screened negative with a PHQ-2 score of 0  Return in May with labs    Preventive health issues were discussed with patient, and age appropriate screening tests were ordered as noted in patient's After Visit Summary  Personalized health advice and appropriate referrals for health education or preventive services given if needed, as noted in patient's After Visit Summary  History of Present Illness:     Patient presents for Medicare Annual Wellness visit    Patient Care Team:  David Abreu MD as PCP - Sanjiv Vail MD as Consulting Physician     Review of Systems   Constitutional: Negative for activity change, appetite change, chills, fatigue, fever and unexpected weight change  HENT: Negative for congestion, ear discharge, ear pain, postnasal drip, sinus pressure and sore throat  Eyes: Positive for visual disturbance (recent stroke)  Negative for discharge  Respiratory: Negative for cough, shortness of breath and wheezing      Cardiovascular: Negative for chest pain, palpitations and leg swelling  Gastrointestinal: Negative for abdominal pain, constipation, diarrhea, nausea and vomiting  Endocrine: Negative for cold intolerance, heat intolerance, polydipsia and polyuria  Genitourinary: Negative for difficulty urinating and frequency  Musculoskeletal: Negative for arthralgias, back pain, joint swelling and myalgias  Skin: Negative for rash  Neurological: Negative for dizziness, weakness, light-headedness, numbness and headaches  Hematological: Negative for adenopathy  Psychiatric/Behavioral: Negative for behavioral problems, confusion, dysphoric mood, sleep disturbance and suicidal ideas  The patient is not nervous/anxious  Problem List:     Patient Active Problem List   Diagnosis    Stage 3b chronic kidney disease ([facility] )    Type 2 diabetes mellitus with diabetic chronic kidney disease ([facility] )    Benign essential hypertension    Hypercholesterolemia    Statin intolerance    Degenerative cervical spinal stenosis    Overweight (BMI 25 0-29  9)    Benign prostatic hyperplasia with lower urinary tract symptoms    Chronic kidney disease-mineral and bone disorder    Ganglion cyst of left foot    Vitamin D deficiency    Moderate asthma without complication    Anxiety about health    Left inguinal hernia    Acute CVA (cerebrovascular accident) [facility])      Past Medical and Surgical History:     Past Medical History:   Diagnosis Date    Asthma     last assessed: April 1, 2015    Balanitis     last assessed: July 9, 2014    Cataract     Chronic kidney disease     Chronic kidney disease, stage 3 ([facility] )     last assessed: Jan 17, 2018    Diabetes mellitus [facility])     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([place] [address] )     last assessed: Jan 17, 2018    Hypercholesterolemia     Hypertension     Pneumonia     last assessed: Feb 25, 2013    Stroke [facility])      Past Surgical History:   Procedure Laterality Date   [address] [place] [address] CATARACT EXTRACTION      HERNIA REPAIR      HERNIA REPAIR  2007    x2    OTHER SURGICAL HISTORY  1965    Perforated duodenol ulcer surgery     UT REPAIR FLEXOR TENDON,HAND,W/O GRAFT,EA Left 3/17/2021    Procedure: Left thumb EPL repair;  Surgeon: Rosie España MD;  Location: [facility];  Service: Orthopedics      Family History:     Family History   Problem Relation Age of Onset    Pancreatic cancer Mother     Colon cancer Father     Diabetes Father         mellitus     No Known Problems Sister     Diabetes Sister     Nephrolithiasis Sister       Social History:     Social History     Socioeconomic History    Marital status:      Spouse name: None    Number of children: None    Years of education: None    Highest education level: None   Occupational History    Occupation: Not employed - Retired    Tobacco Use    Smoking status: Former Smoker     Packs/day: 2 00     Years: 10 00     Pack years: 20 00     Types: Cigarettes     Quit date:      Years since quittin 2    Smokeless tobacco: Former User     Quit date:     Tobacco comment: former smoker noted in "allscripts" Quit in  - never used chewing tobacco    Vaping Use    Vaping Use: Never used   Substance and Sexual Activity    Alcohol use: Yes     Alcohol/week: 1 0 standard drink     Types: 1 Standard drinks or equivalent per week     Comment: rare  socially    Drug use: No    Sexual activity: None   Other Topics Concern    None   Social History Narrative    Voluntary Childlessness     Advance directive on file - No     Exercises occasionally     Occasional caffeine consumption      Social Determinants of Health     Financial Resource Strain: Not on file   Food Insecurity: No Food Insecurity    Worried About Running Out of Food in the Last Year: Never true    Sparkle of Food in the Last Year: Never true   Transportation Needs: No Transportation Needs    Lack of Transportation (Medical):  No    Lack of Transportation (Non-Medical): No   Physical Activity: Not on file   Stress: Not on file   Social Connections: Not on file   Intimate Partner Violence: Not on file   Housing Stability: Low Risk     Unable to Pay for Housing in the Last Year: No    Number of Places Lived in the Last Year: 1    Unstable Housing in the Last Year: No      Medications and Allergies:     Current Outpatient Medications   Medication Sig Dispense Refill    albuterol (PROVENTIL HFA,VENTOLIN HFA) 90 mcg/act inhaler Inhale 2 puffs every 4 (four) hours as needed for wheezing 8 5 g 1    aspirin 81 mg chewable tablet Chew 1 tablet (81 mg total) daily 30 tablet 0    atorvastatin (LIPITOR) 80 mg tablet Take 1 tablet (80 mg total) by mouth daily with dinner 90 tablet 1    ERYN MICROLET LANCETS lancets Test twice a day 100 each 3    cholecalciferol (VITAMIN D3) 1,000 units tablet Take 2,000 Units by mouth daily      clopidogrel (PLAVIX) 75 mg tablet Take 1 tablet (75 mg total) by mouth daily for 30 doses 90 tablet 1    Contour Test test strip Test fasting glucose daily 100 strip 2    glyBURIDE (DIABETA) 5 mg tablet Take 1 tablet (5 mg total) by mouth 2 (two) times a day 180 tablet 1    linaGLIPtin (Tradjenta) 5 MG TABS Take 5 mg by mouth daily 90 tablet 1    lisinopril-hydrochlorothiazide (PRINZIDE,ZESTORETIC) 20-25 MG per tablet Take 1 tablet by mouth daily 90 tablet 1    methylPREDNISolone 4 MG tablet therapy pack Use as directed on package 21 each 0    Trulicity 7 56 IF/1 4JC SOPN INJECT THE CONTENTS OF ONE  PEN SUBCUTANEOUSLY WEEKLY  AS DIRECTED 6 mL 3     No current facility-administered medications for this visit       Allergies   Allergen Reactions    Meperidine     Pravastatin Myalgia      Immunizations:     Immunization History   Administered Date(s) Administered    COVID-19 MODERNA VACC 0 25 ML IM BOOSTER 02/16/2022    COVID-19 MODERNA VACC 0 5 ML IM 04/07/2021, 05/05/2021    Pneumococcal Polysaccharide PPV23 12/29/2021  Tdap 01/11/2018, 01/11/2018      Health Maintenance:         Topic Date Due    Hepatitis C Screening  Never done     There are no preventive care reminders to display for this patient  Medicare Health Risk Assessment:     /73   Pulse 75   Temp 98 7 °F (37 1 °C)   Ht 5' 10" (1 778 m)   Wt 82 6 kg (182 lb)   SpO2 97%   BMI 26 11 kg/m²      Bree is here for his Subsequent Wellness visit  Last Medicare Wellness visit information reviewed, patient interviewed and updates made to the record today  Health Risk Assessment:   Patient rates overall health as good  Patient feels that their physical health rating is same  Patient is satisfied with their life  Eyesight was rated as same  Hearing was rated as slightly worse  Patient feels that their emotional and mental health rating is same  Patients states they are never, rarely angry  Patient states they are never, rarely unusually tired/fatigued  Pain experienced in the last 7 days has been none  Patient states that he has experienced no weight loss or gain in last 6 months  Depression Screening:   PHQ-2 Score: 0      Fall Risk Screening: In the past year, patient has experienced: history of falling in past year    Number of falls: 2 or more  Injured during fall?: Yes    Feels unsteady when standing or walking?: Yes    Worried about falling?: Yes      Home Safety:  Patient has trouble with stairs inside or outside of their home  Patient has working smoke alarms and has working carbon monoxide detector  Home safety hazards include: none  Nutrition:   Current diet is Regular  Medications:   Patient is currently taking over-the-counter supplements  OTC medications include: see medication list  Patient is able to manage medications       Activities of Daily Living (ADLs)/Instrumental Activities of Daily Living (IADLs):   Walk and transfer into and out of bed and chair?: Yes  Dress and groom yourself?: Yes    Bathe or shower yourself?: Yes Feed yourself? Yes  Do your laundry/housekeeping?: Yes  Manage your money, pay your bills and track your expenses?: Yes  Make your own meals?: Yes    Do your own shopping?: Yes    Previous Hospitalizations:   Any hospitalizations or ED visits within the last 12 months?: Yes    How many hospitalizations have you had in the last year?: 1-2    Advance Care Planning:   Living will: No    Durable POA for healthcare: No    Advanced directive: No    Five wishes given: Yes      Cognitive Screening:   Provider or family/friend/caregiver concerned regarding cognition?: No    PREVENTIVE SCREENINGS      Cardiovascular Screening:    General: Screening Not Indicated, History Lipid Disorder and Risks and Benefits Discussed    Due for: Lipid Panel      Diabetes Screening:     General: Screening Not Indicated, History Diabetes and Risks and Benefits Discussed    Due for: Blood Glucose      Colorectal Cancer Screening:     General: Screening Not Indicated      Prostate Cancer Screening:    General: Screening Not Indicated      Osteoporosis Screening:    General: Screening Not Indicated      Abdominal Aortic Aneurysm (AAA) Screening:    Risk factors include: tobacco use        General: Screening Not Indicated      Lung Cancer Screening:     General: Screening Not Indicated      Hepatitis C Screening:    General: Risks and Benefits Discussed    Screening, Brief Intervention, and Referral to Treatment (SBIRT)    Screening  Typical number of drinks in a day: 0  Typical number of drinks in a week: 0  Interpretation: Low risk drinking behavior  Single Item Drug Screening:  How often have you used an illegal drug (including marijuana) or a prescription medication for non-medical reasons in the past year? never    Single Item Drug Screen Score: 0  Interpretation: Negative screen for possible drug use disorder    Brief Intervention  Alcohol & drug use screenings were reviewed  No concerns regarding substance use disorder identified  Physical Exam  Vitals and nursing note reviewed  Constitutional:       General: He is not in acute distress  Appearance: He is well-developed  He is not diaphoretic  HENT:      Head: Normocephalic and atraumatic  Right Ear: Tympanic membrane, ear canal and external ear normal       Left Ear: Tympanic membrane, ear canal and external ear normal       Mouth/Throat:      Comments: mask  Eyes:      Conjunctiva/sclera: Conjunctivae normal       Pupils: Pupils are equal, round, and reactive to light  Cardiovascular:      Rate and Rhythm: Normal rate and regular rhythm  Heart sounds: Normal heart sounds  No murmur heard  No friction rub  No gallop  Comments: Normal carotid exam  Pulmonary:      Effort: Pulmonary effort is normal  No respiratory distress  Breath sounds: Normal breath sounds  No stridor  No wheezing or rales  Chest:      Chest wall: No tenderness  Musculoskeletal:         General: No swelling  Right lower leg: No edema  Left lower leg: No edema  Skin:     Findings: No rash  Neurological:      General: No focal deficit present  Mental Status: He is alert  Mental status is at baseline  Cranial Nerves: No cranial nerve deficit  Psychiatric:         Behavior: Behavior normal          Thought Content:  Thought content normal          Judgment: Judgment normal          Papa Mcrae MD

## 2022-04-01 NOTE — PATIENT INSTRUCTIONS
Medicare Preventive Visit Patient Instructions  Thank you for completing your Welcome to Medicare Visit or Medicare Annual Wellness Visit today  Your next wellness visit will be due in one year (4/2/2023)  The screening/preventive services that you may require over the next 5-10 years are detailed below  Some tests may not apply to you based off risk factors and/or age  Screening tests ordered at today's visit but not completed yet may show as past due  Also, please note that scanned in results may not display below  Preventive Screenings:  Service Recommendations Previous Testing/Comments   Colorectal Cancer Screening  · Colonoscopy    · Fecal Occult Blood Test (FOBT)/Fecal Immunochemical Test (FIT)  · Fecal DNA/Cologuard Test  · Flexible Sigmoidoscopy Age: 54-65 years old   Colonoscopy: every 10 years (May be performed more frequently if at higher risk)  OR  FOBT/FIT: every 1 year  OR  Cologuard: every 3 years  OR  Sigmoidoscopy: every 5 years  Screening may be recommended earlier than age 48 if at higher risk for colorectal cancer  Also, an individualized decision between you and your healthcare provider will decide whether screening between the ages of 74-80 would be appropriate   Colonoscopy: 12/02/2014  FOBT/FIT: Not on file  Cologuard: Not on file  Sigmoidoscopy: Not on file          Prostate Cancer Screening Individualized decision between patient and health care provider in men between ages of 53-78   Medicare will cover every 12 months beginning on the day after your 50th birthday PSA: 2 9 ng/mL     Screening Not Indicated     Hepatitis C Screening Once for adults born between 1945 and 1965  More frequently in patients at high risk for Hepatitis C Hep C Antibody: Not on file        Diabetes Screening 1-2 times per year if you're at risk for diabetes or have pre-diabetes Fasting glucose: 274 mg/dL   A1C: 6 9 %    Screening Not Indicated  History Diabetes   Cholesterol Screening Once every 5 years if you don't have a lipid disorder  May order more often based on risk factors  Lipid panel: 02/17/2022    Screening Not Indicated  History Lipid Disorder      Other Preventive Screenings Covered by Medicare:  1  Abdominal Aortic Aneurysm (AAA) Screening: covered once if your at risk  You're considered to be at risk if you have a family history of AAA or a male between the age of 73-68 who smoking at least 100 cigarettes in your lifetime  2  Lung Cancer Screening: covers low dose CT scan once per year if you meet all of the following conditions: (1) Age 50-69; (2) No signs or symptoms of lung cancer; (3) Current smoker or have quit smoking within the last 15 years; (4) You have a tobacco smoking history of at least 30 pack years (packs per day x number of years you smoked); (5) You get a written order from a healthcare provider  3  Glaucoma Screening: covered annually if you're considered high risk: (1) You have diabetes OR (2) Family history of glaucoma OR (3)  aged 48 and older OR (3)  American aged 72 and older  3  Osteoporosis Screening: covered every 2 years if you meet one of the following conditions: (1) Have a vertebral abnormality; (2) On glucocorticoid therapy for more than 3 months; (3) Have primary hyperparathyroidism; (4) On osteoporosis medications and need to assess response to drug therapy  5  HIV Screening: covered annually if you're between the age of 12-76  Also covered annually if you are younger than 13 and older than 72 with risk factors for HIV infection  For pregnant patients, it is covered up to 3 times per pregnancy      Immunizations:  Immunization Recommendations   Influenza Vaccine Annual influenza vaccination during flu season is recommended for all persons aged >= 6 months who do not have contraindications   Pneumococcal Vaccine (Prevnar and Pneumovax)  * Prevnar = PCV13  * Pneumovax = PPSV23 Adults 25-60 years old: 1-3 doses may be recommended based on certain risk factors  Adults 72 years old: Prevnar (PCV13) vaccine recommended followed by Pneumovax (PPSV23) vaccine  If already received PPSV23 since turning 65, then PCV13 recommended at least one year after PPSV23 dose  Hepatitis B Vaccine 3 dose series if at intermediate or high risk (ex: diabetes, end stage renal disease, liver disease)   Tetanus (Td) Vaccine - COST NOT COVERED BY MEDICARE PART B Following completion of primary series, a booster dose should be given every 10 years to maintain immunity against tetanus  Td may also be given as tetanus wound prophylaxis  Tdap Vaccine - COST NOT COVERED BY MEDICARE PART B Recommended at least once for all adults  For pregnant patients, recommended with each pregnancy  Shingles Vaccine (Shingrix) - COST NOT COVERED BY MEDICARE PART B  2 shot series recommended in those aged 48 and above     Health Maintenance Due:      Topic Date Due    Hepatitis C Screening  Never done     Immunizations Due:  There are no preventive care reminders to display for this patient  Advance Directives   What are advance directives? Advance directives are legal documents that state your wishes and plans for medical care  These plans are made ahead of time in case you lose your ability to make decisions for yourself  Advance directives can apply to any medical decision, such as the treatments you want, and if you want to donate organs  What are the types of advance directives? There are many types of advance directives, and each state has rules about how to use them  You may choose a combination of any of the following:  · Living will: This is a written record of the treatment you want  You can also choose which treatments you do not want, which to limit, and which to stop at a certain time  This includes surgery, medicine, IV fluid, and tube feedings  · Durable power of  for healthcare [place] [facility]):   This is a written record that states who you want to make healthcare choices for you when you are unable to make them for yourself  This person, called a proxy, is usually a family member or a friend  You may choose more than 1 proxy  · Do not resuscitate (DNR) order:  A DNR order is used in case your heart stops beating or you stop breathing  It is a request not to have certain forms of treatment, such as CPR  A DNR order may be included in other types of advance directives  · Medical directive: This covers the care that you want if you are in a coma, near death, or unable to make decisions for yourself  You can list the treatments you want for each condition  Treatment may include pain medicine, surgery, blood transfusions, dialysis, IV or tube feedings, and a ventilator (breathing machine)  · Values history: This document has questions about your views, beliefs, and how you feel and think about life  This information can help others choose the care that you would choose  Why are advance directives important? An advance directive helps you control your care  Although spoken wishes may be used, it is better to have your wishes written down  Spoken wishes can be misunderstood, or not followed  Treatments may be given even if you do not want them  An advance directive may make it easier for your family to make difficult choices about your care  Fall Prevention    Fall prevention  includes ways to make your home and other areas safer  It also includes ways you can move more carefully to prevent a fall  Health conditions that cause changes in your blood pressure, vision, or muscle strength and coordination may increase your risk for falls  Medicines may also increase your risk for falls if they make you dizzy, weak, or sleepy  Fall prevention tips:   · Stand or sit up slowly  · Use assistive devices as directed  · Wear shoes that fit well and have soles that   · Wear a personal alarm  · Stay active  · Manage your medical conditions      Home Safety Tips:  · Add items to prevent falls in the bathroom  · Keep paths clear  · Install bright lights in your home  · Keep items you use often on shelves within reach  · Paint or place reflective tape on the edges of your stairs  Weight Management   Why it is important to manage your weight:  Being overweight increases your risk of health conditions such as heart disease, high blood pressure, type 2 diabetes, and certain types of cancer  It can also increase your risk for osteoarthritis, sleep apnea, and other respiratory problems  Aim for a slow, steady weight loss  Even a small amount of weight loss can lower your risk of health problems  How to lose weight safely:  A safe and healthy way to lose weight is to eat fewer calories and get regular exercise  You can lose up about 1 pound a week by decreasing the number of calories you eat by 500 calories each day  Healthy meal plan for weight management:  A healthy meal plan includes a variety of foods, contains fewer calories, and helps you stay healthy  A healthy meal plan includes the following:  · Eat whole-grain foods more often  A healthy meal plan should contain fiber  Fiber is the part of grains, fruits, and vegetables that is not broken down by your body  Whole-grain foods are healthy and provide extra fiber in your diet  Some examples of whole-grain foods are whole-wheat breads and pastas, oatmeal, brown rice, and bulgur  · Eat a variety of vegetables every day  Include dark, leafy greens such as spinach, kale, anderson greens, and mustard greens  Eat yellow and orange vegetables such as carrots, sweet potatoes, and winter squash  · Eat a variety of fruits every day  Choose fresh or canned fruit (canned in its own juice or light syrup) instead of juice  Fruit juice has very little or no fiber  · Eat low-fat dairy foods  Drink fat-free (skim) milk or 1% milk  Eat fat-free yogurt and low-fat cottage cheese   Try low-fat cheeses such as mozzarella and other reduced-fat cheeses  · Choose meat and other protein foods that are low in fat  Choose beans or other legumes such as split peas or lentils  Choose fish, skinless poultry (chicken or turkey), or lean cuts of red meat (beef or pork)  Before you cook meat or poultry, cut off any visible fat  · Use less fat and oil  Try baking foods instead of frying them  Add less fat, such as margarine, sour cream, regular salad dressing and mayonnaise to foods  Eat fewer high-fat foods  Some examples of high-fat foods include french fries, doughnuts, ice cream, and cakes  · Eat fewer sweets  Limit foods and drinks that are high in sugar  This includes candy, cookies, regular soda, and sweetened drinks  Exercise:  Exercise at least 30 minutes per day on most days of the week  Some examples of exercise include walking, biking, dancing, and swimming  You can also fit in more physical activity by taking the stairs instead of the elevator or parking farther away from stores  Ask your healthcare provider about the best exercise plan for you  © Copyright [employer] 2018 Information is for End User's use only and may not be sold, redistributed or otherwise used for commercial purposes   All illustrations and images included in CareNotes® are the copyrighted property of [employer]  or [address] [place] [address] [facility]

## 2022-04-28 ENCOUNTER — TELEPHONE (OUTPATIENT)
Dept: NEPHROLOGY | Facility: CLINIC | Age: 77
End: 2022-04-28

## 2022-05-18 ENCOUNTER — APPOINTMENT (OUTPATIENT)
Dept: LAB | Facility: CLINIC | Age: 77
End: 2022-05-18
Payer: COMMERCIAL

## 2022-05-18 DIAGNOSIS — E11.8 TYPE 2 DIABETES MELLITUS WITH COMPLICATION, WITHOUT LONG-TERM CURRENT USE OF INSULIN (HCC): ICD-10-CM

## 2022-05-18 DIAGNOSIS — Z11.59 NEED FOR HEPATITIS C SCREENING TEST: ICD-10-CM

## 2022-05-18 LAB
ALBUMIN SERPL BCP-MCNC: 4.1 G/DL (ref 3.5–5)
ALP SERPL-CCNC: 71 U/L (ref 46–116)
ALT SERPL W P-5'-P-CCNC: 43 U/L (ref 12–78)
ANION GAP SERPL CALCULATED.3IONS-SCNC: 7 MMOL/L (ref 4–13)
AST SERPL W P-5'-P-CCNC: 24 U/L (ref 5–45)
BILIRUB SERPL-MCNC: 0.71 MG/DL (ref 0.2–1)
BUN SERPL-MCNC: 33 MG/DL (ref 5–25)
CALCIUM SERPL-MCNC: 8.7 MG/DL (ref 8.3–10.1)
CHLORIDE SERPL-SCNC: 106 MMOL/L (ref 100–108)
CHOLEST SERPL-MCNC: 93 MG/DL
CO2 SERPL-SCNC: 24 MMOL/L (ref 21–32)
CREAT SERPL-MCNC: 2.07 MG/DL (ref 0.6–1.3)
EST. AVERAGE GLUCOSE BLD GHB EST-MCNC: 166 MG/DL
GFR SERPL CREATININE-BSD FRML MDRD: 29 ML/MIN/1.73SQ M
GLUCOSE P FAST SERPL-MCNC: 94 MG/DL (ref 65–99)
HBA1C MFR BLD: 7.4 %
HCV AB SER QL: NORMAL
HDLC SERPL-MCNC: 31 MG/DL
LDLC SERPL CALC-MCNC: 52 MG/DL (ref 0–100)
POTASSIUM SERPL-SCNC: 4.6 MMOL/L (ref 3.5–5.3)
PROT SERPL-MCNC: 7.4 G/DL (ref 6.4–8.2)
SODIUM SERPL-SCNC: 137 MMOL/L (ref 136–145)
TRIGL SERPL-MCNC: 48 MG/DL

## 2022-05-18 PROCEDURE — 86803 HEPATITIS C AB TEST: CPT

## 2022-05-18 PROCEDURE — 80061 LIPID PANEL: CPT

## 2022-05-18 PROCEDURE — 80053 COMPREHEN METABOLIC PANEL: CPT

## 2022-05-18 PROCEDURE — 36415 COLL VENOUS BLD VENIPUNCTURE: CPT

## 2022-05-18 PROCEDURE — 83036 HEMOGLOBIN GLYCOSYLATED A1C: CPT

## 2022-05-23 ENCOUNTER — OFFICE VISIT (OUTPATIENT)
Dept: FAMILY MEDICINE CLINIC | Facility: CLINIC | Age: 77
End: 2022-05-23
Payer: COMMERCIAL

## 2022-05-23 VITALS
HEIGHT: 70 IN | OXYGEN SATURATION: 96 % | SYSTOLIC BLOOD PRESSURE: 138 MMHG | TEMPERATURE: 96.6 F | DIASTOLIC BLOOD PRESSURE: 70 MMHG | WEIGHT: 180 LBS | BODY MASS INDEX: 25.77 KG/M2 | HEART RATE: 74 BPM

## 2022-05-23 DIAGNOSIS — E11.22 TYPE 2 DIABETES MELLITUS WITH STAGE 3B CHRONIC KIDNEY DISEASE, WITHOUT LONG-TERM CURRENT USE OF INSULIN (HCC): Primary | ICD-10-CM

## 2022-05-23 DIAGNOSIS — E78.00 HYPERCHOLESTEROLEMIA: ICD-10-CM

## 2022-05-23 DIAGNOSIS — I10 BENIGN ESSENTIAL HYPERTENSION: ICD-10-CM

## 2022-05-23 DIAGNOSIS — N18.32 TYPE 2 DIABETES MELLITUS WITH STAGE 3B CHRONIC KIDNEY DISEASE, WITHOUT LONG-TERM CURRENT USE OF INSULIN (HCC): Primary | ICD-10-CM

## 2022-05-23 DIAGNOSIS — N18.4 CHRONIC RENAL DISEASE, STAGE IV (HCC): ICD-10-CM

## 2022-05-23 PROBLEM — Z78.9 STATIN INTOLERANCE: Status: RESOLVED | Noted: 2019-02-15 | Resolved: 2022-05-23

## 2022-05-23 PROBLEM — I63.9 ACUTE CVA (CEREBROVASCULAR ACCIDENT) (HCC): Status: RESOLVED | Noted: 2022-02-16 | Resolved: 2022-05-23

## 2022-05-23 PROCEDURE — 3075F SYST BP GE 130 - 139MM HG: CPT | Performed by: FAMILY MEDICINE

## 2022-05-23 PROCEDURE — 1036F TOBACCO NON-USER: CPT | Performed by: FAMILY MEDICINE

## 2022-05-23 PROCEDURE — 1160F RVW MEDS BY RX/DR IN RCRD: CPT | Performed by: FAMILY MEDICINE

## 2022-05-23 PROCEDURE — 3078F DIAST BP <80 MM HG: CPT | Performed by: FAMILY MEDICINE

## 2022-05-23 PROCEDURE — 99214 OFFICE O/P EST MOD 30 MIN: CPT | Performed by: FAMILY MEDICINE

## 2022-05-23 NOTE — ASSESSMENT & PLAN NOTE
Lab Results   Component Value Date    EGFR 29 05/18/2022    EGFR 33 03/11/2022    EGFR 32 02/18/2022    CREATININE 2 07 (H) 05/18/2022    CREATININE 1 90 (H) 03/11/2022    CREATININE 1 93 (H) 02/18/2022   has upcoming appt with Nephrology

## 2022-05-23 NOTE — ASSESSMENT & PLAN NOTE
Lab Results   Component Value Date    HGBA1C 7 4 (H) 05/18/2022   no changes in regimen - continue Trulicity, Glyburide, and Tradjenta -- if A1c goes up will consider increasing Trulicity next time

## 2022-05-23 NOTE — PROGRESS NOTES
Assessment/Plan:         Problem List Items Addressed This Visit        Endocrine    Type 2 diabetes mellitus with diabetic chronic kidney disease ([facility] ) - Primary       Lab Results   Component Value Date    HGBA1C 7 4 (H) 05/18/2022   no changes in regimen - continue Trulicity, Glyburide, and Tradjenta -- if A1c goes up will consider increasing Trulicity next time              Cardiovascular and Mediastinum    Benign essential hypertension     Well controlled on current regimen Lisinopril HCTZ              Genitourinary    Chronic renal disease, stage IV ([facility] )     Lab Results   Component Value Date    EGFR 29 05/18/2022    EGFR 33 03/11/2022    EGFR 32 02/18/2022    CREATININE 2 07 (H) 05/18/2022    CREATININE 1 90 (H) 03/11/2022    CREATININE 1 93 (H) 02/18/2022   has upcoming appt with Nephrology              Other    Hypercholesterolemia     Well controlled on Statin - continue Atorvastatin 80 mg daily  Subjective:      Patient ID: Andrew Patel is a 67 y o  male  67year old here for lab review  CKD - stage IV  Sees nephrology  DM - A1c 7 4% up from 6 9%  Fasting glucoses are averaging 80-90  Lipids - well controlled on statin  recently started this and doing okay - having some trouble swallowing it  The following portions of the patient's history were reviewed and updated as appropriate:   Past Medical History:  He has a past medical history of Asthma, Balanitis, Cataract, Chronic kidney disease, Chronic kidney disease, stage 3 ([facility] ), Diabetes mellitus ([facility] ), 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([facility] ), Hypercholesterolemia, Hypertension, Pneumonia, and Stroke ([facility] )  ,  _______________________________________________________________________  Medical Problems:  does not have any pertinent problems on file ,  _______________________________________________________________________  Past Surgical History:   has a past surgical history that includes Hernia repair; Abdominal surgery; Appendectomy (1965); Cataract extraction; Hernia repair (2007); Other surgical history (1965); and pr repair flexor tendon,hand,w/o graft,ea (Left, 3/17/2021)  ,  _______________________________________________________________________  Family History:  family history includes Colon cancer in his father; Diabetes in his father and sister; Nephrolithiasis in his sister; No Known Problems in his sister; Pancreatic cancer in his mother ,  _______________________________________________________________________  Social History:   reports that he quit smoking about 51 years ago  His smoking use included cigarettes  He has a 20 00 pack-year smoking history  He quit smokeless tobacco use about 15 years ago  He reports current alcohol use of about 1 0 standard drink of alcohol per week  He reports that he does not use drugs  ,  _______________________________________________________________________  Allergies:  is allergic to meperidine and pravastatin     _______________________________________________________________________  Current Outpatient Medications   Medication Sig Dispense Refill    albuterol (PROVENTIL HFA,VENTOLIN HFA) 90 mcg/act inhaler Inhale 2 puffs every 4 (four) hours as needed for wheezing 8 5 g 1    aspirin 81 mg chewable tablet Chew 1 tablet (81 mg total) daily 30 tablet 0    atorvastatin (LIPITOR) 80 mg tablet Take 1 tablet (80 mg total) by mouth daily with dinner 90 tablet 1    ERYN MICROLET LANCETS lancets Test twice a day 100 each 3    cholecalciferol (VITAMIN D3) 1,000 units tablet Take 2,000 Units by mouth daily      clopidogrel (PLAVIX) 75 mg tablet Take 1 tablet (75 mg total) by mouth daily for 30 doses 90 tablet 1    Contour Test test strip Test fasting glucose daily 100 strip 2    glyBURIDE (DIABETA) 5 mg tablet Take 1 tablet (5 mg total) by mouth 2 (two) times a day 180 tablet 1    linaGLIPtin (Tradjenta) 5 MG TABS Take 5 mg by mouth daily 90 tablet 1    lisinopril-hydrochlorothiazide (PRINZIDE,ZESTORETIC) 20-25 MG per tablet Take 1 tablet by mouth daily 90 tablet 1    Trulicity 8 22 JS/0 6HY SOPN INJECT THE CONTENTS OF ONE  PEN SUBCUTANEOUSLY WEEKLY  AS DIRECTED 6 mL 3     No current facility-administered medications for this visit      _______________________________________________________________________  Review of Systems   Constitutional: Negative for activity change, appetite change, fatigue and unexpected weight change  Respiratory: Negative for chest tightness and shortness of breath  Cardiovascular: Negative for chest pain and leg swelling  Gastrointestinal: Negative for abdominal pain  Neurological: Positive for dizziness  Negative for headaches  Objective:  Vitals:    05/23/22 0916   BP: 138/70   Pulse: 74   Temp: (!) 96 6 °F (35 9 °C)   SpO2: 96%   Weight: 81 6 kg (180 lb)   Height: 5' 10" (1 778 m)     Body mass index is 25 83 kg/m²  Physical Exam  Vitals and nursing note reviewed  Constitutional:       General: He is not in acute distress  Appearance: Normal appearance  He is well-developed  He is not diaphoretic  HENT:      Head: Normocephalic and atraumatic  Cardiovascular:      Rate and Rhythm: Normal rate and regular rhythm  Pulses: no weak pulses          Dorsalis pedis pulses are 2+ on the right side and 2+ on the left side  Posterior tibial pulses are 2+ on the right side and 2+ on the left side  Heart sounds: Normal heart sounds  No murmur heard  No friction rub  No gallop  Pulmonary:      Effort: Pulmonary effort is normal  No respiratory distress  Breath sounds: Normal breath sounds  No wheezing or rales  Chest:      Chest wall: No tenderness  Musculoskeletal:         General: No swelling  Right lower leg: No edema  Left lower leg: No edema  Feet:      Right foot:      Skin integrity: No ulcer, skin breakdown, erythema, warmth, callus or dry skin        Left foot:      Skin integrity: No ulcer, skin breakdown, erythema, warmth, callus or dry skin  Neurological:      Mental Status: He is alert and oriented to person, place, and time  Psychiatric:         Mood and Affect: Mood normal          Behavior: Behavior normal          Thought Content: Thought content normal          Judgment: Judgment normal        Patient's shoes and socks removed  Right Foot/Ankle   Right Foot Inspection  Skin Exam: skin normal and skin intact  No dry skin, no warmth, no callus, no erythema, no maceration, no abnormal color, no pre-ulcer, no ulcer and no callus  Toe Exam: No swelling, no tenderness, erythema and  no right toe deformity    Sensory   Vibration: intact  Monofilament testing: intact    Vascular  Capillary refills: < 3 seconds  The right DP pulse is 2+  The right PT pulse is 2+  Left Foot/Ankle  Left Foot Inspection  Skin Exam: skin normal and skin intact  No dry skin, no warmth, no erythema, no maceration, normal color, no pre-ulcer, no ulcer and no callus  Toe Exam: No swelling, no tenderness, no erythema and no left toe deformity  Sensory   Vibration: intact  Monofilament testing: intact    Vascular  Capillary refills: < 3 seconds  The left DP pulse is 2+  The left PT pulse is 2+       Assign Risk Category  No deformity present  No loss of protective sensation  No weak pulses  Risk: 0

## 2022-06-22 DIAGNOSIS — I63.9 ACUTE CVA (CEREBROVASCULAR ACCIDENT) (HCC): ICD-10-CM

## 2022-06-22 RX ORDER — CLOPIDOGREL BISULFATE 75 MG/1
75 TABLET ORAL DAILY
Qty: 90 TABLET | Refills: 1 | Status: SHIPPED | OUTPATIENT
Start: 2022-06-22 | End: 2022-07-22

## 2022-06-22 RX ORDER — ATORVASTATIN CALCIUM 80 MG/1
80 TABLET, FILM COATED ORAL
Qty: 90 TABLET | Refills: 1 | Status: SHIPPED | OUTPATIENT
Start: 2022-06-22 | End: 2022-07-22

## 2022-07-05 ENCOUNTER — OFFICE VISIT (OUTPATIENT)
Dept: NEUROLOGY | Facility: CLINIC | Age: 77
End: 2022-07-05
Payer: COMMERCIAL

## 2022-07-05 VITALS
HEIGHT: 70 IN | DIASTOLIC BLOOD PRESSURE: 74 MMHG | HEART RATE: 72 BPM | SYSTOLIC BLOOD PRESSURE: 142 MMHG | WEIGHT: 180 LBS | BODY MASS INDEX: 25.77 KG/M2

## 2022-07-05 DIAGNOSIS — R27.8 SENSORY ATAXIA: ICD-10-CM

## 2022-07-05 DIAGNOSIS — I63.81 LEFT SIDED LACUNAR STROKE (HCC): ICD-10-CM

## 2022-07-05 DIAGNOSIS — I63.532 CEREBROVASCULAR ACCIDENT (CVA) DUE TO OCCLUSION OF LEFT POSTERIOR CEREBRAL ARTERY (HCC): Primary | ICD-10-CM

## 2022-07-05 DIAGNOSIS — E11.42 DIABETIC POLYNEUROPATHY ASSOCIATED WITH TYPE 2 DIABETES MELLITUS (HCC): ICD-10-CM

## 2022-07-05 PROCEDURE — 1160F RVW MEDS BY RX/DR IN RCRD: CPT | Performed by: PSYCHIATRY & NEUROLOGY

## 2022-07-05 PROCEDURE — 3077F SYST BP >= 140 MM HG: CPT | Performed by: PSYCHIATRY & NEUROLOGY

## 2022-07-05 PROCEDURE — 99213 OFFICE O/P EST LOW 20 MIN: CPT | Performed by: PSYCHIATRY & NEUROLOGY

## 2022-07-05 PROCEDURE — 3078F DIAST BP <80 MM HG: CPT | Performed by: PSYCHIATRY & NEUROLOGY

## 2022-07-05 NOTE — PROGRESS NOTES
Lawanda Guaman is a 67 y o  male returns in follow-up today with history of CVA with visual field deficit and neuropathy with balanced    Assessment:  1  Cerebrovascular accident (CVA) due to occlusion of left posterior cerebral artery ([facility] [address] )    2  Left sided lacunar stroke ([facility] [address] )    3  Diabetic polyneuropathy associated with type 2 diabetes mellitus ([facility] [address] )    4  Sensory ataxia        Plan:  Continue aspirin and statin therapy   Discontinue Plavix   Follow-up 6 months    Discussion:  Bree reports no new stroke symptoms since here last   He has continued to take aspirin and Plavix with his statin medication despite the fact he was told to discontinue it in March  He continues report some visual symptoms on the right side related to his occipital CVA and continues to report balance issues  He states he did not go to physical therapy because he did not feel the need for it  Have recommended keeping blood pressure and sugar under control  He may discontinue Plavix and continue aspirin and statin therapy and I will see him back in follow-up in 6 month      Subjective:    TERRANCE Giron returns in follow-up today  He denies any new stroke symptoms since here last   He continues to be aware of some vision disturbance on the right side  He has continued to take Plavix with his aspirin and statin therapy despite being told discontinue it in March  He denies any symptoms of neuropathic pain in his feet but continues report issues with balance  He did not go to physical therapy as he did not feel the need to pursue this    He denies any new health issues      Past Medical History:   Diagnosis Date    Asthma     last assessed: April 1, 2015    Balanitis     last assessed: July 9, 2014    Cataract     Chronic kidney disease     Chronic kidney disease, stage 3 ([facility] [address] )     last assessed: Jan 17, 2018    Diabetes mellitus [facility])     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([facility] [address] )     last assessed: Jan 17, 2018    Hypercholesterolemia  Hypertension     Pneumonia     last assessed: Feb 25, 2013    Stroke [facility])        Family History:  Family History   Problem Relation Age of Onset    Pancreatic cancer Mother     Colon cancer Father     Diabetes Father         mellitus     No Known Problems Sister     Diabetes Sister     Nephrolithiasis Sister        Past Surgical History:  Past Surgical History:   Procedure Laterality Date    ABDOMINAL SURGERY      APPENDECTOMY  1965    CATARACT EXTRACTION      HERNIA REPAIR      HERNIA REPAIR  2007    x2    OTHER SURGICAL HISTORY  1965    Perforated duodenol ulcer surgery     [place] REPAIR FLEXOR TENDON,HAND,W/O GRAFT,EA Left 3/17/2021    Procedure: Left thumb EPL repair;  Surgeon: Zach Koroma MD;  Location: [facility];  Service: Orthopedics       Social History:   reports that he quit smoking about 51 years ago  His smoking use included cigarettes  He has a 20 00 pack-year smoking history  He quit smokeless tobacco use about 15 years ago  He reports current alcohol use of about 1 0 standard drink of alcohol per week  He reports that he does not use drugs      Allergies:  Meperidine and Pravastatin      Current Outpatient Medications:     albuterol (PROVENTIL HFA,VENTOLIN HFA) 90 mcg/act inhaler, Inhale 2 puffs every 4 (four) hours as needed for wheezing, Disp: 8 5 g, Rfl: 1    aspirin 81 mg chewable tablet, Chew 1 tablet (81 mg total) daily, Disp: 30 tablet, Rfl: 0    atorvastatin (LIPITOR) 80 mg tablet, Take 1 tablet (80 mg total) by mouth daily with dinner, Disp: 90 tablet, Rfl: 1    ERYN MICROLET LANCETS lancets, Test twice a day, Disp: 100 each, Rfl: 3    cholecalciferol (VITAMIN D3) 1,000 units tablet, Take 2,000 Units by mouth daily, Disp: , Rfl:     clopidogrel (PLAVIX) 75 mg tablet, Take 1 tablet (75 mg total) by mouth daily for 30 doses, Disp: 90 tablet, Rfl: 1    Contour Test test strip, Test fasting glucose daily, Disp: 100 strip, Rfl: 2    glyBURIDE (DIABETA) 5 mg tablet, Take 1 tablet (5 mg total) by mouth 2 (two) times a day, Disp: 180 tablet, Rfl: 1    linaGLIPtin (Tradjenta) 5 MG TABS, Take 5 mg by mouth daily, Disp: 90 tablet, Rfl: 1    lisinopril-hydrochlorothiazide (PRINZIDE,ZESTORETIC) 20-25 MG per tablet, Take 1 tablet by mouth daily, Disp: 90 tablet, Rfl: 1    Trulicity 0 26 TB/2 6RU SOPN, INJECT THE CONTENTS OF ONE  PEN SUBCUTANEOUSLY WEEKLY  AS DIRECTED, Disp: 6 mL, Rfl: 3    I have reviewed the past medical, social and family history, current medications, allergies, vitals, review of systems and updated this information as appropriate today     Objective:    Vitals:  Blood pressure 142/74, pulse 72, height 5' 10" (1 778 m), weight 81 6 kg (180 lb)  Physical Exam    Neurological Exam  GENERAL:  Well-developed well-nourished man in no acute distress  HEENT/NECK: Head is atraumatic normocephalic, neck is supple  CARDIOVASCULAR:  No Carotid bruit  NEUROLOGIC:  Mental Status: Awake and alert without aphasia  Cranial Nerves: Extraocular movements are full  Right visual field deficit is noted in the upper right quadrant  Face is symmetrical  Motor:  No drift is noted on arm extension  Coordination:  Finger-to-nose testing is performed accurately  Romberg is increased sway with eyes closed  Gait is stable            ROS:    Review of Systems   Constitutional: Negative  Negative for appetite change and fever  HENT: Negative  Negative for hearing loss, tinnitus, trouble swallowing and voice change  Eyes: Negative  Negative for photophobia and pain  Respiratory: Negative  Negative for shortness of breath  Cardiovascular: Negative  Negative for palpitations  Gastrointestinal: Negative  Negative for nausea and vomiting  Endocrine: Negative  Negative for cold intolerance  Genitourinary: Negative  Negative for dysuria, frequency and urgency  Musculoskeletal: Negative  Negative for myalgias and neck pain  Skin: Negative  Negative for rash  Neurological: Positive for tremors, light-headedness and numbness  Negative for dizziness, seizures, syncope, facial asymmetry, speech difficulty, weakness and headaches  Patient states numbness in left hand  Patient states tremors in both hands   Hematological: Negative  Does not bruise/bleed easily  Psychiatric/Behavioral: Positive for sleep disturbance  Negative for confusion and hallucinations

## 2022-07-19 ENCOUNTER — TELEPHONE (OUTPATIENT)
Dept: NEPHROLOGY | Facility: CLINIC | Age: 77
End: 2022-07-19

## 2022-07-19 NOTE — TELEPHONE ENCOUNTER
Called spoke with patent advised patient to get labs done prior to 7/27 Appt, patient understood and is okay with it

## 2022-07-20 ENCOUNTER — APPOINTMENT (OUTPATIENT)
Dept: LAB | Facility: CLINIC | Age: 77
End: 2022-07-20
Payer: COMMERCIAL

## 2022-07-20 DIAGNOSIS — N18.32 STAGE 3B CHRONIC KIDNEY DISEASE (HCC): ICD-10-CM

## 2022-07-20 DIAGNOSIS — N18.9 CHRONIC KIDNEY DISEASE, UNSPECIFIED CKD STAGE: ICD-10-CM

## 2022-07-20 LAB
25(OH)D3 SERPL-MCNC: 59 NG/ML (ref 30–100)
ANION GAP SERPL CALCULATED.3IONS-SCNC: 8 MMOL/L (ref 4–13)
BUN SERPL-MCNC: 37 MG/DL (ref 5–25)
CALCIUM SERPL-MCNC: 9.1 MG/DL (ref 8.3–10.1)
CHLORIDE SERPL-SCNC: 104 MMOL/L (ref 96–108)
CO2 SERPL-SCNC: 26 MMOL/L (ref 21–32)
CREAT SERPL-MCNC: 1.8 MG/DL (ref 0.6–1.3)
GFR SERPL CREATININE-BSD FRML MDRD: 35 ML/MIN/1.73SQ M
GLUCOSE P FAST SERPL-MCNC: 99 MG/DL (ref 65–99)
PHOSPHATE SERPL-MCNC: 3.1 MG/DL (ref 2.3–4.1)
POTASSIUM SERPL-SCNC: 4.7 MMOL/L (ref 3.5–5.3)
PTH-INTACT SERPL-MCNC: 149.6 PG/ML (ref 18.4–80.1)
SODIUM SERPL-SCNC: 138 MMOL/L (ref 135–147)

## 2022-07-20 PROCEDURE — 84100 ASSAY OF PHOSPHORUS: CPT

## 2022-07-20 PROCEDURE — 83970 ASSAY OF PARATHORMONE: CPT

## 2022-07-20 PROCEDURE — 36415 COLL VENOUS BLD VENIPUNCTURE: CPT

## 2022-07-20 PROCEDURE — 82306 VITAMIN D 25 HYDROXY: CPT

## 2022-07-20 PROCEDURE — 80048 BASIC METABOLIC PNL TOTAL CA: CPT

## 2022-07-25 ENCOUNTER — TELEPHONE (OUTPATIENT)
Dept: NEPHROLOGY | Facility: CLINIC | Age: 77
End: 2022-07-25

## 2022-07-25 NOTE — TELEPHONE ENCOUNTER
Good Afternoon      Dr Lanny Durand patient had an appointment with you for 07/27/2022  Patient is unable  to make appointment patient tested + for Covid 19   Patient will call back in two weeks    Appointment was cancel

## 2022-08-04 ENCOUNTER — TELEPHONE (OUTPATIENT)
Dept: FAMILY MEDICINE CLINIC | Facility: CLINIC | Age: 77
End: 2022-08-04

## 2022-08-04 NOTE — TELEPHONE ENCOUNTER
Pt calls stating he believes he may have an oral infection  He has a lot of pain and facial swelling  Pt was requesting an ABX  Instructed pt to be seen at urgent care or ED, as we did not have any appointments available  Pt verbalized understanding

## 2022-08-05 ENCOUNTER — OFFICE VISIT (OUTPATIENT)
Dept: URGENT CARE | Facility: CLINIC | Age: 77
End: 2022-08-05
Payer: COMMERCIAL

## 2022-08-05 VITALS
RESPIRATION RATE: 18 BRPM | BODY MASS INDEX: 25.05 KG/M2 | TEMPERATURE: 97.4 F | OXYGEN SATURATION: 96 % | HEIGHT: 70 IN | SYSTOLIC BLOOD PRESSURE: 143 MMHG | DIASTOLIC BLOOD PRESSURE: 72 MMHG | WEIGHT: 175 LBS | HEART RATE: 88 BPM

## 2022-08-05 DIAGNOSIS — K04.7 DENTAL INFECTION: Primary | ICD-10-CM

## 2022-08-05 PROCEDURE — S9083 URGENT CARE CENTER GLOBAL: HCPCS

## 2022-08-05 PROCEDURE — 99213 OFFICE O/P EST LOW 20 MIN: CPT

## 2022-08-05 RX ORDER — AMOXICILLIN AND CLAVULANATE POTASSIUM 875; 125 MG/1; MG/1
1 TABLET, FILM COATED ORAL EVERY 12 HOURS SCHEDULED
Qty: 20 TABLET | Refills: 0 | Status: SHIPPED | OUTPATIENT
Start: 2022-08-05 | End: 2022-08-15

## 2022-08-05 NOTE — PROGRESS NOTES
[facility]        NAME: Kayce Abreu is a 67 y o  male  : 1945    MRN: [id number]  DATE: 2022  TIME: 9:52 AM    Assessment and Plan   Dental infection [K04 7]  1  Dental infection  amoxicillin-clavulanate (AUGMENTIN) 875-125 mg per tablet     Number provided for dental clinics in case he is unable to get in with his regular dentist      Patient Instructions     Take Augmentin with food as prescribed  Tylenol/Motrin as needed for pain  Salt water gargles  Ice over area  Follow up with PCP  Follow up with dentist   Proceed to the ER with worsening symptoms  Chief Complaint     Chief Complaint   Patient presents with    Dental Pain     Started Monday  Top center         History of Present Illness       The patient presents today with complaints of front upper dental pain on the R side since Mon  He has several teeth on the top that are broken off, and he is unsure which one is causing the pain  He denies fevers/chills, drainage, or abscess  He has been taking tylenol as needed at home with some relief  He has not been to a dentist in some time  He did not schedule an appointment with his dentist as of yet  He does not have dental insurance  Review of Systems   Review of Systems   Constitutional: Negative for chills, fatigue and fever  HENT: Positive for dental problem  Negative for congestion  Eyes: Negative  Respiratory: Negative for cough and shortness of breath  Cardiovascular: Negative for chest pain and palpitations  Gastrointestinal: Negative for abdominal pain, diarrhea, nausea and vomiting  Genitourinary: Negative for difficulty urinating  Musculoskeletal: Negative for myalgias  Skin: Negative for rash  Allergic/Immunologic: Negative for environmental allergies  Neurological: Negative for dizziness and headaches  Psychiatric/Behavioral: Negative            Current Medications       Current Outpatient Medications:     albuterol (PROVENTIL HFA,VENTOLIN HFA) 90 mcg/act inhaler, Inhale 2 puffs every 4 (four) hours as needed for wheezing, Disp: 8 5 g, Rfl: 1    amoxicillin-clavulanate (AUGMENTIN) 875-125 mg per tablet, Take 1 tablet by mouth every 12 (twelve) hours for 10 days, Disp: 20 tablet, Rfl: 0    ERYN MICROLET LANCETS lancets, Test twice a day, Disp: 100 each, Rfl: 3    cholecalciferol (VITAMIN D3) 1,000 units tablet, Take 2,000 Units by mouth daily, Disp: , Rfl:     Contour Test test strip, Test fasting glucose daily, Disp: 100 strip, Rfl: 2    glyBURIDE (DIABETA) 5 mg tablet, Take 1 tablet (5 mg total) by mouth 2 (two) times a day, Disp: 180 tablet, Rfl: 1    linaGLIPtin (Tradjenta) 5 MG TABS, Take 5 mg by mouth daily, Disp: 90 tablet, Rfl: 1    lisinopril-hydrochlorothiazide (PRINZIDE,ZESTORETIC) 20-25 MG per tablet, Take 1 tablet by mouth daily, Disp: 90 tablet, Rfl: 1    Trulicity 2 01 MF/1 1SR SOPN, INJECT THE CONTENTS OF ONE  PEN SUBCUTANEOUSLY WEEKLY  AS DIRECTED, Disp: 6 mL, Rfl: 3    aspirin 81 mg chewable tablet, Chew 1 tablet (81 mg total) daily, Disp: 30 tablet, Rfl: 0    atorvastatin (LIPITOR) 80 mg tablet, Take 1 tablet (80 mg total) by mouth daily with dinner, Disp: 90 tablet, Rfl: 1    clopidogrel (PLAVIX) 75 mg tablet, Take 1 tablet (75 mg total) by mouth daily for 30 doses, Disp: 90 tablet, Rfl: 1    Current Allergies     Allergies as of 08/05/2022 - Reviewed 08/05/2022   Allergen Reaction Noted    Meperidine      Pravastatin Myalgia             The following portions of the patient's history were reviewed and updated as appropriate: allergies, current medications, past family history, past medical history, past social history, past surgical history and problem list      Past Medical History:   Diagnosis Date    Asthma     last assessed: April 1, 2015    Balanitis     last assessed: July 9, 2014    Cataract     Chronic kidney disease     Chronic kidney disease, stage 3 ([facility] [address] )     last assessed: Jan 17, 2018  Diabetes mellitus ([facility] )     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([facility] )     last assessed: Jan 17, 2018    Hypercholesterolemia     Hypertension     Pneumonia     last assessed: Feb 25, 2013    Stroke [facility]) 02/2022       Past Surgical History:   Procedure Laterality Date    ABDOMINAL SURGERY      APPENDECTOMY  1965    CATARACT EXTRACTION      HERNIA REPAIR      HERNIA REPAIR  2007    x2    OTHER SURGICAL HISTORY  1965    Perforated duodenol ulcer surgery     [place] REPAIR FLEXOR TENDON,HAND,W/O GRAFT,EA Left 3/17/2021    Procedure: Left thumb EPL repair;  Surgeon: Brianna Gillette MD;  [facility];  Service: Orthopedics       Family History   Problem Relation Age of Onset    Pancreatic cancer Mother     Colon cancer Father     Diabetes Father         mellitus     No Known Problems Sister     Diabetes Sister     Nephrolithiasis Sister          Medications have been verified  Objective   /72   Pulse 88   Temp (!) 97 4 °F (36 3 °C) (Temporal)   Resp 18   Ht 5' 10" (1 778 m)   Wt 79 4 kg (175 lb)   SpO2 96%   BMI 25 11 kg/m²        Physical Exam     Physical Exam  Vitals and nursing note reviewed  Constitutional:       General: He is not in acute distress  Appearance: Normal appearance  He is not ill-appearing  HENT:      Head: Normocephalic and atraumatic  Right Ear: External ear normal       Left Ear: External ear normal       Nose: Nose normal       Mouth/Throat:      Lips: Pink  Mouth: Mucous membranes are moist       Dentition: Abnormal dentition  Dental tenderness and dental caries present  No dental abscesses  Pharynx: Oropharynx is clear  Comments: Upper front teeth  Eyes:      General: Vision grossly intact  Extraocular Movements: Extraocular movements intact  Pupils: Pupils are equal, round, and reactive to light  Cardiovascular:      Rate and Rhythm: Normal rate and regular rhythm  Heart sounds: Normal heart sounds   No murmur heard   Pulmonary:      Effort: Pulmonary effort is normal       Breath sounds: Normal breath sounds  Abdominal:      General: Abdomen is flat  Bowel sounds are normal       Palpations: Abdomen is soft  Musculoskeletal:         General: Normal range of motion  Cervical back: Normal range of motion  Skin:     General: Skin is warm  Findings: No rash  Neurological:      Mental Status: He is alert and oriented to person, place, and time  Motor: Motor function is intact     Psychiatric:         Attention and Perception: Attention normal          Mood and Affect: Mood normal

## 2022-08-05 NOTE — PATIENT INSTRUCTIONS
Take Augmentin with food as prescribed  Tylenol/Motrin as needed for pain  Salt water gargles  Ice over area  Follow up with PCP  Follow up with dentist   Proceed to the ER with worsening symptoms  Dental Abscess   AMBULATORY CARE:   A dental abscess  is a collection of pus in or around a tooth  A dental abscess is caused by bacteria  The bacteria can enter the tooth when the enamel (outer part of the tooth) is damaged by tooth decay  Bacteria can also enter the tooth through a chip in the tooth or a cut in the gum  Food particles that are stuck between the teeth for a long time may also lead to an abscess  Common signs and symptoms:   Toothache, a loose tooth, or a tooth that is very sensitive to pressure or temperature    Bad breath, unpleasant taste, and drooling    Fever    Pain, redness, and swelling of the gums, or swelling of your face and neck    Pain when you open or close your mouth    Trouble opening your mouth    Seek care immediately if:   You have severe pain in your tooth or jaw  You have trouble breathing because of pain or swelling  Call your doctor if:   Your symptoms get worse, even after treatment  Your mouth is bleeding  You cannot eat or drink because of pain or swelling  Your abscess returns  You have an injury that causes a crack in your tooth  You have questions or concerns about your condition or care  Treatment:  You may  need any of the following:  Medicines  may be given to treat a bacterial infection and decrease pain  Incision and drainage  is a cut in the abscess to allow the pus to drain  A sample of fluid may be collected from your abscess  The fluid is sent to a lab and tested for bacteria  Ask your healthcare provider for more information  A root canal  is a procedure to remove the bacteria and prevent more infection  It is usually done after an incision and drainage   A filling or crown will be placed over the tooth after you have healed from your root canal      Tooth removal  may be needed if the infection affects deeper tissues  This is usually done after an incision and drainage  Self-care:   Rinse your mouth every 2 hours with salt water  This will help keep the area clean  Gently brush your teeth twice a day with a soft tooth brush  This will help keep the area clean  Eat soft foods as directed  Soft foods may cause less pain  Examples include applesauce, yogurt, and cooked pasta  Ask your healthcare provider how long to follow this instruction  Apply a warm compress to your tooth or gum  Use a cotton ball or gauze soaked in warm water  Remove the compress in 10 minutes or when it becomes cool  Repeat 3 times a day  Prevent another abscess:   Brush your teeth at least 2 times a day  with fluoride toothpaste  Use dental floss at least once a day  to clean between your teeth  Rinse your mouth with water or mouthwash  after meals and snacks  Chew sugarless gum  Avoid sugary and starchy food that can stick between your teeth  Limit drinks high in sugar, such as soda or fruit juice  See your dentist every 6 months  for dental cleanings and oral exams  Follow up with your doctor or dentist as directed: Your healthcare provider will need to check your teeth and gums  Write down your questions so you remember to ask them during your visits  © [employer] 2022 Information is for End User's use only and may not be sold, redistributed or otherwise used for commercial purposes  All illustrations and images included in CareNotes® are the copyrighted property of A [employer] M [employer] Inc  or Natasha Phillips  The above information is an  only  It is not intended as medical advice for individual conditions or treatments  Talk to your doctor, nurse or pharmacist before following any medical regimen to see if it is safe and effective for you

## 2022-08-18 ENCOUNTER — TELEPHONE (OUTPATIENT)
Dept: OTHER | Facility: OTHER | Age: 77
End: 2022-08-18

## 2022-08-19 NOTE — TELEPHONE ENCOUNTER
LM that appt was made on 08/29 at 3:00, asked pt to call us back at [phone] to confirm that appt  Labs are done on 07/20

## 2022-08-23 ENCOUNTER — TELEPHONE (OUTPATIENT)
Dept: NEPHROLOGY | Facility: CLINIC | Age: 77
End: 2022-08-23

## 2022-08-23 ENCOUNTER — APPOINTMENT (OUTPATIENT)
Dept: LAB | Facility: CLINIC | Age: 77
End: 2022-08-23
Payer: COMMERCIAL

## 2022-08-23 DIAGNOSIS — N18.32 STAGE 3B CHRONIC KIDNEY DISEASE (HCC): ICD-10-CM

## 2022-08-23 DIAGNOSIS — N18.32 STAGE 3B CHRONIC KIDNEY DISEASE (HCC): Primary | ICD-10-CM

## 2022-08-23 LAB
25(OH)D3 SERPL-MCNC: 55.3 NG/ML (ref 30–100)
ANION GAP SERPL CALCULATED.3IONS-SCNC: 3 MMOL/L (ref 4–13)
BACTERIA UR QL AUTO: NORMAL /HPF
BASOPHILS # BLD AUTO: 0.07 THOUSANDS/ΜL (ref 0–0.1)
BASOPHILS NFR BLD AUTO: 1 % (ref 0–1)
BILIRUB UR QL STRIP: NEGATIVE
BUN SERPL-MCNC: 35 MG/DL (ref 5–25)
CALCIUM SERPL-MCNC: 9 MG/DL (ref 8.3–10.1)
CHLORIDE SERPL-SCNC: 107 MMOL/L (ref 96–108)
CLARITY UR: CLEAR
CO2 SERPL-SCNC: 27 MMOL/L (ref 21–32)
COLOR UR: NORMAL
CREAT SERPL-MCNC: 1.82 MG/DL (ref 0.6–1.3)
CREAT UR-MCNC: 78.9 MG/DL
EOSINOPHIL # BLD AUTO: 0.65 THOUSAND/ΜL (ref 0–0.61)
EOSINOPHIL NFR BLD AUTO: 9 % (ref 0–6)
ERYTHROCYTE [DISTWIDTH] IN BLOOD BY AUTOMATED COUNT: 12.9 % (ref 11.6–15.1)
GFR SERPL CREATININE-BSD FRML MDRD: 35 ML/MIN/1.73SQ M
GLUCOSE P FAST SERPL-MCNC: 84 MG/DL (ref 65–99)
GLUCOSE UR STRIP-MCNC: NEGATIVE MG/DL
HCT VFR BLD AUTO: 38.3 % (ref 36.5–49.3)
HGB BLD-MCNC: 12.6 G/DL (ref 12–17)
HGB UR QL STRIP.AUTO: NEGATIVE
IMM GRANULOCYTES # BLD AUTO: 0.04 THOUSAND/UL (ref 0–0.2)
IMM GRANULOCYTES NFR BLD AUTO: 1 % (ref 0–2)
KETONES UR STRIP-MCNC: NEGATIVE MG/DL
LEUKOCYTE ESTERASE UR QL STRIP: NEGATIVE
LYMPHOCYTES # BLD AUTO: 1.58 THOUSANDS/ΜL (ref 0.6–4.47)
LYMPHOCYTES NFR BLD AUTO: 21 % (ref 14–44)
MCH RBC QN AUTO: 30.6 PG (ref 26.8–34.3)
MCHC RBC AUTO-ENTMCNC: 32.9 G/DL (ref 31.4–37.4)
MCV RBC AUTO: 93 FL (ref 82–98)
MONOCYTES # BLD AUTO: 0.64 THOUSAND/ΜL (ref 0.17–1.22)
MONOCYTES NFR BLD AUTO: 9 % (ref 4–12)
NEUTROPHILS # BLD AUTO: 4.53 THOUSANDS/ΜL (ref 1.85–7.62)
NEUTS SEG NFR BLD AUTO: 59 % (ref 43–75)
NITRITE UR QL STRIP: NEGATIVE
NON-SQ EPI CELLS URNS QL MICRO: NORMAL /HPF
NRBC BLD AUTO-RTO: 0 /100 WBCS
PH UR STRIP.AUTO: 6 [PH]
PHOSPHATE SERPL-MCNC: 3.4 MG/DL (ref 2.3–4.1)
PLATELET # BLD AUTO: 256 THOUSANDS/UL (ref 149–390)
PMV BLD AUTO: 11.1 FL (ref 8.9–12.7)
POTASSIUM SERPL-SCNC: 4.8 MMOL/L (ref 3.5–5.3)
PROT UR STRIP-MCNC: NEGATIVE MG/DL
PROT UR-MCNC: 7 MG/DL
PROT/CREAT UR: 0.09 MG/G{CREAT} (ref 0–0.1)
PTH-INTACT SERPL-MCNC: 110 PG/ML (ref 18.4–80.1)
RBC # BLD AUTO: 4.12 MILLION/UL (ref 3.88–5.62)
RBC #/AREA URNS AUTO: NORMAL /HPF
SODIUM SERPL-SCNC: 137 MMOL/L (ref 135–147)
SP GR UR STRIP.AUTO: 1.01 (ref 1–1.03)
UROBILINOGEN UR STRIP-ACNC: <2 MG/DL
WBC # BLD AUTO: 7.51 THOUSAND/UL (ref 4.31–10.16)
WBC #/AREA URNS AUTO: NORMAL /HPF

## 2022-08-23 PROCEDURE — 85025 COMPLETE CBC W/AUTO DIFF WBC: CPT

## 2022-08-23 PROCEDURE — 84156 ASSAY OF PROTEIN URINE: CPT

## 2022-08-23 PROCEDURE — 83970 ASSAY OF PARATHORMONE: CPT

## 2022-08-23 PROCEDURE — 80048 BASIC METABOLIC PNL TOTAL CA: CPT

## 2022-08-23 PROCEDURE — 84100 ASSAY OF PHOSPHORUS: CPT

## 2022-08-23 PROCEDURE — 81001 URINALYSIS AUTO W/SCOPE: CPT

## 2022-08-23 PROCEDURE — 82570 ASSAY OF URINE CREATININE: CPT

## 2022-08-23 PROCEDURE — 82306 VITAMIN D 25 HYDROXY: CPT

## 2022-08-23 PROCEDURE — 36415 COLL VENOUS BLD VENIPUNCTURE: CPT

## 2022-08-23 NOTE — TELEPHONE ENCOUNTER
Amelia Sanders    Can you please enter patient's labs in Epic   Patient has an appointment with Dr Amandeep Saucedo for 08/29/2022 and he is at the [facility] as we speak and labs are not in [address] [place] [address]    Thank you

## 2022-08-24 ENCOUNTER — OFFICE VISIT (OUTPATIENT)
Dept: FAMILY MEDICINE CLINIC | Facility: CLINIC | Age: 77
End: 2022-08-24
Payer: COMMERCIAL

## 2022-08-24 VITALS
HEART RATE: 83 BPM | OXYGEN SATURATION: 97 % | TEMPERATURE: 97.8 F | SYSTOLIC BLOOD PRESSURE: 128 MMHG | HEIGHT: 70 IN | WEIGHT: 180 LBS | BODY MASS INDEX: 25.77 KG/M2 | DIASTOLIC BLOOD PRESSURE: 60 MMHG

## 2022-08-24 DIAGNOSIS — N18.32 TYPE 2 DIABETES MELLITUS WITH STAGE 3B CHRONIC KIDNEY DISEASE, WITHOUT LONG-TERM CURRENT USE OF INSULIN (HCC): Primary | ICD-10-CM

## 2022-08-24 DIAGNOSIS — E78.00 HYPERCHOLESTEROLEMIA: ICD-10-CM

## 2022-08-24 DIAGNOSIS — N18.32 STAGE 3B CHRONIC KIDNEY DISEASE (CKD) (HCC): ICD-10-CM

## 2022-08-24 DIAGNOSIS — E11.22 TYPE 2 DIABETES MELLITUS WITH STAGE 3B CHRONIC KIDNEY DISEASE, WITHOUT LONG-TERM CURRENT USE OF INSULIN (HCC): Primary | ICD-10-CM

## 2022-08-24 PROBLEM — N18.4 CHRONIC RENAL DISEASE, STAGE IV (HCC): Status: RESOLVED | Noted: 2022-05-23 | Resolved: 2022-08-24

## 2022-08-24 LAB — SL AMB POCT HEMOGLOBIN AIC: 7.1 (ref ?–6.5)

## 2022-08-24 PROCEDURE — 99214 OFFICE O/P EST MOD 30 MIN: CPT | Performed by: FAMILY MEDICINE

## 2022-08-24 PROCEDURE — 83036 HEMOGLOBIN GLYCOSYLATED A1C: CPT | Performed by: FAMILY MEDICINE

## 2022-08-24 NOTE — ASSESSMENT & PLAN NOTE
Lab Results   Component Value Date    HGBA1C 7 1 (A) 08/24/2022   A1c stable, will monitor - continue current meds, Glyburide, Tradjenta, Trulicity

## 2022-08-24 NOTE — ASSESSMENT & PLAN NOTE
Lab Results   Component Value Date    EGFR 35 08/23/2022    EGFR 35 07/20/2022    EGFR 29 05/18/2022    CREATININE 1 82 (H) 08/23/2022    CREATININE 1 80 (H) 07/20/2022    CREATININE 2 07 (H) 05/18/2022   stable, seeing Nephrology

## 2022-08-24 NOTE — PROGRESS NOTES
Assessment/Plan:         Problem List Items Addressed This Visit        Endocrine    Type 2 diabetes mellitus with diabetic chronic kidney disease ([facility] ) - Primary       Lab Results   Component Value Date    HGBA1C 7 1 (A) 08/24/2022   A1c stable, will monitor - continue current meds, Glyburide, Tradjenta, Trulicity          Relevant Orders    POCT hemoglobin A1c (Completed)    Hemoglobin A1C    Comprehensive metabolic panel    Lipid Panel with Direct LDL reflex    Microalbumin / creatinine urine ratio       Genitourinary    Stage 3b chronic kidney disease (CKD) ([facility])     Lab Results   Component Value Date    EGFR 35 08/23/2022    EGFR 35 07/20/2022    EGFR 29 05/18/2022    CREATININE 1 82 (H) 08/23/2022    CREATININE 1 80 (H) 07/20/2022    CREATININE 2 07 (H) 05/18/2022   stable, seeing Nephrology            Other    Hypercholesterolemia     On statin therapy                 Subjective:      Patient ID: Clifford Luke is a 67 y o  male  67year old here for diabetic check up  A1c 7 1%  he says his fasting glucose is   Denies hypoglycemia  On Trulicity, Tradjenta, and Glyburide  HTN and HLD - on statin    States he had COVID in July -tested at home  Recovered within 2 weeks  The following portions of the patient's history were reviewed and updated as appropriate:   Past Medical History:  He has a past medical history of Asthma, Balanitis, Cataract, Chronic kidney disease, Chronic kidney disease, stage 3 ([facility] ), COVID-19, Diabetes mellitus ([place] [address] ), 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([place] [address] ), Hypercholesterolemia, Hypertension, Pneumonia, and Stroke ([facility] ) (02/2022)  ,  _______________________________________________________________________  Medical Problems:  does not have any pertinent problems on file ,  _______________________________________________________________________  Past Surgical History:   has a past surgical history that includes Hernia repair; Abdominal surgery; Appendectomy (1965);  Cataract extraction; Hernia repair (2007); Other surgical history (1965); and pr repair flexor tendon,hand,w/o graft,ea (Left, 3/17/2021)  ,  _______________________________________________________________________  Family History:  family history includes Colon cancer in his father; Diabetes in his father and sister; Nephrolithiasis in his sister; No Known Problems in his sister; Pancreatic cancer in his mother ,  _______________________________________________________________________  Social History:   reports that he quit smoking about 51 years ago  His smoking use included cigarettes  He has a 20 00 pack-year smoking history  He quit smokeless tobacco use about 15 years ago  He reports current alcohol use of about 1 0 standard drink of alcohol per week  He reports that he does not use drugs  ,  _______________________________________________________________________  Allergies:  is allergic to meperidine and pravastatin     _______________________________________________________________________  Current Outpatient Medications   Medication Sig Dispense Refill    albuterol (PROVENTIL HFA,VENTOLIN HFA) 90 mcg/act inhaler Inhale 2 puffs every 4 (four) hours as needed for wheezing 8 5 g 1    aspirin 81 mg chewable tablet Chew 1 tablet (81 mg total) daily 30 tablet 0    atorvastatin (LIPITOR) 80 mg tablet Take 1 tablet (80 mg total) by mouth daily with dinner 90 tablet 1    ERYN MICROLET LANCETS lancets Test twice a day 100 each 3    cholecalciferol (VITAMIN D3) 1,000 units tablet Take 2,000 Units by mouth daily      Contour Test test strip Test fasting glucose daily 100 strip 2    glyBURIDE (DIABETA) 5 mg tablet Take 1 tablet (5 mg total) by mouth 2 (two) times a day 180 tablet 1    linaGLIPtin (Tradjenta) 5 MG TABS Take 5 mg by mouth daily 90 tablet 1    lisinopril-hydrochlorothiazide (PRINZIDE,ZESTORETIC) 20-25 MG per tablet Take 1 tablet by mouth daily 90 tablet 1    Trulicity 0 23 AW/9 1KR SOPN INJECT THE CONTENTS OF ONE  PEN SUBCUTANEOUSLY WEEKLY  AS DIRECTED 6 mL 3    clopidogrel (PLAVIX) 75 mg tablet Take 1 tablet (75 mg total) by mouth daily for 30 doses 90 tablet 1     No current facility-administered medications for this visit      _______________________________________________________________________  Review of Systems   Constitutional: Negative for activity change, appetite change, fatigue and unexpected weight change  Respiratory: Negative for chest tightness and shortness of breath  Cardiovascular: Negative for chest pain and leg swelling  Gastrointestinal: Negative for abdominal pain  Neurological: Negative for headaches  Objective:  Vitals:    08/24/22 1228 08/24/22 1247   BP: 140/82 128/60   BP Location: Right arm    Patient Position: Sitting    Pulse: 83    Temp: 97 8 °F (36 6 °C)    TempSrc: Temporal    SpO2: 97%    Weight: 81 6 kg (180 lb)    Height: 5' 10" (1 778 m)      Body mass index is 25 83 kg/m²  Physical Exam  Vitals and nursing note reviewed  Constitutional:       General: He is not in acute distress  Appearance: Normal appearance  He is well-developed  He is not diaphoretic  HENT:      Head: Normocephalic and atraumatic  Cardiovascular:      Rate and Rhythm: Normal rate and regular rhythm  Heart sounds: Normal heart sounds  No murmur heard  No friction rub  No gallop  Pulmonary:      Effort: Pulmonary effort is normal  No respiratory distress  Breath sounds: Normal breath sounds  No wheezing or rales  Chest:      Chest wall: No tenderness  Musculoskeletal:         General: No swelling  Right lower leg: No edema  Left lower leg: No edema  Neurological:      Mental Status: He is alert and oriented to person, place, and time  Psychiatric:         Mood and Affect: Mood normal          Behavior: Behavior normal          Thought Content:  Thought content normal          Judgment: Judgment normal

## 2022-08-25 ENCOUNTER — TELEPHONE (OUTPATIENT)
Dept: ADMINISTRATIVE | Facility: OTHER | Age: 77
End: 2022-08-25

## 2022-08-25 NOTE — LETTER
Diabetic Eye Exam Form    Date Requested: 22  Patient: Angelito Carrasco  Patient : 1945   Referring Provider: Pawel Rothman MD    DIABETIC Eye Exam Date _______________________________    Type of Exam MUST be documented for Diabetic Eye Exams  Please CHECK ONE  Retinal Exam       Dilated Retinal Exam       OCT       Optomap-Iris Exam      Fundus Photography     Left Eye - Please check Retinopathy AND Type or No Retinopathy      Exam did show retinopathy    Exam did not show retinopathy         Mild     Proliferative           Moderate    Severe            None         Right Eye - Please check Retinopathy AND Type or No Retinopathy     Exam did show retinopathy    Exam did not show retinopathy         Mild     Proliferative        Moderate    Severe        None       Comments __________________________________________________________    Practice Providing Exam ______________________________________________    Exam Performed By (print name) _______________________________________      Provider Signature ___________________________________________________    These reports are needed for  compliance  Please fax this completed form and a copy of the Diabetic Eye Exam report to our office located at [place] [address] as soon as possible via [phone] andrés Garcia So: Phone [phone]  We thank you for your assistance in treating our mutual patient

## 2022-08-25 NOTE — LETTER
Diabetic Eye Exam Form    Date Requested: 22  Patient: Jerman Bain  Patient : 1945   Referring Provider: Jesse Peñaloza MD    DIABETIC Eye Exam Date _______________________________    Type of Exam MUST be documented for Diabetic Eye Exams  Please CHECK ONE  Retinal Exam       Dilated Retinal Exam       OCT       Optomap-Iris Exam      Fundus Photography     Left Eye - Please check Retinopathy AND Type or No Retinopathy      Exam did show retinopathy    Exam did not show retinopathy         Mild     Proliferative           Moderate    Severe            None         Right Eye - Please check Retinopathy AND Type or No Retinopathy     Exam did show retinopathy    Exam did not show retinopathy         Mild     Proliferative        Moderate    Severe        None       Comments __________________________________________________________    Practice Providing Exam ______________________________________________    Exam Performed By (print name) _______________________________________      Provider Signature ___________________________________________________    These reports are needed for  compliance  Please fax this completed form and a copy of the Diabetic Eye Exam report to our office located at [place] [address] as soon as possible via [phone] andrés Villagomez Mom: Phone [phone]  We thank you for your assistance in treating our mutual patient

## 2022-08-25 NOTE — TELEPHONE ENCOUNTER
----- Message from Fidelia Patiño MD sent at 8/24/2022 12:48 PM EDT -----  Regarding: ey exam  08/24/22 12:48 PM    Hello, our patient Carlo Osborn has had Diabetic Eye Exam completed/performed  Please assist in updating the patient chart by making an External outreach to [address] [place] [address] located in [place]  The date of service is 2021      Thank you,  Fidelia Patiño MD  [facility]

## 2022-08-28 DIAGNOSIS — I10 ESSENTIAL HYPERTENSION: ICD-10-CM

## 2022-08-28 DIAGNOSIS — E11.8 TYPE 2 DIABETES MELLITUS WITH COMPLICATION, WITHOUT LONG-TERM CURRENT USE OF INSULIN (HCC): ICD-10-CM

## 2022-08-29 ENCOUNTER — OFFICE VISIT (OUTPATIENT)
Dept: NEPHROLOGY | Facility: CLINIC | Age: 77
End: 2022-08-29
Payer: COMMERCIAL

## 2022-08-29 VITALS
BODY MASS INDEX: 25.8 KG/M2 | RESPIRATION RATE: 16 BRPM | HEIGHT: 70 IN | HEART RATE: 77 BPM | WEIGHT: 180.2 LBS | SYSTOLIC BLOOD PRESSURE: 120 MMHG | OXYGEN SATURATION: 96 % | TEMPERATURE: 97.5 F | DIASTOLIC BLOOD PRESSURE: 60 MMHG

## 2022-08-29 DIAGNOSIS — E11.22 TYPE 2 DIABETES MELLITUS WITH STAGE 3B CHRONIC KIDNEY DISEASE, WITHOUT LONG-TERM CURRENT USE OF INSULIN (HCC): ICD-10-CM

## 2022-08-29 DIAGNOSIS — E83.9 CHRONIC KIDNEY DISEASE-MINERAL AND BONE DISORDER: ICD-10-CM

## 2022-08-29 DIAGNOSIS — N18.32 TYPE 2 DIABETES MELLITUS WITH STAGE 3B CHRONIC KIDNEY DISEASE, WITHOUT LONG-TERM CURRENT USE OF INSULIN (HCC): ICD-10-CM

## 2022-08-29 DIAGNOSIS — I10 BENIGN ESSENTIAL HYPERTENSION: ICD-10-CM

## 2022-08-29 DIAGNOSIS — N18.32 STAGE 3B CHRONIC KIDNEY DISEASE (CKD) (HCC): Primary | ICD-10-CM

## 2022-08-29 DIAGNOSIS — N18.9 CHRONIC KIDNEY DISEASE-MINERAL AND BONE DISORDER: ICD-10-CM

## 2022-08-29 DIAGNOSIS — M89.9 CHRONIC KIDNEY DISEASE-MINERAL AND BONE DISORDER: ICD-10-CM

## 2022-08-29 PROCEDURE — 99214 OFFICE O/P EST MOD 30 MIN: CPT | Performed by: INTERNAL MEDICINE

## 2022-08-29 PROCEDURE — 1160F RVW MEDS BY RX/DR IN RCRD: CPT | Performed by: INTERNAL MEDICINE

## 2022-08-29 PROCEDURE — 3078F DIAST BP <80 MM HG: CPT | Performed by: INTERNAL MEDICINE

## 2022-08-29 PROCEDURE — 3074F SYST BP LT 130 MM HG: CPT | Performed by: INTERNAL MEDICINE

## 2022-08-29 RX ORDER — LISINOPRIL AND HYDROCHLOROTHIAZIDE 25; 20 MG/1; MG/1
1 TABLET ORAL DAILY
Qty: 90 TABLET | Refills: 3 | Status: SHIPPED | OUTPATIENT
Start: 2022-08-29

## 2022-08-29 RX ORDER — LINAGLIPTIN 5 MG/1
TABLET, FILM COATED ORAL
Qty: 90 TABLET | Refills: 3 | Status: SHIPPED | OUTPATIENT
Start: 2022-08-29

## 2022-08-29 RX ORDER — GLYBURIDE 5 MG/1
TABLET ORAL
Qty: 180 TABLET | Refills: 3 | Status: SHIPPED | OUTPATIENT
Start: 2022-08-29

## 2022-08-29 NOTE — TELEPHONE ENCOUNTER
Upon review of the In Basket request and the patient's chart, initial outreach has been made via fax, please see Contacts section for details       Thank you  Yesica Denton MA

## 2022-08-29 NOTE — ASSESSMENT & PLAN NOTE
Lab Results   Component Value Date    EGFR 35 08/23/2022    EGFR 35 07/20/2022    EGFR 29 05/18/2022    CREATININE 1 82 (H) 08/23/2022    CREATININE 1 80 (H) 07/20/2022    CREATININE 2 07 (H) 05/18/2022   Patient kidney function seems to be stable with GFR of about 35 making it stage IIIB CKD  Pathophysiology kidney disease discussed with the patient    Advised hydration and avoiding any nephrotoxic medication

## 2022-08-29 NOTE — ASSESSMENT & PLAN NOTE
Lab Results   Component Value Date    HGBA1C 7 1 (A) 08/24/2022   Diabetes seems to be reasonably well control    Importance of diabetic control and effect on kidney disease discussed with the patient

## 2022-08-29 NOTE — PROGRESS NOTES
NEPHROLOGY OFFICE FOLLOW UP  Bree Bautista 67 y o  male MRN: [id number]    Encounter: [id number] 8/29/2022    REASON FOR VISIT: Josefina Alcala is a 67 y o  male who is here on 8/29/2022 for Chronic Kidney Disease and Follow-up    HPI:    Bree came in today for follow-up of CKD  Since I saw him last he was hospitalized with CVA likely to be occipital in origin as he had visual problem  He still complaining some dizziness and decreased visual acuity     No chest pain no palpitation     No shortness of breath     No nausea no vomiting      REVIEW OF SYSTEMS:    Review of Systems   Constitutional: Negative for activity change and fatigue  HENT: Negative for congestion and ear discharge  Eyes: Positive for visual disturbance  Negative for photophobia and pain  Respiratory: Negative for apnea and choking  Cardiovascular: Negative for chest pain and palpitations  Gastrointestinal: Negative for abdominal distention and blood in stool  Endocrine: Negative for heat intolerance and polyphagia  Genitourinary: Negative for flank pain and urgency  Musculoskeletal: Negative for neck pain and neck stiffness  Skin: Negative for color change and wound  Allergic/Immunologic: Negative for food allergies and immunocompromised state  Neurological: Positive for light-headedness  Negative for seizures and facial asymmetry  Hematological: Negative for adenopathy  Does not bruise/bleed easily  Psychiatric/Behavioral: Negative for self-injury and suicidal ideas           PAST MEDICAL HISTORY:  Past Medical History:   Diagnosis Date    Asthma     last assessed: April 1, 2015    Balanitis     last assessed: July 9, 2014    Cataract     Chronic kidney disease     Chronic kidney disease, stage 3 ([facility] )     last assessed: Jan 17, 2018    COVID-19     Diabetes mellitus [facility])     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([facility] )     last assessed: Jan 17, 2018    Hypercholesterolemia     Hypertension     Pneumonia last assessed: 2013    Stroke [facility]) 2022       PAST SURGICAL HISTORY:  Past Surgical History:   Procedure Laterality Date    ABDOMINAL SURGERY      APPENDECTOMY  1965    CATARACT EXTRACTION      HERNIA REPAIR      HERNIA REPAIR  2007    x2   [facility] OTHER SURGICAL HISTORY  1965    Perforated duodenol ulcer surgery     ID REPAIR FLEXOR TENDON,HAND,W/O GRAFT,EA Left 3/17/2021    Procedure: Left thumb EPL repair;  Surgeon: Kelton Claire MD;  Location: [facility];  Service: Orthopedics       SOCIAL HISTORY:  Social History     Substance and Sexual Activity   Alcohol Use Yes    Alcohol/week: 1 0 standard drink    Types: 1 Standard drinks or equivalent per week    Comment: rare  socially     Social History     Substance and Sexual Activity   Drug Use No     Social History     Tobacco Use   Smoking Status Former Smoker    Packs/day: 2 00    Years: 10 00    Pack years: 20 00    Types: Cigarettes    Quit date: 26    Years since quittin 6   Smokeless Tobacco Former User    Quit date:    Tobacco Comment    former smoker noted in "allscripts" Quit in  - never used chewing tobacco        FAMILY HISTORY:  Family History   Problem Relation Age of Onset    Pancreatic cancer Mother     Colon cancer Father     Diabetes Father         mellitus     No Known Problems Sister     Diabetes Sister     Nephrolithiasis Sister        MEDICATIONS:    Current Outpatient Medications:     albuterol (PROVENTIL HFA,VENTOLIN HFA) 90 mcg/act inhaler, Inhale 2 puffs every 4 (four) hours as needed for wheezing, Disp: 8 5 g, Rfl: 1    aspirin 81 mg chewable tablet, Chew 1 tablet (81 mg total) daily, Disp: 30 tablet, Rfl: 0    atorvastatin (LIPITOR) 80 mg tablet, Take 1 tablet (80 mg total) by mouth daily with dinner, Disp: 90 tablet, Rfl: 1    ERYN MICROLET LANCETS lancets, Test twice a day, Disp: 100 each, Rfl: 3    cholecalciferol (VITAMIN D3) 1,000 units tablet, Take 2,000 Units by mouth daily, Disp: , Rfl:     Contour Test test strip, Test fasting glucose daily, Disp: 100 strip, Rfl: 2    glyBURIDE (DIABETA) 5 mg tablet, TAKE 1 TABLET BY MOUTH  TWICE DAILY, Disp: 180 tablet, Rfl: 3    lisinopril-hydrochlorothiazide (PRINZIDE,ZESTORETIC) 20-25 MG per tablet, TAKE 1 TABLET BY MOUTH  DAILY, Disp: 90 tablet, Rfl: 3    Tradjenta 5 MG TABS, TAKE 1 TABLET BY MOUTH  DAILY, Disp: 90 tablet, Rfl: 3    Trulicity 5 98 BP/4 6QV SOPN, INJECT THE CONTENTS OF ONE  PEN SUBCUTANEOUSLY WEEKLY  AS DIRECTED, Disp: 6 mL, Rfl: 3    clopidogrel (PLAVIX) 75 mg tablet, Take 1 tablet (75 mg total) by mouth daily for 30 doses (Patient not taking: Reported on 8/29/2022), Disp: 90 tablet, Rfl: 1    PHYSICAL EXAM:  Vitals:    08/29/22 1453   BP: 120/60   BP Location: Right arm   Patient Position: Sitting   Pulse: 77   Resp: 16   Temp: 97 5 °F (36 4 °C)   TempSrc: Temporal   SpO2: 96%   Weight: 81 7 kg (180 lb 3 2 oz)   Height: 5' 10" (1 778 m)     Body mass index is 25 86 kg/m²  Physical Exam  Constitutional:       General: He is not in acute distress  Appearance: He is well-developed  HENT:      Head: Normocephalic  Eyes:      General: No scleral icterus  Conjunctiva/sclera: Conjunctivae normal    Neck:      Vascular: No JVD  Cardiovascular:      Rate and Rhythm: Normal rate  Heart sounds: Normal heart sounds  Pulmonary:      Effort: Pulmonary effort is normal       Breath sounds: No wheezing  Abdominal:      Palpations: Abdomen is soft  Tenderness: There is no abdominal tenderness  Musculoskeletal:         General: Normal range of motion  Cervical back: Neck supple  Skin:     General: Skin is warm  Findings: No rash  Neurological:      Mental Status: He is alert and oriented to person, place, and time     Psychiatric:         Behavior: Behavior normal          LAB RESULTS:  Results for orders placed or performed in visit on 08/24/22   POCT hemoglobin A1c   Result Value Ref Range Hemoglobin A1C 7 1 (A) 6 5       ASSESSMENT and PLAN:      Stage 3b chronic kidney disease (CKD) ([facility])  Lab Results   Component Value Date    EGFR 35 08/23/2022    EGFR 35 07/20/2022    EGFR 29 05/18/2022    CREATININE 1 82 (H) 08/23/2022    CREATININE 1 80 (H) 07/20/2022    CREATININE 2 07 (H) 05/18/2022   Patient kidney function seems to be stable with GFR of about 35 making it stage IIIB CKD  Pathophysiology kidney disease discussed with the patient  Advised hydration and avoiding any nephrotoxic medication    Chronic kidney disease-mineral and bone disorder  Lab Results   Component Value Date    EGFR 35 08/23/2022    EGFR 35 07/20/2022    EGFR 29 05/18/2022    CREATININE 1 82 (H) 08/23/2022    CREATININE 1 80 (H) 07/20/2022    CREATININE 2 07 (H) 05/18/2022   PTH and phosphorus along with vitamin-D are within acceptable range and will continue to monitor    Benign essential hypertension  Very well control at this point  Will continue to monitor    Type 2 diabetes mellitus with diabetic chronic kidney disease ([facility] )    Lab Results   Component Value Date    HGBA1C 7 1 (A) 08/24/2022   Diabetes seems to be reasonably well control  Importance of diabetic control and effect on kidney disease discussed with the patient      Everything discussed with patient at length  I will see him back in 6 months and will get blood and urine test before that visit        Portions of the record may have been created with voice recognition software  Occasional wrong word or "sound a like" substitutions may have occurred due to the inherent limitations of voice recognition software  Read the chart carefully and recognize, using context, where substitutions have occurred  If you have any questions, please contact the dictating provider

## 2022-08-29 NOTE — ASSESSMENT & PLAN NOTE
Lab Results   Component Value Date    EGFR 35 08/23/2022    EGFR 35 07/20/2022    EGFR 29 05/18/2022    CREATININE 1 82 (H) 08/23/2022    CREATININE 1 80 (H) 07/20/2022    CREATININE 2 07 (H) 05/18/2022   PTH and phosphorus along with vitamin-D are within acceptable range and will continue to monitor

## 2022-09-06 NOTE — TELEPHONE ENCOUNTER
As a follow-up, a second attempt has been made for outreach via fax, please see Contacts section for details      Thank you  Macie Camarillo MA

## 2022-09-07 ENCOUNTER — VBI (OUTPATIENT)
Dept: ADMINISTRATIVE | Facility: OTHER | Age: 77
End: 2022-09-07

## 2022-09-07 NOTE — TELEPHONE ENCOUNTER
Upon review of the In Basket request we were able to locate, review, and update the patient chart as requested for Diabetic Eye Exam     Any additional questions or concerns should be emailed to the Practice Liaisons via [email or web address] email, please do not reply via In Basket      Thank you  Cesar Schumacher

## 2022-09-13 NOTE — TELEPHONE ENCOUNTER
Upon review of the In Basket request we last eye 2021 in     Any additional questions or concerns should be emailed to the Practice Liaisons via [email or web address] email, please do not reply via In Basket      Thank you  Billy Sosa MA Regular diabetic diet

## 2022-09-20 ENCOUNTER — TELEPHONE (OUTPATIENT)
Dept: ADMINISTRATIVE | Facility: OTHER | Age: 77
End: 2022-09-20

## 2022-09-20 NOTE — LETTER
Diabetic Eye Exam Form    Date Requested: 22  Patient: Angelito Carrasco  Patient : 1945   Referring Provider: Pawel Rothman MD    DIABETIC Eye Exam Date _______________________________    Type of Exam MUST be documented for Diabetic Eye Exams  Please CHECK ONE  Retinal Exam       Dilated Retinal Exam       OCT       Optomap-Iris Exam      Fundus Photography     Left Eye - Please check Retinopathy AND Type or No Retinopathy      Exam did show retinopathy    Exam did not show retinopathy         Mild     Proliferative           Moderate    Severe            None         Right Eye - Please check Retinopathy AND Type or No Retinopathy     Exam did show retinopathy    Exam did not show retinopathy         Mild     Proliferative        Moderate    Severe        None       Comments __________________________________________________________    Practice Providing Exam ______________________________________________    Exam Performed By (print name) _______________________________________      Provider Signature ___________________________________________________    These reports are needed for  compliance  Please fax this completed form and a copy of the Diabetic Eye Exam report to our office located at [place] [address] as soon as possible via [phone] andrés Garcia So: Phone [phone]  We thank you for your assistance in treating our mutual patient

## 2022-09-20 NOTE — TELEPHONE ENCOUNTER
----- Message from Jacquelyn Fatima MD sent at 9/19/2022  3:47 PM EDT -----  Regarding: eye exam  09/19/22 3:47 PM    Hello, our patient Loren Juarez has had Diabetic Eye Exam completed/performed  Please assist in updating the patient chart by making an External outreach to [address] [place] [address] located in [facility]  The date of service is 2022      Thank you,  Jacquelyn Fatima MD  [facility]

## 2022-09-21 NOTE — TELEPHONE ENCOUNTER
Upon review of the In Basket request and the patient's chart, initial outreach has been made via fax, please see Contacts section for details       Thank you  Sorin Hall MA

## 2022-09-26 DIAGNOSIS — I63.9 ACUTE CVA (CEREBROVASCULAR ACCIDENT) (HCC): ICD-10-CM

## 2022-09-27 RX ORDER — ATORVASTATIN CALCIUM 80 MG/1
TABLET, FILM COATED ORAL
Qty: 90 TABLET | Refills: 3 | Status: SHIPPED | OUTPATIENT
Start: 2022-09-27

## 2022-10-03 NOTE — TELEPHONE ENCOUNTER
As a follow-up, a second attempt has been made for outreach via telephone call, please see Contacts section for details      Thank you  Macie Camarillo

## 2022-10-04 ENCOUNTER — TELEPHONE (OUTPATIENT)
Dept: SURGERY | Facility: CLINIC | Age: 77
End: 2022-10-04

## 2022-10-04 NOTE — TELEPHONE ENCOUNTER
Automated call: ref: [id number]  Active ins as of 1/1/2022  No referrals required   PPO plan, in and out of network benefits      Waited on hold for over an hour to get a live representative to confirm whether   Dr Ludy Maradiaga is in network or not  Patient has seen Dr in recent past  I am assuming that the visits were covered    Feb 2022 pt was here- I was out on leave and do not see any notation as to anyone confirming prior to pts appt, that Yousuf was in network

## 2022-10-05 ENCOUNTER — OFFICE VISIT (OUTPATIENT)
Dept: SURGERY | Facility: CLINIC | Age: 77
End: 2022-10-05
Payer: COMMERCIAL

## 2022-10-05 ENCOUNTER — TELEPHONE (OUTPATIENT)
Dept: SURGERY | Facility: CLINIC | Age: 77
End: 2022-10-05

## 2022-10-05 ENCOUNTER — TELEPHONE (OUTPATIENT)
Dept: NEUROLOGY | Facility: CLINIC | Age: 77
End: 2022-10-05

## 2022-10-05 VITALS
DIASTOLIC BLOOD PRESSURE: 70 MMHG | HEART RATE: 84 BPM | OXYGEN SATURATION: 97 % | RESPIRATION RATE: 16 BRPM | SYSTOLIC BLOOD PRESSURE: 130 MMHG | BODY MASS INDEX: 25.77 KG/M2 | TEMPERATURE: 98 F | HEIGHT: 70 IN | WEIGHT: 180 LBS

## 2022-10-05 DIAGNOSIS — K40.90 INGUINAL HERNIA WITHOUT OBSTRUCTION OR GANGRENE, RECURRENCE NOT SPECIFIED, UNSPECIFIED LATERALITY: Primary | ICD-10-CM

## 2022-10-05 PROCEDURE — 99214 OFFICE O/P EST MOD 30 MIN: CPT | Performed by: SURGERY

## 2022-10-05 RX ORDER — TAMSULOSIN HYDROCHLORIDE 0.4 MG/1
0.4 CAPSULE ORAL
Qty: 5 CAPSULE | Refills: 0 | Status: SHIPPED | OUTPATIENT
Start: 2022-10-05

## 2022-10-05 NOTE — TELEPHONE ENCOUNTER
Rafael Ford called the office back informing us that Dr Bernadine Keyes entered a letter in the pt's chart clearing him from his stand point for the Inguinal Hernia Repair  Pt was called and notified  Being that the pt requires a Monday or Tuesday  The next available Monday after his clearance appointment with the PCP on 11/07/22 is 11/28/22  Pt is ok with this and has been scheduled

## 2022-10-05 NOTE — PROGRESS NOTES
Assessment/Plan:     1  Inguinal hernia without obstruction or gangrene, recurrence not specified, unspecified laterality       We discussed the risks and benefits of open inguinal hernia repair including but not limited to mesh placement to decrease risk of recurrence, risk of bleeding, infection, damage to nerves or other structures, acute and chronic pain, recurrence, reactions to medications  He wants to return to his PCP and discuss risk prior to proceeding and he will return to me after  Doing an open repair does require less anesthesia than laparoscopy  Will discuss with anesthesia MAC vs LMA  Subjective:      Patient ID: Emilia Prescott is a 67 y o  male  Triage Notes:    Mr Danay Mohan is presenting for followup on his inguinal hernia  Per his neurologist he has completed the plavix and now only needs the baby asa 81mg  Monday or Tuesdays are the days he has transportation  The following portions of the patient's history were reviewed and updated as appropriate: allergies, current medications, past family history, past medical history, past social history, past surgical history and problem list     Review of Systems   Constitutional: Negative for appetite change, chills and fever  HENT: Positive for hearing loss  Eyes: Positive for visual disturbance (2/2 CVA)  Respiratory: Shortness of breath: intermittent 2/2 asthma  Gastrointestinal: Negative for abdominal pain, constipation and diarrhea  Endocrine: Negative for heat intolerance  Genitourinary: Positive for scrotal swelling (LIH extends into the scrotum at times)  Negative for flank pain  Skin: Negative for rash  Neurological: Positive for light-headedness (since CVA)  Feels some issues with balance   Psychiatric/Behavioral: Negative for suicidal ideas  All other systems reviewed and are negative          Objective:      /70   Pulse 84   Temp 98 °F (36 7 °C) (Temporal)   Resp 16   Ht 5' 10" (1 778 m) Wt 81 6 kg (180 lb)   SpO2 97%   BMI 25 83 kg/m²     Below is the patient's most recent value for Albumin, ALT, AST, BUN, Calcium, Chloride, Cholesterol, CO2, Creatinine, GFR, Glucose, HDL, Hematocrit, Hemoglobin, Hemoglobin A1C, LDL, Magnesium, Phosphorus, Platelets, Potassium, PSA, Sodium, Triglycerides, and WBC  Lab Results   Component Value Date    ALT 43 05/18/2022    AST 24 05/18/2022    BUN 35 (H) 08/23/2022    CALCIUM 9 0 08/23/2022     08/23/2022    CHOL 168 10/01/2015    CO2 27 08/23/2022    CREATININE 1 82 (H) 08/23/2022    HDL 31 (L) 05/18/2022    HCT 38 3 08/23/2022    HGB 12 6 08/23/2022    HGBA1C 7 1 (A) 08/24/2022    MG 1 6 04/02/2014    PHOS 3 4 08/23/2022     08/23/2022    K 4 8 08/23/2022    PSA 2 9 09/16/2021     (L) 11/25/2015    TRIG 48 05/18/2022    WBC 7 51 08/23/2022     Note: for a comprehensive list of the patient's lab results, access the Results Review activity  Physical Exam  Vitals and nursing note reviewed  Constitutional:       General: He is not in acute distress  Appearance: Normal appearance  He is well-developed  He is not diaphoretic  HENT:      Head: Normocephalic and atraumatic  Right Ear: External ear normal       Left Ear: External ear normal       Mouth/Throat:      Mouth: Mucous membranes are moist    Eyes:      General: No scleral icterus  Cardiovascular:      Rate and Rhythm: Normal rate and regular rhythm  Heart sounds: Normal heart sounds  No murmur heard  No friction rub  No gallop  Pulmonary:      Effort: Pulmonary effort is normal  No respiratory distress  Abdominal:      General: Abdomen is flat  There is no distension  Genitourinary:     Comments: Left inguinoscrotal hernia, partially reducible  Musculoskeletal:         General: No swelling or deformity  Right lower leg: No edema  Left lower leg: No edema  Neurological:      Mental Status: He is alert and oriented to person, place, and time  Psychiatric:         Mood and Affect: Mood normal          Behavior: Behavior normal          Thought Content:  Thought content normal          Judgment: Judgment normal              Procedures

## 2022-10-05 NOTE — TELEPHONE ENCOUNTER
PT needs amanda for surgery that is being scheduled in mid October  Does Pt need to be seen by Dr Analia Babcock to give this clearance ? Please advise      Surgery coordinator Jason Dawson  [phone]

## 2022-10-05 NOTE — TELEPHONE ENCOUNTER
I called Neurology's office of Dr Camden Verduzco  I spoke w/Kathia  I asked for a medical clearance appointment for the pt so that we can move forward with scheduling IHR with Dr Ludy Merchant stated she will need to speak with Dr Camden Verduzco because there were no appointments available sooner than March 2023  I asked her to please give me a call once she received the response from Dr Camden Verduzco

## 2022-10-07 NOTE — TELEPHONE ENCOUNTER
Upon review of the In Basket request we Called patient has a Oct 20 2022 appointment  Any additional questions or concerns should be emailed to the Practice Liaisons via the appropriate education email address, please do not reply via In Basket      Thank you  Rustam Mckeon

## 2022-10-27 ENCOUNTER — APPOINTMENT (OUTPATIENT)
Dept: LAB | Facility: CLINIC | Age: 77
End: 2022-10-27
Payer: COMMERCIAL

## 2022-10-27 DIAGNOSIS — K40.90 INGUINAL HERNIA WITHOUT OBSTRUCTION OR GANGRENE, RECURRENCE NOT SPECIFIED, UNSPECIFIED LATERALITY: ICD-10-CM

## 2022-10-27 LAB
ALBUMIN SERPL BCP-MCNC: 3.7 G/DL (ref 3.5–5)
ALP SERPL-CCNC: 82 U/L (ref 46–116)
ALT SERPL W P-5'-P-CCNC: 32 U/L (ref 12–78)
ANION GAP SERPL CALCULATED.3IONS-SCNC: 6 MMOL/L (ref 4–13)
AST SERPL W P-5'-P-CCNC: 17 U/L (ref 5–45)
BASOPHILS # BLD AUTO: 0.08 THOUSANDS/ÂΜL (ref 0–0.1)
BASOPHILS NFR BLD AUTO: 1 % (ref 0–1)
BILIRUB SERPL-MCNC: 0.64 MG/DL (ref 0.2–1)
BUN SERPL-MCNC: 33 MG/DL (ref 5–25)
CALCIUM SERPL-MCNC: 8.9 MG/DL (ref 8.3–10.1)
CHLORIDE SERPL-SCNC: 106 MMOL/L (ref 96–108)
CO2 SERPL-SCNC: 24 MMOL/L (ref 21–32)
CREAT SERPL-MCNC: 1.74 MG/DL (ref 0.6–1.3)
EOSINOPHIL # BLD AUTO: 0.6 THOUSAND/ÂΜL (ref 0–0.61)
EOSINOPHIL NFR BLD AUTO: 8 % (ref 0–6)
ERYTHROCYTE [DISTWIDTH] IN BLOOD BY AUTOMATED COUNT: 12.9 % (ref 11.6–15.1)
GFR SERPL CREATININE-BSD FRML MDRD: 36 ML/MIN/1.73SQ M
GLUCOSE P FAST SERPL-MCNC: 165 MG/DL (ref 65–99)
HCT VFR BLD AUTO: 39.5 % (ref 36.5–49.3)
HGB BLD-MCNC: 13.2 G/DL (ref 12–17)
IMM GRANULOCYTES # BLD AUTO: 0.02 THOUSAND/UL (ref 0–0.2)
IMM GRANULOCYTES NFR BLD AUTO: 0 % (ref 0–2)
LYMPHOCYTES # BLD AUTO: 1.91 THOUSANDS/ÂΜL (ref 0.6–4.47)
LYMPHOCYTES NFR BLD AUTO: 25 % (ref 14–44)
MCH RBC QN AUTO: 30.9 PG (ref 26.8–34.3)
MCHC RBC AUTO-ENTMCNC: 33.4 G/DL (ref 31.4–37.4)
MCV RBC AUTO: 93 FL (ref 82–98)
MONOCYTES # BLD AUTO: 0.61 THOUSAND/ÂΜL (ref 0.17–1.22)
MONOCYTES NFR BLD AUTO: 8 % (ref 4–12)
NEUTROPHILS # BLD AUTO: 4.34 THOUSANDS/ÂΜL (ref 1.85–7.62)
NEUTS SEG NFR BLD AUTO: 58 % (ref 43–75)
NRBC BLD AUTO-RTO: 0 /100 WBCS
PLATELET # BLD AUTO: 237 THOUSANDS/UL (ref 149–390)
PMV BLD AUTO: 11.4 FL (ref 8.9–12.7)
POTASSIUM SERPL-SCNC: 4.5 MMOL/L (ref 3.5–5.3)
PROT SERPL-MCNC: 7.1 G/DL (ref 6.4–8.4)
RBC # BLD AUTO: 4.27 MILLION/UL (ref 3.88–5.62)
SODIUM SERPL-SCNC: 136 MMOL/L (ref 135–147)
WBC # BLD AUTO: 7.56 THOUSAND/UL (ref 4.31–10.16)

## 2022-10-27 PROCEDURE — 80053 COMPREHEN METABOLIC PANEL: CPT

## 2022-10-27 PROCEDURE — 85025 COMPLETE CBC W/AUTO DIFF WBC: CPT

## 2022-10-27 PROCEDURE — 36415 COLL VENOUS BLD VENIPUNCTURE: CPT

## 2022-11-07 ENCOUNTER — OFFICE VISIT (OUTPATIENT)
Dept: FAMILY MEDICINE CLINIC | Facility: CLINIC | Age: 77
End: 2022-11-07

## 2022-11-07 VITALS
TEMPERATURE: 98.4 F | HEART RATE: 84 BPM | OXYGEN SATURATION: 97 % | DIASTOLIC BLOOD PRESSURE: 78 MMHG | SYSTOLIC BLOOD PRESSURE: 120 MMHG | HEIGHT: 70 IN | WEIGHT: 181 LBS | BODY MASS INDEX: 25.91 KG/M2

## 2022-11-07 DIAGNOSIS — N18.32 TYPE 2 DIABETES MELLITUS WITH STAGE 3B CHRONIC KIDNEY DISEASE, WITHOUT LONG-TERM CURRENT USE OF INSULIN (HCC): ICD-10-CM

## 2022-11-07 DIAGNOSIS — Z01.818 PRE-OP EXAMINATION: ICD-10-CM

## 2022-11-07 DIAGNOSIS — K40.90 LEFT INGUINAL HERNIA: Primary | ICD-10-CM

## 2022-11-07 DIAGNOSIS — E11.22 TYPE 2 DIABETES MELLITUS WITH STAGE 3B CHRONIC KIDNEY DISEASE, WITHOUT LONG-TERM CURRENT USE OF INSULIN (HCC): ICD-10-CM

## 2022-11-07 NOTE — PROGRESS NOTES
Magdalene Martin 1945 male MRN: [id number]        ASSESSMENT/PLAN  Problem List Items Addressed This Visit        Endocrine    Type 2 diabetes mellitus with diabetic chronic kidney disease ([facility] [address] )       Other    Left inguinal hernia - Primary      Other Visit Diagnoses     Pre-op examination        Relevant Orders    POCT ECG (Completed)          High Risk Surgery: no  CAD: no  CHF: no  CVD: no  DM2 on insulin: no  Cr>2: no        Chett HORTENCIA Salcedo is undergoing a Low Risk surgery  He is at [address] for major adverse cardiac event (MACE)  He may proceed with surgery as planned without further workup  Hold Glyburide and Tradjenta the morning of surgery  Hold aspirin for 7 days prior to surgery  SUBJECTIVE  CC: Pre-op Exam      HPI:  Magdalene Martin is a 65 y o  male who is planning to undergo hernia surgery under general by Dr Katt Ramires on 11/28/22  Patient has not had complications with anesthesia in the past   Functional status: good    Diabetes - last A1c in August 7 1%  CBC normal  CMP - CKD is stable     Review of Systems   Constitutional: Negative for activity change, appetite change, fatigue and unexpected weight change  Respiratory: Negative for chest tightness and shortness of breath  Cardiovascular: Negative for chest pain and leg swelling  Gastrointestinal: Negative for abdominal pain  Hernia   Neurological: Negative for headaches           Historical Information   The patient history was reviewed as follows:    Past Medical History:   Diagnosis Date   • Asthma     last assessed: April 1, 2015   • Balanitis     last assessed: July 9, 2014   • Cataract    • Chronic kidney disease    • Chronic kidney disease, stage 3 ([facility] [address] )     last assessed: Jan 17, 2018   • COVID-19    • Diabetes mellitus ([facility] [address] )    • 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([facility] [address] )     last assessed: Jan 17, 2018   • Hypercholesterolemia    • Hypertension    • Pneumonia     last assessed: Feb 25, 2013   • Stroke [facility]) 02/2022 Past Surgical History:   Procedure Laterality Date   • ABDOMINAL SURGERY     • APPENDECTOMY  1965   • CATARACT EXTRACTION     • HERNIA REPAIR     • HERNIA REPAIR  2007    x2   • OTHER SURGICAL HISTORY  1965    Perforated duodenol ulcer surgery    • NH REPAIR FLEXOR TENDON,HAND,W/O GRAFT,EA Left 3/17/2021    Procedure: Left thumb EPL repair;  Surgeon: Burgess Tamara MD;  Location: [facility];  Service: Orthopedics     Family History   Problem Relation Age of Onset   • Pancreatic cancer Mother    • Colon cancer Father    • Diabetes Father         mellitus    • No Known Problems Sister    • Diabetes Sister    • Nephrolithiasis Sister       Social History       Medications:     Current Outpatient Medications:   •  albuterol (PROVENTIL HFA,VENTOLIN HFA) 90 mcg/act inhaler, Inhale 2 puffs every 4 (four) hours as needed for wheezing, Disp: 8 5 g, Rfl: 1  •  aspirin 81 mg chewable tablet, Chew 1 tablet (81 mg total) daily, Disp: 30 tablet, Rfl: 0  •  atorvastatin (LIPITOR) 80 mg tablet, TAKE 1 TABLET BY MOUTH  DAILY WITH DINNER, Disp: 90 tablet, Rfl: 3  •  ERYN MICROLET LANCETS lancets, Test twice a day, Disp: 100 each, Rfl: 3  •  cholecalciferol (VITAMIN D3) 1,000 units tablet, Take 2,000 Units by mouth daily, Disp: , Rfl:   •  Contour Test test strip, Test fasting glucose daily, Disp: 100 strip, Rfl: 2  •  glyBURIDE (DIABETA) 5 mg tablet, TAKE 1 TABLET BY MOUTH  TWICE DAILY, Disp: 180 tablet, Rfl: 3  •  lisinopril-hydrochlorothiazide (PRINZIDE,ZESTORETIC) 20-25 MG per tablet, TAKE 1 TABLET BY MOUTH  DAILY, Disp: 90 tablet, Rfl: 3  •  tamsulosin (FLOMAX) 0 4 mg, Take 1 capsule (0 4 mg total) by mouth daily with dinner Take 3 days before surgery and 2 days after , Disp: 5 capsule, Rfl: 0  •  Tradjenta 5 MG TABS, TAKE 1 TABLET BY MOUTH  DAILY, Disp: 90 tablet, Rfl: 3  •  Trulicity 0 98 AF/9 2TC SOPN, INJECT THE CONTENTS OF ONE  PEN SUBCUTANEOUSLY WEEKLY  AS DIRECTED, Disp: 6 mL, Rfl: 3  Allergies   Allergen Reactions • Meperidine    • Pravastatin Myalgia       OBJECTIVE    Vitals:   Vitals:    11/07/22 1603   BP: 120/78   Pulse: 84   Temp: 98 4 °F (36 9 °C)   SpO2: 97%   Weight: 82 1 kg (181 lb)   Height: 5' 10" (1 778 m)           Physical Exam  Vitals and nursing note reviewed  Constitutional:       General: He is not in acute distress  Appearance: Normal appearance  He is well-developed  He is not diaphoretic  HENT:      Head: Normocephalic and atraumatic  Mouth/Throat:      Comments: mask  Cardiovascular:      Rate and Rhythm: Normal rate and regular rhythm  Heart sounds: Normal heart sounds  No murmur heard  No friction rub  No gallop  Pulmonary:      Effort: Pulmonary effort is normal  No respiratory distress  Breath sounds: Normal breath sounds  No wheezing or rales  Chest:      Chest wall: No tenderness  Abdominal:      General: There is distension  Palpations: Abdomen is soft  Hernia: A hernia (L inguinal) is present  Musculoskeletal:         General: No swelling  Right lower leg: No edema  Left lower leg: No edema  Neurological:      General: No focal deficit present  Mental Status: He is alert and oriented to person, place, and time  Psychiatric:         Mood and Affect: Mood normal          Behavior: Behavior normal          Thought Content: Thought content normal          Judgment: Judgment normal         EKG: normal EKG, normal sinus rhythm        [facility]   11/7/2022  4:24 PM

## 2022-11-16 ENCOUNTER — APPOINTMENT (OUTPATIENT)
Dept: PREADMISSION TESTING | Facility: HOSPITAL | Age: 77
End: 2022-11-16

## 2022-11-18 NOTE — PRE-PROCEDURE INSTRUCTIONS
Pre-Surgery Instructions:   Medication Instructions   • albuterol (PROVENTIL HFA,VENTOLIN HFA) 90 mcg/act inhaler Uses PRN- OK to take day of surgery   • aspirin 81 mg chewable tablet Stop taking 7 days prior to surgery  • atorvastatin (LIPITOR) 80 mg tablet Take night before surgery   • cholecalciferol (VITAMIN D3) 1,000 units tablet Stop taking 7 days prior to surgery  • glyBURIDE (DIABETA) 5 mg tablet Hold day of surgery  • lisinopril-hydrochlorothiazide (PRINZIDE,ZESTORETIC) 20-25 MG per tablet Hold day of surgery  • tamsulosin (FLOMAX) 0 4 mg Instructions provided by MD   • Tradjenta 5 MG TABS Hold day of surgery     • Trulicity 0 66 MARLINE/1 8PP SOPN ok to take on sunday     Pt verbalizes understanding of the following:    - Bathing instructions, has chg, neck down, no genitals  - No lotions, powders, sprays, deodorant, jewelry  - No shaving within 24hrs    - NPO after MN  - Avoid OTC non-directed Vit/ Suppl/ Herbals 7 days prior to surgery to ensure no drug interactions with perioperative surgical/ anesthetic meds  - Avoid NSAIDs 3 days prior  - Avoid ASA containing products 5 days prior  - Continue statin medication up through and including the day of surgery  - Insulin Management: If on Insulin, advised to call PCP for explicit instructions    - Bring list of meds with last dose noted  - [employer] cards & photo id

## 2022-11-28 ENCOUNTER — ANESTHESIA EVENT (OUTPATIENT)
Dept: PERIOP | Facility: HOSPITAL | Age: 77
End: 2022-11-28

## 2022-11-28 ENCOUNTER — ANESTHESIA (OUTPATIENT)
Dept: PERIOP | Facility: HOSPITAL | Age: 77
End: 2022-11-28

## 2022-11-28 ENCOUNTER — HOSPITAL ENCOUNTER (OUTPATIENT)
Facility: HOSPITAL | Age: 77
Setting detail: OUTPATIENT SURGERY
Discharge: HOME | End: 2022-11-28
Attending: SURGERY | Admitting: SURGERY

## 2022-11-28 VITALS
TEMPERATURE: 98.4 F | DIASTOLIC BLOOD PRESSURE: 71 MMHG | RESPIRATION RATE: 17 BRPM | BODY MASS INDEX: 25.41 KG/M2 | WEIGHT: 177.47 LBS | HEIGHT: 70 IN | HEART RATE: 78 BPM | OXYGEN SATURATION: 95 % | SYSTOLIC BLOOD PRESSURE: 125 MMHG

## 2022-11-28 DIAGNOSIS — K40.90 INGUINAL HERNIA WITHOUT OBSTRUCTION OR GANGRENE, RECURRENCE NOT SPECIFIED, UNSPECIFIED LATERALITY: ICD-10-CM

## 2022-11-28 DIAGNOSIS — K40.90 LEFT INGUINAL HERNIA: Primary | ICD-10-CM

## 2022-11-28 LAB — GLUCOSE SERPL-MCNC: 137 MG/DL (ref 65–140)

## 2022-11-28 DEVICE — BARD MESH PERFIX PLUG, LARGE
Type: IMPLANTABLE DEVICE | Status: FUNCTIONAL
Brand: BARD MESH PERFIX PLUG

## 2022-11-28 DEVICE — BARD SOFT MESH
Type: IMPLANTABLE DEVICE | Status: FUNCTIONAL
Brand: BARD SOFT MESH

## 2022-11-28 RX ORDER — PROPOFOL 10 MG/ML
INJECTION, EMULSION INTRAVENOUS AS NEEDED
Status: DISCONTINUED | OUTPATIENT
Start: 2022-11-28 | End: 2022-11-28

## 2022-11-28 RX ORDER — SODIUM CHLORIDE, SODIUM LACTATE, POTASSIUM CHLORIDE, CALCIUM CHLORIDE 600; 310; 30; 20 MG/100ML; MG/100ML; MG/100ML; MG/100ML
50 INJECTION, SOLUTION INTRAVENOUS CONTINUOUS
Status: DISCONTINUED | OUTPATIENT
Start: 2022-11-28 | End: 2022-11-28 | Stop reason: HOSPADM

## 2022-11-28 RX ORDER — HYDROCODONE BITARTRATE AND ACETAMINOPHEN 5; 325 MG/1; MG/1
1 TABLET ORAL EVERY 6 HOURS PRN
Qty: 12 TABLET | Refills: 0 | Status: SHIPPED | OUTPATIENT
Start: 2022-11-28 | End: 2022-12-08

## 2022-11-28 RX ORDER — MAGNESIUM HYDROXIDE 1200 MG/15ML
LIQUID ORAL AS NEEDED
Status: DISCONTINUED | OUTPATIENT
Start: 2022-11-28 | End: 2022-11-28 | Stop reason: HOSPADM

## 2022-11-28 RX ORDER — LIDOCAINE HYDROCHLORIDE 20 MG/ML
INJECTION, SOLUTION EPIDURAL; INFILTRATION; INTRACAUDAL; PERINEURAL AS NEEDED
Status: DISCONTINUED | OUTPATIENT
Start: 2022-11-28 | End: 2022-11-28

## 2022-11-28 RX ORDER — ASPIRIN 81 MG/1
81 TABLET, CHEWABLE ORAL DAILY
Status: DISCONTINUED | OUTPATIENT
Start: 2022-11-28 | End: 2022-11-28 | Stop reason: HOSPADM

## 2022-11-28 RX ORDER — CEFAZOLIN SODIUM 2 G/50ML
2000 SOLUTION INTRAVENOUS ONCE
Status: COMPLETED | OUTPATIENT
Start: 2022-11-28 | End: 2022-11-28

## 2022-11-28 RX ORDER — ONDANSETRON 2 MG/ML
4 INJECTION INTRAMUSCULAR; INTRAVENOUS ONCE AS NEEDED
Status: DISCONTINUED | OUTPATIENT
Start: 2022-11-28 | End: 2022-11-28 | Stop reason: HOSPADM

## 2022-11-28 RX ORDER — DOCUSATE SODIUM 100 MG/1
100 CAPSULE, LIQUID FILLED ORAL 2 TIMES DAILY
Qty: 20 CAPSULE | Refills: 0 | Status: SHIPPED | OUTPATIENT
Start: 2022-11-28

## 2022-11-28 RX ORDER — HYDROCODONE BITARTRATE AND ACETAMINOPHEN 5; 325 MG/1; MG/1
1 TABLET ORAL EVERY 6 HOURS PRN
Status: DISCONTINUED | OUTPATIENT
Start: 2022-11-28 | End: 2022-11-28 | Stop reason: HOSPADM

## 2022-11-28 RX ORDER — FENTANYL CITRATE/PF 50 MCG/ML
50 SYRINGE (ML) INJECTION
Status: DISCONTINUED | OUTPATIENT
Start: 2022-11-28 | End: 2022-11-28 | Stop reason: HOSPADM

## 2022-11-28 RX ORDER — DEXAMETHASONE SODIUM PHOSPHATE 10 MG/ML
INJECTION, SOLUTION INTRAMUSCULAR; INTRAVENOUS AS NEEDED
Status: DISCONTINUED | OUTPATIENT
Start: 2022-11-28 | End: 2022-11-28

## 2022-11-28 RX ORDER — FENTANYL CITRATE 50 UG/ML
INJECTION, SOLUTION INTRAMUSCULAR; INTRAVENOUS AS NEEDED
Status: DISCONTINUED | OUTPATIENT
Start: 2022-11-28 | End: 2022-11-28

## 2022-11-28 RX ORDER — SODIUM CHLORIDE, SODIUM LACTATE, POTASSIUM CHLORIDE, CALCIUM CHLORIDE 600; 310; 30; 20 MG/100ML; MG/100ML; MG/100ML; MG/100ML
INJECTION, SOLUTION INTRAVENOUS CONTINUOUS PRN
Status: DISCONTINUED | OUTPATIENT
Start: 2022-11-28 | End: 2022-11-28

## 2022-11-28 RX ORDER — PROPOFOL 10 MG/ML
INJECTION, EMULSION INTRAVENOUS CONTINUOUS PRN
Status: DISCONTINUED | OUTPATIENT
Start: 2022-11-28 | End: 2022-11-28

## 2022-11-28 RX ORDER — ONDANSETRON 2 MG/ML
INJECTION INTRAMUSCULAR; INTRAVENOUS AS NEEDED
Status: DISCONTINUED | OUTPATIENT
Start: 2022-11-28 | End: 2022-11-28

## 2022-11-28 RX ADMIN — PROPOFOL 50 MG: 10 INJECTION, EMULSION INTRAVENOUS at 13:01

## 2022-11-28 RX ADMIN — PROPOFOL 50 MG: 10 INJECTION, EMULSION INTRAVENOUS at 12:06

## 2022-11-28 RX ADMIN — FENTANYL CITRATE 25 MCG: 50 INJECTION INTRAMUSCULAR; INTRAVENOUS at 13:01

## 2022-11-28 RX ADMIN — LIDOCAINE HYDROCHLORIDE 100 MG: 20 INJECTION, SOLUTION EPIDURAL; INFILTRATION; INTRACAUDAL; PERINEURAL at 12:04

## 2022-11-28 RX ADMIN — FENTANYL CITRATE 25 MCG: 50 INJECTION INTRAMUSCULAR; INTRAVENOUS at 12:45

## 2022-11-28 RX ADMIN — ONDANSETRON 4 MG: 2 INJECTION INTRAMUSCULAR; INTRAVENOUS at 13:02

## 2022-11-28 RX ADMIN — PROPOFOL 120 MCG/KG/MIN: 10 INJECTION, EMULSION INTRAVENOUS at 12:07

## 2022-11-28 RX ADMIN — ASPIRIN 81 MG: 81 TABLET, CHEWABLE ORAL at 11:35

## 2022-11-28 RX ADMIN — HYDROCODONE BITARTRATE AND ACETAMINOPHEN 1 TABLET: 5; 325 TABLET ORAL at 15:20

## 2022-11-28 RX ADMIN — PROPOFOL 50 MG: 10 INJECTION, EMULSION INTRAVENOUS at 13:20

## 2022-11-28 RX ADMIN — FENTANYL CITRATE 25 MCG: 50 INJECTION INTRAMUSCULAR; INTRAVENOUS at 12:04

## 2022-11-28 RX ADMIN — CEFAZOLIN SODIUM 2000 MG: 2 SOLUTION INTRAVENOUS at 11:47

## 2022-11-28 RX ADMIN — FENTANYL CITRATE 25 MCG: 50 INJECTION INTRAMUSCULAR; INTRAVENOUS at 13:20

## 2022-11-28 RX ADMIN — SODIUM CHLORIDE, SODIUM LACTATE, POTASSIUM CHLORIDE, AND CALCIUM CHLORIDE: .6; .31; .03; .02 INJECTION, SOLUTION INTRAVENOUS at 11:58

## 2022-11-28 RX ADMIN — SODIUM CHLORIDE, SODIUM LACTATE, POTASSIUM CHLORIDE, AND CALCIUM CHLORIDE: .6; .31; .03; .02 INJECTION, SOLUTION INTRAVENOUS at 13:29

## 2022-11-28 RX ADMIN — PROPOFOL 150 MG: 10 INJECTION, EMULSION INTRAVENOUS at 12:04

## 2022-11-28 RX ADMIN — DEXAMETHASONE SODIUM PHOSPHATE 10 MG: 10 INJECTION INTRAMUSCULAR; INTRAVENOUS at 12:16

## 2022-11-28 RX ADMIN — FENTANYL CITRATE 25 MCG: 50 INJECTION INTRAMUSCULAR; INTRAVENOUS at 13:24

## 2022-11-28 RX ADMIN — FENTANYL CITRATE 25 MCG: 50 INJECTION INTRAMUSCULAR; INTRAVENOUS at 12:09

## 2022-11-28 RX ADMIN — FENTANYL CITRATE 25 MCG: 50 INJECTION INTRAMUSCULAR; INTRAVENOUS at 12:12

## 2022-11-28 RX ADMIN — PROPOFOL 20 MG: 10 INJECTION, EMULSION INTRAVENOUS at 13:22

## 2022-11-28 NOTE — DISCHARGE INSTRUCTIONS
Inguinal Hernia Repair   AMBULATORY CARE:   What you need to know about an inguinal hernia repair:  An inguinal hernia repair may be done open or laparoscopically  Open means your healthcare provider will make 1 large incision and fix your hernia  Laparoscopically means he will make 2 to 3 small incisions and fix your hernia  What will happen during an inguinal hernia repair:   You will be given general anesthesia to keep you asleep and free from pain during surgery  You may also be given local anesthesia to numb the surgery area Local anesthesia may help decrease your pain after surgery  To fix the hernia laparoscopically, your healthcare provider will make a small incision above or to the side of your hernia  Next, he will insert a laparoscope  A laparoscope is a long metal tube with a light and camera on the end  He will insert other instruments by making 2 to 3 smaller incisions at different places on your abdomen  In an open hernia repair, your healthcare provider will make 1 large incision near your groin  In both types of hernia repair, tools are used to remove the sac that contains your organs or abdominal tissue  Next, your healthcare provider will move your organs or tissue back into the correct place  Stitches or mesh may be used to close or cover the opening in your abdominal wall  This may prevent your organs and tissues from bulging through it again  Your healthcare provider may check the opposite side of your lower abdomen for a hernia  He will do this before he closes your incisions  He may close the incisions in your skin with stitches, medical glue, or strips of medical tape  What will happen after an inguinal hernia repair:  Healthcare providers will monitor you until you are awake  You may be able to go home when your pain is controlled, you can drink liquids, and you can urinate   You may instead need to spend a night in the hospital  It is normal for men to have swelling or bruising in their scrotum  Women may have swelling just under the incision  You will not be able to drive or lift anything heavy for 1 to 2 weeks  Risks of an inguinal hernia repair:  Your organs, blood vessels, or nerves may get injured during the surgery  You may bleed more than expected or get an infection  A pocket of fluid may form under your skin  This may heal on its own, or you may need surgery to remove it  You may have difficulty urinating after surgery  This is usually temporary  Problems, such as a hole in your intestines, may happen during your laparoscopic repair that may lead to open surgery  Even after you have this surgery, there is a chance that you could have another hernia  You may get a blood clot in your leg or arm  This may become life-threatening  Call 911 for any of the following: You feel lightheaded, short of breath, and have chest pain  You cough up blood  You have trouble breathing  Seek care immediately if:   Your arm or leg feels warm, tender, and painful  It may look swollen and red  Blood soaks through your bandage  Your abdomen or groin feels hard and looks bigger than usual      Your bowel movements are black, bloody, or tarry-looking  Contact your healthcare provider if:   You have a fever above 101°F      You develop a skin rash, hives, or itching  Your incision is swollen, red, or draining pus or fluid  You have nausea, or you are vomiting  You cannot have a bowel movement  You have trouble urinating  Your pain does not get better after you take pain medicine  You have questions or concerns about your condition or care  Medicines: You may need any of the following:  Prescription pain medicine  may be given  Ask your healthcare provider how to take this medicine safely  Some prescription pain medicines contain acetaminophen  Do not take other medicines that contain acetaminophen without talking to your healthcare provider   Too much acetaminophen may cause liver damage  Prescription pain medicine may cause constipation  Ask your healthcare provider how to prevent or treat constipation  NSAIDs , such as ibuprofen, help decrease swelling, pain, and fever  This medicine is available with or without a doctor's order  NSAIDs can cause stomach bleeding or kidney problems in certain people  If you take blood thinner medicine, always ask your healthcare provider if NSAIDs are safe for you  Always read the medicine label and follow directions  Acetaminophen  decreases pain and fever  It is available without a doctor's order  Ask how much to take and how often to take it  Follow directions  Read the labels of all other medicines you are using to see if they also contain acetaminophen, or ask your doctor or pharmacist  Acetaminophen can cause liver damage if not taken correctly  Do not use more than 4 grams (4,000 milligrams) total of acetaminophen in one day  Take your medicine as directed  Contact your healthcare provider if you think your medicine is not helping or if you have side effects  Tell him or her if you are allergic to any medicine  Keep a list of the medicines, vitamins, and herbs you take  Include the amounts, and when and why you take them  Bring the list or the pill bottles to follow-up visits  Carry your medicine list with you in case of an emergency  Care for your wound as directed: You can shower in 48 hours  Remove your bandage before you shower  It is normal to see a small amount of blood under the bandage  Carefully wash around your wound  It is okay to let soap and water run over your wound  Do not  scrub your wound  Gently pay your wound dry  If you have strips of medical tape over your incision, allow them to fall off on their own  It may take 7 to 10 days for them to fall off  Do not get in a bathtub, swimming pool, or hot tub until your healthcare provider says it is okay     Self-care:   Eat a variety of healthy foods   Healthy foods include fruits, vegetables, whole-grain breads, low-fat dairy products, beans, lean meats, and fish  Healthy foods may help you heal faster  Ask if you need to be on a special diet  Drink liquids as directed  Liquids may prevent constipation and straining during a bowel movement  This will help prevent pressure on your incision, and prevent another hernia  Ask how much liquid to drink each day and which liquids are best for you  Apply ice  on your incision for 15 to 20 minutes every hour or as directed  Use an ice pack, or put crushed ice in a plastic bag  Cover it with a towel  Ice helps prevent tissue damage and decreases swelling and pain  Take deep breaths and cough  10 times each hour  This will decrease your risk for a lung infection  Take a deep breath and hold it for as long as you can  Let the air out and then cough strongly  Deep breaths help open your airway  You may be given an incentive spirometer to help you take deep breaths  Put the plastic piece in your mouth and take a slow, deep breath  Then let the air out and cough  Repeat these steps 10 times every hour  Press a pillow lightly against your incision when you cough  This may decrease pain or discomfort  Do not smoke  Nicotine and other chemicals in cigarettes and cigars can prevent your wound from healing  It can also increase your risk for another inguinal hernia  Ask your healthcare provider for information if you currently smoke and need help to quit  E-cigarettes or smokeless tobacco still contain nicotine  Talk to your healthcare provider before you use these products  Driving:  Do not drive for at least 1 week after surgery  Do not drive if you are taking prescription pain medication  Do not drive until it is comfortable to wear a seatbelt across your abdomen  Ask your healthcare provider when it is safe for you to drive  Activity:   It is important to get out of bed and walk the day after your surgery  This will help prevent blood clots, move your bowels after surgery, and increase healing  Do not lift anything heavy until your healthcare provider says it is okay  This may put too much pressure on your incision and cause it to come apart  It may also increase your risk for another hernia  Do not play sports for 2 to 3 weeks  Ask your healthcare provider when you can return to work, school, and your normal activities  Follow up with your healthcare provider as directed:  Write down your questions so you remember to ask them during your visits  Follow up with your healthcare provider as directed:  Write down your questions so you remember to ask them during your visits  © Copyright [employer] 2022 Information is for End User's use only and may not be sold, redistributed or otherwise used for commercial purposes  All illustrations and images included in CareNotes® are the copyrighted property of [employer]  or Natasha Phillips  The above information is an  only  It is not intended as medical advice for individual conditions or treatments  Talk to your doctor, nurse or pharmacist before following any medical regimen to see if it is safe and effective for you

## 2022-11-28 NOTE — PROGRESS NOTES
Assessment/Plan:     1  Inguinal hernia without obstruction or gangrene, recurrence not specified, unspecified laterality       We discussed the risks and benefits of open inguinal hernia repair including but not limited to mesh placement to decrease risk of recurrence, risk of bleeding, infection, damage to nerves or other structures, acute and chronic pain, recurrence, reactions to medications  He wants to return to his PCP and discuss risk prior to proceeding and he will return to me after  Doing an open repair does require less anesthesia than laparoscopy  Will discuss with anesthesia MAC vs LMA  Subjective:      Patient ID: Lucas Session is a 65 y o  male  Triage Notes:    Mr Kiera Miller is presenting for followup on his inguinal hernia  Per his neurologist he has completed the plavix and now only needs the baby asa 81mg  Monday or Tuesdays are the days he has transportation  The following portions of the patient's history were reviewed and updated as appropriate: allergies, current medications, past family history, past medical history, past social history, past surgical history and problem list     Review of Systems   Constitutional: Negative for appetite change, chills and fever  HENT: Positive for hearing loss  Eyes: Positive for visual disturbance (2/2 CVA)  Respiratory: Shortness of breath: intermittent 2/2 asthma  Gastrointestinal: Negative for abdominal pain, constipation and diarrhea  Endocrine: Negative for heat intolerance  Genitourinary: Positive for scrotal swelling (LIH extends into the scrotum at times)  Negative for flank pain  Skin: Negative for rash  Neurological: Positive for light-headedness (since CVA)  Feels some issues with balance   Psychiatric/Behavioral: Negative for suicidal ideas  All other systems reviewed and are negative          Objective:      /70   Pulse 77   Temp 97 5 °F (36 4 °C) (Temporal)   Resp 16   Ht 5' 10" (1 778 m) Wt 80 5 kg (177 lb 7 5 oz)   SpO2 98%   BMI 25 46 kg/m²     Below is the patient's most recent value for Albumin, ALT, AST, BUN, Calcium, Chloride, Cholesterol, CO2, Creatinine, GFR, Glucose, HDL, Hematocrit, Hemoglobin, Hemoglobin A1C, LDL, Magnesium, Phosphorus, Platelets, Potassium, PSA, Sodium, Triglycerides, and WBC  Lab Results   Component Value Date    ALT 32 10/27/2022    AST 17 10/27/2022    BUN 33 (H) 10/27/2022    CALCIUM 8 9 10/27/2022     10/27/2022    CHOL 168 10/01/2015    CO2 24 10/27/2022    CREATININE 1 74 (H) 10/27/2022    HDL 31 (L) 05/18/2022    HCT 39 5 10/27/2022    HGB 13 2 10/27/2022    HGBA1C 7 1 (A) 08/24/2022    MG 1 6 04/02/2014    PHOS 3 4 08/23/2022     10/27/2022    K 4 5 10/27/2022    PSA 2 9 09/16/2021     (L) 11/25/2015    TRIG 48 05/18/2022    WBC 7 56 10/27/2022     Note: for a comprehensive list of the patient's lab results, access the Results Review activity  Physical Exam  Vitals and nursing note reviewed  Constitutional:       General: He is not in acute distress  Appearance: Normal appearance  He is well-developed  He is not diaphoretic  HENT:      Head: Normocephalic and atraumatic  Right Ear: External ear normal       Left Ear: External ear normal       Mouth/Throat:      Mouth: Mucous membranes are moist    Eyes:      General: No scleral icterus  Cardiovascular:      Rate and Rhythm: Normal rate and regular rhythm  Heart sounds: Normal heart sounds  No murmur heard  No friction rub  No gallop  Pulmonary:      Effort: Pulmonary effort is normal  No respiratory distress  Abdominal:      General: Abdomen is flat  There is no distension  Genitourinary:     Comments: Left inguinoscrotal hernia, partially reducible  Musculoskeletal:         General: No swelling or deformity  Right lower leg: No edema  Left lower leg: No edema     Neurological:      Mental Status: He is alert and oriented to person, place, and time    Psychiatric:         Mood and Affect: Mood normal          Behavior: Behavior normal          Thought Content:  Thought content normal          Judgment: Judgment normal              Procedures

## 2022-11-28 NOTE — ANESTHESIA POSTPROCEDURE EVALUATION
Post-Op Assessment Note    CV Status:  Stable    Pain management: adequate     Mental Status:  Sleepy and arousable   Hydration Status:  Euvolemic   PONV Controlled:  Controlled   Airway Patency:  Patent   Two or more mitigation strategies used for obstructive sleep apnea   Post Op Vitals Reviewed: Yes      Staff: CRNA         No notable events documented      BP   98/53   Temp   98 4   Pulse 80   Resp 18   SpO2 98

## 2022-11-28 NOTE — OP NOTE
OPERATIVE REPORT  PATIENT NAME: Shaista Sifuentes    :  1945  MRN: [id number]  Pt Location: [facility] OR ROOM 02    SURGERY DATE: 2022    Surgeon(s) and Role:     * Lake Matos MD - Primary     * Shaye Valadez PA-C - Assisting    Preop Diagnosis:  Inguinal hernia without obstruction or gangrene, recurrence not specified, unspecified laterality [K40 90]    Post-Op Diagnosis Codes:     * Inguinal hernia without obstruction or gangrene, recurrence not specified, unspecified laterality [K40 90]    Procedure(s) (LRB):  REPAIR HERNIA INGUINAL (Left)    Specimen(s):  ID Type Source Tests Collected by Time Destination   1 : lipoma of cord Tissue Lipoma of cord TISSUE EXAM Lake Matos MD 2022 1300        Estimated Blood Loss:   Minimal    Drains:  * No LDAs found *    Anesthesia Type:   IV Sedation with Anesthesia    Operative Indications:  Inguinal hernia without obstruction or gangrene, recurrence not specified, unspecified laterality [K40 90]      Operative Findings:  Large indirect hernia with associated cord lipoma  Weak inguinal floor (conjoined tendon)    Complications:   None    Procedure and Technique:    The patient was seen again in the Holding Room  The risks, benefits, complications, treatment options, and expected outcomes were discussed with the patient)  The possibilities of bleeding, infection, hernia recurrence, nerve injury and acute and chronic pain, testicular vessel injury and testicular loss were explained  There was concurrence with the proposed plan, and informed consent was obtained  The site of surgery was properly noted and marked  The patient was taken to the Operating Room, identified as Shaista Sifuentes, and the procedure verified as left inguinal hernia repair and antibiotics were given  A Time Out was held and the above information confirmed       The patient was placed in the supine position and underwent induction of anesthesia, the lower abdomen and groin was prepped and draped in the standard fashion  The pubic tubercle and ASIS were marked and a 6 cm denis was made along that line  A mixture of 0 25% Marcaine and 1% lidocaine was used to anesthetize the skin and incision was made with a 15 blade  Dissection was carried through the soft tissue including Campers and Scarpas and the superficial epigastric vein was ligated using hemostats and 2-0 vicryl ties  This was then carried down to expose the inguinal canal and inguinal ligament along its lower edge which was distorted given the size of the hernia  The external oblique fascia was split along the course of its fibers, exposing the inguinal canal  The cord and nerve were looped using a Penrose drain and reflected out of the field  The sac was fatty and difficult to discern from the very large cord lipoma along its course  This was dissected back to the preperitoneal fat and the sac and lipoma was divided slowly in layers with care not to divide any intraabdominal contents   Thus a large hernia plug was placed because a ligation of the sac would not hold due to the fatty nature of the tissue  The inguinal canal was irrigated  The defect was exposed and a piece of 7 5 x 15 bard soft polypropylene mesh was trimmed to size and placed over the defect  2-0 Prolene suture was then used in an interrupted fashion to place the mesh with the suture being sewn from the pubic tubercle inferiorly and superiorly along the canal to a level just beyond the internal ring  The mesh was split to allow passage of the cord and nerve into the canal without entrapment  The contents were then returned to canal and the external oblique fascia was then closed in a continuous fashion using 2-0 Vicryl suture taking care not to cause entrapment  Scarpas was closed with interrupted 3-0 vicryl and deep dermals were placed  A 4-0 monocryl was used to run a subcuticular skin suture and covered with steris, gauze and tape       Instrument, sponge, and needle counts were correct prior to closure and at the conclusion of the case       I was present for the entire procedure, A qualified resident physician was not available and A physician assistant was required during the procedure for retraction tissue handling,dissection and suturing    Patient Disposition:  PACU  and extubated and stable        SIGNATURE: Alirio Hidalgo MD  DATE: November 28, 2022  TIME: 1:30 PM

## 2022-11-28 NOTE — ANESTHESIA PREPROCEDURE EVALUATION
Procedure:  REPAIR HERNIA INGUINAL (Left: Abdomen)    Relevant Problems   CARDIO   (+) Benign essential hypertension   (+) Hypercholesterolemia      ENDO   (+) Type 2 diabetes mellitus with diabetic chronic kidney disease (HCC)      /RENAL   (+) Benign prostatic hyperplasia with lower urinary tract symptoms   (+) Chronic kidney disease-mineral and bone disorder   (+) Stage 3b chronic kidney disease (CKD) (HCC)      NEURO/PSYCH   (+) Anxiety about health      PULMONARY   (+) Moderate asthma without complication   Stroke     Left Ventricle: Left ventricular cavity size is normal  Wall thickness    is normal  The left ventricular ejection fraction is 55% by visual    estimation  Systolic function is normal  Wall motion is normal  Diastolic    function is mildly abnormal, consistent with grade I (abnormal)    relaxation  •  Atrial Septum: No patent foramen ovale confirmed at rest by saline    contrast    •  Mitral Valve: There is mild regurgitation  •  Tricuspid Valve: There is mild regurgitation  •  Pulmonic Valve: There is mild regurgitation  Physical Exam    Airway    Mallampati score: II  TM Distance: >3 FB  Neck ROM: full     Dental       Cardiovascular      Pulmonary      Other Findings        Anesthesia Plan  ASA Score- 2     Anesthesia Type- general with ASA Monitors  Additional Monitors:   Airway Plan: LMA  Plan Factors-    Chart reviewed  Obstructive sleep apnea risk education given perioperatively  Induction- intravenous  Postoperative Plan-     Informed Consent- Anesthetic plan and risks discussed with patient  I personally reviewed this patient with the CRNA  Discussed and agreed on the Anesthesia Plan with the CRNA  Nhung Degroot

## 2022-12-19 ENCOUNTER — OFFICE VISIT (OUTPATIENT)
Dept: SURGERY | Facility: CLINIC | Age: 77
End: 2022-12-19

## 2022-12-19 VITALS
HEART RATE: 77 BPM | WEIGHT: 179 LBS | DIASTOLIC BLOOD PRESSURE: 76 MMHG | HEIGHT: 70 IN | BODY MASS INDEX: 25.62 KG/M2 | SYSTOLIC BLOOD PRESSURE: 134 MMHG

## 2022-12-19 DIAGNOSIS — Z09 POSTOP CHECK: Primary | ICD-10-CM

## 2022-12-19 NOTE — PROGRESS NOTES
Assessment/Plan:    A  Lipoma of cord:  - Mature fibroadipose tissue consistent with lipoma  1  Postop check      Healing well  Continue lifting restriction x 20 pounds x 1 month (he does not typically do any heavy lifting ) other exercise is encouraged  F/u prn  Subjective:      Patient ID: Corrie Liu is a 65 y o  male  Triage Notes:    Mr Otoniel Lopez is following up about 2 weeks after left IHR  Reports feeling well  Pain has improved  Is eating  No diarrhea or constipation  No fevers/chills/drainage/redness  He had anxiety about the procedure before but feels quite well now  The following portions of the patient's history were reviewed and updated as appropriate: allergies, current medications, past family history, past medical history, past social history, past surgical history and problem list     Review of Systems      Objective:      /76   Pulse 77   Ht 5' 10" (1 778 m)   Wt 81 2 kg (179 lb)   BMI 25 68 kg/m²     Below is the patient's most recent value for Albumin, ALT, AST, BUN, Calcium, Chloride, Cholesterol, CO2, Creatinine, GFR, Glucose, HDL, Hematocrit, Hemoglobin, Hemoglobin A1C, LDL, Magnesium, Phosphorus, Platelets, Potassium, PSA, Sodium, Triglycerides, and WBC  Lab Results   Component Value Date    ALT 32 10/27/2022    AST 17 10/27/2022    BUN 33 (H) 10/27/2022    CALCIUM 8 9 10/27/2022     10/27/2022    CHOL 168 10/01/2015    CO2 24 10/27/2022    CREATININE 1 74 (H) 10/27/2022    HDL 31 (L) 05/18/2022    HCT 39 5 10/27/2022    HGB 13 2 10/27/2022    HGBA1C 7 1 (A) 08/24/2022    MG 1 6 04/02/2014    PHOS 3 4 08/23/2022     10/27/2022    K 4 5 10/27/2022    PSA 2 9 09/16/2021     (L) 11/25/2015    TRIG 48 05/18/2022    WBC 7 56 10/27/2022     Note: for a comprehensive list of the patient's lab results, access the Results Review activity  Physical Exam  Vitals and nursing note reviewed     Constitutional:       General: He is not in acute distress  Appearance: He is well-developed  HENT:      Head: Normocephalic and atraumatic  Mouth/Throat:      Mouth: Mucous membranes are moist    Eyes:      General: No scleral icterus  Right eye: No discharge  Left eye: No discharge  Neck:      Vascular: No JVD  Cardiovascular:      Rate and Rhythm: Normal rate and regular rhythm  Pulmonary:      Effort: Pulmonary effort is normal  No respiratory distress  Abdominal:      General: There is no distension  Palpations: Abdomen is soft  Tenderness: There is no abdominal tenderness  There is no guarding  Genitourinary:     Comments: left sided incision is clean and dry  No erythema or drainage  +healing ridge  Musculoskeletal:         General: Normal range of motion  Cervical back: Normal range of motion and neck supple  Skin:     General: Skin is warm and dry  Neurological:      Mental Status: He is alert and oriented to person, place, and time  Psychiatric:         Mood and Affect: Mood normal          Behavior: Behavior normal          Thought Content:  Thought content normal          Judgment: Judgment normal              Procedures

## 2023-01-09 ENCOUNTER — OFFICE VISIT (OUTPATIENT)
Dept: NEUROLOGY | Facility: CLINIC | Age: 78
End: 2023-01-09

## 2023-01-09 VITALS
HEART RATE: 84 BPM | HEIGHT: 70 IN | BODY MASS INDEX: 25.77 KG/M2 | WEIGHT: 180 LBS | SYSTOLIC BLOOD PRESSURE: 126 MMHG | DIASTOLIC BLOOD PRESSURE: 64 MMHG

## 2023-01-09 DIAGNOSIS — E11.42 DIABETIC POLYNEUROPATHY ASSOCIATED WITH TYPE 2 DIABETES MELLITUS (HCC): ICD-10-CM

## 2023-01-09 DIAGNOSIS — I63.532 CEREBROVASCULAR ACCIDENT (CVA) DUE TO OCCLUSION OF LEFT POSTERIOR CEREBRAL ARTERY (HCC): Primary | ICD-10-CM

## 2023-01-09 DIAGNOSIS — I63.81 LEFT SIDED LACUNAR STROKE (HCC): ICD-10-CM

## 2023-01-09 DIAGNOSIS — R27.8 SENSORY ATAXIA: ICD-10-CM

## 2023-01-09 NOTE — PROGRESS NOTES
Bree Soria is a 65 y o  male returns in follow-up for history of CVA, neuropathy and balance difficulty    Assessment:  1  Cerebrovascular accident (CVA) due to occlusion of left posterior cerebral artery ([facility] [address] )    2  Left sided lacunar stroke ([facility] [address] )    3  Diabetic polyneuropathy associated with type 2 diabetes mellitus ([facility] [address] )    4  Sensory ataxia        Plan:  Continue aspirin and statin therapy  Keep blood pressure and sugar under control  Avoid fall risk  Follow-up 6 months    Discussion:  Bree denies any new stroke symptoms  His exam is stable with a right visual field deficit and changes consistent with sensory ataxia  He does have occasional falls but denies any symptoms of neuropathic pain  He is not interested in pursuing physical therapy  He understands that he needs to keep his blood pressure sugars under control  I will see him back in follow-up in 6 months      Subjective:    HPI  Bree returns in follow-up today  He denies any new stroke symptoms since here last   He remains on aspirin and statin therapy and tolerates it well  He denies any issues of neuropathic pain in his feet  He is aware of balance difficulties and has had occasional falls as recently as last night  He states he has not hurt himself    He does not want to consider physical therapy      Past Medical History:   Diagnosis Date   • Arthritis    • Asthma     last assessed: April 1, 2015   • Balanitis     last assessed: July 9, 2014   • Cataract    • Chronic kidney disease, stage 3 ([facility] )     last assessed: Jan 17, 2018   • Colon polyp    • COVID-19 2022   • Diabetes mellitus ([facility] [address] )    • 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([facility] )     last assessed: Jan 17, 2018   • GERD (gastroesophageal reflux disease)    • Hypercholesterolemia    • Hypertension    • Pneumonia     last assessed: Feb 25, 2013   • Shortness of breath    • Stroke ([facility] ) 02/2022   • Use of cane as ambulatory aid     sometimes   • Wears glasses        Family History:  Family History   Problem Relation Age of Onset   • Pancreatic cancer Mother    • Colon cancer Father    • Diabetes Father         mellitus    • No Known Problems Sister    • Diabetes Sister    • Nephrolithiasis Sister        Past Surgical History:  Past Surgical History:   Procedure Laterality Date   • ABDOMINAL SURGERY     • APPENDECTOMY  1965   • CATARACT EXTRACTION     • COLONOSCOPY     • EGD     • HERNIA REPAIR     • HERNIA REPAIR  2007    x2   • HERNIA REPAIR Left 11/28/2022    Procedure: REPAIR HERNIA INGUINAL;  Surgeon: Netta Kilgore MD;  Location: [facility];  Service: General   • OTHER SURGICAL HISTORY  1965    Perforated duodenol ulcer surgery    • NE RPR/ADVMNT FLXR TDN N/Z/2 W/O FR GRAFT EA TENDON Left 03/17/2021    Procedure: Left thumb EPL repair;  Surgeon: Eliseo Martin MD;  Location: [facility];  Service: Orthopedics       Social History:   reports that he quit smoking about 52 years ago  His smoking use included cigarettes  He has a 20 00 pack-year smoking history  He quit smokeless tobacco use about 16 years ago  He reports current alcohol use of about 1 0 standard drink per week  He reports that he does not use drugs      Allergies:  Meperidine and Pravastatin      Current Outpatient Medications:   •  albuterol (PROVENTIL HFA,VENTOLIN HFA) 90 mcg/act inhaler, Inhale 2 puffs every 4 (four) hours as needed for wheezing, Disp: 8 5 g, Rfl: 1  •  aspirin 81 mg chewable tablet, Chew 1 tablet (81 mg total) daily, Disp: 30 tablet, Rfl: 0  •  atorvastatin (LIPITOR) 80 mg tablet, TAKE 1 TABLET BY MOUTH  DAILY WITH DINNER, Disp: 90 tablet, Rfl: 3  •  ERYN MICROLET LANCETS lancets, Test twice a day, Disp: 100 each, Rfl: 3  •  cholecalciferol (VITAMIN D3) 1,000 units tablet, Take 2,000 Units by mouth daily, Disp: , Rfl:   •  Contour Test test strip, Test fasting glucose daily, Disp: 100 strip, Rfl: 2  •  glyBURIDE (DIABETA) 5 mg tablet, TAKE 1 TABLET BY MOUTH  TWICE DAILY, Disp: 180 tablet, Rfl: 3  •  lisinopril-hydrochlorothiazide (PRINZIDE,ZESTORETIC) 20-25 MG per tablet, TAKE 1 TABLET BY MOUTH  DAILY, Disp: 90 tablet, Rfl: 3  •  Tradjenta 5 MG TABS, TAKE 1 TABLET BY MOUTH  DAILY, Disp: 90 tablet, Rfl: 3  •  Trulicity 6 54 LS/5 2AU SOPN, INJECT THE CONTENTS OF ONE  PEN SUBCUTANEOUSLY WEEKLY  AS DIRECTED (Patient taking differently: 0 75 mg once a week Weekly on Sunday), Disp: 6 mL, Rfl: 3  •  docusate sodium (COLACE) 100 mg capsule, Take 1 capsule (100 mg total) by mouth 2 (two) times a day (Patient not taking: Reported on 12/19/2022), Disp: 20 capsule, Rfl: 0  •  tamsulosin (FLOMAX) 0 4 mg, Take 1 capsule (0 4 mg total) by mouth daily with dinner Take 3 days before surgery and 2 days after  (Patient not taking: Reported on 1/9/2023), Disp: 5 capsule, Rfl: 0    I have reviewed the past medical, social and family history, current medications, allergies, vitals, review of systems and updated this information as appropriate today     Objective:    Vitals:  Height 5' 10" (1 778 m), weight 81 6 kg (180 lb)  Physical Exam    Neurological Exam  GENERAL: Well-developed well-nourished man in no acute distress  HEENT/NECK: Head is atraumatic normocephalic, neck is supple  CARDIOVASCULAR: No carotid bruit  NEUROLOGIC:  Mental Status: Awake and alert without aphasia  Cranial Nerves: Extraocular movements are full  A dense right visual field deficit is noted  Face is symmetrical  Motor: No drift is noted in arm extension  Coordination: Finger-to-nose testing is performed accurately  Romberg is positive  Gait reveals an increased base      ROS:    Review of Systems   Constitutional: Negative  Negative for appetite change and fever  HENT: Negative  Negative for hearing loss, tinnitus, trouble swallowing and voice change  Eyes: Negative  Negative for photophobia, pain and visual disturbance  Respiratory: Negative  Negative for shortness of breath  Cardiovascular: Negative  Negative for palpitations  Gastrointestinal: Negative  Negative for nausea and vomiting  Endocrine: Negative  Negative for cold intolerance  Genitourinary: Negative  Negative for dysuria, frequency and urgency  Musculoskeletal: Negative  Negative for gait problem, myalgias and neck pain  Skin: Negative  Negative for rash  Allergic/Immunologic: Negative  Neurological: Negative  Negative for dizziness, tremors, seizures, syncope, facial asymmetry, speech difficulty, weakness, light-headedness, numbness and headaches  Hematological: Negative  Does not bruise/bleed easily  Psychiatric/Behavioral: Negative  Negative for confusion, hallucinations and sleep disturbance

## 2023-01-21 DIAGNOSIS — E11.22 TYPE 2 DIABETES MELLITUS WITH STAGE 3B CHRONIC KIDNEY DISEASE, WITHOUT LONG-TERM CURRENT USE OF INSULIN (HCC): ICD-10-CM

## 2023-01-21 DIAGNOSIS — N18.32 TYPE 2 DIABETES MELLITUS WITH STAGE 3B CHRONIC KIDNEY DISEASE, WITHOUT LONG-TERM CURRENT USE OF INSULIN (HCC): ICD-10-CM

## 2023-01-23 RX ORDER — DULAGLUTIDE 0.75 MG/.5ML
0.75 INJECTION, SOLUTION SUBCUTANEOUS WEEKLY
Qty: 6 ML | Refills: 3 | Status: SHIPPED | OUTPATIENT
Start: 2023-01-23

## 2023-01-27 ENCOUNTER — HOSPITAL ENCOUNTER (EMERGENCY)
Facility: HOSPITAL | Age: 78
Discharge: HOME | End: 2023-01-27
Attending: EMERGENCY MEDICINE

## 2023-01-27 ENCOUNTER — APPOINTMENT (EMERGENCY)
Dept: RADIOLOGY | Facility: HOSPITAL | Age: 78
End: 2023-01-27

## 2023-01-27 VITALS
DIASTOLIC BLOOD PRESSURE: 64 MMHG | OXYGEN SATURATION: 95 % | RESPIRATION RATE: 18 BRPM | SYSTOLIC BLOOD PRESSURE: 135 MMHG | TEMPERATURE: 97.8 F | HEART RATE: 79 BPM

## 2023-01-27 DIAGNOSIS — L03.116 CELLULITIS OF LEFT FOOT: Primary | ICD-10-CM

## 2023-01-27 RX ORDER — SULFAMETHOXAZOLE AND TRIMETHOPRIM 200; 40 MG/5ML; MG/5ML
160 SUSPENSION ORAL ONCE
Status: COMPLETED | OUTPATIENT
Start: 2023-01-27 | End: 2023-01-27

## 2023-01-27 RX ORDER — SULFAMETHOXAZOLE AND TRIMETHOPRIM 200; 40 MG/5ML; MG/5ML
20 SUSPENSION ORAL 2 TIMES DAILY
Qty: 300 ML | Refills: 0 | Status: SHIPPED | OUTPATIENT
Start: 2023-01-27 | End: 2023-02-03

## 2023-01-27 RX ORDER — CEPHALEXIN 250 MG/5ML
500 POWDER, FOR SUSPENSION ORAL ONCE
Status: DISCONTINUED | OUTPATIENT
Start: 2023-01-27 | End: 2023-01-27 | Stop reason: HOSPADM

## 2023-01-27 RX ORDER — CEPHALEXIN 250 MG/5ML
500 POWDER, FOR SUSPENSION ORAL EVERY 6 HOURS SCHEDULED
Qty: 300 ML | Refills: 0 | Status: SHIPPED | OUTPATIENT
Start: 2023-01-27 | End: 2023-02-03

## 2023-01-27 RX ADMIN — SULFAMETHOXAZOLE AND TRIMETHOPRIM 160 MG OF TRIMETHOPRIM: 200; 40 SUSPENSION ORAL at 20:12

## 2023-01-29 NOTE — ED PROVIDER NOTES
History  Chief Complaint   Patient presents with   • Foot Swelling     Foot swelling and pain  Left foot     Patient is a 76-year-old male with a history of diabetes mellitus who presents for evaluation of left foot swelling and pain  Patient says that over the past 2 to 3 days he has had progressive redness and swelling starting around his left first toe and spreading into his left midfoot  He says there is some moderate sharp stabbing pain in this area  He denies systemic symptoms including fevers chills rigors nausea vomiting or p o  intolerance  Denies chest pain dyspnea abdominal pain  He did have a history of cellulitis similar to this many years ago  No recent antibiotics  No known trauma to the area  He is not take anything for symptoms  Prior to Admission Medications   Prescriptions Last Dose Informant Patient Reported? Taking?    ERYN MICROLET LANCETS lancets   No No   Sig: Test twice a day   Contour Test test strip   No No   Sig: Test fasting glucose daily   Tradjenta 5 MG TABS   No No   Sig: TAKE 1 TABLET BY MOUTH  DAILY   albuterol (PROVENTIL HFA,VENTOLIN HFA) 90 mcg/act inhaler   No No   Sig: Inhale 2 puffs every 4 (four) hours as needed for wheezing   aspirin 81 mg chewable tablet   No No   Sig: Chew 1 tablet (81 mg total) daily   atorvastatin (LIPITOR) 80 mg tablet   No No   Sig: TAKE 1 TABLET BY MOUTH  DAILY WITH DINNER   cholecalciferol (VITAMIN D3) 1,000 units tablet   Yes No   Sig: Take 2,000 Units by mouth daily   docusate sodium (COLACE) 100 mg capsule   No No   Sig: Take 1 capsule (100 mg total) by mouth 2 (two) times a day   Patient not taking: Reported on 12/19/2022   dulaglutide (Trulicity) 1 99 ZJ/7 7VR injection   No No   Sig: Inject 0 5 mL (0 75 mg total) under the skin once a week Weekly on Sunday   glyBURIDE (DIABETA) 5 mg tablet   No No   Sig: TAKE 1 TABLET BY MOUTH  TWICE DAILY   lisinopril-hydrochlorothiazide (PRINZIDE,ZESTORETIC) 20-25 MG per tablet   No No   Sig: TAKE 1 TABLET BY MOUTH  DAILY   tamsulosin (FLOMAX) 0 4 mg   No No   Sig: Take 1 capsule (0 4 mg total) by mouth daily with dinner Take 3 days before surgery and 2 days after  Patient not taking: Reported on 1/9/2023      Facility-Administered Medications: None       Past Medical History:   Diagnosis Date   • Arthritis    • Asthma     last assessed: April 1, 2015   • Balanitis     last assessed: July 9, 2014   • Cataract    • Chronic kidney disease, stage 3 ([facility] [address] )     last assessed: Jan 17, 2018   • Colon polyp    • COVID-19 2022   • Diabetes mellitus ([facility] [address] )    • 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([place] [address] )     last assessed: Jan 17, 2018   • GERD (gastroesophageal reflux disease)    • Hypercholesterolemia    • Hypertension    • Pneumonia     last assessed: Feb 25, 2013   • Shortness of breath    • Stroke ([facility] ) 02/2022   • Use of cane as ambulatory aid     sometimes   • Wears glasses        Past Surgical History:   Procedure Laterality Date   • ABDOMINAL SURGERY     • APPENDECTOMY  1965   • CATARACT EXTRACTION     • COLONOSCOPY     • EGD     • HERNIA REPAIR     • HERNIA REPAIR  2007    x2   • HERNIA REPAIR Left 11/28/2022    Procedure: REPAIR HERNIA INGUINAL;  Surgeon: Ignacio Olsen MD;  Location: [facility];  Service: General   • OTHER SURGICAL HISTORY  1965    Perforated duodenol ulcer surgery    • CT RPR/ADVMNT FLXR TDN N/Z/2 W/O FR GRAFT EA TENDON Left 03/17/2021    Procedure: Left thumb EPL repair;  Surgeon: Sonido Cardoso MD;  Location: [facility];  Service: Orthopedics       Family History   Problem Relation Age of Onset   • Pancreatic cancer Mother    • Colon cancer Father    • Diabetes Father         mellitus    • No Known Problems Sister    • Diabetes Sister    • Nephrolithiasis Sister      I have reviewed and agree with the history as documented      E-Cigarette/Vaping   • E-Cigarette Use Never User      E-Cigarette/Vaping Substances   • Nicotine No    • THC No    • CBD No    • Flavoring No    • Other No    • Unknown No      Social History     Tobacco Use   • Smoking status: Former     Packs/day: 2 00     Years: 10 00     Pack years: 20 00     Types: Cigarettes     Quit date:      Years since quittin 1   • Smokeless tobacco: Former     Quit date:    • Tobacco comments:     former smoker noted in "allscripts" Quit in  - never used chewing tobacco    Vaping Use   • Vaping Use: Never used   Substance Use Topics   • Alcohol use: Yes     Alcohol/week: 1 0 standard drink     Types: 1 Standard drinks or equivalent per week     Comment: rare  socially   • Drug use: No       Review of Systems   Constitutional: Negative for fever  HENT: Negative for sore throat  Eyes: Negative for photophobia  Respiratory: Negative for shortness of breath  Cardiovascular: Negative for chest pain  Gastrointestinal: Negative for abdominal pain  Genitourinary: Negative for dysuria  Musculoskeletal: Negative for back pain  Left foot redness and swelling   Skin: Negative for rash  Neurological: Negative for light-headedness  Hematological: Negative for adenopathy  Psychiatric/Behavioral: Negative for agitation  All other systems reviewed and are negative  Physical Exam  Physical Exam  Vitals reviewed  Constitutional:       General: He is not in acute distress  Appearance: He is well-developed  HENT:      Head: Normocephalic  Eyes:      Pupils: Pupils are equal, round, and reactive to light  Cardiovascular:      Rate and Rhythm: Normal rate and regular rhythm  Heart sounds: Normal heart sounds  No murmur heard  No friction rub  No gallop  Pulmonary:      Effort: Pulmonary effort is normal       Breath sounds: Normal breath sounds  Abdominal:      General: Bowel sounds are normal  There is no distension  Palpations: Abdomen is soft  Tenderness: There is no abdominal tenderness  There is no guarding  Musculoskeletal:         General: Normal range of motion  Cervical back: Normal range of motion and neck supple  Comments: Patient with erythema warmth tenderness and swelling over the dorsum of the left foot  Spreads from the first MTP and to the left midfoot  Neurovascular intact  Skin:     Capillary Refill: Capillary refill takes less than 2 seconds  Neurological:      Mental Status: He is alert and oriented to person, place, and time  Cranial Nerves: No cranial nerve deficit  Sensory: No sensory deficit  Motor: No abnormal muscle tone  Psychiatric:         Behavior: Behavior normal          Thought Content: Thought content normal          Judgment: Judgment normal          Vital Signs  ED Triage Vitals [01/27/23 1924]   Temperature Pulse Respirations Blood Pressure SpO2   97 8 °F (36 6 °C) 79 18 135/64 95 %      Temp Source Heart Rate Source Patient Position - Orthostatic VS BP Location FiO2 (%)   Temporal Monitor Sitting Left arm --      Pain Score       --           Vitals:    01/27/23 1924   BP: 135/64   Pulse: 79   Patient Position - Orthostatic VS: Sitting         Visual Acuity      ED Medications  Medications   sulfamethoxazole-trimethoprim (BACTRIM) oral suspension 160 mg of trimethoprim (160 mg of trimethoprim Oral Given 1/27/23 2012)       Diagnostic Studies  Results Reviewed     None                 XR foot 3+ views LEFT   ED Interpretation by Genaro Li MD (01/27 2034)   Wet read: No obvious osteomyelitis      Final Result by Mary Davis MD (01/28 1008)      No acute osseous abnormality  Workstation performed: [id number]                    Procedures  Procedures         ED Course                               SBIRT 22yo+    Flowsheet Row Most Recent Value   SBIRT (23 yo +)    In order to provide better care to our patients, we are screening all of our patients for alcohol and drug use  Would it be okay to ask you these screening questions?  Yes Filed at: 01/27/2023 1936   Initial Alcohol Screen: US AUDIT-C 1  How often do you have a drink containing alcohol? 0 Filed at: 01/27/2023 1936   2  How many drinks containing alcohol do you have on a typical day you are drinking? 0 Filed at: 01/27/2023 1936   3a  Male UNDER 65: How often do you have five or more drinks on one occasion? 0 Filed at: 01/27/2023 1936   3b  FEMALE Any Age, or MALE 65+: How often do you have 4 or more drinks on one occassion? 0 Filed at: 01/27/2023 1936   Audit-C Score 0 Filed at: 01/27/2023 1936   YEE: How many times in the past year have you    Used an illegal drug or used a prescription medication for non-medical reasons? Never Filed at: 01/27/2023 1936                    Medical Decision Making  Patient is a 78-year-old male presents for evaluation of worsening left foot redness swelling and pain  Consistent with cellulitis at this time  Will be treated with both Keflex and Bactrim secondary to diabetic status with follow-up to podiatry  Strict return precautions discussed    Cellulitis of left foot: complicated acute illness or injury  Amount and/or Complexity of Data Reviewed  Radiology: ordered and independent interpretation performed  Details: Imaging ordered, no evidence of foreign body or osteomyelitis on my read      Risk  Prescription drug management  Disposition  Final diagnoses:   Cellulitis of left foot     Time reflects when diagnosis was documented in both MDM as applicable and the Disposition within this note     Time User Action Codes Description Comment    1/27/2023  8:35 PM Jasen Daniels Add [[id number] 923] Cellulitis of left foot       ED Disposition     ED Disposition   Discharge    Condition   Stable    Date/Time   Fri Jan 27, 2023  8:35 PM    Comment   Bree Lehman discharge to home/self care                 Follow-up Information     Follow up With Specialties Details Why [address] Emergency Department Emergency Medicine  If symptoms worsen [address]  [place] [phone]  [facility] Emergency Department, [address] [place], [address]    Vinay Gutierrez, DPKASSI Podiatry, Wound Care Schedule an appointment as soon as possible for a visit   [address]  [address]  [place] [address] [place]  [phone]             Discharge Medication List as of 1/27/2023  8:49 PM      START taking these medications    Details   cephalexin (KEFLEX) 250 mg/5 mL suspension Take 10 mL (500 mg total) by mouth every 6 (six) hours for 7 days, Starting Fri 1/27/2023, Until Fri 2/3/2023, Normal      sulfamethoxazole-trimethoprim (BACTRIM) 200-40 mg/5 mL suspension Take 20 mL (160 mg of trimethoprim total) by mouth 2 (two) times a day for 7 days, Starting Fri 1/27/2023, Until Fri 2/3/2023, Normal         CONTINUE these medications which have NOT CHANGED    Details   albuterol (PROVENTIL HFA,VENTOLIN HFA) 90 mcg/act inhaler Inhale 2 puffs every 4 (four) hours as needed for wheezing, Starting Wed 12/29/2021, Normal      aspirin 81 mg chewable tablet Chew 1 tablet (81 mg total) daily, Starting Sat 2/19/2022, Until Mon 1/9/2023, Normal      atorvastatin (LIPITOR) 80 mg tablet TAKE 1 TABLET BY MOUTH  DAILY WITH DINNER, Normal      ERYN MICROLET LANCETS lancets Test twice a day, Normal      cholecalciferol (VITAMIN D3) 1,000 units tablet Take 2,000 Units by mouth daily, Historical Med      Contour Test test strip Test fasting glucose daily, Normal      docusate sodium (COLACE) 100 mg capsule Take 1 capsule (100 mg total) by mouth 2 (two) times a day, Starting Mon 11/28/2022, Normal      dulaglutide (Trulicity) 5 74 MV/8 6SJ injection Inject 0 5 mL (0 75 mg total) under the skin once a week Weekly on Sunday, Starting Mon 1/23/2023, Normal      glyBURIDE (DIABETA) 5 mg tablet TAKE 1 TABLET BY MOUTH  TWICE DAILY, Normal      lisinopril-hydrochlorothiazide (PRINZIDE,ZESTORETIC) 20-25 MG per tablet TAKE 1 TABLET BY MOUTH  DAILY, Starting Mon 8/29/2022, Normal      tamsulosin (FLOMAX) 0 4 mg Take 1 capsule (0 4 mg total) by mouth daily with dinner Take 3 days before surgery and 2 days after , Starting Wed 10/5/2022, Normal      Tradjenta 5 MG TABS TAKE 1 TABLET BY MOUTH  DAILY, Normal             No discharge procedures on file      PDMP Review       Value Time User    PDMP Reviewed  Yes 2/18/2022  2:37  [facility],           ED Provider  Electronically Signed by           Nida Vu MD  01/28/23 5268

## 2023-02-04 DIAGNOSIS — E11.9 TYPE 2 DIABETES MELLITUS WITHOUT COMPLICATION, WITHOUT LONG-TERM CURRENT USE OF INSULIN (HCC): ICD-10-CM

## 2023-02-06 RX ORDER — CARVEDILOL 25 MG/1
TABLET, FILM COATED ORAL
Qty: 100 STRIP | Refills: 2 | Status: SHIPPED | OUTPATIENT
Start: 2023-02-06

## 2023-02-20 ENCOUNTER — APPOINTMENT (OUTPATIENT)
Dept: LAB | Facility: CLINIC | Age: 78
End: 2023-02-20

## 2023-02-20 DIAGNOSIS — E83.9 CHRONIC KIDNEY DISEASE-MINERAL AND BONE DISORDER: ICD-10-CM

## 2023-02-20 DIAGNOSIS — E11.22 TYPE 2 DIABETES MELLITUS WITH STAGE 3B CHRONIC KIDNEY DISEASE, WITHOUT LONG-TERM CURRENT USE OF INSULIN (HCC): ICD-10-CM

## 2023-02-20 DIAGNOSIS — M89.9 CHRONIC KIDNEY DISEASE-MINERAL AND BONE DISORDER: ICD-10-CM

## 2023-02-20 DIAGNOSIS — N18.9 CHRONIC KIDNEY DISEASE-MINERAL AND BONE DISORDER: ICD-10-CM

## 2023-02-20 DIAGNOSIS — N18.32 STAGE 3B CHRONIC KIDNEY DISEASE (CKD) (HCC): ICD-10-CM

## 2023-02-20 DIAGNOSIS — N18.32 TYPE 2 DIABETES MELLITUS WITH STAGE 3B CHRONIC KIDNEY DISEASE, WITHOUT LONG-TERM CURRENT USE OF INSULIN (HCC): ICD-10-CM

## 2023-02-20 LAB
25(OH)D3 SERPL-MCNC: 39.5 NG/ML (ref 30–100)
ALBUMIN SERPL BCP-MCNC: 4 G/DL (ref 3.5–5)
ALP SERPL-CCNC: 68 U/L (ref 46–116)
ALT SERPL W P-5'-P-CCNC: 32 U/L (ref 12–78)
ANION GAP SERPL CALCULATED.3IONS-SCNC: 4 MMOL/L (ref 4–13)
AST SERPL W P-5'-P-CCNC: 17 U/L (ref 5–45)
BASOPHILS # BLD AUTO: 0.12 THOUSANDS/ÂΜL (ref 0–0.1)
BASOPHILS NFR BLD AUTO: 1 % (ref 0–1)
BILIRUB SERPL-MCNC: 0.64 MG/DL (ref 0.2–1)
BILIRUB UR QL STRIP: NEGATIVE
BUN SERPL-MCNC: 32 MG/DL (ref 5–25)
CALCIUM SERPL-MCNC: 9.2 MG/DL (ref 8.3–10.1)
CHLORIDE SERPL-SCNC: 106 MMOL/L (ref 96–108)
CHOLEST SERPL-MCNC: 86 MG/DL
CLARITY UR: CLEAR
CO2 SERPL-SCNC: 25 MMOL/L (ref 21–32)
COLOR UR: NORMAL
CREAT SERPL-MCNC: 1.66 MG/DL (ref 0.6–1.3)
CREAT UR-MCNC: 123 MG/DL
CREAT UR-MCNC: 127 MG/DL
EOSINOPHIL # BLD AUTO: 0.7 THOUSAND/ÂΜL (ref 0–0.61)
EOSINOPHIL NFR BLD AUTO: 6 % (ref 0–6)
ERYTHROCYTE [DISTWIDTH] IN BLOOD BY AUTOMATED COUNT: 12.8 % (ref 11.6–15.1)
GFR SERPL CREATININE-BSD FRML MDRD: 38 ML/MIN/1.73SQ M
GLUCOSE P FAST SERPL-MCNC: 116 MG/DL (ref 65–99)
GLUCOSE UR STRIP-MCNC: NEGATIVE MG/DL
HCT VFR BLD AUTO: 40.6 % (ref 36.5–49.3)
HDLC SERPL-MCNC: 38 MG/DL
HGB BLD-MCNC: 13.5 G/DL (ref 12–17)
HGB UR QL STRIP.AUTO: NEGATIVE
IMM GRANULOCYTES # BLD AUTO: 0.03 THOUSAND/UL (ref 0–0.2)
IMM GRANULOCYTES NFR BLD AUTO: 0 % (ref 0–2)
KETONES UR STRIP-MCNC: NEGATIVE MG/DL
LDLC SERPL CALC-MCNC: 38 MG/DL (ref 0–100)
LEUKOCYTE ESTERASE UR QL STRIP: NEGATIVE
LYMPHOCYTES # BLD AUTO: 1.89 THOUSANDS/ÂΜL (ref 0.6–4.47)
LYMPHOCYTES NFR BLD AUTO: 17 % (ref 14–44)
MCH RBC QN AUTO: 30.5 PG (ref 26.8–34.3)
MCHC RBC AUTO-ENTMCNC: 33.3 G/DL (ref 31.4–37.4)
MCV RBC AUTO: 92 FL (ref 82–98)
MICROALBUMIN UR-MCNC: 15.1 MG/L (ref 0–20)
MICROALBUMIN/CREAT 24H UR: 12 MG/G CREATININE (ref 0–30)
MONOCYTES # BLD AUTO: 0.96 THOUSAND/ÂΜL (ref 0.17–1.22)
MONOCYTES NFR BLD AUTO: 9 % (ref 4–12)
NEUTROPHILS # BLD AUTO: 7.5 THOUSANDS/ÂΜL (ref 1.85–7.62)
NEUTS SEG NFR BLD AUTO: 67 % (ref 43–75)
NITRITE UR QL STRIP: NEGATIVE
NRBC BLD AUTO-RTO: 0 /100 WBCS
PH UR STRIP.AUTO: 5.5 [PH]
PHOSPHATE SERPL-MCNC: 3.6 MG/DL (ref 2.3–4.1)
PLATELET # BLD AUTO: 273 THOUSANDS/UL (ref 149–390)
PMV BLD AUTO: 10.7 FL (ref 8.9–12.7)
POTASSIUM SERPL-SCNC: 5.6 MMOL/L (ref 3.5–5.3)
PROT SERPL-MCNC: 7.2 G/DL (ref 6.4–8.4)
PROT UR STRIP-MCNC: NEGATIVE MG/DL
PROT UR-MCNC: 11 MG/DL
PROT/CREAT UR: 0.09 MG/G{CREAT} (ref 0–0.1)
PTH-INTACT SERPL-MCNC: 122.3 PG/ML (ref 18.4–80.1)
RBC # BLD AUTO: 4.43 MILLION/UL (ref 3.88–5.62)
SODIUM SERPL-SCNC: 135 MMOL/L (ref 135–147)
SP GR UR STRIP.AUTO: 1.01 (ref 1–1.03)
TRIGL SERPL-MCNC: 51 MG/DL
UROBILINOGEN UR STRIP-ACNC: <2 MG/DL
WBC # BLD AUTO: 11.2 THOUSAND/UL (ref 4.31–10.16)

## 2023-02-21 ENCOUNTER — RA CDI HCC (OUTPATIENT)
Dept: OTHER | Facility: HOSPITAL | Age: 78
End: 2023-02-21

## 2023-02-21 NOTE — PROGRESS NOTES
[facility] [address]  coding opportunities          Chart Reviewed number of suggestions sent to Provider: 1   E11 22, N18 32    Patients Insurance     Medicare Insurance: [facility]

## 2023-02-22 LAB
EST. AVERAGE GLUCOSE BLD GHB EST-MCNC: 166 MG/DL
HBA1C MFR BLD: 7.4 %

## 2023-02-23 DIAGNOSIS — E87.5 HYPERKALEMIA: Primary | ICD-10-CM

## 2023-02-27 ENCOUNTER — OFFICE VISIT (OUTPATIENT)
Dept: FAMILY MEDICINE CLINIC | Facility: CLINIC | Age: 78
End: 2023-02-27

## 2023-02-27 ENCOUNTER — TELEPHONE (OUTPATIENT)
Dept: NEPHROLOGY | Facility: CLINIC | Age: 78
End: 2023-02-27

## 2023-02-27 ENCOUNTER — APPOINTMENT (OUTPATIENT)
Dept: LAB | Facility: CLINIC | Age: 78
End: 2023-02-27

## 2023-02-27 VITALS
TEMPERATURE: 97.4 F | OXYGEN SATURATION: 96 % | DIASTOLIC BLOOD PRESSURE: 70 MMHG | BODY MASS INDEX: 25.77 KG/M2 | HEIGHT: 70 IN | SYSTOLIC BLOOD PRESSURE: 134 MMHG | HEART RATE: 68 BPM | WEIGHT: 180 LBS

## 2023-02-27 DIAGNOSIS — E87.5 HYPERKALEMIA: ICD-10-CM

## 2023-02-27 DIAGNOSIS — E11.22 TYPE 2 DIABETES MELLITUS WITH STAGE 3B CHRONIC KIDNEY DISEASE, WITHOUT LONG-TERM CURRENT USE OF INSULIN (HCC): ICD-10-CM

## 2023-02-27 DIAGNOSIS — N18.32 STAGE 3B CHRONIC KIDNEY DISEASE (CKD) (HCC): ICD-10-CM

## 2023-02-27 DIAGNOSIS — M79.89 SWELLING OF TOE OF LEFT FOOT: ICD-10-CM

## 2023-02-27 DIAGNOSIS — M79.89 SWELLING OF TOE OF LEFT FOOT: Primary | ICD-10-CM

## 2023-02-27 DIAGNOSIS — I10 BENIGN ESSENTIAL HYPERTENSION: ICD-10-CM

## 2023-02-27 DIAGNOSIS — E78.00 HYPERCHOLESTEROLEMIA: ICD-10-CM

## 2023-02-27 DIAGNOSIS — N18.32 TYPE 2 DIABETES MELLITUS WITH STAGE 3B CHRONIC KIDNEY DISEASE, WITHOUT LONG-TERM CURRENT USE OF INSULIN (HCC): ICD-10-CM

## 2023-02-27 DIAGNOSIS — E11.8 TYPE 2 DIABETES MELLITUS WITH COMPLICATION, WITHOUT LONG-TERM CURRENT USE OF INSULIN (HCC): ICD-10-CM

## 2023-02-27 PROBLEM — F41.8 ANXIETY ABOUT HEALTH: Status: RESOLVED | Noted: 2022-02-04 | Resolved: 2023-02-27

## 2023-02-27 PROBLEM — R45.89 ANXIETY ABOUT HEALTH: Status: RESOLVED | Noted: 2022-02-04 | Resolved: 2023-02-27

## 2023-02-27 PROBLEM — J44.9 CHRONIC OBSTRUCTIVE PULMONARY DISEASE, UNSPECIFIED COPD TYPE (HCC): Status: RESOLVED | Noted: 2023-02-27 | Resolved: 2023-02-27

## 2023-02-27 PROBLEM — J44.9 CHRONIC OBSTRUCTIVE PULMONARY DISEASE, UNSPECIFIED COPD TYPE (HCC): Status: ACTIVE | Noted: 2023-02-27

## 2023-02-27 LAB
POTASSIUM SERPL-SCNC: 4.5 MMOL/L (ref 3.5–5.3)
URATE SERPL-MCNC: 7.7 MG/DL (ref 3.5–8.5)

## 2023-02-27 NOTE — PROGRESS NOTES
Assessment/Plan:         Problem List Items Addressed This Visit        Endocrine    Type 2 diabetes mellitus with diabetic chronic kidney disease ([facility] [address] )       Lab Results   Component Value Date    HGBA1C 7 4 (H) 02/20/2023   no changes today  Discussed increasing Trulicity to 1 5 mg weekly and decreasing Glyburide to 2 5 mg BID (to avoid hypoglycemia),but he just picked up a 3 month supply of Trulicity and prefers to use this up and not make any changes at this time  Cardiovascular and Mediastinum    Benign essential hypertension     Stable on Lisinopril HCTZ            Genitourinary    Stage 3b chronic kidney disease (CKD) ([facility])     Lab Results   Component Value Date    EGFR 38 02/20/2023    EGFR 36 10/27/2022    EGFR 35 08/23/2022    CREATININE 1 66 (H) 02/20/2023    CREATININE 1 74 (H) 10/27/2022    CREATININE 1 82 (H) 08/23/2022   stable, follows with Nephrology            Other    Hypercholesterolemia     Well controlled on Atorvastatin 80 mg         Swelling of toe of left foot - Primary     Suspect gout  Will check uric acid level         Relevant Orders    Uric acid    Hyperkalemia     Recheck K level         Relevant Orders    Potassium   Other Visit Diagnoses     Type 2 diabetes mellitus with complication, without long-term current use of insulin ([facility])                Subjective:      Patient ID: Bree Brown is a 65 y o  male  He was in the Er a month ago for foot swelling  Was treated for cellulitis - was treated with abx - has improved  Still some swelling and pain  Xray was normal  He says the pain was very significant especially when toe was touched  Had labs done  DM - A1c 7 4% up from 7 1%  fasting glucoses 90s-130s  He is on Trulicity, Glyburide and Tradjenta  Sometimes gets a low glucose in 50-60s  CKD - stable - sees nephrology tomorrow     He had an elevated K+ level 5 6      The following portions of the patient's history were reviewed and updated as appropriate:   Past Medical History:  He has a past medical history of Arthritis, Asthma, Balanitis, Cataract, Chronic [facility]  ), Colon polyp, COVID-19 (2022), Diabetes mellitus ([place] [address] ), 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([place] [address] ), GERD (gastroesophageal reflux disease), Hypercholesterolemia, Hypertension, Pneumonia, Shortness of breath, Stroke ([place] [address] ) (02/2022), Use of cane as ambulatory aid, and Wears glasses  ,  _______________________________________________________________________  Medical Problems:  does not have any pertinent problems on file ,  _______________________________________________________________________  Past Surgical History:   has a past surgical history that includes Hernia repair; Abdominal surgery; Appendectomy (1965); Cataract extraction; Hernia repair (2007); Other surgical history (1965); pr rpr/advmnt flxr tdn n/z/2 w/o fr graft ea tendon (Left, 03/17/2021); Colonoscopy; EGD; and Hernia repair (Left, 11/28/2022)  ,  _______________________________________________________________________  Family History:  family history includes Colon cancer in his father; Diabetes in his father and sister; Nephrolithiasis in his sister; No Known Problems in his sister; Pancreatic cancer in his mother ,  _______________________________________________________________________  Social History:   reports that he quit smoking about 52 years ago  His smoking use included cigarettes  He has a 20 00 pack-year smoking history  He quit smokeless tobacco use about 16 years ago  He reports current alcohol use of about 1 0 standard drink per week  He reports that he does not use drugs  ,  _______________________________________________________________________  Allergies:  is allergic to meperidine and pravastatin     _______________________________________________________________________  Current Outpatient Medications   Medication Sig Dispense Refill   • albuterol (PROVENTIL HFA,VENTOLIN HFA) 90 mcg/act inhaler Inhale 2 puffs every 4 (four) hours as needed for wheezing 8 5 g 1   • aspirin 81 mg chewable tablet Chew 1 tablet (81 mg total) daily 30 tablet 0   • atorvastatin (LIPITOR) 80 mg tablet TAKE 1 TABLET BY MOUTH  DAILY WITH DINNER 90 tablet 3   • ERYN MICROLET LANCETS lancets Test twice a day 100 each 3   • cholecalciferol (VITAMIN D3) 1,000 units tablet Take 2,000 Units by mouth daily     • Contour Test test strip TEST FASTING GLUCOSE DAILY 100 strip 2   • glyBURIDE (DIABETA) 5 mg tablet TAKE 1 TABLET BY MOUTH  TWICE DAILY 180 tablet 3   • lisinopril-hydrochlorothiazide (PRINZIDE,ZESTORETIC) 20-25 MG per tablet TAKE 1 TABLET BY MOUTH  DAILY 90 tablet 3   • tamsulosin (FLOMAX) 0 4 mg Take 1 capsule (0 4 mg total) by mouth daily with dinner Take 3 days before surgery and 2 days after  (Patient not taking: Reported on 1/9/2023) 5 capsule 0   • Tradjenta 5 MG TABS TAKE 1 TABLET BY MOUTH  DAILY 90 tablet 3     No current facility-administered medications for this visit      _______________________________________________________________________  Review of Systems   Constitutional: Negative for activity change, appetite change, fatigue and unexpected weight change  Respiratory: Negative for chest tightness and shortness of breath  Cardiovascular: Negative for chest pain and leg swelling  Gastrointestinal: Negative for abdominal pain  Neurological: Negative for headaches  Objective:  Vitals:    02/27/23 0854   BP: 134/70   Pulse: 68   Temp: (!) 97 4 °F (36 3 °C)   SpO2: 96%   Weight: 81 6 kg (180 lb)   Height: 5' 10" (1 778 m)     Body mass index is 25 83 kg/m²  Physical Exam  Vitals and nursing note reviewed  Constitutional:       General: He is not in acute distress  Appearance: Normal appearance  He is well-developed  He is not diaphoretic  HENT:      Head: Normocephalic and atraumatic  Cardiovascular:      Rate and Rhythm: Normal rate and regular rhythm        Pulses: no weak pulses          Dorsalis pedis pulses are 2+ on the right side and 2+ on the left side  Posterior tibial pulses are 2+ on the right side and 2+ on the left side  Heart sounds: Normal heart sounds  No murmur heard  No friction rub  No gallop  Pulmonary:      Effort: Pulmonary effort is normal  No respiratory distress  Breath sounds: Normal breath sounds  No wheezing or rales  Chest:      Chest wall: No tenderness  Musculoskeletal:         General: Swelling (mild swelling of L great toe) present  Right lower leg: No edema  Left lower leg: No edema  Feet:      Right foot:      Skin integrity: No ulcer, skin breakdown, erythema, warmth, callus or dry skin  Left foot:      Skin integrity: No ulcer, skin breakdown, erythema, warmth, callus or dry skin  Neurological:      Mental Status: He is alert and oriented to person, place, and time  Psychiatric:         Mood and Affect: Mood normal          Behavior: Behavior normal          Thought Content: Thought content normal          Judgment: Judgment normal        Patient's shoes and socks removed  Right Foot/Ankle   Right Foot Inspection  Skin Exam: skin normal and skin intact  No dry skin, no warmth, no callus, no erythema, no maceration, no abnormal color, no pre-ulcer, no ulcer and no callus  Toe Exam: No swelling, no tenderness, erythema and  no right toe deformity    Sensory   Vibration: intact  Monofilament testing: intact    Vascular  Capillary refills: < 3 seconds  The right DP pulse is 2+  The right PT pulse is 2+  Left Foot/Ankle  Left Foot Inspection  Skin Exam: skin normal and skin intact  No dry skin, no warmth, no erythema, no maceration, normal color, no pre-ulcer, no ulcer and no callus  Toe Exam: swelling (mild swelling left great toe)  No tenderness, no erythema and no left toe deformity       Sensory   Vibration: diminished  Monofilament testing: intact    Vascular  Capillary refills: < 3 seconds  The left DP pulse is 2+  The left PT pulse is 2+       Assign Risk Category  No deformity present  Loss of protective sensation  No weak pulses  Risk: 1

## 2023-02-27 NOTE — ASSESSMENT & PLAN NOTE
Lab Results   Component Value Date    EGFR 38 02/20/2023    EGFR 36 10/27/2022    EGFR 35 08/23/2022    CREATININE 1 66 (H) 02/20/2023    CREATININE 1 74 (H) 10/27/2022    CREATININE 1 82 (H) 08/23/2022   stable, follows with Nephrology

## 2023-02-27 NOTE — ASSESSMENT & PLAN NOTE
Lab Results   Component Value Date    HGBA1C 7 4 (H) 02/20/2023   no changes today  Discussed increasing Trulicity to 1 5 mg weekly and decreasing Glyburide to 2 5 mg BID (to avoid hypoglycemia),but he just picked up a 3 month supply of Trulicity and prefers to use this up and not make any changes at this time

## 2023-02-28 ENCOUNTER — TELEPHONE (OUTPATIENT)
Dept: OTHER | Facility: OTHER | Age: 78
End: 2023-02-28

## 2023-02-28 NOTE — TELEPHONE ENCOUNTER
Patient is calling regarding cancelling an appointment      Date/Time: 2/28/2023 @ 1040    Patient was rescheduled: YES [] NO [x]    Patient requesting call back to reschedule: YES [x] NO []

## 2023-02-28 NOTE — TELEPHONE ENCOUNTER
Called spoke with patient scheduled patient for 04/04 @ 4:40pm with Dr Moncada patient understood and is okay with it

## 2023-03-27 ENCOUNTER — TELEPHONE (OUTPATIENT)
Dept: NEPHROLOGY | Facility: CLINIC | Age: 78
End: 2023-03-27

## 2023-03-27 DIAGNOSIS — N18.32 STAGE 3B CHRONIC KIDNEY DISEASE (CKD) (HCC): Primary | ICD-10-CM

## 2023-03-29 ENCOUNTER — APPOINTMENT (OUTPATIENT)
Dept: LAB | Facility: CLINIC | Age: 78
End: 2023-03-29

## 2023-03-29 DIAGNOSIS — N18.32 STAGE 3B CHRONIC KIDNEY DISEASE (CKD) (HCC): ICD-10-CM

## 2023-03-29 LAB
ANION GAP SERPL CALCULATED.3IONS-SCNC: 5 MMOL/L (ref 4–13)
BUN SERPL-MCNC: 43 MG/DL (ref 5–25)
CALCIUM SERPL-MCNC: 8.6 MG/DL (ref 8.3–10.1)
CHLORIDE SERPL-SCNC: 106 MMOL/L (ref 96–108)
CO2 SERPL-SCNC: 25 MMOL/L (ref 21–32)
CREAT SERPL-MCNC: 1.8 MG/DL (ref 0.6–1.3)
GFR SERPL CREATININE-BSD FRML MDRD: 35 ML/MIN/1.73SQ M
GLUCOSE P FAST SERPL-MCNC: 126 MG/DL (ref 65–99)
POTASSIUM SERPL-SCNC: 4.7 MMOL/L (ref 3.5–5.3)
SODIUM SERPL-SCNC: 136 MMOL/L (ref 135–147)

## 2023-04-03 ENCOUNTER — TELEPHONE (OUTPATIENT)
Dept: NEPHROLOGY | Facility: CLINIC | Age: 78
End: 2023-04-03

## 2023-04-04 ENCOUNTER — OFFICE VISIT (OUTPATIENT)
Dept: NEPHROLOGY | Facility: CLINIC | Age: 78
End: 2023-04-04

## 2023-04-04 VITALS
SYSTOLIC BLOOD PRESSURE: 120 MMHG | WEIGHT: 178 LBS | HEART RATE: 120 BPM | OXYGEN SATURATION: 96 % | HEIGHT: 70 IN | RESPIRATION RATE: 16 BRPM | BODY MASS INDEX: 25.48 KG/M2 | DIASTOLIC BLOOD PRESSURE: 70 MMHG | TEMPERATURE: 97.4 F

## 2023-04-04 DIAGNOSIS — I10 BENIGN ESSENTIAL HYPERTENSION: ICD-10-CM

## 2023-04-04 DIAGNOSIS — N18.32 STAGE 3B CHRONIC KIDNEY DISEASE (CKD) (HCC): Primary | ICD-10-CM

## 2023-04-04 DIAGNOSIS — M89.9 CHRONIC KIDNEY DISEASE-MINERAL AND BONE DISORDER: ICD-10-CM

## 2023-04-04 DIAGNOSIS — E83.9 CHRONIC KIDNEY DISEASE-MINERAL AND BONE DISORDER: ICD-10-CM

## 2023-04-04 DIAGNOSIS — N40.1 BENIGN PROSTATIC HYPERPLASIA WITH LOWER URINARY TRACT SYMPTOMS, SYMPTOM DETAILS UNSPECIFIED: ICD-10-CM

## 2023-04-04 DIAGNOSIS — E87.5 HYPERKALEMIA: ICD-10-CM

## 2023-04-04 DIAGNOSIS — N18.9 CHRONIC KIDNEY DISEASE-MINERAL AND BONE DISORDER: ICD-10-CM

## 2023-04-04 RX ORDER — DULAGLUTIDE 0.75 MG/.5ML
0.75 INJECTION, SOLUTION SUBCUTANEOUS
COMMUNITY

## 2023-04-04 NOTE — ASSESSMENT & PLAN NOTE
Lab Results   Component Value Date    EGFR 35 03/29/2023    EGFR 38 02/20/2023    EGFR 36 10/27/2022    CREATININE 1 80 (H) 03/29/2023    CREATININE 1 66 (H) 02/20/2023    CREATININE 1 74 (H) 10/27/2022   Renal function is quite stable    Advise hydration and avoiding nephrotoxic medication

## 2023-04-04 NOTE — ASSESSMENT & PLAN NOTE
Lab Results   Component Value Date    EGFR 35 03/29/2023    EGFR 38 02/20/2023    EGFR 36 10/27/2022    CREATININE 1 80 (H) 03/29/2023    CREATININE 1 66 (H) 02/20/2023    CREATININE 1 74 (H) 10/27/2022   PTH and phosphorus along with vitamin D are within acceptable range and will continue to monitor

## 2023-04-04 NOTE — ASSESSMENT & PLAN NOTE
Lab Results   Component Value Date    HGBA1C 7 4 (H) 02/20/2023   Reasonably well controlled    Importance of diabetic control effect on kidney disease discussed with the patient

## 2023-04-04 NOTE — PROGRESS NOTES
NEPHROLOGY OFFICE FOLLOW UP  Bree Banuelos 65 y o  male MRN: [id number]    Encounter: [id number] 4/4/2023    REASON FOR VISIT: Joan Felix is a 65 y o  male who is here on 4/4/2023 for Chronic Kidney Disease and Follow-up    HPI:    Bree came in today for follow-up of CKD stage III  He is doing well overall except generalized achiness and feeling of tiredness    Denies any urinary complaint    No chest pain no palpitation or shortness of breath    No nausea no vomiting      REVIEW OF SYSTEMS:    Review of Systems   Constitutional: Negative for activity change and fatigue  HENT: Negative for congestion and ear discharge  Eyes: Negative for photophobia and pain  Respiratory: Negative for apnea and choking  Cardiovascular: Negative for chest pain and palpitations  Gastrointestinal: Negative for abdominal distention and blood in stool  Endocrine: Negative for heat intolerance and polyphagia  Genitourinary: Negative for flank pain and urgency  Musculoskeletal: Negative for neck pain and neck stiffness  Skin: Negative for color change and wound  Allergic/Immunologic: Negative for food allergies and immunocompromised state  Neurological: Negative for seizures and facial asymmetry  Hematological: Negative for adenopathy  Does not bruise/bleed easily  Psychiatric/Behavioral: Negative for self-injury and suicidal ideas           PAST MEDICAL HISTORY:  Past Medical History:   Diagnosis Date   • Arthritis    • Asthma     last assessed: April 1, 2015   • Balanitis     last assessed: July 9, 2014   • Cataract    • Chronic kidney disease    • Chronic kidney disease, stage 3 ([facility] )     last assessed: Jan 17, 2018   • Colon polyp    • COVID-19 2022   • Diabetes mellitus ([facility] )    • 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([facility] )     last assessed: Jan 17, 2018   • GERD (gastroesophageal reflux disease)    • Hypercholesterolemia    • Hypertension    • Pneumonia     last assessed: Feb 25, 2013   • Shortness of "breath    • Stroke ([facility] ) 2022   • Use of cane as ambulatory aid     sometimes   • Wears glasses        PAST SURGICAL HISTORY:  Past Surgical History:   Procedure Laterality Date   • ABDOMINAL SURGERY     • APPENDECTOMY     • CATARACT EXTRACTION     • COLONOSCOPY     • EGD     • HERNIA REPAIR     • HERNIA REPAIR  2007    x2   • HERNIA REPAIR Left 2022    Procedure: REPAIR HERNIA INGUINAL;  Surgeon: Nichole Garcia MD;  Location: [facility];  Service: General   • OTHER SURGICAL HISTORY      Perforated duodenol ulcer surgery    • VT RPR/ADVMNT FLXR TDN N/Z/2 W/O FR GRAFT EA TENDON Left 2021    Procedure: Left thumb EPL repair;  Surgeon: Viraj Lopez MD;  Location: [facility];  Service: Orthopedics       SOCIAL HISTORY:  Social History     Substance and Sexual Activity   Alcohol Use Yes   • Alcohol/week: 1 0 standard drink   • Types: 1 Standard drinks or equivalent per week    Comment: rare  socially     Social History     Substance and Sexual Activity   Drug Use No     Social History     Tobacco Use   Smoking Status Former   • Packs/day: 2 00   • Years: 10 00   • Pack years: 20 00   • Types: Cigarettes   • Quit date:    • Years since quittin 2   Smokeless Tobacco Former   • Quit date:    Tobacco Comments    former smoker noted in \"allscripts\" Quit in 1971 - never used chewing tobacco        FAMILY HISTORY:  Family History   Problem Relation Age of Onset   • Pancreatic cancer Mother    • Colon cancer Father    • Diabetes Father         mellitus    • No Known Problems Sister    • Diabetes Sister    • Nephrolithiasis Sister        MEDICATIONS:    Current Outpatient Medications:   •  albuterol (PROVENTIL HFA,VENTOLIN HFA) 90 mcg/act inhaler, Inhale 2 puffs every 4 (four) hours as needed for wheezing, Disp: 8 5 g, Rfl: 1  •  aspirin 81 mg chewable tablet, Chew 1 tablet (81 mg total) daily, Disp: 30 tablet, Rfl: 0  •  atorvastatin (LIPITOR) 80 mg tablet, TAKE 1 TABLET BY MOUTH  DAILY " "WITH DINNER, Disp: 90 tablet, Rfl: 3  •  ERYN MICROLET LANCETS lancets, Test twice a day, Disp: 100 each, Rfl: 3  •  cholecalciferol (VITAMIN D3) 1,000 units tablet, Take 2,000 Units by mouth daily, Disp: , Rfl:   •  Contour Test test strip, TEST FASTING GLUCOSE DAILY, Disp: 100 strip, Rfl: 2  •  dulaglutide (Trulicity) 7 29 QQ/5 7AG injection, Inject 0 75 mg under the skin every 7 days, Disp: , Rfl:   •  glyBURIDE (DIABETA) 5 mg tablet, TAKE 1 TABLET BY MOUTH  TWICE DAILY, Disp: 180 tablet, Rfl: 3  •  lisinopril-hydrochlorothiazide (PRINZIDE,ZESTORETIC) 20-25 MG per tablet, TAKE 1 TABLET BY MOUTH  DAILY, Disp: 90 tablet, Rfl: 3  •  Tradjenta 5 MG TABS, TAKE 1 TABLET BY MOUTH  DAILY, Disp: 90 tablet, Rfl: 3  •  tamsulosin (FLOMAX) 0 4 mg, Take 1 capsule (0 4 mg total) by mouth daily with dinner Take 3 days before surgery and 2 days after  (Patient not taking: Reported on 1/9/2023), Disp: 5 capsule, Rfl: 0    PHYSICAL EXAM:  Vitals:    04/04/23 1257   BP: 120/70   BP Location: Right arm   Patient Position: Sitting   Pulse: (!) 120   Resp: 16   Temp: (!) 97 4 °F (36 3 °C)   TempSrc: Temporal   SpO2: 96%   Weight: 80 7 kg (178 lb)   Height: 5' 10\" (1 778 m)     Body mass index is 25 54 kg/m²  Physical Exam  Constitutional:       General: He is not in acute distress  Appearance: He is well-developed  HENT:      Head: Normocephalic  Eyes:      General: No scleral icterus  Conjunctiva/sclera: Conjunctivae normal    Neck:      Vascular: No JVD  Cardiovascular:      Rate and Rhythm: Normal rate  Heart sounds: Normal heart sounds  Pulmonary:      Effort: Pulmonary effort is normal       Breath sounds: No wheezing  Abdominal:      Palpations: Abdomen is soft  Tenderness: There is no abdominal tenderness  Musculoskeletal:         General: Normal range of motion  Cervical back: Neck supple  Skin:     General: Skin is warm  Findings: No rash     Neurological:      Mental Status: He is " "alert and oriented to person, place, and time  Psychiatric:         Behavior: Behavior normal          LAB RESULTS:  Results for orders placed or performed in visit on 67/98/51   Basic metabolic panel   Result Value Ref Range    Sodium 136 135 - 147 mmol/L    Potassium 4 7 3 5 - 5 3 mmol/L    Chloride 106 96 - 108 mmol/L    CO2 25 21 - 32 mmol/L    ANION GAP 5 4 - 13 mmol/L    BUN 43 (H) 5 - 25 mg/dL    Creatinine 1 80 (H) 0 60 - 1 30 mg/dL    Glucose, Fasting 126 (H) 65 - 99 mg/dL    Calcium 8 6 8 3 - 10 1 mg/dL    eGFR 35 ml/min/1 73sq m       ASSESSMENT and PLAN:      Stage 3b chronic kidney disease (CKD) ([facility])  Lab Results   Component Value Date    EGFR 35 03/29/2023    EGFR 38 02/20/2023    EGFR 36 10/27/2022    CREATININE 1 80 (H) 03/29/2023    CREATININE 1 66 (H) 02/20/2023    CREATININE 1 74 (H) 10/27/2022   Renal function is quite stable  Advise hydration and avoiding nephrotoxic medication    Chronic kidney disease-mineral and bone disorder  Lab Results   Component Value Date    EGFR 35 03/29/2023    EGFR 38 02/20/2023    EGFR 36 10/27/2022    CREATININE 1 80 (H) 03/29/2023    CREATININE 1 66 (H) 02/20/2023    CREATININE 1 74 (H) 10/27/2022   PTH and phosphorus along with vitamin D are within acceptable range and will continue to monitor    Benign essential hypertension  Very well controlled with present medication which include lisinopril    Type 2 diabetes mellitus with diabetic chronic kidney disease ([facility] )    Lab Results   Component Value Date    HGBA1C 7 4 (H) 02/20/2023   Reasonably well controlled  Importance of diabetic control effect on kidney disease discussed with the patient      Everything discussed with the patient at length  I will see him back in 6 months  We will get blood and urine test before that visit        Portions of the record may have been created with voice recognition software   Occasional wrong word or \"sound a like\" substitutions may have occurred due to the inherent " limitations of voice recognition software  Read the chart carefully and recognize, using context, where substitutions have occurred  If you have any questions, please contact the dictating provider

## 2023-06-01 ENCOUNTER — OFFICE VISIT (OUTPATIENT)
Dept: FAMILY MEDICINE CLINIC | Facility: CLINIC | Age: 78
End: 2023-06-01

## 2023-06-01 VITALS
DIASTOLIC BLOOD PRESSURE: 72 MMHG | WEIGHT: 178 LBS | OXYGEN SATURATION: 97 % | HEART RATE: 77 BPM | HEIGHT: 70 IN | TEMPERATURE: 96.8 F | SYSTOLIC BLOOD PRESSURE: 112 MMHG | BODY MASS INDEX: 25.48 KG/M2
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
## 2023-06-01 DIAGNOSIS — E11.8 TYPE 2 DIABETES MELLITUS WITH COMPLICATION, WITHOUT LONG-TERM CURRENT USE OF INSULIN (HCC): Primary | ICD-10-CM

## 2023-06-01 DIAGNOSIS — Z00.00 MEDICARE ANNUAL WELLNESS VISIT, SUBSEQUENT: ICD-10-CM

## 2023-06-01 DIAGNOSIS — E11.22 TYPE 2 DIABETES MELLITUS WITH STAGE 3B CHRONIC KIDNEY DISEASE, WITHOUT LONG-TERM CURRENT USE OF INSULIN (HCC): ICD-10-CM

## 2023-06-01 DIAGNOSIS — N18.32 TYPE 2 DIABETES MELLITUS WITH STAGE 3B CHRONIC KIDNEY DISEASE, WITHOUT LONG-TERM CURRENT USE OF INSULIN (HCC): ICD-10-CM

## 2023-06-01 PROBLEM — E87.5 HYPERKALEMIA: Status: RESOLVED | Noted: 2023-02-27 | Resolved: 2023-06-01

## 2023-06-01 PROBLEM — K40.90 LEFT INGUINAL HERNIA: Status: RESOLVED | Noted: 2022-02-04 | Resolved: 2023-06-01

## 2023-06-01 LAB — SL AMB POCT HEMOGLOBIN AIC: 7.3 (ref ?–6.5)

## 2023-06-01 NOTE — ASSESSMENT & PLAN NOTE
Lab Results   Component Value Date    HGBA1C 7 3 (A) 89/89/6332   Increase Trulicity to 1 5 mg weekly  D/C Tradjenta (overlap with GLP1)  Continue Glyburide  Monitor blood sugars  A1c in 3 months

## 2023-06-01 NOTE — PROGRESS NOTES
Assessment and Plan:     Problem List Items Addressed This Visit        Endocrine    Type 2 diabetes mellitus with diabetic chronic kidney disease ([facility] )       Lab Results   Component Value Date    HGBA1C 7 3 (A) 07/97/2207   Increase Trulicity to 1 5 mg weekly  D/C Tradjenta (overlap with GLP1)  Continue Glyburide  Monitor blood sugars  A1c in 3 months         Relevant Medications    dulaglutide (Trulicity) 1 5 MT/0 3BR injection       Other    Fall     Fall precautions discussed  Recommended PT - declined at this time  Other Visit Diagnoses     Type 2 diabetes mellitus with complication, without long-term current use of insulin ([facility] )    -  Primary    Relevant Medications    dulaglutide (Trulicity) 1 5 YS/3 2XC injection    Other Relevant Orders    POCT hemoglobin A1c (Completed)    Hemoglobin W4M    Basic metabolic panel    Lipid Panel with Direct LDL reflex    Medicare annual wellness visit, subsequent            BMI Counseling: Body mass index is 25 54 kg/m²  The BMI is above normal  Nutrition recommendations include moderation in carbohydrate intake  Rationale for BMI follow-up plan is due to patient being overweight or obese  Depression Screening and Follow-up Plan: Patient was screened for depression during today's encounter  They screened negative with a PHQ-2 score of 0  Falls Plan of Care: balance, strength, and gait training instructions were provided  Preventive health issues were discussed with patient, and age appropriate screening tests were ordered as noted in patient's After Visit Summary  Personalized health advice and appropriate referrals for health education or preventive services given if needed, as noted in patient's After Visit Summary  History of Present Illness:     Patient presents for a Medicare Wellness Visit    Here for diabetic check up - a1c is  7 3%, down from 7 4%  He says his fasting glucoses are < 130   He is on Trulicity 5 78 and Glyburide 5 mg BID, Tradjenta 5 mg    He states he had a fall a few days ago  He was at home and his slipper got caught and he fell onto furniture  He scraped his left flank and right arm  He tends to fall frequently  He has neuropathy  He does see neurology and has been offered physical therapy in the past but has declined  Patient Care Team:  Bobby Ferrell MD as PCP - Chelo Diaz MD as Consulting Physician     Review of Systems:     Review of Systems   Constitutional: Negative for activity change, appetite change, fatigue and unexpected weight change  Respiratory: Negative for chest tightness and shortness of breath  Cardiovascular: Negative for chest pain and leg swelling  Gastrointestinal: Negative for abdominal pain  Musculoskeletal: Positive for gait problem  Neurological: Negative for headaches  Problem List:     Patient Active Problem List   Diagnosis   • Stage 3b chronic kidney disease (CKD) ([facility] )   • Type 2 diabetes mellitus with diabetic chronic kidney disease ([facility] )   • Benign essential hypertension   • Hypercholesterolemia   • Degenerative cervical spinal stenosis   • Overweight (BMI 25 0-29  9)   • Benign prostatic hyperplasia with lower urinary tract symptoms   • Chronic kidney disease-mineral and bone disorder   • Ganglion cyst of left foot   • Vitamin D deficiency   • Fall   • Moderate asthma without complication   • Swelling of toe of left foot      Past Medical and Surgical History:     Past Medical History:   Diagnosis Date   • Arthritis    • Asthma     last assessed: April 1, 2015   • Balanitis     last assessed: July 9, 2014   • Cataract    • Chronic kidney disease    • Chronic kidney disease, stage 3 ([facility] )     last assessed: Jan 17, 2018   • Colon polyp    • COVID-19 2022   • Diabetes mellitus ([facility] )    • 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([facility] )     last assessed: Jan 17, 2018   • GERD (gastroesophageal reflux disease)    • Hypercholesterolemia    • Hypertension    • Pneumonia "   last assessed: 2013   • Shortness of breath    • Stroke [facility]) 2022   • Use of cane as ambulatory aid     sometimes   • Wears glasses      Past Surgical History:   Procedure Laterality Date   • ABDOMINAL SURGERY     • APPENDECTOMY     • CATARACT EXTRACTION     • COLONOSCOPY     • EGD     • HERNIA REPAIR     • HERNIA REPAIR  2007    x2   • HERNIA REPAIR Left 2022    Procedure: REPAIR HERNIA INGUINAL;  Surgeon: Danita Salomon MD;  Location: [facility];  Service: General   • OTHER SURGICAL HISTORY      Perforated duodenol ulcer surgery    • [place] RPR/ADVMNT FLXR TDN N/Z/2 W/O FR GRAFT EA TENDON Left 2021    Procedure: Left thumb EPL repair;  Surgeon: Nikolay Cobb MD;  Location: [facility];  Service: Orthopedics      Family History:     Family History   Problem Relation Age of Onset   • Pancreatic cancer Mother    • Colon cancer Father    • Diabetes Father         mellitus    • No Known Problems Sister    • Diabetes Sister    • Nephrolithiasis Sister       Social History:     Social History     Socioeconomic History   • Marital status:      Spouse name: None   • Number of children: None   • Years of education: None   • Highest education level: None   Occupational History   • Occupation: Not employed - Retired    Tobacco Use   • Smoking status: Former     Packs/day: 2 00     Years: 10 00     Total pack years: 20 00     Types: Cigarettes     Quit date:      Years since quittin 4   • Smokeless tobacco: Former     Quit date:    • Tobacco comments:     former smoker noted in \"allscripts\" Quit in  - never used chewing tobacco    Vaping Use   • Vaping Use: Never used   Substance and Sexual Activity   • Alcohol use:  Yes     Alcohol/week: 1 0 standard drink of alcohol     Types: 1 Standard drinks or equivalent per week     Comment: rare  socially   • Drug use: No   • Sexual activity: Not Currently     Partners: Female   Other Topics Concern   • None   Social History " Narrative    Voluntary Childlessness     Advance directive on file - No     Exercises occasionally     Occasional caffeine consumption      Social Determinants of Health     Financial Resource Strain: Low Risk  (6/1/2023)    Overall Financial Resource Strain (CARDIA)    • Difficulty of Paying Living Expenses: Not hard at all   Food Insecurity: No Food Insecurity (2/17/2022)    Hunger Vital Sign    • Worried About Running Out of Food in the Last Year: Never true    • Ran Out of Food in the Last Year: Never true   Transportation Needs: No Transportation Needs (6/1/2023)    PRAPARE - Transportation    • Lack of Transportation (Medical): No    • Lack of Transportation (Non-Medical):  No   Physical Activity: Not on file   Stress: Not on file   Social Connections: Not on file   Intimate Partner Violence: Not on file   Housing Stability: Low Risk  (2/17/2022)    Housing Stability Vital Sign    • Unable to Pay for Housing in the Last Year: No    • Number of Places Lived in the Last Year: 1    • Unstable Housing in the Last Year: No      Medications and Allergies:     Current Outpatient Medications   Medication Sig Dispense Refill   • dulaglutide (Trulicity) 1 5 GK/1 3MR injection Inject 0 5 mL (1 5 mg total) under the skin every 7 days 6 mL 1   • albuterol (PROVENTIL HFA,VENTOLIN HFA) 90 mcg/act inhaler Inhale 2 puffs every 4 (four) hours as needed for wheezing 8 5 g 1   • aspirin 81 mg chewable tablet Chew 1 tablet (81 mg total) daily 30 tablet 0   • atorvastatin (LIPITOR) 80 mg tablet TAKE 1 TABLET BY MOUTH  DAILY WITH DINNER 90 tablet 3   • ERYN MICROLET LANCETS lancets Test twice a day 100 each 3   • cholecalciferol (VITAMIN D3) 1,000 units tablet Take 2,000 Units by mouth daily     • Contour Test test strip TEST FASTING GLUCOSE DAILY 100 strip 2   • glyBURIDE (DIABETA) 5 mg tablet TAKE 1 TABLET BY MOUTH  TWICE DAILY 180 tablet 3   • lisinopril-hydrochlorothiazide (PRINZIDE,ZESTORETIC) 20-25 MG per tablet TAKE 1 TABLET BY MOUTH  DAILY 90 tablet 3     No current facility-administered medications for this visit  Allergies   Allergen Reactions   • Meperidine Other (See Comments)     Hypotension & passed out   • Pravastatin Myalgia      Immunizations:     Immunization History   Administered Date(s) Administered   • COVID-19 MODERNA VACC 0 25 ML IM BOOSTER 02/16/2022   • COVID-19 MODERNA VACC 0 5 ML IM 04/07/2021, 05/05/2021   • Pneumococcal Polysaccharide PPV23 12/29/2021   • Tdap 01/11/2018, 01/11/2018      Health Maintenance:         Topic Date Due   • Hepatitis C Screening  Completed   • Colorectal Cancer Screening  Discontinued         Topic Date Due   • COVID-19 Vaccine (4 - Moderna series) 04/13/2022   • Pneumococcal Vaccine: 65+ Years (2 - PCV) 12/29/2022   • Influenza Vaccine (Season Ended) 09/01/2023      Medicare Screening Tests and Risk Assessments:     Bree is here for his Subsequent Wellness visit  Last Medicare Wellness visit information reviewed, patient interviewed and updates made to the record today  Health Risk Assessment:   Patient rates overall health as good  Patient feels that their physical health rating is same  Patient is satisfied with their life  Eyesight was rated as slightly worse  Hearing was rated as slightly worse  Patient feels that their emotional and mental health rating is same  Patients states they are never, rarely angry  Patient states they are never, rarely unusually tired/fatigued  Pain experienced in the last 7 days has been some  Patient's pain rating has been 5/10  Patient states that he has experienced no weight loss or gain in last 6 months  Depression Screening:   PHQ-2 Score: 0      Fall Risk Screening:    In the past year, patient has experienced: history of falling in past year    Number of falls: 2 or more  Injured during fall?: Yes    Feels unsteady when standing or walking?: Yes    Worried about falling?: Yes      Home Safety:  Patient has trouble with stairs inside or outside of their home  Patient has working smoke alarms and has working carbon monoxide detector  Home safety hazards include: none  Nutrition:   Current diet is Regular  Medications:   Patient is currently taking over-the-counter supplements  OTC medications include: see medication list  Patient is able to manage medications  Activities of Daily Living (ADLs)/Instrumental Activities of Daily Living (IADLs):   Walk and transfer into and out of bed and chair?: Yes  Dress and groom yourself?: Yes    Bathe or shower yourself?: Yes    Feed yourself?  Yes  Do your laundry/housekeeping?: Yes  Manage your money, pay your bills and track your expenses?: Yes  Make your own meals?: Yes    Do your own shopping?: Yes    Previous Hospitalizations:   Any hospitalizations or ED visits within the last 12 months?: Yes    How many hospitalizations have you had in the last year?: 1-2    Advance Care Planning:   Living will: No    Durable POA for healthcare: No    Advanced directive: No    Five wishes given: Yes      Cognitive Screening:   Provider or family/friend/caregiver concerned regarding cognition?: No    PREVENTIVE SCREENINGS      Cardiovascular Screening:    General: Screening Not Indicated, History Lipid Disorder, Risks and Benefits Discussed and Screening Current    Due for: Lipid Panel      Diabetes Screening:     General: Screening Not Indicated, History Diabetes, Risks and Benefits Discussed and Screening Current    Due for: Blood Glucose      Colorectal Cancer Screening:     General: Screening Not Indicated      Prostate Cancer Screening:    General: Screening Not Indicated      Osteoporosis Screening:    General: Screening Not Indicated      Abdominal Aortic Aneurysm (AAA) Screening:    Risk factors include: tobacco use        General: Screening Not Indicated      Lung Cancer Screening:     General: Screening Not Indicated      Hepatitis C Screening:    General: Screening Current    Screening, Brief "Intervention, and Referral to Treatment (SBIRT)    Screening  Typical number of drinks in a day: 0  Typical number of drinks in a week: 0  Interpretation: Low risk drinking behavior  Single Item Drug Screening:  How often have you used an illegal drug (including marijuana) or a prescription medication for non-medical reasons in the past year? never    Single Item Drug Screen Score: 0  Interpretation: Negative screen for possible drug use disorder    Brief Intervention  Alcohol & drug use screenings were reviewed  No concerns regarding substance use disorder identified  No results found  Physical Exam:     /72 (BP Location: Left arm, Patient Position: Sitting, Cuff Size: Large)   Pulse 77   Temp (!) 96 8 °F (36 °C)   Ht 5' 10\" (1 778 m)   Wt 80 7 kg (178 lb)   SpO2 97%   BMI 25 54 kg/m²     Physical Exam  Vitals and nursing note reviewed  Constitutional:       General: He is not in acute distress  Appearance: He is well-developed  He is not diaphoretic  HENT:      Head: Normocephalic and atraumatic  Right Ear: External ear normal       Left Ear: External ear normal    Cardiovascular:      Rate and Rhythm: Normal rate and regular rhythm  Heart sounds: Normal heart sounds  No murmur heard  No friction rub  No gallop  Pulmonary:      Effort: Pulmonary effort is normal  No respiratory distress  Breath sounds: Normal breath sounds  No stridor  No wheezing or rales  Chest:      Chest wall: No tenderness  Skin:     Findings: No rash  Neurological:      Mental Status: He is alert  Psychiatric:         Behavior: Behavior normal          Thought Content:  Thought content normal          Judgment: Judgment normal           Jeison Rehman MD  "

## 2023-06-01 NOTE — PATIENT INSTRUCTIONS
Medicare Preventive Visit Patient Instructions  Thank you for completing your Welcome to Medicare Visit or Medicare Annual Wellness Visit today  Your next wellness visit will be due in one year (6/1/2024)  The screening/preventive services that you may require over the next 5-10 years are detailed below  Some tests may not apply to you based off risk factors and/or age  Screening tests ordered at today's visit but not completed yet may show as past due  Also, please note that scanned in results may not display below  Preventive Screenings:  Service Recommendations Previous Testing/Comments   Colorectal Cancer Screening  · Colonoscopy    · Fecal Occult Blood Test (FOBT)/Fecal Immunochemical Test (FIT)  · Fecal DNA/Cologuard Test  · Flexible Sigmoidoscopy Age: 39-70 years old   Colonoscopy: every 10 years (May be performed more frequently if at higher risk)  OR  FOBT/FIT: every 1 year  OR  Cologuard: every 3 years  OR  Sigmoidoscopy: every 5 years  Screening may be recommended earlier than age 39 if at higher risk for colorectal cancer  Also, an individualized decision between you and your healthcare provider will decide whether screening between the ages of 74-80 would be appropriate   Colonoscopy: 12/02/2014  FOBT/FIT: Not on file  Cologuard: Not on file  Sigmoidoscopy: Not on file    Screening Not Indicated     Prostate Cancer Screening Individualized decision between patient and health care provider in men between ages of 53-78   Medicare will cover every 12 months beginning on the day after your 50th birthday PSA: 2 9 ng/mL     Screening Not Indicated     Hepatitis C Screening Once for adults born between 1945 and 1965  More frequently in patients at high risk for Hepatitis C Hep C Antibody: 05/18/2022    Screening Current   Diabetes Screening 1-2 times per year if you're at risk for diabetes or have pre-diabetes Fasting glucose: 126 mg/dL (3/29/2023)  A1C: 7 4 % (2/20/2023)  Screening Not Indicated  History Diabetes  Due for Blood Glucose   Cholesterol Screening Once every 5 years if you don't have a lipid disorder  May order more often based on risk factors  Lipid panel: 02/20/2023  Screening Not Indicated  History Lipid Disorder  Due for Lipid Panel      Other Preventive Screenings Covered by Medicare:  1  Abdominal Aortic Aneurysm (AAA) Screening: covered once if your at risk  You're considered to be at risk if you have a family history of AAA or a male between the age of 73-68 who smoking at least 100 cigarettes in your lifetime  2  Lung Cancer Screening: covers low dose CT scan once per year if you meet all of the following conditions: (1) Age 50-69; (2) No signs or symptoms of lung cancer; (3) Current smoker or have quit smoking within the last 15 years; (4) You have a tobacco smoking history of at least 20 pack years (packs per day x number of years you smoked); (5) You get a written order from a healthcare provider  3  Glaucoma Screening: covered annually if you're considered high risk: (1) You have diabetes OR (2) Family history of glaucoma OR (3)  aged 48 and older OR (3)  American aged 72 and older  3  Osteoporosis Screening: covered every 2 years if you meet one of the following conditions: (1) Have a vertebral abnormality; (2) On glucocorticoid therapy for more than 3 months; (3) Have primary hyperparathyroidism; (4) On osteoporosis medications and need to assess response to drug therapy  5  HIV Screening: covered annually if you're between the age of 12-76  Also covered annually if you are younger than 13 and older than 72 with risk factors for HIV infection  For pregnant patients, it is covered up to 3 times per pregnancy      Immunizations:  Immunization Recommendations   Influenza Vaccine Annual influenza vaccination during flu season is recommended for all persons aged >= 6 months who do not have contraindications   Pneumococcal Vaccine   * Pneumococcal conjugate vaccine = PCV13 (Prevnar 13), PCV15 (Vaxneuvance), PCV20 (Prevnar 20)  * Pneumococcal polysaccharide vaccine = PPSV23 (Pneumovax) Adults 2364 years old: 1-3 doses may be recommended based on certain risk factors  Adults 72 years old: 1-2 doses may be recommended based off what pneumonia vaccine you previously received   Hepatitis B Vaccine 3 dose series if at intermediate or high risk (ex: diabetes, end stage renal disease, liver disease)   Tetanus (Td) Vaccine - COST NOT COVERED BY MEDICARE PART B Following completion of primary series, a booster dose should be given every 10 years to maintain immunity against tetanus  Td may also be given as tetanus wound prophylaxis  Tdap Vaccine - COST NOT COVERED BY MEDICARE PART B Recommended at least once for all adults  For pregnant patients, recommended with each pregnancy  Shingles Vaccine (Shingrix) - COST NOT COVERED BY MEDICARE PART B  2 shot series recommended in those aged 48 and above     Health Maintenance Due:      Topic Date Due   • Hepatitis C Screening  Completed   • Colorectal Cancer Screening  Discontinued     Immunizations Due:      Topic Date Due   • COVID-19 Vaccine (4 - Moderna series) 04/13/2022   • Pneumococcal Vaccine: 65+ Years (2 - PCV) 12/29/2022   • Influenza Vaccine (Season Ended) 09/01/2023     Advance Directives   What are advance directives? Advance directives are legal documents that state your wishes and plans for medical care  These plans are made ahead of time in case you lose your ability to make decisions for yourself  Advance directives can apply to any medical decision, such as the treatments you want, and if you want to donate organs  What are the types of advance directives? There are many types of advance directives, and each state has rules about how to use them  You may choose a combination of any of the following:  · Living will: This is a written record of the treatment you want   You can also choose which treatments you do not want, which to limit, and which to stop at a certain time  This includes surgery, medicine, IV fluid, and tube feedings  · Durable power of  for healthcare [facility]): This is a written record that states who you want to make healthcare choices for you when you are unable to make them for yourself  This person, called a proxy, is usually a family member or a friend  You may choose more than 1 proxy  · Do not resuscitate (DNR) order:  A DNR order is used in case your heart stops beating or you stop breathing  It is a request not to have certain forms of treatment, such as CPR  A DNR order may be included in other types of advance directives  · Medical directive: This covers the care that you want if you are in a coma, near death, or unable to make decisions for yourself  You can list the treatments you want for each condition  Treatment may include pain medicine, surgery, blood transfusions, dialysis, IV or tube feedings, and a ventilator (breathing machine)  · Values history: This document has questions about your views, beliefs, and how you feel and think about life  This information can help others choose the care that you would choose  Why are advance directives important? An advance directive helps you control your care  Although spoken wishes may be used, it is better to have your wishes written down  Spoken wishes can be misunderstood, or not followed  Treatments may be given even if you do not want them  An advance directive may make it easier for your family to make difficult choices about your care  Fall Prevention    Fall prevention  includes ways to make your home and other areas safer  It also includes ways you can move more carefully to prevent a fall  Health conditions that cause changes in your blood pressure, vision, or muscle strength and coordination may increase your risk for falls  Medicines may also increase your risk for falls if they make you dizzy, weak, or sleepy     Fall prevention tips:   · Stand or sit up slowly  · Use assistive devices as directed  · Wear shoes that fit well and have soles that   · Wear a personal alarm  · Stay active  · Manage your medical conditions  Home Safety Tips:  · Add items to prevent falls in the bathroom  · Keep paths clear  · Install bright lights in your home  · Keep items you use often on shelves within reach  · Paint or place reflective tape on the edges of your stairs  Weight Management   Why it is important to manage your weight:  Being overweight increases your risk of health conditions such as heart disease, high blood pressure, type 2 diabetes, and certain types of cancer  It can also increase your risk for osteoarthritis, sleep apnea, and other respiratory problems  Aim for a slow, steady weight loss  Even a small amount of weight loss can lower your risk of health problems  How to lose weight safely:  A safe and healthy way to lose weight is to eat fewer calories and get regular exercise  You can lose up about 1 pound a week by decreasing the number of calories you eat by 500 calories each day  Healthy meal plan for weight management:  A healthy meal plan includes a variety of foods, contains fewer calories, and helps you stay healthy  A healthy meal plan includes the following:  · Eat whole-grain foods more often  A healthy meal plan should contain fiber  Fiber is the part of grains, fruits, and vegetables that is not broken down by your body  Whole-grain foods are healthy and provide extra fiber in your diet  Some examples of whole-grain foods are whole-wheat breads and pastas, oatmeal, brown rice, and bulgur  · Eat a variety of vegetables every day  Include dark, leafy greens such as spinach, kale, anderson greens, and mustard greens  Eat yellow and orange vegetables such as carrots, sweet potatoes, and winter squash  · Eat a variety of fruits every day    Choose fresh or canned fruit (canned in its own juice or light syrup) instead of juice  Fruit juice has very little or no fiber  · Eat low-fat dairy foods  Drink fat-free (skim) milk or 1% milk  Eat fat-free yogurt and low-fat cottage cheese  Try low-fat cheeses such as mozzarella and other reduced-fat cheeses  · Choose meat and other protein foods that are low in fat  Choose beans or other legumes such as split peas or lentils  Choose fish, skinless poultry (chicken or turkey), or lean cuts of red meat (beef or pork)  Before you cook meat or poultry, cut off any visible fat  · Use less fat and oil  Try baking foods instead of frying them  Add less fat, such as margarine, sour cream, regular salad dressing and mayonnaise to foods  Eat fewer high-fat foods  Some examples of high-fat foods include french fries, doughnuts, ice cream, and cakes  · Eat fewer sweets  Limit foods and drinks that are high in sugar  This includes candy, cookies, regular soda, and sweetened drinks  Exercise:  Exercise at least 30 minutes per day on most days of the week  Some examples of exercise include walking, biking, dancing, and swimming  You can also fit in more physical activity by taking the stairs instead of the elevator or parking farther away from stores  Ask your healthcare provider about the best exercise plan for you  © Copyright [employer] 2018 Information is for End User's use only and may not be sold, redistributed or otherwise used for commercial purposes   All illustrations and images included in CareNotes® are the copyrighted property of [employer]  or [address] [place] [address]

## 2023-06-05 ENCOUNTER — TELEPHONE (OUTPATIENT)
Dept: NEUROLOGY | Facility: CLINIC | Age: 78
End: 2023-06-05

## 2023-06-05 NOTE — TELEPHONE ENCOUNTER
Called patient to inform them that their appointment with Yelena Olvera has to be cancelled and rescheduled  Left voicemail to reschedule and to call back

## 2023-07-08 DIAGNOSIS — I10 ESSENTIAL HYPERTENSION: ICD-10-CM

## 2023-07-08 DIAGNOSIS — E11.8 TYPE 2 DIABETES MELLITUS WITH COMPLICATION, WITHOUT LONG-TERM CURRENT USE OF INSULIN (HCC): ICD-10-CM

## 2023-07-10 RX ORDER — LISINOPRIL AND HYDROCHLOROTHIAZIDE 25; 20 MG/1; MG/1
1 TABLET ORAL DAILY
Qty: 90 TABLET | Refills: 3 | Status: SHIPPED | OUTPATIENT
Start: 2023-07-10

## 2023-07-10 RX ORDER — GLYBURIDE 5 MG/1
TABLET ORAL
Qty: 180 TABLET | Refills: 3 | Status: SHIPPED | OUTPATIENT
Start: 2023-07-10

## 2023-09-21 LAB
LEFT EYE DIABETIC RETINOPATHY: NORMAL
RIGHT EYE DIABETIC RETINOPATHY: NORMAL
SEVERITY (EYE EXAM): NORMAL

## 2023-09-26 ENCOUNTER — APPOINTMENT (OUTPATIENT)
Dept: LAB | Facility: CLINIC | Age: 78
End: 2023-09-26
Payer: COMMERCIAL

## 2023-09-26 DIAGNOSIS — M89.9 CHRONIC KIDNEY DISEASE-MINERAL AND BONE DISORDER: ICD-10-CM

## 2023-09-26 DIAGNOSIS — E83.9 CHRONIC KIDNEY DISEASE-MINERAL AND BONE DISORDER: ICD-10-CM

## 2023-09-26 DIAGNOSIS — N18.9 CHRONIC KIDNEY DISEASE-MINERAL AND BONE DISORDER: ICD-10-CM

## 2023-09-26 DIAGNOSIS — E11.8 TYPE 2 DIABETES MELLITUS WITH COMPLICATION, WITHOUT LONG-TERM CURRENT USE OF INSULIN (HCC): ICD-10-CM

## 2023-09-26 DIAGNOSIS — N18.32 STAGE 3B CHRONIC KIDNEY DISEASE (CKD) (HCC): ICD-10-CM

## 2023-09-26 LAB
25(OH)D3 SERPL-MCNC: 67.7 NG/ML (ref 30–100)
ANION GAP SERPL CALCULATED.3IONS-SCNC: 7 MMOL/L
BACTERIA UR QL AUTO: ABNORMAL /HPF
BASOPHILS # BLD AUTO: 0.07 THOUSANDS/ÂΜL (ref 0–0.1)
BASOPHILS NFR BLD AUTO: 1 % (ref 0–1)
BILIRUB UR QL STRIP: NEGATIVE
BUN SERPL-MCNC: 31 MG/DL (ref 5–25)
CALCIUM SERPL-MCNC: 9 MG/DL (ref 8.4–10.2)
CHLORIDE SERPL-SCNC: 104 MMOL/L (ref 96–108)
CHOLEST SERPL-MCNC: 93 MG/DL
CLARITY UR: CLEAR
CO2 SERPL-SCNC: 27 MMOL/L (ref 21–32)
COLOR UR: YELLOW
CREAT SERPL-MCNC: 1.72 MG/DL (ref 0.6–1.3)
CREAT UR-MCNC: 226.3 MG/DL
EOSINOPHIL # BLD AUTO: 0.42 THOUSAND/ÂΜL (ref 0–0.61)
EOSINOPHIL NFR BLD AUTO: 5 % (ref 0–6)
ERYTHROCYTE [DISTWIDTH] IN BLOOD BY AUTOMATED COUNT: 12.6 % (ref 11.6–15.1)
EST. AVERAGE GLUCOSE BLD GHB EST-MCNC: 197 MG/DL
GFR SERPL CREATININE-BSD FRML MDRD: 37 ML/MIN/1.73SQ M
GLUCOSE P FAST SERPL-MCNC: 130 MG/DL (ref 65–99)
GLUCOSE UR STRIP-MCNC: NEGATIVE MG/DL
HBA1C MFR BLD: 8.5 %
HCT VFR BLD AUTO: 41.6 % (ref 36.5–49.3)
HDLC SERPL-MCNC: 34 MG/DL
HGB BLD-MCNC: 14.3 G/DL (ref 12–17)
HGB UR QL STRIP.AUTO: NEGATIVE
HYALINE CASTS #/AREA URNS LPF: ABNORMAL /LPF
IMM GRANULOCYTES # BLD AUTO: 0.03 THOUSAND/UL (ref 0–0.2)
IMM GRANULOCYTES NFR BLD AUTO: 0 % (ref 0–2)
KETONES UR STRIP-MCNC: NEGATIVE MG/DL
LDLC SERPL CALC-MCNC: 48 MG/DL (ref 0–100)
LEUKOCYTE ESTERASE UR QL STRIP: NEGATIVE
LYMPHOCYTES # BLD AUTO: 1.52 THOUSANDS/ÂΜL (ref 0.6–4.47)
LYMPHOCYTES NFR BLD AUTO: 19 % (ref 14–44)
MCH RBC QN AUTO: 31.6 PG (ref 26.8–34.3)
MCHC RBC AUTO-ENTMCNC: 34.4 G/DL (ref 31.4–37.4)
MCV RBC AUTO: 92 FL (ref 82–98)
MONOCYTES # BLD AUTO: 0.63 THOUSAND/ÂΜL (ref 0.17–1.22)
MONOCYTES NFR BLD AUTO: 8 % (ref 4–12)
NEUTROPHILS # BLD AUTO: 5.46 THOUSANDS/ÂΜL (ref 1.85–7.62)
NEUTS SEG NFR BLD AUTO: 67 % (ref 43–75)
NITRITE UR QL STRIP: NEGATIVE
NON-SQ EPI CELLS URNS QL MICRO: ABNORMAL /HPF
NRBC BLD AUTO-RTO: 0 /100 WBCS
PH UR STRIP.AUTO: 5.5 [PH]
PHOSPHATE SERPL-MCNC: 2.9 MG/DL (ref 2.3–4.1)
PLATELET # BLD AUTO: 270 THOUSANDS/UL (ref 149–390)
PMV BLD AUTO: 11 FL (ref 8.9–12.7)
POTASSIUM SERPL-SCNC: 5.4 MMOL/L (ref 3.5–5.3)
PROT UR STRIP-MCNC: ABNORMAL MG/DL
PROT UR-MCNC: 26 MG/DL
PROT/CREAT UR: 0.11 MG/G{CREAT} (ref 0–0.1)
PTH-INTACT SERPL-MCNC: 137.8 PG/ML (ref 12–88)
RBC # BLD AUTO: 4.52 MILLION/UL (ref 3.88–5.62)
RBC #/AREA URNS AUTO: ABNORMAL /HPF
SODIUM SERPL-SCNC: 138 MMOL/L (ref 135–147)
SP GR UR STRIP.AUTO: 1.02 (ref 1–1.03)
TRIGL SERPL-MCNC: 53 MG/DL
UROBILINOGEN UR STRIP-ACNC: <2 MG/DL
WBC # BLD AUTO: 8.13 THOUSAND/UL (ref 4.31–10.16)
WBC #/AREA URNS AUTO: ABNORMAL /HPF

## 2023-09-26 PROCEDURE — 81001 URINALYSIS AUTO W/SCOPE: CPT

## 2023-09-26 PROCEDURE — 82570 ASSAY OF URINE CREATININE: CPT

## 2023-09-26 PROCEDURE — 80061 LIPID PANEL: CPT

## 2023-09-26 PROCEDURE — 36415 COLL VENOUS BLD VENIPUNCTURE: CPT

## 2023-09-26 PROCEDURE — 84156 ASSAY OF PROTEIN URINE: CPT

## 2023-09-26 PROCEDURE — 84100 ASSAY OF PHOSPHORUS: CPT

## 2023-09-26 PROCEDURE — 83036 HEMOGLOBIN GLYCOSYLATED A1C: CPT

## 2023-09-26 PROCEDURE — 85025 COMPLETE CBC W/AUTO DIFF WBC: CPT

## 2023-09-26 PROCEDURE — 82306 VITAMIN D 25 HYDROXY: CPT

## 2023-09-26 PROCEDURE — 83970 ASSAY OF PARATHORMONE: CPT

## 2023-09-26 PROCEDURE — 80048 BASIC METABOLIC PNL TOTAL CA: CPT

## 2023-09-27 ENCOUNTER — PREP FOR PROCEDURE (OUTPATIENT)
Dept: OTHER | Facility: HOSPITAL | Age: 78
End: 2023-09-27

## 2023-10-01 DIAGNOSIS — I63.9 ACUTE CVA (CEREBROVASCULAR ACCIDENT) (HCC): ICD-10-CM

## 2023-10-02 RX ORDER — ATORVASTATIN CALCIUM 80 MG/1
TABLET, FILM COATED ORAL
Qty: 90 TABLET | Refills: 3 | Status: SHIPPED | OUTPATIENT
Start: 2023-10-02

## 2023-10-03 ENCOUNTER — TELEPHONE (OUTPATIENT)
Dept: NEPHROLOGY | Facility: CLINIC | Age: 78
End: 2023-10-03

## 2023-10-03 ENCOUNTER — VBI (OUTPATIENT)
Dept: ADMINISTRATIVE | Facility: OTHER | Age: 78
End: 2023-10-03

## 2023-10-04 ENCOUNTER — OFFICE VISIT (OUTPATIENT)
Dept: NEPHROLOGY | Facility: CLINIC | Age: 78
End: 2023-10-04
Payer: COMMERCIAL

## 2023-10-04 VITALS
WEIGHT: 179.2 LBS | BODY MASS INDEX: 25.65 KG/M2 | OXYGEN SATURATION: 98 % | DIASTOLIC BLOOD PRESSURE: 60 MMHG | HEART RATE: 92 BPM | HEIGHT: 70 IN | TEMPERATURE: 98.2 F | RESPIRATION RATE: 18 BRPM | SYSTOLIC BLOOD PRESSURE: 120 MMHG

## 2023-10-04 DIAGNOSIS — N40.1 BENIGN PROSTATIC HYPERPLASIA WITH LOWER URINARY TRACT SYMPTOMS, SYMPTOM DETAILS UNSPECIFIED: ICD-10-CM

## 2023-10-04 DIAGNOSIS — M89.9 CHRONIC KIDNEY DISEASE-MINERAL AND BONE DISORDER: ICD-10-CM

## 2023-10-04 DIAGNOSIS — N18.32 STAGE 3B CHRONIC KIDNEY DISEASE (CKD) (HCC): Primary | ICD-10-CM

## 2023-10-04 DIAGNOSIS — E83.9 CHRONIC KIDNEY DISEASE-MINERAL AND BONE DISORDER: ICD-10-CM

## 2023-10-04 DIAGNOSIS — E66.3 OVERWEIGHT (BMI 25.0-29.9): ICD-10-CM

## 2023-10-04 DIAGNOSIS — N18.32 TYPE 2 DIABETES MELLITUS WITH STAGE 3B CHRONIC KIDNEY DISEASE, WITHOUT LONG-TERM CURRENT USE OF INSULIN (HCC): ICD-10-CM

## 2023-10-04 DIAGNOSIS — E11.22 TYPE 2 DIABETES MELLITUS WITH STAGE 3B CHRONIC KIDNEY DISEASE, WITHOUT LONG-TERM CURRENT USE OF INSULIN (HCC): ICD-10-CM

## 2023-10-04 DIAGNOSIS — I10 BENIGN ESSENTIAL HYPERTENSION: ICD-10-CM

## 2023-10-04 DIAGNOSIS — N18.9 CHRONIC KIDNEY DISEASE-MINERAL AND BONE DISORDER: ICD-10-CM

## 2023-10-04 DIAGNOSIS — E87.5 HYPERKALEMIA: ICD-10-CM

## 2023-10-04 PROCEDURE — 99214 OFFICE O/P EST MOD 30 MIN: CPT | Performed by: INTERNAL MEDICINE

## 2023-10-04 NOTE — ASSESSMENT & PLAN NOTE
Lab Results   Component Value Date    EGFR 37 09/26/2023    EGFR 35 03/29/2023    EGFR 38 02/20/2023    CREATININE 1.72 (H) 09/26/2023    CREATININE 1.80 (H) 03/29/2023    CREATININE 1.66 (H) 02/20/2023   Renal function is stable overall.   Advise hydration and avoiding nephrotoxic medication

## 2023-10-04 NOTE — ASSESSMENT & PLAN NOTE
Blood pressure is very well controlled with help of ACE inhibitor though we may need to stop it because of hyperkalemia.

## 2023-10-04 NOTE — ASSESSMENT & PLAN NOTE
Patient potassium is high. Low potassium diet discussed with the patient. Also give some material to read about.   I will recheck the blood work next week if potassium remain high may need to stop lisinopril

## 2023-10-04 NOTE — PROGRESS NOTES
NEPHROLOGY OFFICE FOLLOW UP  Bree Martinez 65 y.o. male MRN: [id number]    Encounter: [id number] 10/4/2023    REASON FOR VISIT: Elías Lam is a 65 y.o. male who is here on 10/4/2023 for Chronic Kidney Disease and Follow-up  . HPI:    Bree came in today for follow-up of CKD. 58-year-old gentleman with history of diabetes and diabetic nephropathy    Overall doing well denies any acute complaint    No chest pain no palpitation or shortness of breath    No nausea no vomiting    Denies any urinary complaint      REVIEW OF SYSTEMS:    Review of Systems   Constitutional: Negative for activity change and fatigue. HENT: Negative for congestion and ear discharge. Eyes: Negative for photophobia and pain. Respiratory: Negative for apnea and choking. Cardiovascular: Negative for chest pain and palpitations. Gastrointestinal: Negative for abdominal distention and blood in stool. Endocrine: Negative for heat intolerance and polyphagia. Genitourinary: Negative for flank pain and urgency. Musculoskeletal: Negative for neck pain and neck stiffness. Skin: Negative for color change and wound. Allergic/Immunologic: Negative for food allergies and immunocompromised state. Neurological: Negative for seizures and facial asymmetry. Hematological: Negative for adenopathy. Does not bruise/bleed easily. Psychiatric/Behavioral: Negative for self-injury and suicidal ideas.          PAST MEDICAL HISTORY:  Past Medical History:   Diagnosis Date   • Arthritis    • Asthma     last assessed: April 1, 2015   • Balanitis     last assessed: July 9, 2014   • Cataract    • Chronic kidney disease    • Chronic kidney disease, stage 3 ([address])     last assessed: Jan 17, 2018   • Colon polyp    • COVID-19 2022   • Diabetes mellitus ([address])    • 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([address])     last assessed: Jan 17, 2018   • GERD (gastroesophageal reflux disease)    • Hypercholesterolemia    • Hypertension    • Pneumonia     last assessed: 2013   • Shortness of breath    • Stroke [facility]) 2022   • Use of cane as ambulatory aid     sometimes   • Wears glasses        PAST SURGICAL HISTORY:  Past Surgical History:   Procedure Laterality Date   • ABDOMINAL SURGERY     • APPENDECTOMY     • CATARACT EXTRACTION     • COLONOSCOPY     • EGD     • HERNIA REPAIR     • HERNIA REPAIR  2007    x2   • HERNIA REPAIR Left 2022    Procedure: REPAIR HERNIA INGUINAL;  Surgeon: Jane Mcneil MD;  Location: [facility];  Service: General   • OTHER SURGICAL HISTORY      Perforated duodenol ulcer surgery    • CT RPR/ADVMNT FLXR TDN N/Z/2 W/O FR GRAFT EA TENDON Left 2021    Procedure: Left thumb EPL repair;  Surgeon: Roxanna Becker MD;  Location: [facility];  Service: Orthopedics       SOCIAL HISTORY:  Social History     Substance and Sexual Activity   Alcohol Use Yes   • Alcohol/week: 1.0 standard drink of alcohol   • Types: 1 Standard drinks or equivalent per week    Comment: rare  socially     Social History     Substance and Sexual Activity   Drug Use Never     Social History     Tobacco Use   Smoking Status Former   • Packs/day: 2.00   • Years: 10.00   • Total pack years: 20.00   • Types: Cigarettes   • Quit date:    • Years since quittin.7   • Passive exposure: Past   Smokeless Tobacco Former   • Quit date:    Tobacco Comments    former smoker noted in "allscripts" Quit in  - never used chewing tobacco        FAMILY HISTORY:  Family History   Problem Relation Age of Onset   • Pancreatic cancer Mother    • Colon cancer Father    • Diabetes Father         mellitus    • No Known Problems Sister    • Diabetes Sister    • Nephrolithiasis Sister        MEDICATIONS:    Current Outpatient Medications:   •  albuterol (PROVENTIL HFA,VENTOLIN HFA) 90 mcg/act inhaler, Inhale 2 puffs every 4 (four) hours as needed for wheezing, Disp: 8.5 g, Rfl: 1  •  aspirin 81 mg chewable tablet, Chew 1 tablet (81 mg total) daily, Disp: 30 tablet, Rfl: 0  •  atorvastatin (LIPITOR) 80 mg tablet, TAKE 1 TABLET BY MOUTH  DAILY WITH DINNER, Disp: 90 tablet, Rfl: 3  •  ERYN MICROLET LANCETS lancets, Test twice a day, Disp: 100 each, Rfl: 3  •  cholecalciferol (VITAMIN D3) 1,000 units tablet, Take 2,000 Units by mouth daily, Disp: , Rfl:   •  Contour Test test strip, TEST FASTING GLUCOSE DAILY, Disp: 100 strip, Rfl: 2  •  dulaglutide (Trulicity) 1.5 MR/0.9FN injection, Inject 0.5 mL (1.5 mg total) under the skin every 7 days, Disp: 6 mL, Rfl: 1  •  glyBURIDE (DIABETA) 5 mg tablet, TAKE 1 TABLET BY MOUTH  TWICE DAILY, Disp: 180 tablet, Rfl: 3  •  lisinopril-hydrochlorothiazide (PRINZIDE,ZESTORETIC) 20-25 MG per tablet, TAKE 1 TABLET BY MOUTH  DAILY, Disp: 90 tablet, Rfl: 3    PHYSICAL EXAM:  Vitals:    10/04/23 1330   BP: 120/60   BP Location: Right arm   Patient Position: Sitting   Pulse: 92   Resp: 18   Temp: 98.2 °F (36.8 °C)   TempSrc: Temporal   SpO2: 98%   Weight: 81.3 kg (179 lb 3.2 oz)   Height: 5' 10" (1.778 m)     Body mass index is 25.71 kg/m². Physical Exam  Constitutional:       General: He is not in acute distress. Appearance: He is well-developed. HENT:      Head: Normocephalic. Eyes:      General: No scleral icterus. Conjunctiva/sclera: Conjunctivae normal.   Neck:      Vascular: No JVD. Cardiovascular:      Rate and Rhythm: Normal rate. Heart sounds: Normal heart sounds. Pulmonary:      Effort: Pulmonary effort is normal.      Breath sounds: No wheezing. Abdominal:      Palpations: Abdomen is soft. Tenderness: There is no abdominal tenderness. Musculoskeletal:         General: Normal range of motion. Cervical back: Neck supple. Skin:     General: Skin is warm. Findings: No rash. Neurological:      Mental Status: He is alert and oriented to person, place, and time.    Psychiatric:         Behavior: Behavior normal.         LAB RESULTS:  Results for orders placed or performed in visit on 09/26/23 Basic metabolic panel   Result Value Ref Range    Sodium 138 135 - 147 mmol/L    Potassium 5.4 (H) 3.5 - 5.3 mmol/L    Chloride 104 96 - 108 mmol/L    CO2 27 21 - 32 mmol/L    ANION GAP 7 mmol/L    BUN 31 (H) 5 - 25 mg/dL    Creatinine 1.72 (H) 0.60 - 1.30 mg/dL    Glucose, Fasting 130 (H) 65 - 99 mg/dL    Calcium 9.0 8.4 - 10.2 mg/dL    eGFR 37 ml/min/1.73sq m   CBC and differential   Result Value Ref Range    WBC 8.13 4.31 - 10.16 Thousand/uL    RBC 4.52 3.88 - 5.62 Million/uL    Hemoglobin 14.3 12.0 - 17.0 g/dL    Hematocrit 41.6 36.5 - 49.3 %    MCV 92 82 - 98 fL    MCH 31.6 26.8 - 34.3 pg    MCHC 34.4 31.4 - 37.4 g/dL    RDW 12.6 11.6 - 15.1 %    MPV 11.0 8.9 - 12.7 fL    Platelets 172 590 - 226 Thousands/uL    nRBC 0 /100 WBCs    Neutrophils Relative 67 43 - 75 %    Immat GRANS % 0 0 - 2 %    Lymphocytes Relative 19 14 - 44 %    Monocytes Relative 8 4 - 12 %    Eosinophils Relative 5 0 - 6 %    Basophils Relative 1 0 - 1 %    Neutrophils Absolute 5.46 1.85 - 7.62 Thousands/µL    Immature Grans Absolute 0.03 0.00 - 0.20 Thousand/uL    Lymphocytes Absolute 1.52 0.60 - 4.47 Thousands/µL    Monocytes Absolute 0.63 0.17 - 1.22 Thousand/µL    Eosinophils Absolute 0.42 0.00 - 0.61 Thousand/µL    Basophils Absolute 0.07 0.00 - 0.10 Thousands/µL   Phosphorus   Result Value Ref Range    Phosphorus 2.9 2.3 - 4.1 mg/dL   Protein / creatinine ratio, urine   Result Value Ref Range    Creatinine, Ur 226.3 Reference range not established. mg/dL    Protein Urine Random 26 Reference range not established. mg/dL    Prot/Creat Ratio, Ur 0.11 (H) 0.00 - 0.10   PTH, intact   Result Value Ref Range    .8 (H) 12.0 - 88.0 pg/mL   UA (URINE) with reflex to Scope   Result Value Ref Range    Color, UA Yellow     Clarity, UA Clear     Specific Gravity, UA 1.020 1.003 - 1.030    pH, UA 5.5 4.5, 5.0, 5.5, 6.0, 6.5, 7.0, 7.5, 8.0    Leukocytes, UA Negative Negative    Nitrite, UA Negative Negative    Protein, UA 30 (1+) (A) Negative mg/dl    Glucose, UA Negative Negative mg/dl    Ketones, UA Negative Negative mg/dl    Urobilinogen, UA <2.0 <2.0 mg/dl mg/dl    Bilirubin, UA Negative Negative    Occult Blood, UA Negative Negative   Vitamin D 25 hydroxy   Result Value Ref Range    Vit D, 25-Hydroxy 67.7 30.0 - 100.0 ng/mL   Hemoglobin A1C   Result Value Ref Range    Hemoglobin A1C 8.5 (H) Normal 4.0-5.6%; PreDiabetic 5.7-6.4%; Diabetic >=6.5%; Glycemic control for adults with diabetes <7.0% %     mg/dl   Lipid Panel with Direct LDL reflex   Result Value Ref Range    Cholesterol 93 See Comment mg/dL    Triglycerides 53 See Comment mg/dL    HDL, Direct 34 (L) >=40 mg/dL    LDL Calculated 48 0 - 100 mg/dL   Urine Microscopic   Result Value Ref Range    RBC, UA 1-2 None Seen, 1-2 /hpf    WBC, UA 1-2 None Seen, 1-2 /hpf    Epithelial Cells None Seen None Seen, Occasional /hpf    Bacteria, UA None Seen None Seen, Occasional /hpf    Hyaline Casts, UA 5-10 (A) None Seen /lpf       ASSESSMENT and PLAN:      Stage 3b chronic kidney disease (CKD) ([facility])  Lab Results   Component Value Date    EGFR 37 09/26/2023    EGFR 35 03/29/2023    EGFR 38 02/20/2023    CREATININE 1.72 (H) 09/26/2023    CREATININE 1.80 (H) 03/29/2023    CREATININE 1.66 (H) 02/20/2023   Renal function is stable overall. Advise hydration and avoiding nephrotoxic medication    Chronic kidney disease-mineral and bone disorder  Lab Results   Component Value Date    EGFR 37 09/26/2023    EGFR 35 03/29/2023    EGFR 38 02/20/2023    CREATININE 1.72 (H) 09/26/2023    CREATININE 1.80 (H) 03/29/2023    CREATININE 1.66 (H) 02/20/2023   PTH and phosphorus along with vitamin D are within acceptable range and will continue to monitor    Benign essential hypertension  Blood pressure is very well controlled with help of ACE inhibitor though we may need to stop it because of hyperkalemia.     Type 2 diabetes mellitus with diabetic chronic kidney disease [facility])    Lab Results   Component Value Date HGBA1C 8.5 (H) 09/26/2023   Not very well controlled. Patient may benefit from medicine like SGLT2 inhibitor. Advised to discuss with primary doctor    Hyperkalemia  Patient potassium is high. Low potassium diet discussed with the patient. Also give some material to read about. I will recheck the blood work next week if potassium remain high may need to stop lisinopril      Everything discussed with the patient at length. I will see him back in 6 months. He will get blood test next week to assess potassium. Rest of the blood work will be done before next visit. Advised to discuss SGLT2 inhibitor for diabetic management as well as renal protection with primary doctor        Portions of the record may have been created with voice recognition software. Occasional wrong word or "sound a like" substitutions may have occurred due to the inherent limitations of voice recognition software. Read the chart carefully and recognize, using context, where substitutions have occurred. If you have any questions, please contact the dictating provider.

## 2023-10-04 NOTE — ASSESSMENT & PLAN NOTE
Lab Results   Component Value Date    HGBA1C 8.5 (H) 09/26/2023   Not very well controlled. Patient may benefit from medicine like SGLT2 inhibitor.   Advised to discuss with primary doctor

## 2023-10-04 NOTE — ASSESSMENT & PLAN NOTE
Lab Results   Component Value Date    EGFR 37 09/26/2023    EGFR 35 03/29/2023    EGFR 38 02/20/2023    CREATININE 1.72 (H) 09/26/2023    CREATININE 1.80 (H) 03/29/2023    CREATININE 1.66 (H) 02/20/2023   PTH and phosphorus along with vitamin D are within acceptable range and will continue to monitor

## 2023-10-05 ENCOUNTER — OFFICE VISIT (OUTPATIENT)
Dept: FAMILY MEDICINE CLINIC | Facility: CLINIC | Age: 78
End: 2023-10-05
Payer: COMMERCIAL

## 2023-10-05 VITALS
DIASTOLIC BLOOD PRESSURE: 80 MMHG | WEIGHT: 179.8 LBS | TEMPERATURE: 98.4 F | OXYGEN SATURATION: 98 % | SYSTOLIC BLOOD PRESSURE: 122 MMHG | BODY MASS INDEX: 25.8 KG/M2 | HEART RATE: 82 BPM

## 2023-10-05 DIAGNOSIS — E11.22 TYPE 2 DIABETES MELLITUS WITH STAGE 3B CHRONIC KIDNEY DISEASE, WITHOUT LONG-TERM CURRENT USE OF INSULIN (HCC): Primary | ICD-10-CM

## 2023-10-05 DIAGNOSIS — N18.32 TYPE 2 DIABETES MELLITUS WITH STAGE 3B CHRONIC KIDNEY DISEASE, WITHOUT LONG-TERM CURRENT USE OF INSULIN (HCC): Primary | ICD-10-CM

## 2023-10-05 DIAGNOSIS — E87.5 HYPERKALEMIA: ICD-10-CM

## 2023-10-05 DIAGNOSIS — N18.32 STAGE 3B CHRONIC KIDNEY DISEASE (CKD) (HCC): ICD-10-CM

## 2023-10-05 DIAGNOSIS — Z23 ENCOUNTER FOR IMMUNIZATION: ICD-10-CM

## 2023-10-05 DIAGNOSIS — J45.909 MODERATE ASTHMA WITHOUT COMPLICATION, UNSPECIFIED WHETHER PERSISTENT: ICD-10-CM

## 2023-10-05 DIAGNOSIS — E78.00 HYPERCHOLESTEROLEMIA: ICD-10-CM

## 2023-10-05 DIAGNOSIS — E11.8 TYPE 2 DIABETES MELLITUS WITH COMPLICATION, WITHOUT LONG-TERM CURRENT USE OF INSULIN (HCC): ICD-10-CM
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
## 2023-10-05 PROBLEM — M79.89 SWELLING OF TOE OF LEFT FOOT: Status: RESOLVED | Noted: 2023-02-27 | Resolved: 2023-10-05

## 2023-10-05 PROCEDURE — G0008 ADMIN INFLUENZA VIRUS VAC: HCPCS | Performed by: FAMILY MEDICINE

## 2023-10-05 PROCEDURE — 90662 IIV NO PRSV INCREASED AG IM: CPT | Performed by: FAMILY MEDICINE

## 2023-10-05 PROCEDURE — 99214 OFFICE O/P EST MOD 30 MIN: CPT | Performed by: FAMILY MEDICINE

## 2023-10-05 RX ORDER — BLOOD-GLUCOSE METER
KIT MISCELLANEOUS
Qty: 1 KIT | Refills: 0 | Status: SHIPPED | OUTPATIENT
Start: 2023-10-05

## 2023-10-05 RX ORDER — BLOOD SUGAR DIAGNOSTIC
STRIP MISCELLANEOUS
Qty: 100 EACH | Refills: 3 | Status: SHIPPED | OUTPATIENT
Start: 2023-10-05

## 2023-10-05 RX ORDER — LANCETS 33 GAUGE
EACH MISCELLANEOUS
Qty: 100 EACH | Refills: 3 | Status: SHIPPED | OUTPATIENT
Start: 2023-10-05

## 2023-10-05 RX ORDER — ALBUTEROL SULFATE 90 UG/1
2 AEROSOL, METERED RESPIRATORY (INHALATION) EVERY 4 HOURS PRN
Qty: 8.5 G | Refills: 1 | Status: SHIPPED | OUTPATIENT
Start: 2023-10-05

## 2023-10-05 NOTE — PROGRESS NOTES
Assessment/Plan:         Problem List Items Addressed This Visit        Endocrine    Type 2 diabetes mellitus with diabetic chronic kidney disease ([address]) - Primary       Lab Results   Component Value Date    HGBA1C 8.5 (H) 09/26/2023   A1c uncontrolled - increase Trulicity to 3 mg weekly          Relevant Medications    Blood Glucose Monitoring Suppl (OneTouch Verio Reflect) w/Device KIT    glucose blood (OneTouch Verio) test strip    OneTouch Delica Lancets 46O MISC    dulaglutide (Trulicity) 3 TX/9.0XF injection    Other Relevant Orders    Hemoglobin K8L    Basic metabolic panel    Albumin / creatinine urine ratio    Lipid Panel with Direct LDL reflex       Respiratory    Moderate asthma without complication     Renewed albuterol to use prn         Relevant Medications    albuterol (PROVENTIL HFA,VENTOLIN HFA) 90 mcg/act inhaler       Genitourinary    Stage 3b chronic kidney disease (CKD) ([facility])     Lab Results   Component Value Date    EGFR 37 09/26/2023    EGFR 35 03/29/2023    EGFR 38 02/20/2023    CREATININE 1.72 (H) 09/26/2023    CREATININE 1.80 (H) 03/29/2023    CREATININE 1.66 (H) 02/20/2023   CKD stable, follows with Nephrology             Other    Hypercholesterolemia     On statin          Hyperkalemia     Repeating level soon per nephrology orders        Other Visit Diagnoses     Encounter for immunization        Relevant Orders    influenza vaccine, high-dose, PF 0.7 mL (FLUZONE HIGH-DOSE) (Completed)    Type 2 diabetes mellitus with complication, without long-term current use of insulin (HCC)        Relevant Medications    dulaglutide (Trulicity) 3 DJ/8.2PB injection        Return in 3 months with labs    Subjective:      Patient ID: Jovi Huang is a 65 y.o. male. Here for lab review  Last A1c 8.5% which increased from 7.3%. - his Trulicity was increased to 1.5 mg and Victor Manuel Ceciliaon was stopped. Still on Glyburide. No side effects from meds. His sugars at home have been .  Wondering if his meter works correctly. Checked today and it is within 10 points. Potassium was 5.4 - nephrology is rechecking this  Lipids- well controlled on statin. He has a chronic cough -former smoker quit in 1971 - his chart lists a diagnosis of asthma. Would like albuterol refilled. The following portions of the patient's history were reviewed and updated as appropriate:   Past Medical History:  He has a past medical history of Arthritis, Asthma, Balanitis, Cataract, Chronic kidney disease, Chronic kidney disease, stage 3 ([address]), Colon polyp, COVID-19 (2022), Diabetes mellitus ([address]), 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([address]), GERD (gastroesophageal reflux disease), Hypercholesterolemia, Hypertension, Pneumonia, Shortness of breath, Stroke ([address]) (02/2022), Use of cane as ambulatory aid, and Wears glasses. ,  _______________________________________________________________________  Medical Problems:  does not have any pertinent problems on file.,  _______________________________________________________________________  Past Surgical History:   has a past surgical history that includes Hernia repair; Abdominal surgery; Appendectomy (1965); Cataract extraction; Hernia repair (2007); Other surgical history (1965); pr rpr/advmnt flxr tdn n/z/2 w/o fr graft ea tendon (Left, 03/17/2021); Colonoscopy; EGD; and Hernia repair (Left, 11/28/2022). ,  _______________________________________________________________________  Family History:  family history includes Colon cancer in his father; Diabetes in his father and sister; Nephrolithiasis in his sister; No Known Problems in his sister; Pancreatic cancer in his mother.,  _______________________________________________________________________  Social History:   reports that he quit smoking about 52 years ago. His smoking use included cigarettes. He has a 20.00 pack-year smoking history. He has been exposed to tobacco smoke. He quit smokeless tobacco use about 16 years ago.  He reports current alcohol use of about 1.0 standard drink of alcohol per week. He reports that he does not use drugs. ,  _______________________________________________________________________  Allergies:  is allergic to meperidine and pravastatin. .  _______________________________________________________________________  Current Outpatient Medications   Medication Sig Dispense Refill   • albuterol (PROVENTIL HFA,VENTOLIN HFA) 90 mcg/act inhaler Inhale 2 puffs every 4 (four) hours as needed for wheezing 8.5 g 1   • atorvastatin (LIPITOR) 80 mg tablet TAKE 1 TABLET BY MOUTH  DAILY WITH DINNER 90 tablet 3   • Blood Glucose Monitoring Suppl (OneTouch Verio Reflect) w/Device KIT Check blood sugars once daily. Please substitute with appropriate alternative as covered by patient's insurance. Dx: E11.65 1 kit 0   • cholecalciferol (VITAMIN D3) 1,000 units tablet Take 2,000 Units by mouth daily     • dulaglutide (Trulicity) 3 NE/1.1NA injection Inject 0.5 mL (3 mg total) under the skin every 7 days 6 mL 1   • glucose blood (OneTouch Verio) test strip Check blood sugars once daily. Please substitute with appropriate alternative as covered by patient's insurance. Dx: E11.65 100 each 3   • glyBURIDE (DIABETA) 5 mg tablet TAKE 1 TABLET BY MOUTH  TWICE DAILY 180 tablet 3   • lisinopril-hydrochlorothiazide (PRINZIDE,ZESTORETIC) 20-25 MG per tablet TAKE 1 TABLET BY MOUTH  DAILY 90 tablet 3   • OneTouch Delica Lancets 39K MISC Check blood sugars once daily. Please substitute with appropriate alternative as covered by patient's insurance. Dx: E11.65 100 each 3   • aspirin 81 mg chewable tablet Chew 1 tablet (81 mg total) daily 30 tablet 0     No current facility-administered medications for this visit.     _______________________________________________________________________  Review of Systems   Constitutional: Negative for activity change, appetite change, fatigue and unexpected weight change. Respiratory: Positive for cough (chronic).  Negative for chest tightness and shortness of breath. Cardiovascular: Negative for chest pain and leg swelling. Gastrointestinal: Negative for abdominal pain. Neurological: Negative for headaches. Objective:  Vitals:    10/05/23 0855   BP: 122/80   Pulse: 82   Temp: 98.4 °F (36.9 °C)   SpO2: 98%   Weight: 81.6 kg (179 lb 12.8 oz)     Body mass index is 25.8 kg/m². Physical Exam  Vitals and nursing note reviewed. Constitutional:       General: He is not in acute distress. Appearance: Normal appearance. He is well-developed. He is not diaphoretic. HENT:      Head: Normocephalic and atraumatic. Cardiovascular:      Rate and Rhythm: Normal rate and regular rhythm. Heart sounds: Normal heart sounds. No murmur heard. No friction rub. No gallop. Pulmonary:      Effort: Pulmonary effort is normal. No respiratory distress. Breath sounds: Normal breath sounds. No wheezing or rales. Chest:      Chest wall: No tenderness. Musculoskeletal:         General: No swelling. Right lower leg: No edema. Left lower leg: No edema. Neurological:      General: No focal deficit present. Mental Status: He is alert and oriented to person, place, and time. Psychiatric:         Mood and Affect: Mood normal.         Behavior: Behavior normal.         Thought Content:  Thought content normal.         Judgment: Judgment normal.

## 2023-10-05 NOTE — ASSESSMENT & PLAN NOTE
Lab Results   Component Value Date    EGFR 37 09/26/2023    EGFR 35 03/29/2023    EGFR 38 02/20/2023    CREATININE 1.72 (H) 09/26/2023    CREATININE 1.80 (H) 03/29/2023    CREATININE 1.66 (H) 02/20/2023   CKD stable, follows with Nephrology

## 2023-10-05 NOTE — ASSESSMENT & PLAN NOTE
Lab Results   Component Value Date    HGBA1C 8.5 (H) 09/26/2023   A1c uncontrolled - increase Trulicity to 3 mg weekly [FreeTextEntry1] : WIll complete treatment in 2 days, will contact [facility] to remove the line. \par Her labs are reviewed weekly, baseline. \par Patient was given the opportunity to ask questions and all questions were answered to their satisfaction.\par Counseling included lab results, differential diagnosis, treatment options, risks and benefits, lifestyle changes, current condition, medications and dose adjustments.\par The patient verbalized understanding.\par  [Treatment Education] : treatment education [Treatment Adherence] : treatment adherence [Rx Dose / Side Effects] : Rx dose/side effects [Drug Interactions / Side Effects] : drug interactions/side effects [Disclosure of Diagnosis] : disclosure of diagnosis [Anticipatory Guidance] : anticipatory guidance

## 2023-10-06 ENCOUNTER — TELEPHONE (OUTPATIENT)
Dept: ADMINISTRATIVE | Facility: OTHER | Age: 78
End: 2023-10-06

## 2023-10-06 NOTE — LETTER
2nd Request      Diabetic Eye Exam Form    Date Requested: 10/10/23  Patient: Chester Avendnao  Patient : 1945   Referring Provider: Lilly Carmona MD      DIABETIC Eye Exam Date _______________________________      Type of Exam MUST be documented for Diabetic Eye Exams. Please CHECK ONE. Retinal Exam       Dilated Retinal Exam       OCT       Optomap-Iris Exam      Fundus Photography       Left Eye - Please check Retinopathy or No Retinopathy        Exam did show retinopathy    Exam did not show retinopathy       Right Eye - Please check Retinopathy or No Retinopathy       Exam did show retinopathy    Exam did not show retinopathy       Comments __________________________________________________________    Practice Providing Exam ______________________________________________    Exam Performed By (print name) _______________________________________      Provider Signature ___________________________________________________      These reports are needed for  compliance. Please fax this completed form and a copy of the Diabetic Eye Exam report to our office located at [address] [place] [address] as soon as possible via Fax [phone] attention [place]: Phone [phone]  We thank you for your assistance in treating our mutual patient.

## 2023-10-06 NOTE — LETTER
Diabetic Eye Exam Form    Date Requested: 10/06/23  Patient: Lilo Salcedo  Patient : 1945   Referring Provider: Vianney Oreilly MD      DIABETIC Eye Exam Date _______________________________      Type of Exam MUST be documented for Diabetic Eye Exams. Please CHECK ONE. Retinal Exam       Dilated Retinal Exam       OCT       Optomap-Iris Exam      Fundus Photography       Left Eye - Please check Retinopathy or No Retinopathy        Exam did show retinopathy    Exam did not show retinopathy       Right Eye - Please check Retinopathy or No Retinopathy       Exam did show retinopathy    Exam did not show retinopathy       Comments __________________________________________________________    Practice Providing Exam ______________________________________________    Exam Performed By (print name) _______________________________________      Provider Signature ___________________________________________________      These reports are needed for  compliance. Please fax this completed form and a copy of the Diabetic Eye Exam report to our office located at [address] [place] [address] as soon as possible via Fax [phone] attention [place]: Phone [phone]  We thank you for your assistance in treating our mutual patient.

## 2023-10-06 NOTE — TELEPHONE ENCOUNTER
Upon review of the In Basket request and the patient's chart, initial outreach has been made via fax to facility. Please see Contacts section for details.      Thank you  Cinthia Ferrell MA

## 2023-10-06 NOTE — TELEPHONE ENCOUNTER
----- Message from Jose Gupta sent at 10/5/2023  9:02 AM EDT -----  Regarding: Diabetic Eye Exam  10/05/23 9:02 AM    Hello, our patient Hermelinda Aguilar has had Diabetic Eye Exam completed/performed. Please assist in updating the patient chart by making an External outreach to [facility] facility located in [facility]. The date of service is 2023.     Thank you,  Jose HEADLEY

## 2023-10-06 NOTE — TELEPHONE ENCOUNTER
----- Message from Trina Vidales MD sent at 10/5/2023  9:10 AM EDT -----  Regarding: eye exam  10/05/23 9:11 AM    Hello, our patient Annamaria Peabody has had Diabetic Eye Exam completed/performed. Please assist in updating the patient chart by making an External outreach to [address] [place] [address] located in [place]. The date of service is SEPT 2023.     Thank you,  Trina Vidales MD  [facility]

## 2023-10-10 NOTE — TELEPHONE ENCOUNTER
As a follow-up, a second attempt has been made for outreach via fax to facility. Please see Contacts section for details.     Thank you  Nandini Powell MA

## 2023-10-11 ENCOUNTER — APPOINTMENT (OUTPATIENT)
Dept: LAB | Facility: CLINIC | Age: 78
End: 2023-10-11
Payer: COMMERCIAL

## 2023-10-11 DIAGNOSIS — N18.32 TYPE 2 DIABETES MELLITUS WITH STAGE 3B CHRONIC KIDNEY DISEASE, WITHOUT LONG-TERM CURRENT USE OF INSULIN (HCC): ICD-10-CM

## 2023-10-11 DIAGNOSIS — E87.5 HYPERKALEMIA: ICD-10-CM

## 2023-10-11 DIAGNOSIS — E11.22 TYPE 2 DIABETES MELLITUS WITH STAGE 3B CHRONIC KIDNEY DISEASE, WITHOUT LONG-TERM CURRENT USE OF INSULIN (HCC): ICD-10-CM

## 2023-10-11 LAB
ANION GAP SERPL CALCULATED.3IONS-SCNC: 10 MMOL/L
BUN SERPL-MCNC: 33 MG/DL (ref 5–25)
CALCIUM SERPL-MCNC: 9 MG/DL (ref 8.4–10.2)
CHLORIDE SERPL-SCNC: 103 MMOL/L (ref 96–108)
CO2 SERPL-SCNC: 26 MMOL/L (ref 21–32)
CREAT SERPL-MCNC: 1.76 MG/DL (ref 0.6–1.3)
GFR SERPL CREATININE-BSD FRML MDRD: 35 ML/MIN/1.73SQ M
GLUCOSE SERPL-MCNC: 123 MG/DL (ref 65–140)
POTASSIUM SERPL-SCNC: 4.6 MMOL/L (ref 3.5–5.3)
SODIUM SERPL-SCNC: 139 MMOL/L (ref 135–147)

## 2023-10-11 PROCEDURE — 80048 BASIC METABOLIC PNL TOTAL CA: CPT

## 2023-10-11 PROCEDURE — 36415 COLL VENOUS BLD VENIPUNCTURE: CPT

## 2023-10-16 NOTE — TELEPHONE ENCOUNTER
Upon review of the In Basket request we were able to locate, review, and update the patient chart as requested for Diabetic Eye Exam.    Any additional questions or concerns should be emailed to the Practice Liaisons via the appropriate education email address, please do not reply via In Basket.     Thank you  Sergio Baltazar MA

## 2023-10-17 ENCOUNTER — TELEPHONE (OUTPATIENT)
Dept: NEPHROLOGY | Facility: CLINIC | Age: 78
End: 2023-10-17

## 2023-10-17 NOTE — TELEPHONE ENCOUNTER
Called spoke with patient advised patient as per , that his potassium is normal.  patient understood and is okay it.
Good Afternoon    Dr. Joe Menezes    Patient of yours called the office in regards of his BMP he had done on 10/11/2023.  As per patient he stated to call the office for blood work test results    Please contact patient at [phone]    Thank you
Please let him know that potassium is normal now
19-Feb-2019 18:00

## 2023-12-28 ENCOUNTER — TELEPHONE (OUTPATIENT)
Dept: NEPHROLOGY | Facility: CLINIC | Age: 78
End: 2023-12-28

## 2023-12-28 NOTE — TELEPHONE ENCOUNTER
Called left VM to advise patient appt scheduled for 04/05/24 fu with  has been rescheduled to 04/15/24 @ 3:20pm. due to provider being on hospital call. advised patient to call office to confirm new scheduled appt.

## 2024-01-23 ENCOUNTER — APPOINTMENT (OUTPATIENT)
Dept: LAB | Facility: CLINIC | Age: 79
End: 2024-01-23
Payer: COMMERCIAL

## 2024-01-23 DIAGNOSIS — N18.32 TYPE 2 DIABETES MELLITUS WITH STAGE 3B CHRONIC KIDNEY DISEASE, WITHOUT LONG-TERM CURRENT USE OF INSULIN (HCC): ICD-10-CM

## 2024-01-23 DIAGNOSIS — E11.22 TYPE 2 DIABETES MELLITUS WITH STAGE 3B CHRONIC KIDNEY DISEASE, WITHOUT LONG-TERM CURRENT USE OF INSULIN (HCC): ICD-10-CM

## 2024-01-23 LAB
ANION GAP SERPL CALCULATED.3IONS-SCNC: 11 MMOL/L
BUN SERPL-MCNC: 33 MG/DL (ref 5–25)
CALCIUM SERPL-MCNC: 8.9 MG/DL (ref 8.4–10.2)
CHLORIDE SERPL-SCNC: 103 MMOL/L (ref 96–108)
CHOLEST SERPL-MCNC: 88 MG/DL
CO2 SERPL-SCNC: 25 MMOL/L (ref 21–32)
CREAT SERPL-MCNC: 1.68 MG/DL (ref 0.6–1.3)
CREAT UR-MCNC: 147.4 MG/DL
EST. AVERAGE GLUCOSE BLD GHB EST-MCNC: 189 MG/DL
GFR SERPL CREATININE-BSD FRML MDRD: 38 ML/MIN/1.73SQ M
GLUCOSE P FAST SERPL-MCNC: 94 MG/DL (ref 65–99)
HBA1C MFR BLD: 8.2 %
HDLC SERPL-MCNC: 32 MG/DL
LDLC SERPL CALC-MCNC: 42 MG/DL (ref 0–100)
MICROALBUMIN UR-MCNC: 15.5 MG/L
MICROALBUMIN/CREAT 24H UR: 11 MG/G CREATININE (ref 0–30)
POTASSIUM SERPL-SCNC: 4.4 MMOL/L (ref 3.5–5.3)
SODIUM SERPL-SCNC: 139 MMOL/L (ref 135–147)
TRIGL SERPL-MCNC: 70 MG/DL

## 2024-01-23 PROCEDURE — 83036 HEMOGLOBIN GLYCOSYLATED A1C: CPT

## 2024-01-23 PROCEDURE — 80048 BASIC METABOLIC PNL TOTAL CA: CPT

## 2024-01-23 PROCEDURE — 36415 COLL VENOUS BLD VENIPUNCTURE: CPT

## 2024-01-23 PROCEDURE — 82570 ASSAY OF URINE CREATININE: CPT

## 2024-01-23 PROCEDURE — 82043 UR ALBUMIN QUANTITATIVE: CPT

## 2024-01-23 PROCEDURE — 80061 LIPID PANEL: CPT

## 2024-01-29 ENCOUNTER — OFFICE VISIT (OUTPATIENT)
Dept: FAMILY MEDICINE CLINIC | Facility: CLINIC | Age: 79
End: 2024-01-29
Payer: COMMERCIAL

## 2024-01-29 VITALS
WEIGHT: 172 LBS | OXYGEN SATURATION: 100 % | HEART RATE: 64 BPM | DIASTOLIC BLOOD PRESSURE: 70 MMHG | SYSTOLIC BLOOD PRESSURE: 146 MMHG | BODY MASS INDEX: 24.62 KG/M2 | TEMPERATURE: 96.3 F | HEIGHT: 70 IN

## 2024-01-29 DIAGNOSIS — K59.00 CONSTIPATION, UNSPECIFIED CONSTIPATION TYPE: ICD-10-CM

## 2024-01-29 DIAGNOSIS — E11.22 TYPE 2 DIABETES MELLITUS WITH STAGE 3B CHRONIC KIDNEY DISEASE, WITHOUT LONG-TERM CURRENT USE OF INSULIN (HCC): Primary | ICD-10-CM

## 2024-01-29 DIAGNOSIS — R13.10 DYSPHAGIA, UNSPECIFIED TYPE: ICD-10-CM

## 2024-01-29 DIAGNOSIS — R26.89 BALANCE DISORDER: ICD-10-CM

## 2024-01-29 DIAGNOSIS — R09.89 THROAT CLEARING: ICD-10-CM

## 2024-01-29 DIAGNOSIS — N18.32 TYPE 2 DIABETES MELLITUS WITH STAGE 3B CHRONIC KIDNEY DISEASE, WITHOUT LONG-TERM CURRENT USE OF INSULIN (HCC): Primary | ICD-10-CM

## 2024-01-29 DIAGNOSIS — N18.32 STAGE 3B CHRONIC KIDNEY DISEASE (CKD) (HCC): ICD-10-CM

## 2024-01-29 PROBLEM — E87.5 HYPERKALEMIA: Status: RESOLVED | Noted: 2023-02-27 | Resolved: 2024-01-29

## 2024-01-29 PROCEDURE — 1159F MED LIST DOCD IN RCRD: CPT | Performed by: FAMILY MEDICINE

## 2024-01-29 PROCEDURE — 99214 OFFICE O/P EST MOD 30 MIN: CPT | Performed by: FAMILY MEDICINE

## 2024-01-29 PROCEDURE — 1160F RVW MEDS BY RX/DR IN RCRD: CPT | Performed by: FAMILY MEDICINE

## 2024-01-29 RX ORDER — DOCUSATE SODIUM 100 MG/1
100 CAPSULE, LIQUID FILLED ORAL 2 TIMES DAILY
Start: 2024-01-29

## 2024-01-29 NOTE — ASSESSMENT & PLAN NOTE
Lab Results   Component Value Date    EGFR 38 01/23/2024    EGFR 35 10/11/2023    EGFR 37 09/26/2023    CREATININE 1.68 (H) 01/23/2024    CREATININE 1.76 (H) 10/11/2023    CREATININE 1.72 (H) 09/26/2023   CKD stable

## 2024-01-29 NOTE — PROGRESS NOTES
"Assessment/Plan:         Problem List Items Addressed This Visit        Digestive    Dysphagia     Will refer to Gastro         Relevant Orders    Ambulatory referral to Gastroenterology       Endocrine    Type 2 diabetes mellitus with diabetic chronic kidney disease (HCC) - Primary       Lab Results   Component Value Date    HGBA1C 8.2 (H) 01/23/2024   Will add Farxiga and repeat A1c in 3 months  Continue Glyburide and Trulicity          Relevant Medications    dapagliflozin 5 MG TABS    Other Relevant Orders    Hemoglobin A1C    Basic metabolic panel    Lipid Panel with Direct LDL reflex       Genitourinary    Stage 3b chronic kidney disease (CKD) ([facility])     Lab Results   Component Value Date    EGFR 38 01/23/2024    EGFR 35 10/11/2023    EGFR 37 09/26/2023    CREATININE 1.68 (H) 01/23/2024    CREATININE 1.76 (H) 10/11/2023    CREATININE 1.72 (H) 09/26/2023   CKD stable            Other    Throat clearing     Discussed possibly related to allergies.  ENT referral          Relevant Orders    Ambulatory Referral to Otolaryngology    Constipation     Advised colace BID and increase fiber/water         Relevant Medications    docusate sodium (COLACE) 100 mg capsule    Balance disorder     Advised physical therapy  Patient declined.              Subjective:      Patient ID: Bree Barrios is a 78 y.o. male.    He had labs done and is here for a diabetic check up  He says his voice is changing and feels like food is sticking in the throat. Constant throat clearing. Post nasal drip. Chronic cough. +allergies worse in the spring.  Not taking allergy medication. Rarely has GERD symptoms. Does report constipation.   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 No reports in chart.     DM - A1c is 8.2%.   Lipids - controlled on statin  CKD stable    Diabetes        The following portions of the patient's history were reviewed and updated as appropriate:   Past Medical History:  He has a past medical history of " Arthritis, Asthma, Balanitis, Cataract, Chronic kidney disease, Chronic kidney disease, stage 3 (HCC), Colon polyp, COVID-19 (2022), Diabetes mellitus (HCC), 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(HCC), GERD (gastroesophageal reflux disease), Hypercholesterolemia, Hypertension, Pneumonia, Shortness of breath, Stroke (HCC) (02/2022), Use of cane as ambulatory aid, and Wears glasses.,  _______________________________________________________________________  Medical Problems:  does not have any pertinent problems on file.,  _______________________________________________________________________  Past Surgical History:   has a past surgical history that includes Hernia repair; Abdominal surgery; Appendectomy (1965); Cataract extraction; Hernia repair (2007); Other surgical history (1965); pr rpr/advmnt flxr tdn n/z/2 w/o fr graft ea tendon (Left, 03/17/2021); Colonoscopy; EGD; and Hernia repair (Left, 11/28/2022).,  _______________________________________________________________________  Family History:  family history includes Colon cancer in his father; Diabetes in his father and sister; Nephrolithiasis in his sister; No Known Problems in his sister; Pancreatic cancer in his mother.,  _______________________________________________________________________  Social History:   reports that he quit smoking about 53 years ago. His smoking use included cigarettes. He started smoking about 63 years ago. He has a 20.0 pack-year smoking history. He has been exposed to tobacco smoke. He quit smokeless tobacco use about 17 years ago. He reports current alcohol use of about 1.0 standard drink of alcohol per week. He reports that he does not use drugs.,  _______________________________________________________________________  Allergies:  is allergic to meperidine and pravastatin..  _______________________________________________________________________  Current Outpatient Medications   Medication Sig Dispense Refill   • dapagliflozin 5  MG TABS Take 1 tablet (5 mg total) by mouth daily 90 tablet 3   • docusate sodium (COLACE) 100 mg capsule Take 1 capsule (100 mg total) by mouth 2 (two) times a day     • dulaglutide (Trulicity) 3 MG/0.5ML injection Inject 0.5 mL (3 mg total) under the skin every 7 days 6 mL 1   • albuterol (PROVENTIL HFA,VENTOLIN HFA) 90 mcg/act inhaler Inhale 2 puffs every 4 (four) hours as needed for wheezing 8.5 g 1   • aspirin 81 mg chewable tablet Chew 1 tablet (81 mg total) daily 30 tablet 0   • atorvastatin (LIPITOR) 80 mg tablet TAKE 1 TABLET BY MOUTH  DAILY WITH DINNER 90 tablet 3   • Blood Glucose Monitoring Suppl (OneTouch Verio Reflect) w/Device KIT Check blood sugars once daily. Please substitute with appropriate alternative as covered by patient's insurance. Dx: E11.65 1 kit 0   • cholecalciferol (VITAMIN D3) 1,000 units tablet Take 2,000 Units by mouth daily     • glucose blood (OneTouch Verio) test strip Check blood sugars once daily. Please substitute with appropriate alternative as covered by patient's insurance. Dx: E11.65 100 each 3   • glyBURIDE (DIABETA) 5 mg tablet TAKE 1 TABLET BY MOUTH  TWICE DAILY 180 tablet 3   • lisinopril-hydrochlorothiazide (PRINZIDE,ZESTORETIC) 20-25 MG per tablet TAKE 1 TABLET BY MOUTH  DAILY 90 tablet 3   • OneTouch Delica Lancets 33G MISC Check blood sugars once daily. Please substitute with appropriate alternative as covered by patient's insurance. Dx: E11.65 100 each 3     No current facility-administered medications for this visit.     _______________________________________________________________________  Review of Systems   HENT:  Positive for postnasal drip.         Throat clearing   Respiratory:  Positive for cough.    Gastrointestinal:  Positive for constipation.   Musculoskeletal:  Positive for gait problem.        Feels off balance   Allergic/Immunologic: Positive for environmental allergies.         Objective:  Vitals:    01/29/24 1010   BP: 146/70   Pulse: 64   Temp:  "(!) 96.3 °F (35.7 °C)   SpO2: 100%   Weight: 78 kg (172 lb)   Height: 5' 10\" (1.778 m)     Body mass index is 24.68 kg/m².     Physical Exam  Vitals and nursing note reviewed.   Constitutional:       General: He is not in acute distress.     Appearance: Normal appearance. He is well-developed. He is not diaphoretic.   HENT:      Head: Normocephalic and atraumatic.      Right Ear: Tympanic membrane, ear canal and external ear normal.      Left Ear: Tympanic membrane, ear canal and external ear normal.      Mouth/Throat:      Mouth: Mucous membranes are moist.      Pharynx: Oropharynx is clear.   Cardiovascular:      Rate and Rhythm: Normal rate and regular rhythm.      Pulses: no weak pulses          Dorsalis pedis pulses are 2+ on the right side and 2+ on the left side.        Posterior tibial pulses are 2+ on the right side and 2+ on the left side.      Heart sounds: Normal heart sounds. No murmur heard.     No friction rub. No gallop.   Pulmonary:      Effort: Pulmonary effort is normal. No respiratory distress.      Breath sounds: Normal breath sounds. No wheezing or rales.   Chest:      Chest wall: No tenderness.   Abdominal:      General: There is distension.      Palpations: Abdomen is soft.      Tenderness: There is no abdominal tenderness.   Musculoskeletal:         General: No swelling.      Right lower leg: No edema.      Left lower leg: No edema.   Feet:      Right foot:      Skin integrity: Callus present. No ulcer, skin breakdown, erythema, warmth or dry skin.      Left foot:      Skin integrity: Callus present. No ulcer, skin breakdown, erythema, warmth or dry skin.   Neurological:      General: No focal deficit present.      Mental Status: He is alert and oriented to person, place, and time.      Cranial Nerves: No cranial nerve deficit.   Psychiatric:         Mood and Affect: Mood normal.         Behavior: Behavior normal.         Thought Content: Thought content normal.         Judgment: Judgment " normal.         Patient's shoes and socks removed.    Right Foot/Ankle   Right Foot Inspection  Skin Exam: skin normal, skin intact, callus and callus. No dry skin, no warmth, no erythema, no maceration, no abnormal color, no pre-ulcer and no ulcer.     Toe Exam: No swelling, no tenderness, erythema and  no right toe deformity    Sensory   Vibration: intact  Monofilament testing: intact    Vascular  Capillary refills: < 3 seconds  The right DP pulse is 2+. The right PT pulse is 2+.     Left Foot/Ankle  Left Foot Inspection  Skin Exam: skin normal, skin intact and callus. No dry skin, no warmth, no erythema, no maceration, normal color, no pre-ulcer and no ulcer.     Toe Exam: No swelling, no tenderness, no erythema and no left toe deformity.     Sensory   Vibration: intact  Monofilament testing: intact    Vascular  Capillary refills: < 3 seconds  The left DP pulse is 2+. The left PT pulse is 2+.     Assign Risk Category  No deformity present  No loss of protective sensation  No weak pulses  Risk: 0

## 2024-01-29 NOTE — ASSESSMENT & PLAN NOTE
Lab Results   Component Value Date    HGBA1C 8.2 (H) 01/23/2024   Will add Farxiga and repeat A1c in 3 months  Continue Glyburide and Trulicity

## 2024-01-30 ENCOUNTER — TELEPHONE (OUTPATIENT)
Dept: FAMILY MEDICINE CLINIC | Facility: CLINIC | Age: 79
End: 2024-01-30

## 2024-01-30 DIAGNOSIS — E11.22 TYPE 2 DIABETES MELLITUS WITH STAGE 3B CHRONIC KIDNEY DISEASE, WITHOUT LONG-TERM CURRENT USE OF INSULIN (HCC): Primary | ICD-10-CM

## 2024-01-30 DIAGNOSIS — N18.32 TYPE 2 DIABETES MELLITUS WITH STAGE 3B CHRONIC KIDNEY DISEASE, WITHOUT LONG-TERM CURRENT USE OF INSULIN (HCC): Primary | ICD-10-CM

## 2024-01-30 NOTE — TELEPHONE ENCOUNTER
His insurance does not cover the Farxiga 5mg. The covered alternative is Jardiance 10 mg. [facility]

## 2024-02-21 PROBLEM — R09.89 THROAT CLEARING: Status: RESOLVED | Noted: 2024-01-29 | Resolved: 2024-02-21

## 2024-04-04 ENCOUNTER — TELEPHONE (OUTPATIENT)
Dept: NEPHROLOGY | Facility: CLINIC | Age: 79
End: 2024-04-04

## 2024-04-10 ENCOUNTER — APPOINTMENT (OUTPATIENT)
Dept: LAB | Facility: CLINIC | Age: 79
End: 2024-04-10
Payer: COMMERCIAL

## 2024-04-10 DIAGNOSIS — M89.9 CHRONIC KIDNEY DISEASE-MINERAL AND BONE DISORDER: ICD-10-CM

## 2024-04-10 DIAGNOSIS — N18.9 CHRONIC KIDNEY DISEASE-MINERAL AND BONE DISORDER: ICD-10-CM

## 2024-04-10 DIAGNOSIS — N18.32 STAGE 3B CHRONIC KIDNEY DISEASE (CKD) (HCC): ICD-10-CM

## 2024-04-10 DIAGNOSIS — E83.9 CHRONIC KIDNEY DISEASE-MINERAL AND BONE DISORDER: ICD-10-CM

## 2024-04-10 LAB
ANION GAP SERPL CALCULATED.3IONS-SCNC: 12 MMOL/L (ref 4–13)
BACTERIA UR QL AUTO: NORMAL /HPF
BASOPHILS # BLD AUTO: 0.07 THOUSANDS/ÂΜL (ref 0–0.1)
BASOPHILS NFR BLD AUTO: 1 % (ref 0–1)
BILIRUB UR QL STRIP: NEGATIVE
BUN SERPL-MCNC: 41 MG/DL (ref 5–25)
CALCIUM SERPL-MCNC: 8.6 MG/DL (ref 8.4–10.2)
CHLORIDE SERPL-SCNC: 102 MMOL/L (ref 96–108)
CLARITY UR: CLEAR
CO2 SERPL-SCNC: 24 MMOL/L (ref 21–32)
COLOR UR: ABNORMAL
CREAT SERPL-MCNC: 1.98 MG/DL (ref 0.6–1.3)
CREAT UR-MCNC: 113.7 MG/DL
EOSINOPHIL # BLD AUTO: 0.17 THOUSAND/ÂΜL (ref 0–0.61)
EOSINOPHIL NFR BLD AUTO: 2 % (ref 0–6)
ERYTHROCYTE [DISTWIDTH] IN BLOOD BY AUTOMATED COUNT: 12.5 % (ref 11.6–15.1)
GFR SERPL CREATININE-BSD FRML MDRD: 31 ML/MIN/1.73SQ M
GLUCOSE P FAST SERPL-MCNC: 65 MG/DL (ref 65–99)
GLUCOSE UR STRIP-MCNC: ABNORMAL MG/DL
HCT VFR BLD AUTO: 39.5 % (ref 36.5–49.3)
HGB BLD-MCNC: 13.2 G/DL (ref 12–17)
HGB UR QL STRIP.AUTO: NEGATIVE
IMM GRANULOCYTES # BLD AUTO: 0.03 THOUSAND/UL (ref 0–0.2)
IMM GRANULOCYTES NFR BLD AUTO: 0 % (ref 0–2)
KETONES UR STRIP-MCNC: NEGATIVE MG/DL
LEUKOCYTE ESTERASE UR QL STRIP: NEGATIVE
LYMPHOCYTES # BLD AUTO: 2.05 THOUSANDS/ÂΜL (ref 0.6–4.47)
LYMPHOCYTES NFR BLD AUTO: 28 % (ref 14–44)
MCH RBC QN AUTO: 30.9 PG (ref 26.8–34.3)
MCHC RBC AUTO-ENTMCNC: 33.4 G/DL (ref 31.4–37.4)
MCV RBC AUTO: 93 FL (ref 82–98)
MONOCYTES # BLD AUTO: 0.66 THOUSAND/ÂΜL (ref 0.17–1.22)
MONOCYTES NFR BLD AUTO: 9 % (ref 4–12)
NEUTROPHILS # BLD AUTO: 4.46 THOUSANDS/ÂΜL (ref 1.85–7.62)
NEUTS SEG NFR BLD AUTO: 60 % (ref 43–75)
NITRITE UR QL STRIP: NEGATIVE
NON-SQ EPI CELLS URNS QL MICRO: NORMAL /HPF
NRBC BLD AUTO-RTO: 0 /100 WBCS
PH UR STRIP.AUTO: 5.5 [PH]
PHOSPHATE SERPL-MCNC: 3.7 MG/DL (ref 2.3–4.1)
PLATELET # BLD AUTO: 308 THOUSANDS/UL (ref 149–390)
PMV BLD AUTO: 10.7 FL (ref 8.9–12.7)
POTASSIUM SERPL-SCNC: 4.3 MMOL/L (ref 3.5–5.3)
PROT UR STRIP-MCNC: ABNORMAL MG/DL
PROT UR-MCNC: 14 MG/DL
PROT/CREAT UR: 0.12 MG/G{CREAT} (ref 0–0.1)
PTH-INTACT SERPL-MCNC: 177.6 PG/ML (ref 12–88)
RBC # BLD AUTO: 4.27 MILLION/UL (ref 3.88–5.62)
RBC #/AREA URNS AUTO: NORMAL /HPF
SODIUM SERPL-SCNC: 138 MMOL/L (ref 135–147)
SP GR UR STRIP.AUTO: 1.01 (ref 1–1.03)
UROBILINOGEN UR STRIP-ACNC: <2 MG/DL
WBC # BLD AUTO: 7.44 THOUSAND/UL (ref 4.31–10.16)
WBC #/AREA URNS AUTO: NORMAL /HPF

## 2024-04-10 PROCEDURE — 84100 ASSAY OF PHOSPHORUS: CPT

## 2024-04-10 PROCEDURE — 82306 VITAMIN D 25 HYDROXY: CPT

## 2024-04-10 PROCEDURE — 84156 ASSAY OF PROTEIN URINE: CPT

## 2024-04-10 PROCEDURE — 83970 ASSAY OF PARATHORMONE: CPT

## 2024-04-10 PROCEDURE — 80048 BASIC METABOLIC PNL TOTAL CA: CPT

## 2024-04-10 PROCEDURE — 82570 ASSAY OF URINE CREATININE: CPT

## 2024-04-10 PROCEDURE — 36415 COLL VENOUS BLD VENIPUNCTURE: CPT

## 2024-04-10 PROCEDURE — 81001 URINALYSIS AUTO W/SCOPE: CPT

## 2024-04-10 PROCEDURE — 85025 COMPLETE CBC W/AUTO DIFF WBC: CPT

## 2024-04-11 LAB — 25(OH)D3 SERPL-MCNC: 49.7 NG/ML (ref 30–100)

## 2024-04-12 ENCOUNTER — TELEPHONE (OUTPATIENT)
Dept: NEPHROLOGY | Facility: CLINIC | Age: 79
End: 2024-04-12

## 2024-04-12 NOTE — TELEPHONE ENCOUNTER
I called and left message on patients answering machine confimring appt for Monday 04/15/2024 with Dr. Moncada

## 2024-04-15 ENCOUNTER — OFFICE VISIT (OUTPATIENT)
Dept: NEPHROLOGY | Facility: CLINIC | Age: 79
End: 2024-04-15
Payer: COMMERCIAL

## 2024-04-15 VITALS
HEART RATE: 97 BPM | DIASTOLIC BLOOD PRESSURE: 80 MMHG | SYSTOLIC BLOOD PRESSURE: 120 MMHG | BODY MASS INDEX: 24.34 KG/M2 | HEIGHT: 70 IN | OXYGEN SATURATION: 99 % | WEIGHT: 170 LBS | RESPIRATION RATE: 16 BRPM | TEMPERATURE: 97.1 F

## 2024-04-15 DIAGNOSIS — N18.9 CHRONIC KIDNEY DISEASE-MINERAL AND BONE DISORDER: ICD-10-CM

## 2024-04-15 DIAGNOSIS — E83.9 CHRONIC KIDNEY DISEASE-MINERAL AND BONE DISORDER: ICD-10-CM

## 2024-04-15 DIAGNOSIS — N18.32 TYPE 2 DIABETES MELLITUS WITH STAGE 3B CHRONIC KIDNEY DISEASE, WITHOUT LONG-TERM CURRENT USE OF INSULIN (HCC): ICD-10-CM

## 2024-04-15 DIAGNOSIS — E11.22 TYPE 2 DIABETES MELLITUS WITH STAGE 3B CHRONIC KIDNEY DISEASE, WITHOUT LONG-TERM CURRENT USE OF INSULIN (HCC): ICD-10-CM

## 2024-04-15 DIAGNOSIS — N18.32 STAGE 3B CHRONIC KIDNEY DISEASE (CKD) (HCC): Primary | ICD-10-CM

## 2024-04-15 DIAGNOSIS — I10 BENIGN ESSENTIAL HYPERTENSION: ICD-10-CM

## 2024-04-15 DIAGNOSIS — M89.9 CHRONIC KIDNEY DISEASE-MINERAL AND BONE DISORDER: ICD-10-CM

## 2024-04-15 PROCEDURE — 99214 OFFICE O/P EST MOD 30 MIN: CPT | Performed by: INTERNAL MEDICINE

## 2024-04-15 NOTE — ASSESSMENT & PLAN NOTE
Lab Results   Component Value Date    EGFR 31 04/10/2024    EGFR 36 (L) 02/13/2024    EGFR  02/12/2024     Not performed on patients less than 18 years of age or greater than 97 years of age    CREATININE 1.98 (H) 04/10/2024    CREATININE 1.87 (H) 02/13/2024    CREATININE 1.97 (H) 02/12/2024   Renal function is stable overall.  Advise hydration and avoiding nephrotoxic medication
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
## 2024-04-15 NOTE — ASSESSMENT & PLAN NOTE
Lab Results   Component Value Date    EGFR 31 04/10/2024    EGFR 36 (L) 02/13/2024    EGFR  02/12/2024     Not performed on patients less than 18 years of age or greater than 97 years of age    CREATININE 1.98 (H) 04/10/2024    CREATININE 1.87 (H) 02/13/2024    CREATININE 1.97 (H) 02/12/2024   PTH and phosphorus along with vitamin D are within acceptable range and will continue to monitor

## 2024-04-15 NOTE — ASSESSMENT & PLAN NOTE
Lab Results   Component Value Date    HGBA1C 8.0 (H) 02/13/2024   Need to get better control.  Importance of diabetic control and effect on kidney disease discussed with the patient

## 2024-05-02 ENCOUNTER — RA CDI HCC (OUTPATIENT)
Dept: OTHER | Facility: HOSPITAL | Age: 79
End: 2024-05-02

## 2024-05-02 ENCOUNTER — APPOINTMENT (OUTPATIENT)
Dept: LAB | Facility: CLINIC | Age: 79
End: 2024-05-02
Payer: COMMERCIAL

## 2024-05-02 DIAGNOSIS — N18.32 TYPE 2 DIABETES MELLITUS WITH STAGE 3B CHRONIC KIDNEY DISEASE, WITHOUT LONG-TERM CURRENT USE OF INSULIN (HCC): ICD-10-CM

## 2024-05-02 DIAGNOSIS — E11.22 TYPE 2 DIABETES MELLITUS WITH STAGE 3B CHRONIC KIDNEY DISEASE, WITHOUT LONG-TERM CURRENT USE OF INSULIN (HCC): ICD-10-CM

## 2024-05-02 LAB
ANION GAP SERPL CALCULATED.3IONS-SCNC: 10 MMOL/L (ref 4–13)
BUN SERPL-MCNC: 45 MG/DL (ref 5–25)
CALCIUM SERPL-MCNC: 9.3 MG/DL (ref 8.4–10.2)
CHLORIDE SERPL-SCNC: 102 MMOL/L (ref 96–108)
CHOLEST SERPL-MCNC: 95 MG/DL
CO2 SERPL-SCNC: 26 MMOL/L (ref 21–32)
CREAT SERPL-MCNC: 2.06 MG/DL (ref 0.6–1.3)
GFR SERPL CREATININE-BSD FRML MDRD: 29 ML/MIN/1.73SQ M
GLUCOSE P FAST SERPL-MCNC: 105 MG/DL (ref 65–99)
HDLC SERPL-MCNC: 34 MG/DL
LDLC SERPL CALC-MCNC: 49 MG/DL (ref 0–100)
POTASSIUM SERPL-SCNC: 4.8 MMOL/L (ref 3.5–5.3)
SODIUM SERPL-SCNC: 138 MMOL/L (ref 135–147)
TRIGL SERPL-MCNC: 62 MG/DL

## 2024-05-02 PROCEDURE — 83036 HEMOGLOBIN GLYCOSYLATED A1C: CPT

## 2024-05-02 PROCEDURE — 80048 BASIC METABOLIC PNL TOTAL CA: CPT

## 2024-05-02 PROCEDURE — 80061 LIPID PANEL: CPT

## 2024-05-02 PROCEDURE — 36415 COLL VENOUS BLD VENIPUNCTURE: CPT

## 2024-05-02 NOTE — PROGRESS NOTES
E11.22    HCC coding opportunities          Chart Reviewed number of suggestions sent to Provider: 1     Patients Insurance     Medicare Insurance: [employer]vantage

## 2024-05-03 LAB
EST. AVERAGE GLUCOSE BLD GHB EST-MCNC: 174 MG/DL
HBA1C MFR BLD: 7.7 %

## 2024-05-09 ENCOUNTER — OFFICE VISIT (OUTPATIENT)
Dept: FAMILY MEDICINE CLINIC | Facility: CLINIC | Age: 79
End: 2024-05-09
Payer: COMMERCIAL

## 2024-05-09 VITALS
DIASTOLIC BLOOD PRESSURE: 72 MMHG | HEIGHT: 70 IN | SYSTOLIC BLOOD PRESSURE: 118 MMHG | WEIGHT: 173.2 LBS | BODY MASS INDEX: 24.79 KG/M2 | OXYGEN SATURATION: 97 % | HEART RATE: 80 BPM | TEMPERATURE: 97.8 F

## 2024-05-09 DIAGNOSIS — R26.89 BALANCE DISORDER: ICD-10-CM

## 2024-05-09 DIAGNOSIS — N18.4 TYPE 2 DIABETES MELLITUS WITH STAGE 4 CHRONIC KIDNEY DISEASE, WITHOUT LONG-TERM CURRENT USE OF INSULIN (HCC): Primary | ICD-10-CM

## 2024-05-09 DIAGNOSIS — R45.89 DYSPHORIC MOOD: ICD-10-CM

## 2024-05-09 DIAGNOSIS — E78.00 HYPERCHOLESTEROLEMIA: ICD-10-CM

## 2024-05-09 DIAGNOSIS — N18.4 STAGE 4 CHRONIC KIDNEY DISEASE (HCC): ICD-10-CM

## 2024-05-09 DIAGNOSIS — E11.22 TYPE 2 DIABETES MELLITUS WITH STAGE 4 CHRONIC KIDNEY DISEASE, WITHOUT LONG-TERM CURRENT USE OF INSULIN (HCC): Primary | ICD-10-CM

## 2024-05-09 PROCEDURE — G2211 COMPLEX E/M VISIT ADD ON: HCPCS | Performed by: FAMILY MEDICINE

## 2024-05-09 PROCEDURE — 99214 OFFICE O/P EST MOD 30 MIN: CPT | Performed by: FAMILY MEDICINE

## 2024-05-09 NOTE — ASSESSMENT & PLAN NOTE
Lab Results   Component Value Date    HGBA1C 7.7 (H) 05/02/2024   A1c slightly above goal, advised to continue Jardiance, Trulicity, and Glyburide
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
## 2024-05-09 NOTE — ASSESSMENT & PLAN NOTE
Lab Results   Component Value Date    EGFR 29 05/02/2024    EGFR 31 04/10/2024    EGFR 36 (L) 02/13/2024    CREATININE 2.06 (H) 05/02/2024    CREATININE 1.98 (H) 04/10/2024    CREATININE 1.87 (H) 02/13/2024   Advised to avoid nephrotoxins and drink plenty of water.   Continue follow up with nephrology

## 2024-05-16 LAB
LEFT EYE DIABETIC RETINOPATHY: NORMAL
RIGHT EYE DIABETIC RETINOPATHY: NORMAL

## 2024-05-21 DIAGNOSIS — E11.22 TYPE 2 DIABETES MELLITUS WITH STAGE 3B CHRONIC KIDNEY DISEASE, WITHOUT LONG-TERM CURRENT USE OF INSULIN (HCC): ICD-10-CM

## 2024-05-21 DIAGNOSIS — N18.32 TYPE 2 DIABETES MELLITUS WITH STAGE 3B CHRONIC KIDNEY DISEASE, WITHOUT LONG-TERM CURRENT USE OF INSULIN (HCC): ICD-10-CM

## 2024-05-22 RX ORDER — DULAGLUTIDE 3 MG/.5ML
INJECTION, SOLUTION SUBCUTANEOUS
Qty: 6 ML | Refills: 1 | Status: SHIPPED | OUTPATIENT
Start: 2024-05-22

## 2024-05-27 DIAGNOSIS — I10 ESSENTIAL HYPERTENSION: ICD-10-CM

## 2024-05-28 RX ORDER — LISINOPRIL AND HYDROCHLOROTHIAZIDE 25; 20 MG/1; MG/1
1 TABLET ORAL DAILY
Qty: 90 TABLET | Refills: 1 | Status: SHIPPED | OUTPATIENT
Start: 2024-05-28

## 2024-07-11 DIAGNOSIS — E11.8 TYPE 2 DIABETES MELLITUS WITH COMPLICATION, WITHOUT LONG-TERM CURRENT USE OF INSULIN (HCC): ICD-10-CM

## 2024-07-11 RX ORDER — GLYBURIDE 5 MG/1
TABLET ORAL
Qty: 180 TABLET | Refills: 1 | Status: ON HOLD | OUTPATIENT
Start: 2024-07-11

## 2024-07-16 ENCOUNTER — HOSPITAL ENCOUNTER (INPATIENT)
Facility: HOSPITAL | Age: 79
LOS: 5 days | Discharge: HOME | DRG: 439 | End: 2024-07-22
Admitting: INTERNAL MEDICINE
Payer: COMMERCIAL

## 2024-07-16 ENCOUNTER — APPOINTMENT (EMERGENCY)
Dept: CT IMAGING | Facility: HOSPITAL | Age: 79
DRG: 439 | End: 2024-07-16
Payer: COMMERCIAL

## 2024-07-16 DIAGNOSIS — R10.9 ABDOMINAL PAIN: Primary | ICD-10-CM

## 2024-07-16 DIAGNOSIS — K85.90 PANCREATITIS: ICD-10-CM

## 2024-07-16 DIAGNOSIS — N17.9 ACUTE KIDNEY INJURY (HCC): ICD-10-CM

## 2024-07-16 PROBLEM — E78.2 MIXED HYPERLIPIDEMIA: Status: ACTIVE | Noted: 2024-07-16

## 2024-07-16 PROBLEM — K85.00 IDIOPATHIC ACUTE PANCREATITIS: Status: ACTIVE | Noted: 2024-07-16

## 2024-07-16 LAB
ALBUMIN SERPL BCG-MCNC: 4.8 G/DL (ref 3.5–5)
ALP SERPL-CCNC: 72 U/L (ref 34–104)
ALT SERPL W P-5'-P-CCNC: 21 U/L (ref 7–52)
ANION GAP SERPL CALCULATED.3IONS-SCNC: 8 MMOL/L (ref 4–13)
AST SERPL W P-5'-P-CCNC: 16 U/L (ref 13–39)
BASOPHILS # BLD AUTO: 0.03 THOUSANDS/ÂΜL (ref 0–0.1)
BASOPHILS NFR BLD AUTO: 0 % (ref 0–1)
BILIRUB DIRECT SERPL-MCNC: 0.17 MG/DL (ref 0–0.2)
BILIRUB SERPL-MCNC: 0.64 MG/DL (ref 0.2–1)
BUN SERPL-MCNC: 32 MG/DL (ref 5–25)
CALCIUM SERPL-MCNC: 9.5 MG/DL (ref 8.4–10.2)
CARDIAC TROPONIN I PNL SERPL HS: 9 NG/L
CHLORIDE SERPL-SCNC: 103 MMOL/L (ref 96–108)
CO2 SERPL-SCNC: 27 MMOL/L (ref 21–32)
CREAT SERPL-MCNC: 1.8 MG/DL (ref 0.6–1.3)
EOSINOPHIL # BLD AUTO: 0.06 THOUSAND/ÂΜL (ref 0–0.61)
EOSINOPHIL NFR BLD AUTO: 1 % (ref 0–6)
ERYTHROCYTE [DISTWIDTH] IN BLOOD BY AUTOMATED COUNT: 12.6 % (ref 11.6–15.1)
GFR SERPL CREATININE-BSD FRML MDRD: 35 ML/MIN/1.73SQ M
GLUCOSE SERPL-MCNC: 140 MG/DL (ref 65–140)
GLUCOSE SERPL-MCNC: 179 MG/DL (ref 65–140)
HCT VFR BLD AUTO: 45.2 % (ref 36.5–49.3)
HGB BLD-MCNC: 14.9 G/DL (ref 12–17)
IMM GRANULOCYTES # BLD AUTO: 0.12 THOUSAND/UL (ref 0–0.2)
IMM GRANULOCYTES NFR BLD AUTO: 1 % (ref 0–2)
LIPASE SERPL-CCNC: 346 U/L (ref 11–82)
LYMPHOCYTES # BLD AUTO: 1.32 THOUSANDS/ÂΜL (ref 0.6–4.47)
LYMPHOCYTES NFR BLD AUTO: 16 % (ref 14–44)
MCH RBC QN AUTO: 30 PG (ref 26.8–34.3)
MCHC RBC AUTO-ENTMCNC: 33 G/DL (ref 31.4–37.4)
MCV RBC AUTO: 91 FL (ref 82–98)
MONOCYTES # BLD AUTO: 0.71 THOUSAND/ÂΜL (ref 0.17–1.22)
MONOCYTES NFR BLD AUTO: 8 % (ref 4–12)
NEUTROPHILS # BLD AUTO: 6.23 THOUSANDS/ÂΜL (ref 1.85–7.62)
NEUTS SEG NFR BLD AUTO: 74 % (ref 43–75)
NRBC BLD AUTO-RTO: 0 /100 WBCS
PLATELET # BLD AUTO: 238 THOUSANDS/UL (ref 149–390)
PLATELET # BLD AUTO: 271 THOUSANDS/UL (ref 149–390)
PMV BLD AUTO: 10.2 FL (ref 8.9–12.7)
PMV BLD AUTO: 10.7 FL (ref 8.9–12.7)
POTASSIUM SERPL-SCNC: 4.3 MMOL/L (ref 3.5–5.3)
PROT SERPL-MCNC: 7.5 G/DL (ref 6.4–8.4)
RBC # BLD AUTO: 4.97 MILLION/UL (ref 3.88–5.62)
SODIUM SERPL-SCNC: 138 MMOL/L (ref 135–147)
WBC # BLD AUTO: 8.47 THOUSAND/UL (ref 4.31–10.16)

## 2024-07-16 PROCEDURE — 99285 EMERGENCY DEPT VISIT HI MDM: CPT

## 2024-07-16 PROCEDURE — 85025 COMPLETE CBC W/AUTO DIFF WBC: CPT

## 2024-07-16 PROCEDURE — 96374 THER/PROPH/DIAG INJ IV PUSH: CPT

## 2024-07-16 PROCEDURE — 84484 ASSAY OF TROPONIN QUANT: CPT

## 2024-07-16 PROCEDURE — 96375 TX/PRO/DX INJ NEW DRUG ADDON: CPT

## 2024-07-16 PROCEDURE — 84484 ASSAY OF TROPONIN QUANT: CPT | Performed by: INTERNAL MEDICINE

## 2024-07-16 PROCEDURE — 80076 HEPATIC FUNCTION PANEL: CPT

## 2024-07-16 PROCEDURE — 93005 ELECTROCARDIOGRAM TRACING: CPT

## 2024-07-16 PROCEDURE — 99221 1ST HOSP IP/OBS SF/LOW 40: CPT | Performed by: INTERNAL MEDICINE

## 2024-07-16 PROCEDURE — 82948 REAGENT STRIP/BLOOD GLUCOSE: CPT

## 2024-07-16 PROCEDURE — 80048 BASIC METABOLIC PNL TOTAL CA: CPT

## 2024-07-16 PROCEDURE — 36415 COLL VENOUS BLD VENIPUNCTURE: CPT

## 2024-07-16 PROCEDURE — 85049 AUTOMATED PLATELET COUNT: CPT | Performed by: INTERNAL MEDICINE

## 2024-07-16 PROCEDURE — 74176 CT ABD & PELVIS W/O CONTRAST: CPT

## 2024-07-16 PROCEDURE — 83690 ASSAY OF LIPASE: CPT

## 2024-07-16 RX ORDER — LISINOPRIL 20 MG/1
20 TABLET ORAL DAILY
Status: DISCONTINUED | OUTPATIENT
Start: 2024-07-17 | End: 2024-07-21

## 2024-07-16 RX ORDER — HYDROMORPHONE HCL/PF 1 MG/ML
1 SYRINGE (ML) INJECTION EVERY 4 HOURS PRN
Status: DISCONTINUED | OUTPATIENT
Start: 2024-07-16 | End: 2024-07-20

## 2024-07-16 RX ORDER — ASPIRIN 81 MG/1
81 TABLET, CHEWABLE ORAL DAILY
Status: DISCONTINUED | OUTPATIENT
Start: 2024-07-17 | End: 2024-07-22 | Stop reason: HOSPADM

## 2024-07-16 RX ORDER — HYDROMORPHONE HCL/PF 1 MG/ML
0.5 SYRINGE (ML) INJECTION ONCE
Status: COMPLETED | OUTPATIENT
Start: 2024-07-16 | End: 2024-07-16

## 2024-07-16 RX ORDER — ONDANSETRON 2 MG/ML
4 INJECTION INTRAMUSCULAR; INTRAVENOUS EVERY 6 HOURS PRN
Status: DISCONTINUED | OUTPATIENT
Start: 2024-07-16 | End: 2024-07-22 | Stop reason: HOSPADM

## 2024-07-16 RX ORDER — ATORVASTATIN CALCIUM 40 MG/1
80 TABLET, FILM COATED ORAL
Status: DISCONTINUED | OUTPATIENT
Start: 2024-07-17 | End: 2024-07-22 | Stop reason: HOSPADM

## 2024-07-16 RX ORDER — ACETAMINOPHEN 325 MG/1
650 TABLET ORAL EVERY 6 HOURS PRN
Status: DISCONTINUED | OUTPATIENT
Start: 2024-07-16 | End: 2024-07-22 | Stop reason: HOSPADM

## 2024-07-16 RX ORDER — SODIUM CHLORIDE 9 MG/ML
150 INJECTION, SOLUTION INTRAVENOUS CONTINUOUS
Status: DISCONTINUED | OUTPATIENT
Start: 2024-07-16 | End: 2024-07-18

## 2024-07-16 RX ORDER — ONDANSETRON 2 MG/ML
4 INJECTION INTRAMUSCULAR; INTRAVENOUS ONCE
Status: COMPLETED | OUTPATIENT
Start: 2024-07-16 | End: 2024-07-16

## 2024-07-16 RX ORDER — HYDROMORPHONE HCL/PF 1 MG/ML
0.5 SYRINGE (ML) INJECTION EVERY 4 HOURS PRN
Status: DISCONTINUED | OUTPATIENT
Start: 2024-07-16 | End: 2024-07-20

## 2024-07-16 RX ORDER — HYDROCHLOROTHIAZIDE 25 MG/1
25 TABLET ORAL DAILY
Status: DISCONTINUED | OUTPATIENT
Start: 2024-07-17 | End: 2024-07-21

## 2024-07-16 RX ORDER — INSULIN LISPRO 100 [IU]/ML
1-5 INJECTION, SOLUTION INTRAVENOUS; SUBCUTANEOUS
Status: DISCONTINUED | OUTPATIENT
Start: 2024-07-17 | End: 2024-07-22 | Stop reason: HOSPADM

## 2024-07-16 RX ORDER — ENOXAPARIN SODIUM 100 MG/ML
40 INJECTION SUBCUTANEOUS DAILY
Status: DISCONTINUED | OUTPATIENT
Start: 2024-07-17 | End: 2024-07-22 | Stop reason: HOSPADM

## 2024-07-16 RX ADMIN — ONDANSETRON 4 MG: 2 INJECTION INTRAMUSCULAR; INTRAVENOUS at 20:58

## 2024-07-16 RX ADMIN — HYDROMORPHONE HYDROCHLORIDE 0.5 MG: 1 INJECTION, SOLUTION INTRAMUSCULAR; INTRAVENOUS; SUBCUTANEOUS at 20:59

## 2024-07-16 RX ADMIN — SODIUM CHLORIDE 150 ML/HR: 0.9 INJECTION, SOLUTION INTRAVENOUS at 23:37

## 2024-07-16 RX ADMIN — HYDROMORPHONE HYDROCHLORIDE 1 MG: 1 INJECTION, SOLUTION INTRAMUSCULAR; INTRAVENOUS; SUBCUTANEOUS at 23:59

## 2024-07-16 RX ADMIN — SODIUM CHLORIDE 150 ML/HR: 0.9 INJECTION, SOLUTION INTRAVENOUS at 23:38

## 2024-07-17 ENCOUNTER — APPOINTMENT (OUTPATIENT)
Dept: ULTRASOUND IMAGING | Facility: HOSPITAL | Age: 79
DRG: 439 | End: 2024-07-17
Payer: COMMERCIAL

## 2024-07-17 LAB
2HR DELTA HS TROPONIN: 0 NG/L
4HR DELTA HS TROPONIN: 0 NG/L
ALBUMIN SERPL BCG-MCNC: 4 G/DL (ref 3.5–5)
ALP SERPL-CCNC: 59 U/L (ref 34–104)
ALT SERPL W P-5'-P-CCNC: 17 U/L (ref 7–52)
ANION GAP SERPL CALCULATED.3IONS-SCNC: 8 MMOL/L (ref 4–13)
AST SERPL W P-5'-P-CCNC: 13 U/L (ref 13–39)
ATRIAL RATE: 62 BPM
BILIRUB SERPL-MCNC: 0.78 MG/DL (ref 0.2–1)
BUN SERPL-MCNC: 31 MG/DL (ref 5–25)
CALCIUM SERPL-MCNC: 8.6 MG/DL (ref 8.4–10.2)
CARDIAC TROPONIN I PNL SERPL HS: 9 NG/L
CARDIAC TROPONIN I PNL SERPL HS: 9 NG/L
CHLORIDE SERPL-SCNC: 106 MMOL/L (ref 96–108)
CO2 SERPL-SCNC: 24 MMOL/L (ref 21–32)
CREAT SERPL-MCNC: 1.67 MG/DL (ref 0.6–1.3)
ERYTHROCYTE [DISTWIDTH] IN BLOOD BY AUTOMATED COUNT: 12.6 % (ref 11.6–15.1)
GFR SERPL CREATININE-BSD FRML MDRD: 38 ML/MIN/1.73SQ M
GLUCOSE SERPL-MCNC: 103 MG/DL (ref 65–140)
GLUCOSE SERPL-MCNC: 122 MG/DL (ref 65–140)
GLUCOSE SERPL-MCNC: 166 MG/DL (ref 65–140)
GLUCOSE SERPL-MCNC: 95 MG/DL (ref 65–140)
HCT VFR BLD AUTO: 38.6 % (ref 36.5–49.3)
HGB BLD-MCNC: 13 G/DL (ref 12–17)
MCH RBC QN AUTO: 30.4 PG (ref 26.8–34.3)
MCHC RBC AUTO-ENTMCNC: 33.7 G/DL (ref 31.4–37.4)
MCV RBC AUTO: 90 FL (ref 82–98)
P AXIS: 57 DEGREES
PLATELET # BLD AUTO: 223 THOUSANDS/UL (ref 149–390)
PMV BLD AUTO: 10.2 FL (ref 8.9–12.7)
POTASSIUM SERPL-SCNC: 4.3 MMOL/L (ref 3.5–5.3)
PR INTERVAL: 146 MS
PROT SERPL-MCNC: 6.2 G/DL (ref 6.4–8.4)
QRS AXIS: 68 DEGREES
QRSD INTERVAL: 82 MS
QT INTERVAL: 400 MS
QTC INTERVAL: 406 MS
RBC # BLD AUTO: 4.27 MILLION/UL (ref 3.88–5.62)
SODIUM SERPL-SCNC: 138 MMOL/L (ref 135–147)
T WAVE AXIS: 78 DEGREES
TRIGL SERPL-MCNC: 71 MG/DL
VENTRICULAR RATE: 62 BPM
WBC # BLD AUTO: 8.05 THOUSAND/UL (ref 4.31–10.16)

## 2024-07-17 PROCEDURE — 85027 COMPLETE CBC AUTOMATED: CPT | Performed by: INTERNAL MEDICINE

## 2024-07-17 PROCEDURE — 99233 SBSQ HOSP IP/OBS HIGH 50: CPT | Performed by: INTERNAL MEDICINE

## 2024-07-17 PROCEDURE — 93010 ELECTROCARDIOGRAM REPORT: CPT | Performed by: INTERNAL MEDICINE

## 2024-07-17 PROCEDURE — 84484 ASSAY OF TROPONIN QUANT: CPT | Performed by: INTERNAL MEDICINE

## 2024-07-17 PROCEDURE — 80053 COMPREHEN METABOLIC PANEL: CPT | Performed by: INTERNAL MEDICINE

## 2024-07-17 PROCEDURE — 76705 ECHO EXAM OF ABDOMEN: CPT

## 2024-07-17 PROCEDURE — 82948 REAGENT STRIP/BLOOD GLUCOSE: CPT

## 2024-07-17 PROCEDURE — 84478 ASSAY OF TRIGLYCERIDES: CPT | Performed by: INTERNAL MEDICINE

## 2024-07-17 RX ADMIN — LISINOPRIL 20 MG: 20 TABLET ORAL at 09:11

## 2024-07-17 RX ADMIN — Medication 2000 UNITS: at 09:11

## 2024-07-17 RX ADMIN — SODIUM CHLORIDE 150 ML/HR: 0.9 INJECTION, SOLUTION INTRAVENOUS at 21:13

## 2024-07-17 RX ADMIN — HYDROCHLOROTHIAZIDE 25 MG: 25 TABLET ORAL at 09:11

## 2024-07-17 RX ADMIN — SODIUM CHLORIDE 150 ML/HR: 0.9 INJECTION, SOLUTION INTRAVENOUS at 14:35

## 2024-07-17 RX ADMIN — ASPIRIN 81 MG: 81 TABLET, CHEWABLE ORAL at 09:11

## 2024-07-17 RX ADMIN — ENOXAPARIN SODIUM 40 MG: 40 INJECTION SUBCUTANEOUS at 09:11

## 2024-07-17 RX ADMIN — ATORVASTATIN CALCIUM 80 MG: 40 TABLET, FILM COATED ORAL at 17:25

## 2024-07-17 RX ADMIN — INSULIN LISPRO 1 UNITS: 100 INJECTION, SOLUTION INTRAVENOUS; SUBCUTANEOUS at 13:00

## 2024-07-17 NOTE — PROGRESS NOTES
[facility]  Progress Note  Name: Bree Barrios I  MRN: [id number]  Unit/Bed#: [facility] I Date of Admission: 7/16/2024   Date of Service: 7/17/2024 I Hospital Day: 0    Assessment & Plan   * Idiopathic acute pancreatitis  Assessment & Plan  Patient with elevated lipase and pain somewhat typical of acute pancreatitis although CT scan did not reveal any peripancreatic edema.  Given the same we will treat as such since he makes biochemical and clinical criteria.  Etiology not completely clear, he does not have any history of alcohol use, no gallstones noted on imaging, lipid profile apparently recently normal but we will recheck triglycerides    I will also check IgG4  Continue IV fluids  So far appears to be tolerating liquid diet, will advance for dinner  Monitor for dietary tolerance    Mixed hyperlipidemia  Assessment & Plan  Continue with home dose of Lipitor 80 mg daily.      Benign essential hypertension  Assessment & Plan  Blood pressure is controlled, initially reading was high but that I assume was because of pain, continue with home regimen of lisinopril and HCTZ.    Controlled type 2 diabetes mellitus with chronic kidney disease, without long-term current use of insulin ([facility])  Assessment & Plan  Lab Results   Component Value Date    HGBA1C 7.7 (H) 05/02/2024       Recent Labs     07/16/24  2321 07/17/24  0723 07/17/24  1114   POCGLU 140 95 166*       Blood Sugar Average: Last 72 hrs:  (P) [id number]    Glycemic control is adequate, most recent A1c last month was 7.7, hold all oral medications and also Trulicity, use insulin sliding scale for now.  Kidney function seems to be at this time at baseline of CKD stage IIIb.             VTE Pharmacologic Prophylaxis:   Pharmacologic: Enoxaparin (Lovenox)  Mechanical VTE Prophylaxis in Place: Yes    Patient Centered Rounds: I have performed bedside rounds with nursing staff today.    Discussions with Specialists or Other Care Team  Provider: Discussed with care management team    Education and Discussions with Family / Patient: Patient - he did not request me to contact anyone today    Time Spent for Care: 1 hour.  More than 50% of total time spent on counseling and coordination of care as described above.    Current Length of Stay: 0 day(s)    Current Patient Status: Observation   Certification Statement: The patient will continue to require additional inpatient hospital stay due to need for IV therapy    Discharge Plan:   24 to 48 hours depending on dietary tolerance    Code Status: Level 1 - Full Code      Subjective:     Patient valuated this morning.  Denies any chest pain nausea vomiting.  States that pain is definitely better compared to yesterday    Objective:     Vitals:   Temp (24hrs), Av.7 °F (36.5 °C), Min:97.4 °F (36.3 °C), Max:97.8 °F (36.6 °C)    Temp:  [97.4 °F (36.3 °C)-97.8 °F (36.6 °C)] 97.4 °F (36.3 °C)  HR:  [60-73] 73  Resp:  [12-20] 12  BP: (121-191)/(69-84) 121/69  SpO2:  [94 %-98 %] 94 %  Body mass index is 24.2 kg/m².     Input and Output Summary (last 24 hours):       Intake/Output Summary (Last 24 hours) at 2024 1247  Last data filed at 2024 1201  Gross per 24 hour   Intake 2620 ml   Output 1000 ml   Net 1620 ml       Physical Exam:     Physical Exam  Vitals and nursing note reviewed.   Constitutional:       Appearance: Normal appearance.      Comments: Male patient in bed, awake   HENT:      Head: Normocephalic and atraumatic.      Right Ear: External ear normal.      Left Ear: External ear normal.      Nose: Nose normal. No congestion or rhinorrhea.      Mouth/Throat:      Mouth: Mucous membranes are moist.      Pharynx: Oropharynx is clear. No oropharyngeal exudate or posterior oropharyngeal erythema.   Eyes:      General: No scleral icterus.        Right eye: No discharge.         Left eye: No discharge.      Pupils: Pupils are equal, round, and reactive to light.   Neck:      Vascular: No carotid  bruit.   Cardiovascular:      Rate and Rhythm: Normal rate and regular rhythm.      Pulses: Normal pulses.      Heart sounds: No murmur heard.     No friction rub. No gallop.   Pulmonary:      Effort: Pulmonary effort is normal. No respiratory distress.      Breath sounds: Normal breath sounds. No stridor. No wheezing, rhonchi or rales.   Abdominal:      General: Abdomen is flat. Bowel sounds are normal. There is no distension.      Palpations: Abdomen is soft. There is no mass.      Tenderness: There is no abdominal tenderness. There is no guarding or rebound.      Hernia: No hernia is present.   Musculoskeletal:         General: No swelling, tenderness, deformity or signs of injury. Normal range of motion.      Cervical back: Normal range of motion. No rigidity. No muscular tenderness.   Lymphadenopathy:      Cervical: No cervical adenopathy.   Skin:     General: Skin is warm and dry.      Capillary Refill: Capillary refill takes less than 2 seconds.      Coloration: Skin is not jaundiced or pale.      Findings: No bruising or erythema.   Neurological:      General: No focal deficit present.      Mental Status: He is alert and oriented to person, place, and time. Mental status is at baseline.      Cranial Nerves: No cranial nerve deficit.      Sensory: No sensory deficit.      Motor: No weakness.      Coordination: Coordination normal.      Deep Tendon Reflexes: Reflexes normal.   Psychiatric:         Mood and Affect: Mood normal.         Behavior: Behavior normal.         Thought Content: Thought content normal.         Judgment: Judgment normal.           Additional Data:     Labs:    Results from last 7 days   Lab Units 07/17/24  0530 07/16/24  2306 07/16/24  2058   WBC Thousand/uL 8.05  --  8.47   HEMOGLOBIN g/dL 13.0  --  14.9   HEMATOCRIT % 38.6  --  45.2   PLATELETS Thousands/uL 223   < > 271   SEGS PCT %  --   --  74   LYMPHO PCT %  --   --  16   MONO PCT %  --   --  8   EOS PCT %  --   --  1    < > =  values in this interval not displayed.     Results from last 7 days   Lab Units 07/17/24  0530   SODIUM mmol/L 138   POTASSIUM mmol/L 4.3   CHLORIDE mmol/L 106   CO2 mmol/L 24   BUN mg/dL 31*   CREATININE mg/dL 1.67*   ANION GAP mmol/L 8   CALCIUM mg/dL 8.6   ALBUMIN g/dL 4.0   TOTAL BILIRUBIN mg/dL 0.78   ALK PHOS U/L 59   ALT U/L 17   AST U/L 13   GLUCOSE RANDOM mg/dL 122         Results from last 7 days   Lab Units 07/17/24  1114 07/17/24  0723 07/16/24  2321   POC GLUCOSE mg/dl 166* 95 140                   * I Have Reviewed All Lab Data Listed Above.  * Additional Pertinent Lab Tests Reviewed: All Labs For Current Hospital Admission Reviewed        Recent Cultures (last 7 days):           Last 24 Hours Medication List:   Current Facility-Administered Medications   Medication Dose Route Frequency Provider Last Rate    acetaminophen  650 mg Oral Q6H PRN Andi Barbosa MD      aspirin  81 mg Oral Daily Andi Barbosa MD      atorvastatin  80 mg Oral Daily With Dinner Andi Barbosa MD      cholecalciferol  2,000 Units Oral Daily Andi Barbosa MD      enoxaparin  40 mg Subcutaneous Daily Andi Barbosa MD      lisinopril  20 mg Oral Daily Andi Barbosa MD      And    hydroCHLOROthiazide  25 mg Oral Daily Andi Barbosa MD      HYDROmorphone  0.5 mg Intravenous Q4H PRN Andi Barbosa MD      HYDROmorphone  1 mg Intravenous Q4H PRN Andi Barbosa MD      insulin lispro  1-5 Units Subcutaneous TID AC Andi Barbosa MD      ondansetron  4 mg Intravenous Q6H PRN Andi Barbosa MD      sodium chloride  150 mL/hr Intravenous Continuous Andi Barbosa  mL/hr (07/16/24 2338)        Today, Patient Was Seen By: Gianfranco Vega MD    ** Please Note: Dictation voice to text software may have been used in the creation of this document. **

## 2024-07-17 NOTE — ASSESSMENT & PLAN NOTE
Blood pressure is controlled, initially reading was high but that I assume was because of pain, continue with home regimen of lisinopril and HCTZ.
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
## 2024-07-17 NOTE — ED PROVIDER NOTES
History  Chief Complaint   Patient presents with    Abdominal Pain     Generalized abd pain since yesterday, + nausea - vomitting. Having Bms regularly      Patient is a 78-year-old male with a past medical history significant for CKD 3, diabetes, GERD, dyslipidemia, hypertension presenting to the emergency department for evaluation of abdominal pain.  Patient notes yesterday he developed some dull, diffuse, constant abdominal pain rated 7 out of 10.  He notes it is worse with movement.  He has not tried a medication for relief.  He notes fatigue beginning yesterday that is worse today.  He endorses passing gas with some mild constipation.  Denies fevers, chills.  He does report he has routine colonoscopies.  Denies dysuria, urinary retention.  Denies history of liver disease.        Prior to Admission Medications   Prescriptions Last Dose Informant Patient Reported? Taking?   Blood Glucose Monitoring Suppl (OneTouch Verio Reflect) w/Device KIT  Self No No   Sig: Check blood sugars once daily. Please substitute with appropriate alternative as covered by patient's insurance. Dx: E11.65   Empagliflozin (JARDIANCE) 10 MG TABS tablet  Self No No   Sig: Take 1 tablet (10 mg total) by mouth daily   OneTouch Delica Lancets 33G MISC  Self No No   Sig: Check blood sugars once daily. Please substitute with appropriate alternative as covered by patient's insurance. Dx: E11.65   albuterol (PROVENTIL HFA,VENTOLIN HFA) 90 mcg/act inhaler  Self No No   Sig: Inhale 2 puffs every 4 (four) hours as needed for wheezing   aspirin 81 mg chewable tablet  Self No No   Sig: Chew 1 tablet (81 mg total) daily   atorvastatin (LIPITOR) 80 mg tablet  Self No No   Sig: TAKE 1 TABLET BY MOUTH  DAILY WITH DINNER   cholecalciferol (VITAMIN D3) 1,000 units tablet  Self Yes No   Sig: Take 2,000 Units by mouth daily   docusate sodium (COLACE) 100 mg capsule  Self No No   Sig: Take 1 capsule (100 mg total) by mouth 2 (two) times a day   dulaglutide  (Trulicity) 3 MG/0.5ML injection   No No   Sig: INJECT THE CONTENTS OF ONE PEN  SUBCUTANEOUSLY WEEKLY AS  DIRECTED   glucose blood (OneTouch Verio) test strip  Self No No   Sig: Check blood sugars once daily. Please substitute with appropriate alternative as covered by patient's insurance. Dx: E11.65   glyBURIDE (DIABETA) 5 mg tablet   No No   Sig: TAKE 1 TABLET BY MOUTH TWICE  DAILY   lisinopril-hydrochlorothiazide (PRINZIDE,ZESTORETIC) 20-25 MG per tablet   No No   Sig: TAKE 1 TABLET BY MOUTH DAILY      Facility-Administered Medications: None       Past Medical History:   Diagnosis Date    Arthritis     Asthma     last assessed: April 1, 2015    Balanitis     last assessed: July 9, 2014    Cataract     Chronic kidney disease     Chronic kidney disease, stage 3 (HCC)     last assessed: Jan 17, 2018    Colon polyp     COVID-19 2022    Diabetes mellitus (HCC)     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(HCC)     last assessed: Jan 17, 2018    GERD (gastroesophageal reflux disease)     Hypercholesterolemia     Hypertension     Pneumonia     last assessed: Feb 25, 2013    Shortness of breath     Stroke (HCC) 02/2022    Use of cane as ambulatory aid     sometimes    Wears glasses        Past Surgical History:   Procedure Laterality Date    ABDOMINAL SURGERY      APPENDECTOMY  1965    CATARACT EXTRACTION      COLONOSCOPY      EGD      HERNIA REPAIR      HERNIA REPAIR  2007    x2    HERNIA REPAIR Left 11/28/2022    Procedure: REPAIR HERNIA INGUINAL;  Surgeon: Robert To MD;  Location: [facility];  Service: General    OTHER SURGICAL HISTORY  1965    Perforated duodenol ulcer surgery     OK RPR/ADVMNT FLXR TDN N/Z/2 W/O FR GRAFT EA TENDON Left 03/17/2021    Procedure: Left thumb EPL repair;  Surgeon: Juan Alberto Oseguera MD;  Location: [facility];  Service: Orthopedics       Family History   Problem Relation Age of Onset    Pancreatic cancer Mother     Colon cancer Father     Diabetes Father         mellitus     No Known Problems  "Sister     Diabetes Sister     Nephrolithiasis Sister      I have reviewed and agree with the history as documented.    E-Cigarette/Vaping    E-Cigarette Use Never User      E-Cigarette/Vaping Substances    Nicotine No     THC No     CBD No     Flavoring No     Other No     Unknown No      Social History     Tobacco Use    Smoking status: Former     Current packs/day: 0.00     Average packs/day: 2.0 packs/day for 10.0 years (20.0 ttl pk-yrs)     Types: Cigarettes     Start date:      Quit date:      Years since quittin.5     Passive exposure: Past    Smokeless tobacco: Former     Quit date:     Tobacco comments:     former smoker noted in \"[employer]\" Quit in  - never used chewing tobacco    Vaping Use    Vaping status: Never Used   Substance Use Topics    Alcohol use: Yes     Alcohol/week: 1.0 standard drink of alcohol     Types: 1 Standard drinks or equivalent per week     Comment: rare  socially    Drug use: Never       Review of Systems   Constitutional:  Negative for chills and fever.   HENT:  Negative for ear pain and sore throat.    Eyes:  Negative for pain and visual disturbance.   Respiratory:  Negative for cough and shortness of breath.    Cardiovascular:  Negative for chest pain and palpitations.   Gastrointestinal:  Positive for abdominal pain and nausea. Negative for vomiting.   Genitourinary:  Negative for dysuria and hematuria.   Musculoskeletal:  Negative for arthralgias and back pain.   Skin:  Negative for color change and rash.   Neurological:  Negative for seizures and syncope.   All other systems reviewed and are negative.      Physical Exam  Physical Exam  Vitals and nursing note reviewed.   Constitutional:       General: He is not in acute distress.     Appearance: Normal appearance. He is not ill-appearing, toxic-appearing or diaphoretic.   HENT:      Head: Normocephalic and atraumatic.   Eyes:      General: No scleral icterus.        Right eye: No discharge.         Left " eye: No discharge.      Extraocular Movements: Extraocular movements intact.      Conjunctiva/sclera: Conjunctivae normal.   Cardiovascular:      Rate and Rhythm: Normal rate.      Pulses: Normal pulses.      Heart sounds: Normal heart sounds. No murmur heard.     No friction rub. No gallop.   Pulmonary:      Effort: Pulmonary effort is normal. No respiratory distress.      Breath sounds: Normal breath sounds. No stridor. No wheezing, rhonchi or rales.   Abdominal:      General: Abdomen is flat. Bowel sounds are normal. There is no distension.      Palpations: Abdomen is soft.      Tenderness: There is generalized abdominal tenderness and tenderness in the epigastric area. There is no guarding or rebound.      Comments: No rigidity, guarding, rebound   Musculoskeletal:         General: No swelling. Normal range of motion.      Cervical back: Normal range of motion. No rigidity.      Right lower leg: No edema.      Left lower leg: No edema.   Skin:     General: Skin is warm and dry.      Capillary Refill: Capillary refill takes less than 2 seconds.      Coloration: Skin is not jaundiced.      Findings: No bruising or lesion.   Neurological:      General: No focal deficit present.      Mental Status: He is alert and oriented to person, place, and time. Mental status is at baseline.   Psychiatric:         Mood and Affect: Mood normal.         Behavior: Behavior normal.         Thought Content: Thought content normal.         Judgment: Judgment normal.         Vital Signs  ED Triage Vitals   Temperature Pulse Respirations Blood Pressure SpO2   07/16/24 2029 07/16/24 2029 07/16/24 2029 07/16/24 2029 07/16/24 2029   97.8 °F (36.6 °C) 65 20 (!) 191/84 98 %      Temp Source Heart Rate Source Patient Position - Orthostatic VS BP Location FiO2 (%)   07/16/24 2029 -- 07/16/24 2253 07/16/24 2253 --   Oral  Lying Right arm       Pain Score       07/16/24 2029       10 - Worst Possible Pain           Vitals:    07/16/24 2200  07/16/24 2253 07/17/24 0724 07/17/24 0725   BP: 151/74 154/78 121/70 121/70   Pulse: 60 68     Patient Position - Orthostatic VS:  Lying           Visual Acuity      ED Medications  Medications   aspirin chewable tablet 81 mg (has no administration in time range)   Cholecalciferol (VITAMIN D3) tablet 2,000 Units (has no administration in time range)   lisinopril (ZESTRIL) tablet 20 mg (has no administration in time range)     And   hydroCHLOROthiazide tablet 25 mg (has no administration in time range)   atorvastatin (LIPITOR) tablet 80 mg (has no administration in time range)   sodium chloride 0.9 % infusion (150 mL/hr Intravenous New Bag 7/16/24 2338)   acetaminophen (TYLENOL) tablet 650 mg (has no administration in time range)   ondansetron (ZOFRAN) injection 4 mg (has no administration in time range)   enoxaparin (LOVENOX) subcutaneous injection 40 mg (has no administration in time range)   HYDROmorphone (DILAUDID) injection 0.5 mg (has no administration in time range)   HYDROmorphone (DILAUDID) injection 1 mg (1 mg Intravenous Given 7/16/24 2359)   insulin lispro (HumALOG/ADMELOG) 100 units/mL subcutaneous injection 1-5 Units (has no administration in time range)   HYDROmorphone (DILAUDID) injection 0.5 mg (0.5 mg Intravenous Given 7/16/24 2059)   ondansetron (ZOFRAN) injection 4 mg (4 mg Intravenous Given 7/16/24 2058)       Diagnostic Studies  Results Reviewed       Procedure Component Value Units Date/Time    Comprehensive metabolic panel [[id number]]  (Abnormal) Collected: 07/17/24 0530    Lab Status: Final result Specimen: Blood from Arm, Right Updated: 07/17/24 0604     Sodium 138 mmol/L      Potassium 4.3 mmol/L      Chloride 106 mmol/L      CO2 24 mmol/L      ANION GAP 8 mmol/L      BUN 31 mg/dL      Creatinine 1.67 mg/dL      Glucose 122 mg/dL      Calcium 8.6 mg/dL      AST 13 U/L      ALT 17 U/L      Alkaline Phosphatase 59 U/L      Total Protein 6.2 g/dL      Albumin 4.0 g/dL      Total Bilirubin 0.78  mg/dL      eGFR 38 ml/min/1.73sq m     Narrative:      National Kidney Disease Foundation guidelines for Chronic Kidney Disease (CKD):     Stage 1 with normal or high GFR (GFR > 90 mL/min/1.73 square meters)    Stage 2 Mild CKD (GFR = 60-89 mL/min/1.73 square meters)    Stage 3A Moderate CKD (GFR = 45-59 mL/min/1.73 square meters)    Stage 3B Moderate CKD (GFR = 30-44 mL/min/1.73 square meters)    Stage 4 Severe CKD (GFR = 15-29 mL/min/1.73 square meters)    Stage 5 End Stage CKD (GFR <15 mL/min/1.73 square meters)  Note: GFR calculation is accurate only with a steady state creatinine    CBC (With Platelets) [[id number]]  (Normal) Collected: 07/17/24 0530    Lab Status: Final result Specimen: Blood from Arm, Right Updated: 07/17/24 0548     WBC 8.05 Thousand/uL      RBC 4.27 Million/uL      Hemoglobin 13.0 g/dL      Hematocrit 38.6 %      MCV 90 fL      MCH 30.4 pg      MCHC 33.7 g/dL      RDW 12.6 %      Platelets 223 Thousands/uL      MPV 10.2 fL     HS Troponin I 2hr [[id number]]  (Normal) Collected: 07/16/24 2306    Lab Status: Final result Specimen: Blood from Arm, Right Updated: 07/17/24 0213     hs TnI 2hr 9 ng/L      Delta 2hr hsTnI 0 ng/L     HS Troponin I 4hr [[id number]]  (Normal) Collected: 07/17/24 0105    Lab Status: Final result Specimen: Blood Updated: 07/17/24 0135     hs TnI 4hr 9 ng/L      Delta 4hr hsTnI 0 ng/L     Platelet count [[id number]]  (Normal) Collected: 07/16/24 2306    Lab Status: Final result Specimen: Blood from Arm, Right Updated: 07/16/24 2316     Platelets 238 Thousands/uL      MPV 10.2 fL     HS Troponin 0hr (reflex protocol) [[id number]]  (Normal) Collected: 07/16/24 2058    Lab Status: Final result Specimen: Blood from Arm, Left Updated: 07/16/24 2132     hs TnI 0hr 9 ng/L     Basic metabolic panel [[id number]]  (Abnormal) Collected: 07/16/24 2058    Lab Status: Final result Specimen: Blood from Arm, Left Updated: 07/16/24 2125     Sodium 138 mmol/L      Potassium 4.3 mmol/L       Chloride 103 mmol/L      CO2 27 mmol/L      ANION GAP 8 mmol/L      BUN 32 mg/dL      Creatinine 1.80 mg/dL      Glucose 179 mg/dL      Calcium 9.5 mg/dL      eGFR 35 ml/min/1.73sq m     Narrative:      National Kidney Disease Foundation guidelines for Chronic Kidney Disease (CKD):     Stage 1 with normal or high GFR (GFR > 90 mL/min/1.73 square meters)    Stage 2 Mild CKD (GFR = 60-89 mL/min/1.73 square meters)    Stage 3A Moderate CKD (GFR = 45-59 mL/min/1.73 square meters)    Stage 3B Moderate CKD (GFR = 30-44 mL/min/1.73 square meters)    Stage 4 Severe CKD (GFR = 15-29 mL/min/1.73 square meters)    Stage 5 End Stage CKD (GFR <15 mL/min/1.73 square meters)  Note: GFR calculation is accurate only with a steady state creatinine    Hepatic function panel [[id number]]  (Normal) Collected: 07/16/24 2058    Lab Status: Final result Specimen: Blood from Arm, Left Updated: 07/16/24 2125     Total Bilirubin 0.64 mg/dL      Bilirubin, Direct 0.17 mg/dL      Alkaline Phosphatase 72 U/L      AST 16 U/L      ALT 21 U/L      Total Protein 7.5 g/dL      Albumin 4.8 g/dL     Lipase [[id number]]  (Abnormal) Collected: 07/16/24 2058    Lab Status: Final result Specimen: Blood from Arm, Left Updated: 07/16/24 2125     Lipase 346 u/L     CBC and differential [[id number]] Collected: 07/16/24 2058    Lab Status: Final result Specimen: Blood from Arm, Left Updated: 07/16/24 2107     WBC 8.47 Thousand/uL      RBC 4.97 Million/uL      Hemoglobin 14.9 g/dL      Hematocrit 45.2 %      MCV 91 fL      MCH 30.0 pg      MCHC 33.0 g/dL      RDW 12.6 %      MPV 10.7 fL      Platelets 271 Thousands/uL      nRBC 0 /100 WBCs      Segmented % 74 %      Immature Grans % 1 %      Lymphocytes % 16 %      Monocytes % 8 %      Eosinophils Relative 1 %      Basophils Relative 0 %      Absolute Neutrophils 6.23 Thousands/µL      Absolute Immature Grans 0.12 Thousand/uL      Absolute Lymphocytes 1.32 Thousands/µL      Absolute Monocytes 0.71 Thousand/µL       Eosinophils Absolute 0.06 Thousand/µL      Basophils Absolute 0.03 Thousands/µL                    CT abdomen pelvis wo contrast   Final Result by Sharyn Durbin MD (07/16 2145)      No acute findings in the abdomen or pelvis within the limits of unenhanced technique.      Workstation performed: [id number]         US right upper quadrant    (Results Pending)              Procedures  Procedures         ED Course  ED Course as of 07/17/24 0731   Tue Jul 16, 2024   2114 CBC and differential                                 SBIRT 22yo+      Flowsheet Row Most Recent Value   Initial Alcohol Screen: US AUDIT-C     1. How often do you have a drink containing alcohol? 0 Filed at: 07/16/2024 2038   2. How many drinks containing alcohol do you have on a typical day you are drinking?  0 Filed at: 07/16/2024 2038   3b. FEMALE Any Age, or MALE 65+: How often do you have 4 or more drinks on one occassion? 0 Filed at: 07/16/2024 2038   Audit-C Score 0 Filed at: 07/16/2024 2038   YEE: How many times in the past year have you...    Used an illegal drug or used a prescription medication for non-medical reasons? Never Filed at: 07/16/2024 2038                      Medical Decision Making  78y M p/w abdominal pain    DDx including but not limited to: appendicitis, gastroenteritis, gastritis, PUD, GERD, gastroparesis, hepatitis, pancreatitis, colitis, enteritis, food poisoning, mesenteric adenitis, IBD, IBS, ileus, bowel obstruction, volvulus, cholecystitis, biliary colic, choledocholithiasis, perforated viscus, splenic etiology, constipation, intussusception, renal colic, pyelonephritis    Plan: bloodwork, imaging, analgesia, antiemetic    Patient bloodwork remarkable for pancreatitis. Pain improved while in the ED, nausea improved. Unsure of etiology of pancreatitis at this time, will admit for further work-up.        Amount and/or Complexity of Data Reviewed  Labs: ordered. Decision-making details documented in ED Course.  Radiology:  ordered.    Risk  Prescription drug management.  Decision regarding hospitalization.                 Disposition  Final diagnoses:   Abdominal pain   Pancreatitis     Time reflects when diagnosis was documented in both MDM as applicable and the Disposition within this note       Time User Action Codes Description Comment    7/16/2024 10:16 PM Belkis Bonilla Add [R10.9] Abdominal pain     7/16/2024 10:16 PM Belkis Bonilla Add [K85.90] Pancreatitis           ED Disposition       ED Disposition   Admit    Condition   Stable    Date/Time   Tue Jul 16, 2024 2411    Comment   Case was discussed with estefania and the patient's admission status was agreed to be Admission Status: observation status to the service of Dr. Levy .               Follow-up Information    None         Current Discharge Medication List        CONTINUE these medications which have NOT CHANGED    Details   albuterol (PROVENTIL HFA,VENTOLIN HFA) 90 mcg/act inhaler Inhale 2 puffs every 4 (four) hours as needed for wheezing  Qty: 8.5 g, Refills: 1    Comments: Substitution to a formulary equivalent within the same pharmaceutical class is authorized.  Associated Diagnoses: Moderate asthma without complication, unspecified whether persistent      aspirin 81 mg chewable tablet Chew 1 tablet (81 mg total) daily  Qty: 30 tablet, Refills: 0    Associated Diagnoses: Acute CVA (cerebrovascular accident) (HCC)      atorvastatin (LIPITOR) 80 mg tablet TAKE 1 TABLET BY MOUTH  DAILY WITH DINNER  Qty: 90 tablet, Refills: 3    Comments: Please send a replace/new response with 90-Day Supply if appropriate to maximize member benefit. Requesting 1 year supply.  Associated Diagnoses: Acute CVA (cerebrovascular accident) (HCC)      Blood Glucose Monitoring Suppl (OneTouch Verio Reflect) w/Device KIT Check blood sugars once daily. Please substitute with appropriate alternative as covered by patient's insurance. Dx: E11.65  Qty: 1 kit, Refills: 0    Associated Diagnoses:  Type 2 diabetes mellitus with stage 3b chronic kidney disease, without long-term current use of insulin ([facility])      cholecalciferol (VITAMIN D3) 1,000 units tablet Take 2,000 Units by mouth daily      docusate sodium (COLACE) 100 mg capsule Take 1 capsule (100 mg total) by mouth 2 (two) times a day    Associated Diagnoses: Constipation, unspecified constipation type      dulaglutide (Trulicity) 3 MG/0.5ML injection INJECT THE CONTENTS OF ONE PEN  SUBCUTANEOUSLY WEEKLY AS  DIRECTED  Qty: 6 mL, Refills: 1    Comments: Requesting 1 year supply  Associated Diagnoses: Type 2 diabetes mellitus with stage 3b chronic kidney disease, without long-term current use of insulin ([facility])      Empagliflozin (JARDIANCE) 10 MG TABS tablet Take 1 tablet (10 mg total) by mouth daily  Qty: 90 tablet, Refills: 3    Associated Diagnoses: Type 2 diabetes mellitus with stage 3b chronic kidney disease, without long-term current use of insulin ([facility])      glucose blood (OneTouch Verio) test strip Check blood sugars once daily. Please substitute with appropriate alternative as covered by patient's insurance. Dx: E11.65  Qty: 100 each, Refills: 3    Associated Diagnoses: Type 2 diabetes mellitus with stage 3b chronic kidney disease, without long-term current use of insulin ([facility])      glyBURIDE (DIABETA) 5 mg tablet TAKE 1 TABLET BY MOUTH TWICE  DAILY  Qty: 180 tablet, Refills: 1    Associated Diagnoses: Type 2 diabetes mellitus with complication, without long-term current use of insulin ([facility])      lisinopril-hydrochlorothiazide (PRINZIDE,ZESTORETIC) 20-25 MG per tablet TAKE 1 TABLET BY MOUTH DAILY  Qty: 90 tablet, Refills: 1    Comments: Please send a replace/new response with 90-Day Supply if appropriate to maximize member benefit. Requesting 1 year supply.  Associated Diagnoses: Essential hypertension      OneTouch Delica Lancets 33G MISC Check blood sugars once daily. Please substitute with appropriate alternative as covered by patient's  insurance. Dx: E11.65  Qty: 100 each, Refills: 3    Associated Diagnoses: Type 2 diabetes mellitus with stage 3b chronic kidney disease, without long-term current use of insulin (HCC)             No discharge procedures on file.    PDMP Review         Value Time User    PDMP Reviewed  Yes 2/18/2022  2:37 PM Clotilde Davidson DO            ED Provider  Electronically Signed by             Belkis Bonilla DO  07/17/24 0731

## 2024-07-17 NOTE — UTILIZATION REVIEW
Initial Clinical Review      OBS order 7/16 2217 converted to IP on 7/17 @ 1610 for continued treatment of pancreatitis     Admission: Date/Time/Statement:   Admission Orders (From admission, onward)       Ordered        07/17/24 1610  INPATIENT ADMISSION  Once            07/16/24 2217  Place in Observation  Once                           INPATIENT ADMISSION     Standing Status:   Standing     Number of Occurrences:   1     Order Specific Question:   Level of Care     Answer:   Med Surg [16]     Order Specific Question:   Estimated length of stay     Answer:   More than 2 Midnights     Order Specific Question:   Certification     Answer:   I certify that inpatient services are medically necessary for this patient for a duration of greater than two midnights. See H&P and MD Progress Notes for additional information about the patient's course of treatment.     ED Arrival Information       Expected   -    Arrival   7/16/2024 20:18    Acuity   Urgent              Means of arrival   Walk-In    Escorted by   Spouse    Service   Hospitalist    Admission type   Emergency              Arrival complaint   Abdominal Pain & Nausea             Chief Complaint   Patient presents with    Abdominal Pain     Generalized abd pain since yesterday, + nausea - vomitting. Having Bms regularly        Initial Presentation: 78 y.o. male to ED from home w/ diabetes mellitus type 2 which appears to be well-controlled, coronary artery disease, CKD stage IIIb, hypertension and hyperlipidemia , perforated peptic ulcer . Abdominal pain since yesterday which reported to be in the mid abdomen . Lipase 390  , CT wnl . Admitted OBS status w/ pancreatitis . Plan for IVF , pain control , liquid diet . HLD lipitor . HTN lisinopril and HCTZ . DM SSI and monitor.     Anticipated Length of Stay/Certification Statement:  Patient will be admitted on an Observation basis with an anticipated length of stay of less than 2 midnights.   Justification for Hospital  Stay:      7/17 IM note   Acute pancreatitis . Check IGG4 , cont IVF . Tolerating clear liq diet , adv for dinner . HLD cont lipitor . Pain better compared to yesterday .     Date: 7/18  Day 3: Has surpassed a 2nd midnight with active treatments and services. Cont stay for pancreatitis . US w/ pancreatic edema suggestive of acute pancreatitis . Cont w/ some nausea and vomiting . Did not tolerate solid diet well , downgrade to clear liq diet . + abd tenderness . Hold IVF to prevent volume overload at this time . 2615 ml output last 24 hrs .     ED Triage Vitals [07/16/24 2029]   Temperature Pulse Respirations Blood Pressure SpO2 Pain Score   97.8 °F (36.6 °C) 65 20 (!) 191/84 98 % 10 - Worst Possible Pain     Weight (last 2 days)       Date/Time Weight    07/16/24 2253 75.4 (166.23)    07/16/24 2029 78 (172)            Vital Signs (last 3 days)       Date/Time Temp Pulse Resp BP MAP (mmHg) SpO2 O2 Device Patient Position - Orthostatic VS Pain    07/18/24 0935 -- -- -- -- -- -- -- -- 10 - Worst Possible Pain    07/18/24 07:39:07 98.1 °F (36.7 °C) 60 -- 145/77 100 97 % -- -- --    07/17/24 21:49:52 -- -- -- 145/77 100 -- -- -- --    07/17/24 21:46:27 98.3 °F (36.8 °C) -- -- 145/78 100 -- -- -- --    07/17/24 2113 -- -- -- -- -- -- None (Room air) -- 2    07/17/24 1500 97.4 °F (36.3 °C) 18 -- 123/64 -- 94 % -- Lying --    07/17/24 08:47:07 97.4 °F (36.3 °C) 73 12 121/69 86 94 % None (Room air) Lying 2    07/17/24 0750 -- -- -- -- -- -- -- -- No Pain    07/17/24 07:25:08 97.8 °F (36.6 °C) -- -- 121/70 87 -- -- -- --    07/17/24 07:24:52 97.8 °F (36.6 °C) -- -- 121/70 87 -- -- -- --    07/16/24 2359 -- -- -- -- -- -- -- -- 8    07/16/24 2308 -- -- -- -- -- -- None (Room air) -- --    07/16/24 2253 97.5 °F (36.4 °C) 68 20 154/78 103 96 % None (Room air) Lying --    07/16/24 2200 -- 60 20 151/74 106 96 % -- -- --    07/16/24 2100 -- 66 18 143/81 104 95 % -- -- --    07/16/24 2059 -- -- -- -- -- -- -- -- 9    07/16/24 2029  97.8 °F (36.6 °C) 65 20 191/84 -- 98 % None (Room air) -- 10 - Worst Possible Pain              Pertinent Labs/Diagnostic Test Results:   Radiology:  US right upper quadrant   Final Interpretation by Tyler Mojica MD (07/17 0908)      Enlarged echogenic pancreas presumed related to acute pancreatitis. No fluid collections appreciated.      Minimal gallbladder sludge. Tiny polyp versus adherent gallstone. No biliary dilatation.      Workstation performed: [id number]         CT abdomen pelvis wo contrast   Final Interpretation by Sharyn Durbin MD (07/16 2145)      No acute findings in the abdomen or pelvis within the limits of unenhanced technique.      Workstation performed: [id number]           Cardiology:  ECG 12 lead   Final Result by Tracy Stack MD (07/17 7045)   Normal sinus rhythm   Normal ECG   Confirmed by Tracy Stack ([id number]) on 7/17/2024 3:35:27 PM        GI:  No orders to display           Results from last 7 days   Lab Units 07/18/24  0449 07/17/24  0530 07/16/24  2306 07/16/24 2058   WBC Thousand/uL 6.34 8.05  --  8.47   HEMOGLOBIN g/dL 10.7* 13.0  --  14.9   HEMATOCRIT % 31.8* 38.6  --  45.2   PLATELETS Thousands/uL 167 223 238 271   TOTAL NEUT ABS Thousands/µL 3.82  --   --  6.23         Results from last 7 days   Lab Units 07/17/24  0530 07/16/24 2058   SODIUM mmol/L 138 138   POTASSIUM mmol/L 4.3 4.3   CHLORIDE mmol/L 106 103   CO2 mmol/L 24 27   ANION GAP mmol/L 8 8   BUN mg/dL 31* 32*   CREATININE mg/dL 1.67* 1.80*   EGFR ml/min/1.73sq m 38 35   CALCIUM mg/dL 8.6 9.5     Results from last 7 days   Lab Units 07/17/24  0530 07/16/24 2058   AST U/L 13 16   ALT U/L 17 21   ALK PHOS U/L 59 72   TOTAL PROTEIN g/dL 6.2* 7.5   ALBUMIN g/dL 4.0 4.8   TOTAL BILIRUBIN mg/dL 0.78 0.64   BILIRUBIN DIRECT mg/dL  --  0.17     Results from last 7 days   Lab Units 07/18/24  0737 07/17/24  1521 07/17/24  1114 07/17/24  0723 07/16/24  2321   POC GLUCOSE mg/dl 112 103 166* 95 140     Results from  last 7 days   Lab Units 07/17/24  0530 07/16/24 2058   GLUCOSE RANDOM mg/dL 122 179*         Results from last 7 days   Lab Units 07/17/24  0105 07/16/24  2306 07/16/24 2058   HS TNI 0HR ng/L  --   --  9   HS TNI 2HR ng/L  --  9  --    HSTNI D2 ng/L  --  0  --    HS TNI 4HR ng/L 9  --   --    HSTNI D4 ng/L 0  --   --          Results from last 7 days   Lab Units 07/16/24 2058   LIPASE u/L 346*             ED Treatment-Medication Administration from 07/16/2024 2018 to 07/16/2024 2237         Date/Time Order Dose Route Action     07/16/2024 2059 HYDROmorphone (DILAUDID) injection 0.5 mg 0.5 mg Intravenous Given     07/16/2024 2058 ondansetron (ZOFRAN) injection 4 mg 4 mg Intravenous Given            Past Medical History:   Diagnosis Date    Arthritis     Asthma     last assessed: April 1, 2015    Balanitis     last assessed: July 9, 2014    Cataract     Chronic kidney disease     Chronic kidney disease, stage 3 (HCC)     last assessed: Jan 17, 2018    Colon polyp     COVID-19 2022    Diabetes mellitus (HCC)     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(HCC)     last assessed: Jan 17, 2018    GERD (gastroesophageal reflux disease)     Hypercholesterolemia     Hypertension     Pneumonia     last assessed: Feb 25, 2013    Shortness of breath     Stroke (HCC) 02/2022    Use of cane as ambulatory aid     sometimes    Wears glasses      Present on Admission:   Controlled type 2 diabetes mellitus with chronic kidney disease, without long-term current use of insulin (HCC)   Benign essential hypertension      Admitting Diagnosis: Pancreatitis [K85.90]  Abdominal pain [R10.9]  Age/Sex: 78 y.o. male  Admission Orders:  Scheduled Medications:  aspirin, 81 mg, Oral, Daily  atorvastatin, 80 mg, Oral, Daily With Dinner  cholecalciferol, 2,000 Units, Oral, Daily  enoxaparin, 40 mg, Subcutaneous, Daily  lisinopril, 20 mg, Oral, Daily   And  hydroCHLOROthiazide, 25 mg, Oral, Daily  insulin lispro, 1-5 Units, Subcutaneous, TID  AC  pantoprazole, 40 mg, Intravenous, Q12H LAURYN  sucralfate, 1 g, Oral, 4x Daily (AC & HS)      Continuous IV Infusions:  sodium chloride, 150 mL/hr, Intravenous, Continuous      PRN Meds:  acetaminophen, 650 mg, Oral, Q6H PRN  HYDROmorphone, 0.5 mg, Intravenous, Q4H PRN  HYDROmorphone, 1 mg, Intravenous, Q4H PRN  7/16 x1  7/18 x1  ondansetron, 4 mg, Intravenous, Q6H PRN    Fingerstick ac and hs   I&O   Clear liq diet   Up and OOB       Network Utilization Review Department  ATTENTION: Please call with any questions or concerns to [phone] and carefully listen to the prompts so that you are directed to the right person. All voicemails are confidential.   For Discharge needs, contact Care Management DC Support Team at [phone] opt. [phone]  Send all requests for admission clinical reviews, approved or denied determinations and any other requests to dedicated fax number below belonging to the [facility] where the patient is receiving treatment. List of dedicated fax numbers for the Facilities:  FACILITY NAME UR FAX NUMBER   ADMISSION DENIALS (Administrative/Medical Necessity) [phone]   DISCHARGE SUPPORT TEAM (NETWORK) [phone]   PARENT CHILD HEALTH (Maternity/NICU/Pediatrics) [phone]   [facility] [phone]   [facility] [phone]   [facility] [phone]   [facility] [phone]   [facility] [phone]   [facility] [phone]   [facility] [phone]   [facility] [phone]   [facility] [phone]   [facility] [phone]   [facility] [phone]   [facility] [phone]

## 2024-07-17 NOTE — ASSESSMENT & PLAN NOTE
Lab Results   Component Value Date    HGBA1C 7.7 (H) 05/02/2024       Recent Labs     07/16/24  2321 07/17/24  0723 07/17/24  1114   POCGLU 140 95 166*       Blood Sugar Average: Last 72 hrs:  (P) [id number]    Glycemic control is adequate, most recent A1c last month was 7.7, hold all oral medications and also Trulicity, use insulin sliding scale for now.  Kidney function seems to be at this time at baseline of CKD stage IIIb.

## 2024-07-17 NOTE — ASSESSMENT & PLAN NOTE
Etiology of her pancreatitis is not quite known, patient does not consume alcohol, he never reported having any gallbladder disease although we are going to investigate, he has not added any new medication to his regimen, most recent lipid panel showed triglyceride of 62, serum calcium level is normal he does not report any recent viral infection to suspect.  Idiopathic acute pancreatitis is likely, other gastrointestinal disturbances might raise the level of lipase as well.  We were unable to use contrast enhanced CT of the abdomen to detect any evidence of pancreatitis due to underlying kidney dysfunction, will place on IV fluid with normal saline at 150 mill hourly, use Dilaudid for pain control which apparently worked well in the ER.  Will keep on clear liquid diet and place in observation.In case of recurrence of the same presentation in the future, 1 may consider checking for IgG4 related disease.

## 2024-07-17 NOTE — H&P
"[facility]  H&P  Name: Bree Barrios 78 y.o. male I MRN: [id number]  Unit/Bed#: -01 I Date of Admission: 7/16/2024   Date of Service: 7/16/2024 I Hospital Day: 0      Assessment & Plan   * Idiopathic acute pancreatitis  Assessment & Plan  Etiology of her pancreatitis is not quite known, patient does not consume alcohol, he never reported having any gallbladder disease although we are going to investigate, he has not added any new medication to his regimen, most recent lipid panel showed triglyceride of 62, serum calcium level is normal he does not report any recent viral infection to suspect.  Idiopathic acute pancreatitis is likely, other gastrointestinal disturbances might raise the level of lipase as well.  We were unable to use contrast enhanced CT of the abdomen to detect any evidence of pancreatitis due to underlying kidney dysfunction, will place on IV fluid with normal saline at 150 mill hourly, use Dilaudid for pain control which apparently worked well in the ER.  Will keep on clear liquid diet and place in observation.In case of recurrence of the same presentation in the future, 1 may consider checking for IgG4 related disease.    Mixed hyperlipidemia  Assessment & Plan  Continue with home dose of Lipitor 80 mg daily.      Benign essential hypertension  Assessment & Plan  Blood pressure is controlled, initially reading was high but that I assume was because of pain, continue with home regimen of lisinopril and HCTZ.    Controlled type 2 diabetes mellitus with chronic kidney disease, without long-term current use of insulin ([facility])  Assessment & Plan  Lab Results   Component Value Date    HGBA1C 7.7 (H) 05/02/2024       No results for input(s): \"POCGLU\" in the last 72 hours.    Blood Sugar Average: Last 72 hrs:  Glycemic control is adequate, most recent A1c last month was 7.7, hold all oral medications and also Trulicity, use insulin sliding scale for now.  Kidney function seems " to be at this time at baseline of CKD stage IIIb.             VTE Prophylaxis: Enoxaparin (Lovenox)  / reason for no mechanical VTE prophylaxis not required    Code Status: Full code  POLST: There is no POLST form on file for this patient (pre-hospital)  Discussion with family: No family at bedside    Anticipated Length of Stay:  Patient will be admitted on an Observation basis with an anticipated length of stay of less than 2 midnights.   Justification for Hospital Stay:     Total Time for Visit, including Counseling / Coordination of Care: 30 minutes.  Greater than 50% of this total time spent on direct patient counseling and coordination of care.    Chief Complaint:   Abdominal pain    History of Present Illness:    Bree Barrios is a 78 y.o. male with prior history of diabetes mellitus type 2 which appears to be well-controlled, coronary artery disease, CKD stage IIIb, hypertension and hyperlipidemia with remote history of perforated peptic ulcer manage surgically back in 1996, patient started having abdominal pain since yesterday which reported to be in the mid abdomen with no clear radiation, nothing apparently helped him, he always felt nauseous since yesterday but did not vomit, no change in his bowel habit, due to nonresolving nature of his pain, he decided to come to the hospital.  He is decreased kidney function at baseline prevented using contrast and so only noncontrast CT of the abdomen was done which showed no finding, his lipase was slightly elevated at 390, patient was decided to be placed in observation and be managed for pain control and IV hydration with impression of acute pancreatitis.    He denies having any prior gallbladder disease, history of alcohol intake, any recent viral infection, his last lipid panel last month was normal showing no evidence of hypertriglyceridemia, his calcium level was normal, he had no recent medication added to his regimen.    Review of Systems:    Review of  Systems   Gastrointestinal:  Positive for abdominal pain and nausea.   All other systems reviewed and are negative.      Past Medical and Surgical History:     Past Medical History:   Diagnosis Date    Arthritis     Asthma     last assessed: April 1, 2015    Balanitis     last assessed: July 9, 2014    Cataract     Chronic kidney disease     Chronic kidney disease, stage 3 (HCC)     last assessed: Jan 17, 2018    Colon polyp     COVID-19 2022    Diabetes mellitus (HCC)     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(HCC)     last assessed: Jan 17, 2018    GERD (gastroesophageal reflux disease)     Hypercholesterolemia     Hypertension     Pneumonia     last assessed: Feb 25, 2013    Shortness of breath     Stroke ([facility]) 02/2022    Use of cane as ambulatory aid     sometimes    Wears glasses        Past Surgical History:   Procedure Laterality Date    ABDOMINAL SURGERY      APPENDECTOMY  1965    CATARACT EXTRACTION      COLONOSCOPY      EGD      HERNIA REPAIR      HERNIA REPAIR  2007    x2    HERNIA REPAIR Left 11/28/2022    Procedure: REPAIR HERNIA INGUINAL;  Surgeon: Robert To MD;  Location: [facility];  Service: General    OTHER SURGICAL HISTORY  1965    Perforated duodenol ulcer surgery     OR RPR/ADVMNT FLXR TDN N/Z/2 W/O FR GRAFT EA TENDON Left 03/17/2021    Procedure: Left thumb EPL repair;  Surgeon: Juan Alberto Oseguera MD;  Location: [facility];  Service: Orthopedics       Meds/Allergies:    Prior to Admission medications    Medication Sig Start Date End Date Taking? Authorizing Provider   albuterol (PROVENTIL HFA,VENTOLIN HFA) 90 mcg/act inhaler Inhale 2 puffs every 4 (four) hours as needed for wheezing 10/5/23   Lauren Amador MD   aspirin 81 mg chewable tablet Chew 1 tablet (81 mg total) daily 2/19/22 4/15/24  Clotilde Davidson DO   atorvastatin (LIPITOR) 80 mg tablet TAKE 1 TABLET BY MOUTH  DAILY WITH DINNER 10/2/23   Lauren Amador MD   Blood Glucose Monitoring Suppl (OneTouch Verio Reflect) w/Device KIT  Check blood sugars once daily. Please substitute with appropriate alternative as covered by patient's insurance. Dx: E11.65 10/5/23   Lauren Amador MD   cholecalciferol (VITAMIN D3) 1,000 units tablet Take 2,000 Units by mouth daily    Historical Provider, MD   docusate sodium (COLACE) 100 mg capsule Take 1 capsule (100 mg total) by mouth 2 (two) times a day 1/29/24   Lauren Amador MD   dulaglutide (Trulicity) 3 MG/0.5ML injection INJECT THE CONTENTS OF ONE PEN  SUBCUTANEOUSLY WEEKLY AS  DIRECTED 5/22/24   Lauren Amador MD   Empagliflozin (JARDIANCE) 10 MG TABS tablet Take 1 tablet (10 mg total) by mouth daily 1/30/24 1/24/25  Lauren Amador MD   glucose blood (OneTouch Verio) test strip Check blood sugars once daily. Please substitute with appropriate alternative as covered by patient's insurance. Dx: E11.65 10/5/23   Lauren Amador MD   glyBURIDE (DIABETA) 5 mg tablet TAKE 1 TABLET BY MOUTH TWICE  DAILY 7/11/24   Lauren Amador MD   lisinopril-hydrochlorothiazide (PRINZIDE,ZESTORETIC) 20-25 MG per tablet TAKE 1 TABLET BY MOUTH DAILY 5/28/24   Lauren Amador MD   OneTouch Delica Lancets 33G MISC Check blood sugars once daily. Please substitute with appropriate alternative as covered by patient's insurance. Dx: E11.65 10/5/23   Lauren Amador MD     I have reviewed home medications with patient personally.    Allergies:   Allergies   Allergen Reactions    Meperidine Other (See Comments)     Hypotension & passed out    Pravastatin Myalgia       Social History:     Marital Status:    Occupation: Not applicable  Patient Pre-hospital Living Situation: Home  Patient Pre-hospital Level of Mobility: Ambulatory  Patient Pre-hospital Diet Restrictions: None  Substance Use History:   Social History     Substance and Sexual Activity   Alcohol Use Yes    Alcohol/week: 1.0 standard drink of alcohol    Types: 1 Standard drinks or equivalent per week    Comment: rare  socially     Social  "History     Tobacco Use   Smoking Status Former    Current packs/day: 0.00    Average packs/day: 2.0 packs/day for 10.0 years (20.0 ttl pk-yrs)    Types: Cigarettes    Start date:     Quit date:     Years since quittin.5    Passive exposure: Past   Smokeless Tobacco Former    Quit date:    Tobacco Comments    former smoker noted in \"allscripts\" Quit in  - never used chewing tobacco      Social History     Substance and Sexual Activity   Drug Use Never       Family History:    non-contributory      Vitals:   Blood Pressure: 151/74 (240)  Pulse: 60 (24)  Temperature: 97.8 °F (36.6 °C) (24)  Temp Source: Oral (24)  Respirations: 20 (24)  Weight - Scale: 78 kg (172 lb) (24)  SpO2: 96 % (24)    Physical Exam    Physical Exam  Vitals and nursing note reviewed.   HENT:      Head: Normocephalic and atraumatic.      Nose: Nose normal.   Eyes:      Pupils: Pupils are equal, round, and reactive to light.   Cardiovascular:      Rate and Rhythm: Normal rate and regular rhythm.   Pulmonary:      Effort: Pulmonary effort is normal. No respiratory distress.      Breath sounds: Normal breath sounds. No stridor. No wheezing.   Abdominal:      General: Abdomen is flat.  Slightly tender in the epigastric area, there is no distension.      Palpations: There is no mass.      Tenderness: There is no guarding.   Musculoskeletal:         General: No swelling or tenderness. Normal range of motion.   Skin:     General: Skin is warm and dry.   Neurological:      General: No focal deficit present.      Mental Status: He is alert and oriented to person, place, and time. Mental status is at baseline.       Additional Data:     Lab Results: I have personally reviewed pertinent reports.      Results from last 7 days   Lab Units 248   WBC Thousand/uL 8.47   HEMOGLOBIN g/dL 14.9   HEMATOCRIT % 45.2   PLATELETS Thousands/uL 271   SEGS PCT % 74 "   LYMPHO PCT % 16   MONO PCT % 8   EOS PCT % 1     Results from last 7 days   Lab Units 07/16/24 2058   SODIUM mmol/L 138   POTASSIUM mmol/L 4.3   CHLORIDE mmol/L 103   CO2 mmol/L 27   BUN mg/dL 32*   CREATININE mg/dL 1.80*   ANION GAP mmol/L 8   CALCIUM mg/dL 9.5   ALBUMIN g/dL 4.8   TOTAL BILIRUBIN mg/dL 0.64   ALK PHOS U/L 72   ALT U/L 21   AST U/L 16   GLUCOSE RANDOM mg/dL 179*                       Imaging: I have personally reviewed pertinent reports.      CT abdomen pelvis wo contrast   Final Result by Sharyn Durbin MD (07/16 2145)      No acute findings in the abdomen or pelvis within the limits of unenhanced technique.      Workstation performed: [id number]         US right upper quadrant with liver dopplers    (Results Pending)       EKG, Pathology, and Other Studies Reviewed on Admission:   EKG: Report of CT was reviewed    Allscripts / Epic Records Reviewed: Yes     ** Please Note: This note has been constructed using a voice recognition system. **

## 2024-07-17 NOTE — ASSESSMENT & PLAN NOTE
"Lab Results   Component Value Date    HGBA1C 7.7 (H) 05/02/2024       No results for input(s): \"POCGLU\" in the last 72 hours.    Blood Sugar Average: Last 72 hrs:  Glycemic control is adequate, most recent A1c last month was 7.7, hold all oral medications and also Trulicity, use insulin sliding scale for now.  Kidney function seems to be at this time at baseline of CKD stage IIIb.    "

## 2024-07-17 NOTE — ASSESSMENT & PLAN NOTE
Patient with elevated lipase and pain somewhat typical of acute pancreatitis although CT scan did not reveal any peripancreatic edema.  Given the same we will treat as such since he makes biochemical and clinical criteria.  Etiology not completely clear, he does not have any history of alcohol use, no gallstones noted on imaging, lipid profile apparently recently normal but we will recheck triglycerides    I will also check IgG4  Continue IV fluids  So far appears to be tolerating liquid diet, will advance for dinner  Monitor for dietary tolerance

## 2024-07-17 NOTE — PLAN OF CARE
Problem: Potential for Falls  Goal: Patient will remain free of falls  Description: INTERVENTIONS:  - Educate patient/family on patient safety including physical limitations  - Instruct patient to call for assistance with activity   - Consult OT/PT to assist with strengthening/mobility   - Keep Call bell within reach  - Keep bed low and locked with side rails adjusted as appropriate  - Keep care items and personal belongings within reach  - Initiate and maintain comfort rounds  -Outcome: Progressing

## 2024-07-18 PROBLEM — N40.0 ENLARGED PROSTATE: Status: ACTIVE | Noted: 2024-07-18

## 2024-07-18 LAB
ALBUMIN SERPL BCG-MCNC: 3.5 G/DL (ref 3.5–5)
ALP SERPL-CCNC: 59 U/L (ref 34–104)
ALT SERPL W P-5'-P-CCNC: 17 U/L (ref 7–52)
ANION GAP SERPL CALCULATED.3IONS-SCNC: 7 MMOL/L (ref 4–13)
AST SERPL W P-5'-P-CCNC: 14 U/L (ref 13–39)
BASOPHILS # BLD AUTO: 0.04 THOUSANDS/ÂΜL (ref 0–0.1)
BASOPHILS NFR BLD AUTO: 1 % (ref 0–1)
BILIRUB SERPL-MCNC: 0.92 MG/DL (ref 0.2–1)
BUN SERPL-MCNC: 25 MG/DL (ref 5–25)
CALCIUM SERPL-MCNC: 7.9 MG/DL (ref 8.4–10.2)
CHLORIDE SERPL-SCNC: 107 MMOL/L (ref 96–108)
CO2 SERPL-SCNC: 22 MMOL/L (ref 21–32)
CREAT SERPL-MCNC: 1.45 MG/DL (ref 0.6–1.3)
EOSINOPHIL # BLD AUTO: 0.18 THOUSAND/ÂΜL (ref 0–0.61)
EOSINOPHIL NFR BLD AUTO: 3 % (ref 0–6)
ERYTHROCYTE [DISTWIDTH] IN BLOOD BY AUTOMATED COUNT: 12.4 % (ref 11.6–15.1)
GFR SERPL CREATININE-BSD FRML MDRD: 45 ML/MIN/1.73SQ M
GLUCOSE SERPL-MCNC: 110 MG/DL (ref 65–140)
GLUCOSE SERPL-MCNC: 112 MG/DL (ref 65–140)
GLUCOSE SERPL-MCNC: 150 MG/DL (ref 65–140)
GLUCOSE SERPL-MCNC: 212 MG/DL (ref 65–140)
GLUCOSE SERPL-MCNC: 90 MG/DL (ref 65–140)
HCT VFR BLD AUTO: 31.8 % (ref 36.5–49.3)
HGB BLD-MCNC: 10.7 G/DL (ref 12–17)
IMM GRANULOCYTES # BLD AUTO: 0.04 THOUSAND/UL (ref 0–0.2)
IMM GRANULOCYTES NFR BLD AUTO: 1 % (ref 0–2)
LYMPHOCYTES # BLD AUTO: 1.71 THOUSANDS/ÂΜL (ref 0.6–4.47)
LYMPHOCYTES NFR BLD AUTO: 27 % (ref 14–44)
MCH RBC QN AUTO: 30.7 PG (ref 26.8–34.3)
MCHC RBC AUTO-ENTMCNC: 33.6 G/DL (ref 31.4–37.4)
MCV RBC AUTO: 91 FL (ref 82–98)
MONOCYTES # BLD AUTO: 0.55 THOUSAND/ÂΜL (ref 0.17–1.22)
MONOCYTES NFR BLD AUTO: 9 % (ref 4–12)
NEUTROPHILS # BLD AUTO: 3.82 THOUSANDS/ÂΜL (ref 1.85–7.62)
NEUTS SEG NFR BLD AUTO: 59 % (ref 43–75)
NRBC BLD AUTO-RTO: 0 /100 WBCS
PLATELET # BLD AUTO: 167 THOUSANDS/UL (ref 149–390)
PMV BLD AUTO: 10.1 FL (ref 8.9–12.7)
POTASSIUM SERPL-SCNC: 4.6 MMOL/L (ref 3.5–5.3)
PROT SERPL-MCNC: 5.4 G/DL (ref 6.4–8.4)
RBC # BLD AUTO: 3.49 MILLION/UL (ref 3.88–5.62)
SODIUM SERPL-SCNC: 136 MMOL/L (ref 135–147)
WBC # BLD AUTO: 6.34 THOUSAND/UL (ref 4.31–10.16)

## 2024-07-18 PROCEDURE — 82948 REAGENT STRIP/BLOOD GLUCOSE: CPT

## 2024-07-18 PROCEDURE — 85025 COMPLETE CBC W/AUTO DIFF WBC: CPT | Performed by: INTERNAL MEDICINE

## 2024-07-18 PROCEDURE — 99233 SBSQ HOSP IP/OBS HIGH 50: CPT | Performed by: INTERNAL MEDICINE

## 2024-07-18 PROCEDURE — 82787 IGG 1 2 3 OR 4 EACH: CPT | Performed by: INTERNAL MEDICINE

## 2024-07-18 PROCEDURE — 82784 ASSAY IGA/IGD/IGG/IGM EACH: CPT | Performed by: INTERNAL MEDICINE

## 2024-07-18 PROCEDURE — 80053 COMPREHEN METABOLIC PANEL: CPT | Performed by: INTERNAL MEDICINE

## 2024-07-18 RX ORDER — SUCRALFATE 1 G/1
1 TABLET ORAL
Status: DISCONTINUED | OUTPATIENT
Start: 2024-07-18 | End: 2024-07-22 | Stop reason: HOSPADM

## 2024-07-18 RX ORDER — PANTOPRAZOLE SODIUM 40 MG/10ML
40 INJECTION, POWDER, LYOPHILIZED, FOR SOLUTION INTRAVENOUS EVERY 12 HOURS SCHEDULED
Status: DISCONTINUED | OUTPATIENT
Start: 2024-07-18 | End: 2024-07-22 | Stop reason: HOSPADM

## 2024-07-18 RX ADMIN — PANTOPRAZOLE SODIUM 40 MG: 40 INJECTION, POWDER, FOR SOLUTION INTRAVENOUS at 09:35

## 2024-07-18 RX ADMIN — ENOXAPARIN SODIUM 40 MG: 40 INJECTION SUBCUTANEOUS at 08:37

## 2024-07-18 RX ADMIN — PANTOPRAZOLE SODIUM 40 MG: 40 INJECTION, POWDER, FOR SOLUTION INTRAVENOUS at 21:51

## 2024-07-18 RX ADMIN — HYDROMORPHONE HYDROCHLORIDE 1 MG: 1 INJECTION, SOLUTION INTRAMUSCULAR; INTRAVENOUS; SUBCUTANEOUS at 09:35

## 2024-07-18 RX ADMIN — SUCRALFATE 1 G: 1 TABLET ORAL at 17:37

## 2024-07-18 RX ADMIN — SUCRALFATE 1 G: 1 TABLET ORAL at 21:51

## 2024-07-18 RX ADMIN — LISINOPRIL 20 MG: 20 TABLET ORAL at 08:36

## 2024-07-18 RX ADMIN — ATORVASTATIN CALCIUM 80 MG: 40 TABLET, FILM COATED ORAL at 17:37

## 2024-07-18 RX ADMIN — INSULIN LISPRO 1 UNITS: 100 INJECTION, SOLUTION INTRAVENOUS; SUBCUTANEOUS at 12:30

## 2024-07-18 RX ADMIN — SUCRALFATE 1 G: 1 TABLET ORAL at 12:32

## 2024-07-18 RX ADMIN — SODIUM CHLORIDE 150 ML/HR: 0.9 INJECTION, SOLUTION INTRAVENOUS at 06:06

## 2024-07-18 RX ADMIN — ASPIRIN 81 MG: 81 TABLET, CHEWABLE ORAL at 08:36

## 2024-07-18 RX ADMIN — Medication 2000 UNITS: at 08:36

## 2024-07-18 RX ADMIN — ACETAMINOPHEN 650 MG: 325 TABLET, FILM COATED ORAL at 17:56

## 2024-07-18 RX ADMIN — HYDROCHLOROTHIAZIDE 25 MG: 25 TABLET ORAL at 08:36

## 2024-07-18 NOTE — PROGRESS NOTES
[facility]  Progress Note  Name: Bree Barrios I  MRN: [id number]  Unit/Bed#: [facility]-01 I Date of Admission: 7/16/2024   Date of Service: 7/18/2024 I Hospital Day: 1    Assessment & Plan   * Idiopathic acute pancreatitis  Assessment & Plan  Patient with elevated lipase and pain somewhat typical of acute pancreatitis although CT scan did not reveal any peripancreatic edema.  Given the same we will treat as such since he makes biochemical and clinical criteria.  Ultrasound also with pancreatic edema suggestive of acute pancreatitis  Etiology not completely clear, he does not have any history of alcohol use, no gallstones noted on imaging, lipid profile apparently recently normal but we will recheck triglycerides    Patient not tolerating solid food very well today, will downgrade to clear liquid diet again.  Hold IV fluids to prevent volume overload at this time.  Monitor clinically, abdominal exam    Enlarged prostate  Assessment & Plan  Patient with enlarged prostate on imaging, no signs of obstruction or retention.  Daughter concerned because he has a family hx of cancer.  Check PSA and OP follow up    Mixed hyperlipidemia  Assessment & Plan  Continue with home dose of Lipitor 80 mg daily.      Benign essential hypertension  Assessment & Plan  Blood pressure is controlled, initially reading was high but that I assume was because of pain, continue with home regimen of lisinopril and HCTZ.    Controlled type 2 diabetes mellitus with chronic kidney disease, without long-term current use of insulin ([facility])  Assessment & Plan  Lab Results   Component Value Date    HGBA1C 7.7 (H) 05/02/2024       Recent Labs     07/17/24  1114 07/17/24  1521 07/18/24  0737 07/18/24  1128   POCGLU 166* 103 112 150*         Blood Sugar Average: Last 72 hrs:  (P) 127.3233491221058554    Glycemic control is adequate, most recent A1c last month was 7.7, hold all oral medications and also Trulicity, use insulin sliding  scale for now.  Kidney function seems to be at this time at baseline of CKD stage IIIb.  Avoid nephrotoxins           VTE Pharmacologic Prophylaxis:   Pharmacologic: Enoxaparin (Lovenox)  Mechanical VTE Prophylaxis in Place: Yes    Patient Centered Rounds: I have performed bedside rounds with nursing staff today.    Discussions with Specialists or Other Care Team Provider:   Discussed with care management    Education and Discussions with Family / Patient: Discussed with family in detail    Time Spent for Care: 1 hour.  More than 50% of total time spent on counseling and coordination of care as described above.    Current Length of Stay: 1 day(s)    Current Patient Status: Inpatient   Certification Statement: The patient will continue to require additional inpatient hospital stay due to need for     Discharge Plan: 24-48h    Code Status: Level 1 - Full Code      Subjective:     Patient evaluated this morning.  Still has some nausea and vomiting.  Did not tolerate solid diet well.    Objective:     Vitals:   Temp (24hrs), Av.9 °F (36.6 °C), Min:97.4 °F (36.3 °C), Max:98.3 °F (36.8 °C)    Temp:  [97.4 °F (36.3 °C)-98.3 °F (36.8 °C)] 98.1 °F (36.7 °C)  HR:  [18-60] 60  BP: (123-145)/(64-78) 145/77  SpO2:  [94 %-97 %] 97 %  Body mass index is 24.2 kg/m².     Input and Output Summary (last 24 hours):       Intake/Output Summary (Last 24 hours) at 2024 1158  Last data filed at 2024 0927  Gross per 24 hour   Intake 5315 ml   Output 2700 ml   Net 2615 ml       Physical Exam:     Physical Exam  Vitals and nursing note reviewed.   Constitutional:       General: He is not in acute distress.     Appearance: Normal appearance. He is not ill-appearing, toxic-appearing or diaphoretic.   HENT:      Head: Normocephalic and atraumatic.      Right Ear: External ear normal.      Left Ear: External ear normal.      Nose: Nose normal. No congestion or rhinorrhea.      Mouth/Throat:      Mouth: Mucous membranes are moist.       Pharynx: Oropharynx is clear. No oropharyngeal exudate or posterior oropharyngeal erythema.   Eyes:      General: No scleral icterus.        Right eye: No discharge.         Left eye: No discharge.      Pupils: Pupils are equal, round, and reactive to light.   Neck:      Vascular: No carotid bruit.   Cardiovascular:      Rate and Rhythm: Normal rate and regular rhythm.      Pulses: Normal pulses.      Heart sounds: No murmur heard.     No friction rub. No gallop.   Pulmonary:      Effort: Pulmonary effort is normal. No respiratory distress.      Breath sounds: Normal breath sounds. No stridor. No wheezing, rhonchi or rales.   Abdominal:      General: Abdomen is flat. Bowel sounds are normal. There is no distension.      Palpations: Abdomen is soft. There is no mass.      Tenderness: There is abdominal tenderness. There is no guarding or rebound.      Hernia: No hernia is present.      Comments: Tenderness to palpation of epigastric area but no rebound or guarding   Musculoskeletal:         General: No swelling, tenderness, deformity or signs of injury. Normal range of motion.      Cervical back: Normal range of motion. No rigidity. No muscular tenderness.   Lymphadenopathy:      Cervical: No cervical adenopathy.   Skin:     General: Skin is warm and dry.      Capillary Refill: Capillary refill takes less than 2 seconds.      Coloration: Skin is not jaundiced or pale.      Findings: No bruising or erythema.   Neurological:      General: No focal deficit present.      Mental Status: He is alert and oriented to person, place, and time. Mental status is at baseline.      Cranial Nerves: No cranial nerve deficit.      Sensory: No sensory deficit.      Motor: No weakness.      Coordination: Coordination normal.      Deep Tendon Reflexes: Reflexes normal.   Psychiatric:         Mood and Affect: Mood normal.         Behavior: Behavior normal.         Thought Content: Thought content normal.         Judgment: Judgment  normal.         Additional Data:     Labs:    Results from last 7 days   Lab Units 07/18/24  0449   WBC Thousand/uL 6.34   HEMOGLOBIN g/dL 10.7*   HEMATOCRIT % 31.8*   PLATELETS Thousands/uL 167   SEGS PCT % 59   LYMPHO PCT % 27   MONO PCT % 9   EOS PCT % 3     Results from last 7 days   Lab Units 07/18/24  0936   SODIUM mmol/L 136   POTASSIUM mmol/L 4.6   CHLORIDE mmol/L 107   CO2 mmol/L 22   BUN mg/dL 25   CREATININE mg/dL 1.45*   ANION GAP mmol/L 7   CALCIUM mg/dL 7.9*   ALBUMIN g/dL 3.5   TOTAL BILIRUBIN mg/dL 0.92   ALK PHOS U/L 59   ALT U/L 17   AST U/L 14   GLUCOSE RANDOM mg/dL 212*         Results from last 7 days   Lab Units 07/18/24  1128 07/18/24  0737 07/17/24  1521 07/17/24  1114 07/17/24  0723 07/16/24  2321   POC GLUCOSE mg/dl 150* 112 103 166* 95 140                   * I Have Reviewed All Lab Data Listed Above.  * Additional Pertinent Lab Tests Reviewed: All Labs For Current Hospital Admission Reviewed    Recent Cultures (last 7 days):           Last 24 Hours Medication List:   Current Facility-Administered Medications   Medication Dose Route Frequency Provider Last Rate    acetaminophen  650 mg Oral Q6H PRN Andi Barbosa MD      aspirin  81 mg Oral Daily Andi Barbosa MD      atorvastatin  80 mg Oral Daily With Dinner Andi Barbosa MD      cholecalciferol  2,000 Units Oral Daily Andi Barbosa MD      enoxaparin  40 mg Subcutaneous Daily Andi Barbosa MD      lisinopril  20 mg Oral Daily Andi Barbosa MD      And    hydroCHLOROthiazide  25 mg Oral Daily Andi Barbosa MD      HYDROmorphone  0.5 mg Intravenous Q4H PRN Andi Barbosa MD      HYDROmorphone  1 mg Intravenous Q4H PRN Andi Barbosa MD      insulin lispro  1-5 Units Subcutaneous TID AC Andi Barbosa MD      ondansetron  4 mg Intravenous Q6H PRN Andi Barbosa MD      pantoprazole  40 mg Intravenous Q12H [facility] Gianfranco Paulson MD      sucralfate  1 g Oral 4x Daily (AC & HS) Gianfranco  Bj Vega MD          Today, Patient Was Seen By: Gianfranco Vega MD    ** Please Note: Dictation voice to text software may have been used in the creation of this document. **

## 2024-07-18 NOTE — ASSESSMENT & PLAN NOTE
Patient with enlarged prostate on imaging, no signs of obstruction or retention.  Daughter concerned because he has a family hx of cancer.  Check PSA and OP follow up

## 2024-07-18 NOTE — ASSESSMENT & PLAN NOTE
Patient with elevated lipase and pain somewhat typical of acute pancreatitis although CT scan did not reveal any peripancreatic edema.  Given the same we will treat as such since he makes biochemical and clinical criteria.  Ultrasound also with pancreatic edema suggestive of acute pancreatitis  Etiology not completely clear, he does not have any history of alcohol use, no gallstones noted on imaging, lipid profile apparently recently normal but we will recheck triglycerides    Patient not tolerating solid food very well today, will downgrade to clear liquid diet again.  Hold IV fluids to prevent volume overload at this time.  Monitor clinically, abdominal exam

## 2024-07-18 NOTE — CASE MANAGEMENT
Case Management Discharge Planning Note    Patient name Bree Barrios  Location /-01 MRN [id number]  : 1945 Date 2024       Current Admission Date: 2024  Current Admission Diagnosis:Idiopathic acute pancreatitis   Patient Active Problem List    Diagnosis Date Noted Date Diagnosed    Idiopathic acute pancreatitis 2024     Mixed hyperlipidemia 2024     Constipation 2024     Dysphagia 2024     Balance disorder 2024     Dysphoric mood 2022     Moderate asthma without complication 2021     Vitamin D deficiency 2021     Ganglion cyst of left foot 2021     Chronic kidney disease-mineral and bone disorder 2019     Overweight (BMI 25.0-29.9) 2019     Benign prostatic hyperplasia with lower urinary tract symptoms 2019     Degenerative cervical spinal stenosis 02/15/2019     Stage 4 chronic kidney disease (HCC) 2018     Controlled type 2 diabetes mellitus with chronic kidney disease, without long-term current use of insulin (HCC) 2018     Benign essential hypertension 2018     Hypercholesterolemia 2014       LOS (days): 1  Geometric Mean LOS (GMLOS) (days): 2.4  Days to GMLOS:1.6     OBJECTIVE:  Risk of Unplanned Readmission Score: 12.37         Current admission status: Inpatient   Preferred Pharmacy:   [id number] [address] [address]  Phone: [phone] Fax: [phone]    [facility] [place] [address]  [address]  [address] [address]  Phone: [phone] Fax: [phone]    Primary Care Provider: Lauren Amador MD    Primary Insurance: [facility]  Secondary Insurance:     DISCHARGE DETAILS:  As per RM,  The patient will continue to require additional inpatient hospital stay due to need for IV therapy. CM continues to be available to assist with pt dc plans.

## 2024-07-18 NOTE — ASSESSMENT & PLAN NOTE
Lab Results   Component Value Date    HGBA1C 7.7 (H) 05/02/2024       Recent Labs     07/17/24  1114 07/17/24  1521 07/18/24  0737 07/18/24  1128   POCGLU 166* 103 112 150*         Blood Sugar Average: Last 72 hrs:  (P) 127.2587423747114606    Glycemic control is adequate, most recent A1c last month was 7.7, hold all oral medications and also Trulicity, use insulin sliding scale for now.  Kidney function seems to be at this time at baseline of CKD stage IIIb.  Avoid nephrotoxins

## 2024-07-19 ENCOUNTER — APPOINTMENT (INPATIENT)
Dept: MRI IMAGING | Facility: HOSPITAL | Age: 79
DRG: 439 | End: 2024-07-19
Payer: COMMERCIAL

## 2024-07-19 LAB
ALBUMIN SERPL BCG-MCNC: 3.6 G/DL (ref 3.5–5)
ALP SERPL-CCNC: 61 U/L (ref 34–104)
ALT SERPL W P-5'-P-CCNC: 16 U/L (ref 7–52)
ANION GAP SERPL CALCULATED.3IONS-SCNC: 4 MMOL/L (ref 4–13)
AST SERPL W P-5'-P-CCNC: 14 U/L (ref 13–39)
BASOPHILS # BLD AUTO: 0.05 THOUSANDS/ÂΜL (ref 0–0.1)
BASOPHILS NFR BLD AUTO: 1 % (ref 0–1)
BILIRUB SERPL-MCNC: 0.92 MG/DL (ref 0.2–1)
BUN SERPL-MCNC: 23 MG/DL (ref 5–25)
CALCIUM SERPL-MCNC: 8.4 MG/DL (ref 8.4–10.2)
CHLORIDE SERPL-SCNC: 108 MMOL/L (ref 96–108)
CO2 SERPL-SCNC: 25 MMOL/L (ref 21–32)
CREAT SERPL-MCNC: 1.52 MG/DL (ref 0.6–1.3)
EOSINOPHIL # BLD AUTO: 0.28 THOUSAND/ÂΜL (ref 0–0.61)
EOSINOPHIL NFR BLD AUTO: 4 % (ref 0–6)
ERYTHROCYTE [DISTWIDTH] IN BLOOD BY AUTOMATED COUNT: 12.2 % (ref 11.6–15.1)
GFR SERPL CREATININE-BSD FRML MDRD: 42 ML/MIN/1.73SQ M
GLUCOSE SERPL-MCNC: 103 MG/DL (ref 65–140)
GLUCOSE SERPL-MCNC: 107 MG/DL (ref 65–140)
GLUCOSE SERPL-MCNC: 112 MG/DL (ref 65–140)
GLUCOSE SERPL-MCNC: 96 MG/DL (ref 65–140)
HCT VFR BLD AUTO: 38.9 % (ref 36.5–49.3)
HGB BLD-MCNC: 13.2 G/DL (ref 12–17)
IMM GRANULOCYTES # BLD AUTO: 0.02 THOUSAND/UL (ref 0–0.2)
IMM GRANULOCYTES NFR BLD AUTO: 0 % (ref 0–2)
LYMPHOCYTES # BLD AUTO: 1.93 THOUSANDS/ÂΜL (ref 0.6–4.47)
LYMPHOCYTES NFR BLD AUTO: 30 % (ref 14–44)
MCH RBC QN AUTO: 30.6 PG (ref 26.8–34.3)
MCHC RBC AUTO-ENTMCNC: 33.9 G/DL (ref 31.4–37.4)
MCV RBC AUTO: 90 FL (ref 82–98)
MONOCYTES # BLD AUTO: 0.66 THOUSAND/ÂΜL (ref 0.17–1.22)
MONOCYTES NFR BLD AUTO: 10 % (ref 4–12)
NEUTROPHILS # BLD AUTO: 3.48 THOUSANDS/ÂΜL (ref 1.85–7.62)
NEUTS SEG NFR BLD AUTO: 55 % (ref 43–75)
NRBC BLD AUTO-RTO: 0 /100 WBCS
PLATELET # BLD AUTO: 193 THOUSANDS/UL (ref 149–390)
PMV BLD AUTO: 10.1 FL (ref 8.9–12.7)
POTASSIUM SERPL-SCNC: 4.4 MMOL/L (ref 3.5–5.3)
PROT SERPL-MCNC: 5.9 G/DL (ref 6.4–8.4)
RBC # BLD AUTO: 4.31 MILLION/UL (ref 3.88–5.62)
SODIUM SERPL-SCNC: 137 MMOL/L (ref 135–147)
WBC # BLD AUTO: 6.42 THOUSAND/UL (ref 4.31–10.16)

## 2024-07-19 PROCEDURE — 82948 REAGENT STRIP/BLOOD GLUCOSE: CPT

## 2024-07-19 PROCEDURE — 99233 SBSQ HOSP IP/OBS HIGH 50: CPT | Performed by: INTERNAL MEDICINE

## 2024-07-19 PROCEDURE — 99223 1ST HOSP IP/OBS HIGH 75: CPT | Performed by: INTERNAL MEDICINE

## 2024-07-19 PROCEDURE — 85025 COMPLETE CBC W/AUTO DIFF WBC: CPT | Performed by: INTERNAL MEDICINE

## 2024-07-19 PROCEDURE — 80053 COMPREHEN METABOLIC PANEL: CPT | Performed by: INTERNAL MEDICINE

## 2024-07-19 PROCEDURE — 74181 MRI ABDOMEN W/O CONTRAST: CPT

## 2024-07-19 PROCEDURE — BW30ZZZ MAGNETIC RESONANCE IMAGING (MRI) OF ABDOMEN: ICD-10-PCS | Performed by: INTERNAL MEDICINE

## 2024-07-19 RX ORDER — SENNOSIDES 8.6 MG
2 TABLET ORAL ONCE
Status: COMPLETED | OUTPATIENT
Start: 2024-07-19 | End: 2024-07-19

## 2024-07-19 RX ADMIN — SUCRALFATE 1 G: 1 TABLET ORAL at 08:22

## 2024-07-19 RX ADMIN — HYDROCHLOROTHIAZIDE 25 MG: 25 TABLET ORAL at 08:22

## 2024-07-19 RX ADMIN — SUCRALFATE 1 G: 1 TABLET ORAL at 11:53

## 2024-07-19 RX ADMIN — SENNOSIDES 17.2 MG: 8.6 TABLET, FILM COATED ORAL at 16:31

## 2024-07-19 RX ADMIN — SUCRALFATE 1 G: 1 TABLET ORAL at 22:10

## 2024-07-19 RX ADMIN — PANTOPRAZOLE SODIUM 40 MG: 40 INJECTION, POWDER, FOR SOLUTION INTRAVENOUS at 09:56

## 2024-07-19 RX ADMIN — ATORVASTATIN CALCIUM 80 MG: 40 TABLET, FILM COATED ORAL at 16:34

## 2024-07-19 RX ADMIN — SUCRALFATE 1 G: 1 TABLET ORAL at 15:24

## 2024-07-19 RX ADMIN — LISINOPRIL 20 MG: 20 TABLET ORAL at 08:22

## 2024-07-19 RX ADMIN — PANTOPRAZOLE SODIUM 40 MG: 40 INJECTION, POWDER, FOR SOLUTION INTRAVENOUS at 22:10

## 2024-07-19 RX ADMIN — Medication 2000 UNITS: at 08:22

## 2024-07-19 RX ADMIN — ENOXAPARIN SODIUM 40 MG: 40 INJECTION SUBCUTANEOUS at 08:23

## 2024-07-19 RX ADMIN — ASPIRIN 81 MG: 81 TABLET, CHEWABLE ORAL at 08:22

## 2024-07-19 NOTE — ASSESSMENT & PLAN NOTE
Patient with elevated lipase and pain somewhat typical of acute pancreatitis although CT scan did not reveal any peripancreatic edema.  Given the same we will treat as such since he makes biochemical and clinical criteria.  Ultrasound also with pancreatic edema suggestive of acute pancreatitis  Etiology not completely clear, he does not have any history of alcohol use, no gallstones noted on imaging, lipid profile apparently recently normal   IgG4 pending    Patient still with significant pain in upper abdomen  Tolerating CL but still nausea and pain, no vomiting  Has received IVF first day but currently off with concern for potential overload  Given persistence of symptoms GI will be involved  Analgesia, antiemesis  Monitor

## 2024-07-19 NOTE — ASSESSMENT & PLAN NOTE
Lab Results   Component Value Date    HGBA1C 7.7 (H) 05/02/2024       Recent Labs     07/18/24  1710 07/18/24  2156 07/19/24  0742 07/19/24  1123   POCGLU 90 110 103 112         Blood Sugar Average: Last 72 hrs:  (P) 118.1    Glycemic control is adequate, most recent A1c last month was 7.7, hold all oral medications and also Trulicity, use insulin sliding scale for now.  Kidney function seems to be at this time at baseline of CKD  Avoid nephrotoxins

## 2024-07-19 NOTE — CASE MANAGEMENT
Case Management Discharge Planning Note    Patient name Bree Barrios  Location /-01 MRN [id number]  : 1945 Date 2024       Current Admission Date: 2024  Current Admission Diagnosis:Idiopathic acute pancreatitis   Patient Active Problem List    Diagnosis Date Noted Date Diagnosed    Enlarged prostate 2024     Idiopathic acute pancreatitis 2024     Mixed hyperlipidemia 2024     Constipation 2024     Dysphagia 2024     Balance disorder 2024     Dysphoric mood 2022     Moderate asthma without complication 2021     Vitamin D deficiency 2021     Ganglion cyst of left foot 2021     Chronic kidney disease-mineral and bone disorder 2019     Overweight (BMI 25.0-29.9) 2019     Benign prostatic hyperplasia with lower urinary tract symptoms 2019     Degenerative cervical spinal stenosis 02/15/2019     Stage 4 chronic kidney disease (HCC) 2018     Controlled type 2 diabetes mellitus with chronic kidney disease, without long-term current use of insulin (HCC) 2018     Benign essential hypertension 2018     Hypercholesterolemia 2014       LOS (days): 2  Geometric Mean LOS (GMLOS) (days): 2.4  Days to GMLOS:0.4     OBJECTIVE:  Risk of Unplanned Readmission Score: 10.48         Current admission status: Inpatient   Preferred Pharmacy:   [id number] [address] [address]  Phone: [phone] Fax: [phone]    [facility] [address]  [address]  [address] [address]  Phone: [phone] Fax: [phone]    Primary Care Provider: Lauren Amador MD    Primary Insurance: [employer]  Secondary Insurance:     DISCHARGE DETAILS:    Treatment Team Recommendation: Home  Discharge Destination Plan:: Home  Transport at Discharge : Family    IMM Given (Date):: 24  IMM  Given to:: Patient     Additional Comments: IMM reviewed with patient at bedside who verbalized understanding of rights and provided verbal acknowledgement of IMM. Copy of IMM left with patient at bedside. IMM returned to medical records. Patient denied any questions. Patient continues to report plan to return home at discharge, no CM needs anticipated.

## 2024-07-19 NOTE — PROGRESS NOTES
[facility]  Progress Note  Name: Bree Barrios I  MRN: [id number]  Unit/Bed#: -01 I Date of Admission: 7/16/2024   Date of Service: 7/19/2024 I Hospital Day: 2    Assessment & Plan   * Idiopathic acute pancreatitis  Assessment & Plan  Patient with elevated lipase and pain somewhat typical of acute pancreatitis although CT scan did not reveal any peripancreatic edema.  Given the same we will treat as such since he makes biochemical and clinical criteria.  Ultrasound also with pancreatic edema suggestive of acute pancreatitis  Etiology not completely clear, he does not have any history of alcohol use, no gallstones noted on imaging, lipid profile apparently recently normal   IgG4 pending    Patient still with significant pain in upper abdomen  Tolerating CL but still nausea and pain, no vomiting  Has received IVF first day but currently off with concern for potential overload  Given persistence of symptoms GI will be involved  Analgesia, antiemesis  Monitor    Enlarged prostate  Assessment & Plan  Patient with enlarged prostate on imaging, no signs of obstruction or retention.  Daughter concerned because he has a family hx of cancer.  Check PSA and OP follow up    Benign essential hypertension  Assessment & Plan  Continue antihypertensives  Monitor BP    Controlled type 2 diabetes mellitus with chronic kidney disease, without long-term current use of insulin (HCC)  Assessment & Plan  Lab Results   Component Value Date    HGBA1C 7.7 (H) 05/02/2024       Recent Labs     07/18/24  1710 07/18/24  2156 07/19/24  0742 07/19/24  1123   POCGLU 90 110 103 112         Blood Sugar Average: Last 72 hrs:  (P) 118.1    Glycemic control is adequate, most recent A1c last month was 7.7, hold all oral medications and also Trulicity, use insulin sliding scale for now.  Kidney function seems to be at this time at baseline of CKD  Avoid nephrotoxins         VTE Pharmacologic Prophylaxis:   Pharmacologic:  Enoxaparin (Lovenox)  Mechanical VTE Prophylaxis in Place: Yes    Patient Centered Rounds: I have performed bedside rounds with nursing staff today.    Discussions with Specialists or Other Care Team Provider: Discussed with care management team    Education and Discussions with Family / Patient: Patient. Previously discussed with family as well    Time Spent for Care: 45 minutes.  More than 50% of total time spent on counseling and coordination of care as described above.    Current Length of Stay: 2 day(s)    Current Patient Status: Inpatient   Certification Statement: The patient will continue to require additional inpatient hospital stay due to need for improvement in pain    Discharge Plan: TBD    Code Status: Level 1 - Full Code      Subjective:     Patient evaluated this morning, denies any CP, nausea, vomiting    Objective:     Vitals:   Temp (24hrs), Av.4 °F (36.9 °C), Min:98 °F (36.7 °C), Max:98.7 °F (37.1 °C)    Temp:  [98 °F (36.7 °C)-98.7 °F (37.1 °C)] 98.7 °F (37.1 °C)  HR:  [62] 62  Resp:  [20] 20  BP: (139-144)/(75-78) 139/78  SpO2:  [97 %] 97 %  Body mass index is 24.29 kg/m².     Input and Output Summary (last 24 hours):       Intake/Output Summary (Last 24 hours) at 2024 1203  Last data filed at 2024 0900  Gross per 24 hour   Intake 600 ml   Output 4175 ml   Net -3575 ml       Physical Exam:     Physical Exam  Vitals and nursing note reviewed.   Constitutional:       Appearance: Normal appearance.   HENT:      Head: Normocephalic and atraumatic.      Right Ear: External ear normal.      Left Ear: External ear normal.      Nose: Nose normal. No congestion or rhinorrhea.      Mouth/Throat:      Mouth: Mucous membranes are moist.      Pharynx: Oropharynx is clear. No oropharyngeal exudate or posterior oropharyngeal erythema.   Eyes:      General: No scleral icterus.        Right eye: No discharge.         Left eye: No discharge.      Pupils: Pupils are equal, round, and reactive to  light.   Neck:      Vascular: No carotid bruit.   Cardiovascular:      Rate and Rhythm: Normal rate and regular rhythm.      Pulses: Normal pulses.      Heart sounds: No murmur heard.     No friction rub. No gallop.   Pulmonary:      Effort: Pulmonary effort is normal. No respiratory distress.      Breath sounds: Normal breath sounds. No stridor. No wheezing, rhonchi or rales.   Abdominal:      General: Abdomen is flat. Bowel sounds are normal. There is no distension.      Palpations: Abdomen is soft. There is no mass.      Tenderness: There is abdominal tenderness. There is no guarding or rebound.      Hernia: No hernia is present.      Comments: Tenderness of epigastric area but no rebound or guarding   Musculoskeletal:         General: No swelling, tenderness, deformity or signs of injury. Normal range of motion.      Cervical back: Normal range of motion. No rigidity. No muscular tenderness.   Lymphadenopathy:      Cervical: No cervical adenopathy.   Skin:     General: Skin is warm and dry.      Capillary Refill: Capillary refill takes less than 2 seconds.      Coloration: Skin is not jaundiced or pale.      Findings: No bruising or erythema.   Neurological:      General: No focal deficit present.      Mental Status: He is alert and oriented to person, place, and time. Mental status is at baseline.      Cranial Nerves: No cranial nerve deficit.      Sensory: No sensory deficit.      Motor: No weakness.      Coordination: Coordination normal.      Deep Tendon Reflexes: Reflexes normal.   Psychiatric:         Mood and Affect: Mood normal.         Behavior: Behavior normal.         Thought Content: Thought content normal.         Judgment: Judgment normal.           Additional Data:     Labs:    Results from last 7 days   Lab Units 07/19/24  0500   WBC Thousand/uL 6.42   HEMOGLOBIN g/dL 13.2   HEMATOCRIT % 38.9   PLATELETS Thousands/uL 193   SEGS PCT % 55   LYMPHO PCT % 30   MONO PCT % 10   EOS PCT % 4     Results  from last 7 days   Lab Units 07/19/24  0500   SODIUM mmol/L 137   POTASSIUM mmol/L 4.4   CHLORIDE mmol/L 108   CO2 mmol/L 25   BUN mg/dL 23   CREATININE mg/dL 1.52*   ANION GAP mmol/L 4   CALCIUM mg/dL 8.4   ALBUMIN g/dL 3.6   TOTAL BILIRUBIN mg/dL 0.92   ALK PHOS U/L 61   ALT U/L 16   AST U/L 14   GLUCOSE RANDOM mg/dL 96         Results from last 7 days   Lab Units 07/19/24  1123 07/19/24  0742 07/18/24  2156 07/18/24  1710 07/18/24  1128 07/18/24  0737 07/17/24  1521 07/17/24  1114 07/17/24  0723 07/16/24  2321   POC GLUCOSE mg/dl 112 103 110 90 150* 112 103 166* 95 140                   * I Have Reviewed All Lab Data Listed Above.  * Additional Pertinent Lab Tests Reviewed: All Labs For Current Hospital Admission Reviewed        Recent Cultures (last 7 days):           Last 24 Hours Medication List:   Current Facility-Administered Medications   Medication Dose Route Frequency Provider Last Rate    acetaminophen  650 mg Oral Q6H PRN Andi Barbosa MD      aspirin  81 mg Oral Daily Andi Barbosa MD      atorvastatin  80 mg Oral Daily With Dinner Andi Barbosa MD      cholecalciferol  2,000 Units Oral Daily Andi Barbosa MD      enoxaparin  40 mg Subcutaneous Daily Andi Barbosa MD      lisinopril  20 mg Oral Daily Andi Barbosa MD      And    hydroCHLOROthiazide  25 mg Oral Daily Andi Barbosa MD      HYDROmorphone  0.5 mg Intravenous Q4H PRN Andi Barbosa MD      HYDROmorphone  1 mg Intravenous Q4H PRN Andi Barbosa MD      insulin lispro  1-5 Units Subcutaneous TID AC Andi Barbosa MD      ondansetron  4 mg Intravenous Q6H PRN Andi Barbosa MD      pantoprazole  40 mg Intravenous Q12H [facility] Gianfranco Paulson MD      sucralfate  1 g Oral 4x Daily (AC & HS) Gianfranco Paulson MD          Today, Patient Was Seen By: Gianfranco Paulson MD    ** Please Note: Dictation voice to text software may have been used in the creation of this document.  **

## 2024-07-19 NOTE — CONSULTS
Consultation -  [facility]  Bree Barrios 78 y.o. male MRN: [id number]  Unit/Bed#: -01 Encounter: [id number]      Inpatient consult to gastroenterology  Consult performed by: Concepcóin Arechiga PA-C  Consult ordered by: Gianfranco Paulson MD          Reason for Consult / Principal Problem: Abdominal pain and nausea    HPI: Bree Barrios is a 78 y.o. year old male who presents with an extensive PMH to include DM, CAD, hx of CVA, HTN, history of PUD, and HLD.    Patient initially presented to the ED 3 days ago with a CC of abdominal pain. Patient reports that he had been suffering with upper abdominal pain and nausea for several days. Initially CT AP in the ED showed no acute intraabdominal abnormality. A RUQ US was then done on 7/17 showing pancreatitis with minimal GB sludge. He was started on a low fat diet yesterday but then started to experience worsening pain. He is now on clear liquids an overall just doesn't feel well. He is still reporting nausea but no vomiting. He has a poor appetite. He is also constipated. No melena or RB.     2014 colonoscopy showed 2 benign polyps.     REVIEW OF SYSTEMS:     CONSTITUTIONAL: Denies any fever, chills, or rigors. Good appetite, and no recent weight loss.  HEENT: No earache or tinnitus. Denies hearing loss or visual disturbances.  CARDIOVASCULAR: No chest pain or palpitations.   RESPIRATORY: Denies any cough, hemoptysis, shortness of breath or dyspnea on exertion.  GASTROINTESTINAL: As noted in the History of Present Illness.   GENITOURINARY: No problems with urination. Denies any hematuria or dysuria.  NEUROLOGIC: No dizziness or vertigo, denies headaches.   MUSCULOSKELETAL: Denies any muscle or joint pain.   SKIN: Denies skin rashes or itching.   ENDOCRINE: Denies excessive thirst. Denies intolerance to heat or cold.  PSYCHOSOCIAL: Denies depression or anxiety. Denies any recent memory loss.     Historical Information   Past Medical  "History:   Diagnosis Date    Arthritis     Asthma     last assessed: 2015    Balanitis     last assessed: 2014    Cataract     Chronic kidney disease     Chronic kidney disease, stage 3 (HCC)     last assessed: 2018    Colon polyp     COVID-19     Diabetes mellitus (HCC)     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(HCC)     last assessed: 2018    GERD (gastroesophageal reflux disease)     Hypercholesterolemia     Hypertension     Pneumonia     last assessed: 2013    Shortness of breath     Stroke (HCC) 2022    Use of cane as ambulatory aid     sometimes    Wears glasses      Past Surgical History:   Procedure Laterality Date    ABDOMINAL SURGERY      APPENDECTOMY  1965    CATARACT EXTRACTION      COLONOSCOPY      EGD      HERNIA REPAIR      HERNIA REPAIR  2007    x2    HERNIA REPAIR Left 2022    Procedure: REPAIR HERNIA INGUINAL;  Surgeon: Robert To MD;  Location: [facility];  Service: General    OTHER SURGICAL HISTORY      Perforated duodenol ulcer surgery     NM RPR/ADVMNT FLXR TDN N/Z/2 W/O FR GRAFT EA TENDON Left 2021    Procedure: Left thumb EPL repair;  Surgeon: Juan Alberto Oseguera MD;  Location: [facility];  Service: Orthopedics     Social History   Social History     Substance and Sexual Activity   Alcohol Use Yes    Alcohol/week: 1.0 standard drink of alcohol    Types: 1 Standard drinks or equivalent per week    Comment: rare  socially     Social History     Substance and Sexual Activity   Drug Use Never     Social History     Tobacco Use   Smoking Status Former    Current packs/day: 0.00    Average packs/day: 2.0 packs/day for 10.0 years (20.0 ttl pk-yrs)    Types: Cigarettes    Start date:     Quit date:     Years since quittin.5    Passive exposure: Past   Smokeless Tobacco Former    Quit date:    Tobacco Comments    former smoker noted in \"[employer]\" Quit in  - never used chewing tobacco      Family History   Problem " Relation Age of Onset    Pancreatic cancer Mother     Colon cancer Father     Diabetes Father         mellitus     No Known Problems Sister     Diabetes Sister     Nephrolithiasis Sister        Meds/Allergies       Medications Prior to Admission:     albuterol (PROVENTIL HFA,VENTOLIN HFA) 90 mcg/act inhaler    aspirin 81 mg chewable tablet    atorvastatin (LIPITOR) 80 mg tablet    Blood Glucose Monitoring Suppl (OneTouch Verio Reflect) w/Device KIT    cholecalciferol (VITAMIN D3) 1,000 units tablet    docusate sodium (COLACE) 100 mg capsule    dulaglutide (Trulicity) 3 MG/0.5ML injection    Empagliflozin (JARDIANCE) 10 MG TABS tablet    glucose blood (OneTouch Verio) test strip    glyBURIDE (DIABETA) 5 mg tablet    lisinopril-hydrochlorothiazide (PRINZIDE,ZESTORETIC) 20-25 MG per tablet    OneTouch Delica Lancets 33G MISC  Current Facility-Administered Medications   Medication Dose Route Frequency    acetaminophen (TYLENOL) tablet 650 mg  650 mg Oral Q6H PRN    aspirin chewable tablet 81 mg  81 mg Oral Daily    atorvastatin (LIPITOR) tablet 80 mg  80 mg Oral Daily With Dinner    Cholecalciferol (VITAMIN D3) tablet 2,000 Units  2,000 Units Oral Daily    enoxaparin (LOVENOX) subcutaneous injection 40 mg  40 mg Subcutaneous Daily    lisinopril (ZESTRIL) tablet 20 mg  20 mg Oral Daily    And    hydroCHLOROthiazide tablet 25 mg  25 mg Oral Daily    HYDROmorphone (DILAUDID) injection 0.5 mg  0.5 mg Intravenous Q4H PRN    HYDROmorphone (DILAUDID) injection 1 mg  1 mg Intravenous Q4H PRN    insulin lispro (HumALOG/ADMELOG) 100 units/mL subcutaneous injection 1-5 Units  1-5 Units Subcutaneous TID AC    ondansetron (ZOFRAN) injection 4 mg  4 mg Intravenous Q6H PRN    pantoprazole (PROTONIX) injection 40 mg  40 mg Intravenous Q12H LAURYN    sucralfate (CARAFATE) tablet 1 g  1 g Oral 4x Daily (AC & HS)       Allergies   Allergen Reactions    Meperidine Other (See Comments)     Hypotension & passed out    Pravastatin Myalgia  "      Objective   Blood pressure 139/78, pulse 62, temperature 98.7 °F (37.1 °C), temperature source Oral, resp. rate 20, height 5' 9.5\" (1.765 m), weight 75.4 kg (166 lb 3.6 oz), SpO2 97%.    Intake/Output Summary (Last 24 hours) at 7/19/2024 1152  Last data filed at 7/19/2024 0900  Gross per 24 hour   Intake 600 ml   Output 4175 ml   Net -3575 ml         PHYSICAL EXAM:      General Appearance:   Alert, cooperative, no distress, appears stated age    HEENT:   Normocephalic, atraumatic, anicteric.     Neck:  Supple, symmetrical, trachea midline, no adenopathy;    thyroid: no enlargement/tenderness/nodules; no carotid  bruit or JVD    Lungs:   Clear to auscultation bilaterally; no rales, rhonchi or wheezing; respirations unlabored    Heart::   S1 and S2 normal; regular rate and rhythm; no murmur, rub, or gallop.   Abdomen:   Soft, non-tender, non-distended; normal bowel sounds; no masses, no organomegaly    Genitalia:   Deferred    Rectal:   Deferred    Extremities:  No cyanosis, clubbing or edema    Pulses:  2+ and symmetric all extremities    Skin:  Skin color, texture, turgor normal, no rashes or lesions    Lymph nodes:  No palpable cervical, axillary or inguinal lymphadenopathy        Lab Results:   Admission on 07/16/2024   Component Date Value    WBC 07/16/2024 8.47     RBC 07/16/2024 4.97     Hemoglobin 07/16/2024 14.9     Hematocrit 07/16/2024 45.2     MCV 07/16/2024 91     MCH 07/16/2024 30.0     MCHC 07/16/2024 33.0     RDW 07/16/2024 12.6     MPV 07/16/2024 10.7     Platelets 07/16/2024 271     nRBC 07/16/2024 0     Segmented % 07/16/2024 74     Immature Grans % 07/16/2024 1     Lymphocytes % 07/16/2024 16     Monocytes % 07/16/2024 8     Eosinophils Relative 07/16/2024 1     Basophils Relative 07/16/2024 0     Absolute Neutrophils 07/16/2024 6.23     Absolute Immature Grans 07/16/2024 0.12     Absolute Lymphocytes 07/16/2024 1.32     Absolute Monocytes 07/16/2024 0.71     Eosinophils Absolute 07/16/2024 " 0.06     Basophils Absolute 07/16/2024 0.03     Sodium 07/16/2024 138     Potassium 07/16/2024 4.3     Chloride 07/16/2024 103     CO2 07/16/2024 27     ANION GAP 07/16/2024 8     BUN 07/16/2024 32 (H)     Creatinine 07/16/2024 1.80 (H)     Glucose 07/16/2024 179 (H)     Calcium 07/16/2024 9.5     eGFR 07/16/2024 35     Total Bilirubin 07/16/2024 0.64     Bilirubin, Direct 07/16/2024 0.17     Alkaline Phosphatase 07/16/2024 72     AST 07/16/2024 16     ALT 07/16/2024 21     Total Protein 07/16/2024 7.5     Albumin 07/16/2024 4.8     Lipase 07/16/2024 346 (H)     hs TnI 0hr 07/16/2024 9     Ventricular Rate 07/16/2024 62     Atrial Rate 07/16/2024 62     MI Interval 07/16/2024 146     QRSD Interval 07/16/2024 82     QT Interval 07/16/2024 400     QTC Interval 07/16/2024 406     P Axis 07/16/2024 57     QRS Axis 07/16/2024 68     T Wave [place] 07/16/2024 78     hs TnI 2hr 07/16/2024 9     Delta 2hr hsTnI 07/16/2024 0     Platelets 07/16/2024 238     MPV 07/16/2024 10.2     hs TnI 4hr 07/17/2024 9     Delta 4hr hsTnI 07/17/2024 0     POC Glucose 07/16/2024 140     Sodium 07/17/2024 138     Potassium 07/17/2024 4.3     Chloride 07/17/2024 106     CO2 07/17/2024 24     ANION GAP 07/17/2024 8     BUN 07/17/2024 31 (H)     Creatinine 07/17/2024 1.67 (H)     Glucose 07/17/2024 122     Calcium 07/17/2024 8.6     AST 07/17/2024 13     ALT 07/17/2024 17     Alkaline Phosphatase 07/17/2024 59     Total Protein 07/17/2024 6.2 (L)     Albumin 07/17/2024 4.0     Total Bilirubin 07/17/2024 0.78     eGFR 07/17/2024 38     WBC 07/17/2024 8.05     RBC 07/17/2024 4.27     Hemoglobin 07/17/2024 13.0     Hematocrit 07/17/2024 38.6     MCV 07/17/2024 90     MCH 07/17/2024 30.4     MCHC 07/17/2024 33.7     RDW 07/17/2024 12.6     Platelets 07/17/2024 223     MPV 07/17/2024 10.2     POC Glucose 07/17/2024 95     Triglycerides 07/17/2024 71     POC Glucose 07/17/2024 166 (H)     POC Glucose 07/17/2024 103     WBC 07/18/2024 6.34     RBC  07/18/2024 3.49 (L)     Hemoglobin 07/18/2024 10.7 (L)     Hematocrit 07/18/2024 31.8 (L)     MCV 07/18/2024 91     MCH 07/18/2024 30.7     MCHC 07/18/2024 33.6     RDW 07/18/2024 12.4     MPV 07/18/2024 10.1     Platelets 07/18/2024 167     nRBC 07/18/2024 0     Segmented % 07/18/2024 59     Immature Grans % 07/18/2024 1     Lymphocytes % 07/18/2024 27     Monocytes % 07/18/2024 9     Eosinophils Relative 07/18/2024 3     Basophils Relative 07/18/2024 1     Absolute Neutrophils 07/18/2024 3.82     Absolute Immature Grans 07/18/2024 0.04     Absolute Lymphocytes 07/18/2024 1.71     Absolute Monocytes 07/18/2024 0.55     Eosinophils Absolute 07/18/2024 0.18     Basophils Absolute 07/18/2024 0.04     Sodium 07/18/2024 136     Potassium 07/18/2024 4.6     Chloride 07/18/2024 107     CO2 07/18/2024 22     ANION GAP 07/18/2024 7     BUN 07/18/2024 25     Creatinine 07/18/2024 1.45 (H)     Glucose 07/18/2024 212 (H)     Calcium 07/18/2024 7.9 (L)     AST 07/18/2024 14     ALT 07/18/2024 17     Alkaline Phosphatase 07/18/2024 59     Total Protein 07/18/2024 5.4 (L)     Albumin 07/18/2024 3.5     Total Bilirubin 07/18/2024 0.92     eGFR 07/18/2024 45     POC Glucose 07/18/2024 112     POC Glucose 07/18/2024 150 (H)     POC Glucose 07/18/2024 90     POC Glucose 07/18/2024 110     WBC 07/19/2024 6.42     RBC 07/19/2024 4.31     Hemoglobin 07/19/2024 13.2     Hematocrit 07/19/2024 38.9     MCV 07/19/2024 90     MCH 07/19/2024 30.6     MCHC 07/19/2024 33.9     RDW 07/19/2024 12.2     MPV 07/19/2024 10.1     Platelets 07/19/2024 193     nRBC 07/19/2024 0     Segmented % 07/19/2024 55     Immature Grans % 07/19/2024 0     Lymphocytes % 07/19/2024 30     Monocytes % 07/19/2024 10     Eosinophils Relative 07/19/2024 4     Basophils Relative 07/19/2024 1     Absolute Neutrophils 07/19/2024 3.48     Absolute Immature Grans 07/19/2024 0.02     Absolute Lymphocytes 07/19/2024 1.93     Absolute Monocytes 07/19/2024 0.66     Eosinophils  Absolute 07/19/2024 0.28     Basophils Absolute 07/19/2024 0.05     Sodium 07/19/2024 137     Potassium 07/19/2024 4.4     Chloride 07/19/2024 108     CO2 07/19/2024 25     ANION GAP 07/19/2024 4     BUN 07/19/2024 23     Creatinine 07/19/2024 1.52 (H)     Glucose 07/19/2024 96     Calcium 07/19/2024 8.4     AST 07/19/2024 14     ALT 07/19/2024 16     Alkaline Phosphatase 07/19/2024 61     Total Protein 07/19/2024 5.9 (L)     Albumin 07/19/2024 3.6     Total Bilirubin 07/19/2024 0.92     eGFR 07/19/2024 42     POC Glucose 07/19/2024 103     POC Glucose 07/19/2024 112        Imaging Studies: I have personally reviewed pertinent imaging studies.        ASSESSMENT & PLAN:  Abdominal pain  Nausea  History of Perforated PUD  US suggesting pancreatitis and GB sludge.   -GI consulted today for worsening abdominal pain and nausea.   -CT AP 7/16/24 was essentially unremarkable.   -RUQ US 7/17/24 showed pancreatitis and GB sludge.   -LFT's normal.  -Will plan MRI/MRCP  -Clear liquid diet.   -Serial abdominal exams.  -May need EGD at some point pending results of MRI.   -Continue PPI BID    Patient will be seen and examined by Dr. Sanchez. All yen medical decisions will be made by Dr. Sanchez.     Concepción Arechiga PA-C  7/19/2024,11:52 AM

## 2024-07-19 NOTE — PLAN OF CARE
Problem: PAIN - ADULT  Goal: Verbalizes/displays adequate comfort level or baseline comfort level  Description: Interventions:  - Encourage patient to monitor pain and request assistance  - Assess pain using appropriate pain scale  - Administer analgesics based on type and severity of pain and evaluate response  - Implement non-pharmacological measures as appropriate and evaluate response  - Consider cultural and social influences on pain and pain management  - Notify physician/advanced practitioner if interventions unsuccessful or patient reports new pain  Outcome: Progressing    Problem: DISCHARGE PLANNING  Goal: Discharge to home or other facility with appropriate resources  Description: INTERVENTIONS:  - Identify barriers to discharge w/patient and caregiver  - Arrange for needed discharge resources and transportation as appropriate  - Identify discharge learning needs (meds, wound care, etc.)  - Arrange for interpretive services to assist at discharge as needed  - Refer to Case Management Department for coordinating discharge planning if the patient needs post-hospital services based on physician/advanced practitioner order or complex needs related to functional status, cognitive ability, or social support system  Outcome: Progressing     Problem: Knowledge Deficit  Goal: Patient/family/caregiver demonstrates understanding of disease process, treatment plan, medications, and discharge instructions  Description: Complete learning assessment and assess knowledge base.  Interventions:  - Provide teaching at level of understanding  - Provide teaching via preferred learning methods  Outcome: Progressing      Problem: SAFETY ADULT  Goal: Maintain or return to baseline ADL function  Description: INTERVENTIONS:  -  Assess patient's ability to carry out ADLs; assess patient's baseline for ADL function and identify physical deficits which impact ability to perform ADLs (bathing, care of mouth/teeth, toileting,  grooming, dressing, etc.)  - Assess/evaluate cause of self-care deficits   - Assess range of motion  - Assess patient's mobility; develop plan if impaired  - Assess patient's need for assistive devices and provide as appropriate  - Encourage maximum independence but intervene and supervise when necessary  - Involve family in performance of ADLs  - Assess for home care needs following discharge   - Consider OT consult to assist with ADL evaluation and planning for discharge  - Provide patient education as appropriate  Outcome: Progressing

## 2024-07-20 ENCOUNTER — ANESTHESIA EVENT (INPATIENT)
Dept: GASTROENTEROLOGY | Facility: HOSPITAL | Age: 79
DRG: 439 | End: 2024-07-20
Payer: COMMERCIAL

## 2024-07-20 ENCOUNTER — ANESTHESIA (INPATIENT)
Dept: GASTROENTEROLOGY | Facility: HOSPITAL | Age: 79
DRG: 439 | End: 2024-07-20
Payer: COMMERCIAL

## 2024-07-20 ENCOUNTER — APPOINTMENT (INPATIENT)
Dept: GASTROENTEROLOGY | Facility: HOSPITAL | Age: 79
DRG: 439 | End: 2024-07-20
Payer: COMMERCIAL

## 2024-07-20 PROBLEM — K86.2 PANCREATIC CYST: Status: ACTIVE | Noted: 2024-07-20

## 2024-07-20 LAB
ALBUMIN SERPL BCG-MCNC: 3.9 G/DL (ref 3.5–5)
ALP SERPL-CCNC: 65 U/L (ref 34–104)
ALT SERPL W P-5'-P-CCNC: 18 U/L (ref 7–52)
ANION GAP SERPL CALCULATED.3IONS-SCNC: 8 MMOL/L (ref 4–13)
AST SERPL W P-5'-P-CCNC: 17 U/L (ref 13–39)
BASOPHILS # BLD AUTO: 0.05 THOUSANDS/ÂΜL (ref 0–0.1)
BASOPHILS NFR BLD AUTO: 1 % (ref 0–1)
BILIRUB SERPL-MCNC: 0.92 MG/DL (ref 0.2–1)
BUN SERPL-MCNC: 26 MG/DL (ref 5–25)
CALCIUM SERPL-MCNC: 8.6 MG/DL (ref 8.4–10.2)
CHLORIDE SERPL-SCNC: 106 MMOL/L (ref 96–108)
CO2 SERPL-SCNC: 22 MMOL/L (ref 21–32)
CREAT SERPL-MCNC: 1.43 MG/DL (ref 0.6–1.3)
EOSINOPHIL # BLD AUTO: 0.28 THOUSAND/ÂΜL (ref 0–0.61)
EOSINOPHIL NFR BLD AUTO: 4 % (ref 0–6)
ERYTHROCYTE [DISTWIDTH] IN BLOOD BY AUTOMATED COUNT: 12.3 % (ref 11.6–15.1)
GFR SERPL CREATININE-BSD FRML MDRD: 46 ML/MIN/1.73SQ M
GLUCOSE SERPL-MCNC: 100 MG/DL (ref 65–140)
GLUCOSE SERPL-MCNC: 107 MG/DL (ref 65–140)
GLUCOSE SERPL-MCNC: 196 MG/DL (ref 65–140)
GLUCOSE SERPL-MCNC: 81 MG/DL (ref 65–140)
HCT VFR BLD AUTO: 40.1 % (ref 36.5–49.3)
HGB BLD-MCNC: 13.9 G/DL (ref 12–17)
IGG SERPL-MCNC: 472 MG/DL (ref 603–1613)
IGG1 SER-MCNC: 260 MG/DL (ref 248–810)
IGG2 SER-MCNC: 126 MG/DL (ref 130–555)
IGG3 SER-MCNC: 19 MG/DL (ref 15–102)
IGG4 SER-MCNC: 13 MG/DL (ref 2–96)
IMM GRANULOCYTES # BLD AUTO: 0.02 THOUSAND/UL (ref 0–0.2)
IMM GRANULOCYTES NFR BLD AUTO: 0 % (ref 0–2)
LYMPHOCYTES # BLD AUTO: 1.66 THOUSANDS/ÂΜL (ref 0.6–4.47)
LYMPHOCYTES NFR BLD AUTO: 22 % (ref 14–44)
MCH RBC QN AUTO: 30.8 PG (ref 26.8–34.3)
MCHC RBC AUTO-ENTMCNC: 34.7 G/DL (ref 31.4–37.4)
MCV RBC AUTO: 89 FL (ref 82–98)
MONOCYTES # BLD AUTO: 0.85 THOUSAND/ÂΜL (ref 0.17–1.22)
MONOCYTES NFR BLD AUTO: 11 % (ref 4–12)
NEUTROPHILS # BLD AUTO: 4.61 THOUSANDS/ÂΜL (ref 1.85–7.62)
NEUTS SEG NFR BLD AUTO: 62 % (ref 43–75)
NRBC BLD AUTO-RTO: 0 /100 WBCS
PLATELET # BLD AUTO: 211 THOUSANDS/UL (ref 149–390)
PMV BLD AUTO: 10.1 FL (ref 8.9–12.7)
POTASSIUM SERPL-SCNC: 4.1 MMOL/L (ref 3.5–5.3)
PROT SERPL-MCNC: 6.2 G/DL (ref 6.4–8.4)
PSA SERPL-MCNC: 2.29 NG/ML (ref 0–4)
RBC # BLD AUTO: 4.52 MILLION/UL (ref 3.88–5.62)
SODIUM SERPL-SCNC: 136 MMOL/L (ref 135–147)
WBC # BLD AUTO: 7.47 THOUSAND/UL (ref 4.31–10.16)

## 2024-07-20 PROCEDURE — 88305 TISSUE EXAM BY PATHOLOGIST: CPT | Performed by: PATHOLOGY

## 2024-07-20 PROCEDURE — 0DJ08ZZ INSPECTION OF UPPER INTESTINAL TRACT, VIA NATURAL OR ARTIFICIAL OPENING ENDOSCOPIC: ICD-10-PCS | Performed by: INTERNAL MEDICINE

## 2024-07-20 PROCEDURE — G0103 PSA SCREENING: HCPCS | Performed by: INTERNAL MEDICINE

## 2024-07-20 PROCEDURE — 99233 SBSQ HOSP IP/OBS HIGH 50: CPT | Performed by: INTERNAL MEDICINE

## 2024-07-20 PROCEDURE — 0DB68ZX EXCISION OF STOMACH, VIA NATURAL OR ARTIFICIAL OPENING ENDOSCOPIC, DIAGNOSTIC: ICD-10-PCS | Performed by: INTERNAL MEDICINE

## 2024-07-20 PROCEDURE — 43239 EGD BIOPSY SINGLE/MULTIPLE: CPT | Performed by: INTERNAL MEDICINE

## 2024-07-20 PROCEDURE — 82948 REAGENT STRIP/BLOOD GLUCOSE: CPT

## 2024-07-20 PROCEDURE — 85025 COMPLETE CBC W/AUTO DIFF WBC: CPT | Performed by: INTERNAL MEDICINE

## 2024-07-20 PROCEDURE — 80053 COMPREHEN METABOLIC PANEL: CPT | Performed by: INTERNAL MEDICINE

## 2024-07-20 RX ORDER — SODIUM CHLORIDE, SODIUM LACTATE, POTASSIUM CHLORIDE, CALCIUM CHLORIDE 600; 310; 30; 20 MG/100ML; MG/100ML; MG/100ML; MG/100ML
INJECTION, SOLUTION INTRAVENOUS CONTINUOUS PRN
Status: DISCONTINUED | OUTPATIENT
Start: 2024-07-20 | End: 2024-07-20

## 2024-07-20 RX ORDER — SODIUM CHLORIDE, SODIUM LACTATE, POTASSIUM CHLORIDE, CALCIUM CHLORIDE 600; 310; 30; 20 MG/100ML; MG/100ML; MG/100ML; MG/100ML
100 INJECTION, SOLUTION INTRAVENOUS CONTINUOUS
Status: DISCONTINUED | OUTPATIENT
Start: 2024-07-20 | End: 2024-07-20

## 2024-07-20 RX ORDER — PROPOFOL 10 MG/ML
INJECTION, EMULSION INTRAVENOUS AS NEEDED
Status: DISCONTINUED | OUTPATIENT
Start: 2024-07-20 | End: 2024-07-20

## 2024-07-20 RX ORDER — HYDROMORPHONE HCL/PF 1 MG/ML
0.5 SYRINGE (ML) INJECTION EVERY 4 HOURS PRN
Status: DISCONTINUED | OUTPATIENT
Start: 2024-07-20 | End: 2024-07-22 | Stop reason: HOSPADM

## 2024-07-20 RX ORDER — ALBUMIN, HUMAN INJ 5% 5 %
12.5 SOLUTION INTRAVENOUS ONCE
Status: COMPLETED | OUTPATIENT
Start: 2024-07-20 | End: 2024-07-21

## 2024-07-20 RX ORDER — LIDOCAINE HYDROCHLORIDE 20 MG/ML
INJECTION, SOLUTION EPIDURAL; INFILTRATION; INTRACAUDAL; PERINEURAL AS NEEDED
Status: DISCONTINUED | OUTPATIENT
Start: 2024-07-20 | End: 2024-07-20

## 2024-07-20 RX ORDER — SODIUM CHLORIDE, SODIUM LACTATE, POTASSIUM CHLORIDE, CALCIUM CHLORIDE 600; 310; 30; 20 MG/100ML; MG/100ML; MG/100ML; MG/100ML
100 INJECTION, SOLUTION INTRAVENOUS CONTINUOUS
Status: DISCONTINUED | OUTPATIENT
Start: 2024-07-20 | End: 2024-07-21

## 2024-07-20 RX ORDER — PHENYLEPHRINE HCL IN 0.9% NACL 1 MG/10 ML
SYRINGE (ML) INTRAVENOUS AS NEEDED
Status: DISCONTINUED | OUTPATIENT
Start: 2024-07-20 | End: 2024-07-20

## 2024-07-20 RX ADMIN — LIDOCAINE HYDROCHLORIDE 100 MG: 20 INJECTION, SOLUTION EPIDURAL; INFILTRATION; INTRACAUDAL at 13:42

## 2024-07-20 RX ADMIN — SODIUM CHLORIDE, SODIUM LACTATE, POTASSIUM CHLORIDE, AND CALCIUM CHLORIDE 500 ML: .6; .31; .03; .02 INJECTION, SOLUTION INTRAVENOUS at 22:02

## 2024-07-20 RX ADMIN — HYDROCHLOROTHIAZIDE 25 MG: 25 TABLET ORAL at 08:37

## 2024-07-20 RX ADMIN — SODIUM CHLORIDE, SODIUM LACTATE, POTASSIUM CHLORIDE, AND CALCIUM CHLORIDE: .6; .31; .03; .02 INJECTION, SOLUTION INTRAVENOUS at 13:30

## 2024-07-20 RX ADMIN — PANTOPRAZOLE SODIUM 40 MG: 40 INJECTION, POWDER, FOR SOLUTION INTRAVENOUS at 08:37

## 2024-07-20 RX ADMIN — PANTOPRAZOLE SODIUM 40 MG: 40 INJECTION, POWDER, FOR SOLUTION INTRAVENOUS at 21:33

## 2024-07-20 RX ADMIN — ENOXAPARIN SODIUM 40 MG: 40 INJECTION SUBCUTANEOUS at 08:36

## 2024-07-20 RX ADMIN — ASPIRIN 81 MG: 81 TABLET, CHEWABLE ORAL at 08:37

## 2024-07-20 RX ADMIN — SODIUM CHLORIDE, SODIUM LACTATE, POTASSIUM CHLORIDE, AND CALCIUM CHLORIDE 500 ML: .6; .31; .03; .02 INJECTION, SOLUTION INTRAVENOUS at 23:00

## 2024-07-20 RX ADMIN — PROPOFOL 80 MG: 10 INJECTION, EMULSION INTRAVENOUS at 13:42

## 2024-07-20 RX ADMIN — SUCRALFATE 1 G: 1 TABLET ORAL at 16:59

## 2024-07-20 RX ADMIN — Medication 2000 UNITS: at 08:37

## 2024-07-20 RX ADMIN — ATORVASTATIN CALCIUM 80 MG: 40 TABLET, FILM COATED ORAL at 16:59

## 2024-07-20 RX ADMIN — SODIUM CHLORIDE, SODIUM LACTATE, POTASSIUM CHLORIDE, AND CALCIUM CHLORIDE 100 ML/HR: .6; .31; .03; .02 INJECTION, SOLUTION INTRAVENOUS at 23:58

## 2024-07-20 RX ADMIN — Medication 200 MCG: at 13:50

## 2024-07-20 RX ADMIN — SUCRALFATE 1 G: 1 TABLET ORAL at 08:36

## 2024-07-20 RX ADMIN — SUCRALFATE 1 G: 1 TABLET ORAL at 21:33

## 2024-07-20 RX ADMIN — ALBUMIN (HUMAN) 12.5 G: 12.5 INJECTION, SOLUTION INTRAVENOUS at 23:58

## 2024-07-20 RX ADMIN — LISINOPRIL 20 MG: 20 TABLET ORAL at 08:37

## 2024-07-20 RX ADMIN — PROPOFOL 30 MG: 10 INJECTION, EMULSION INTRAVENOUS at 13:44

## 2024-07-20 NOTE — ANESTHESIA PREPROCEDURE EVALUATION
Procedure:  EGD    Relevant Problems   CARDIO   (+) Benign essential hypertension   (+) Hypercholesterolemia   (+) Mixed hyperlipidemia      ENDO   (+) Controlled type 2 diabetes mellitus with chronic kidney disease, without long-term current use of insulin (HCC)      GI/HEPATIC   (+) Dysphagia   (+) Idiopathic acute pancreatitis   (+) Pancreatic cyst      /RENAL   (+) Benign prostatic hyperplasia with lower urinary tract symptoms   (+) Chronic kidney disease-mineral and bone disorder   (+) Stage 4 chronic kidney disease (HCC)      PULMONARY   (+) Moderate asthma without complication      Neurology/Sleep   (+) Balance disorder      Orthopedic/Musculoskeletal   (+) Degenerative cervical spinal stenosis   (+) Ganglion cyst of left foot      Other   (+) Constipation   (+) Overweight (BMI 25.0-29.9)        Physical Exam    Airway      TM Distance: >3 FB  Neck ROM: full     Dental    upper dentures    Cardiovascular  Cardiovascular exam normal    Pulmonary  Pulmonary exam normal     Other Findings        Anesthesia Plan  ASA Score- 3     Anesthesia Type- IV sedation with anesthesia with ASA Monitors.         Additional Monitors:     Airway Plan:            Plan Factors-Exercise tolerance (METS): <4 METS.    Chart reviewed. EKG reviewed. Imaging results reviewed. Existing labs reviewed. Patient summary reviewed.    Patient is not a current smoker.              Induction- intravenous.    Postoperative Plan-     Perioperative Resuscitation Plan - Level 1 - Full Code.       Informed Consent- Anesthetic plan and risks discussed with patient.  I personally reviewed this patient with the CRNA. Discussed and agreed on the Anesthesia Plan with the CRNA..

## 2024-07-20 NOTE — ASSESSMENT & PLAN NOTE
Patient with enlarged prostate on imaging, no signs of obstruction or retention.  Daughter concerned because he has a family hx of cancer.  Follow PSA and OP follow up

## 2024-07-20 NOTE — ASSESSMENT & PLAN NOTE
Patient with elevated lipase and pain somewhat typical of acute pancreatitis although CT scan did not reveal any peripancreatic edema.  Given the same we will treat as such since he makes biochemical and clinical criteria.  Ultrasound also with pancreatic edema suggestive of acute pancreatitis  Etiology not completely clear, he does not have any history of alcohol use, no gallstones noted on imaging, lipid profile apparently recently normal   Apparently patient may have been previously on Trulicity which not only can cause pancreatitis but also gastroparesis and pain  IgG4 pending    Patient was previously with significant pain in the upper abdomen but now improving.  MRI without any signs of active acute pancreatitis or obstruction, small pancreatic cyst  GI planning EGD for further investigation  Monitor dietary tolerance subsequently

## 2024-07-20 NOTE — PLAN OF CARE
Problem: GASTROINTESTINAL - ADULT  Goal: Minimal or absence of nausea and/or vomiting  Description: INTERVENTIONS:  - Administer IV fluids if ordered to ensure adequate hydration  - Maintain NPO status until nausea and vomiting are resolved  - Nasogastric tube if ordered  - Administer ordered antiemetic medications as needed  - Provide nonpharmacologic comfort measures as appropriate  - Advance diet as tolerated, if ordered  - Consider nutrition services referral to assist patient with adequate nutrition and appropriate food choices  Outcome: Progressing     Problem: Nutrition/Hydration-ADULT  Goal: Nutrient/Hydration intake appropriate for improving, restoring or maintaining nutritional needs  Description: Monitor and assess patient's nutrition/hydration status for malnutrition. Collaborate with interdisciplinary team and initiate plan and interventions as ordered.  Monitor patient's weight and dietary intake as ordered or per policy. Utilize nutrition screening tool and intervene as necessary. Determine patient's food preferences and provide high-protein, high-caloric foods as appropriate.     INTERVENTIONS:  - Monitor oral intake, urinary output, labs, and treatment plans  - Assess nutrition and hydration status and recommend course of action  - Evaluate amount of meals eaten  - Assist patient with eating if necessary   - Allow adequate time for meals  - Recommend/ encourage appropriate diets, oral nutritional supplements, and vitamin/mineral supplements  - Order, calculate, and assess calorie counts as needed  - Recommend, monitor, and adjust tube feedings and TPN/PPN based on assessed needs  - Assess need for intravenous fluids  - Provide specific nutrition/hydration education as appropriate  - Include patient/family/caregiver in decisions related to nutrition  7/20/2024 1102 by Gomez Irwin RN  Outcome: Progressing  7/20/2024 1102 by Gomez Irwin RN  Outcome: Progressing     Problem: Knowledge  Deficit  Goal: Patient/family/caregiver demonstrates understanding of disease process, treatment plan, medications, and discharge instructions  Description: Complete learning assessment and assess knowledge base.  Interventions:  - Provide teaching at level of understanding  - Provide teaching via preferred learning methods  7/20/2024 1102 by Gomez Irwin RN  Outcome: Progressing  7/20/2024 1102 by Gomez Irwin RN  Outcome: Progressing     Problem: PAIN - ADULT  Goal: Verbalizes/displays adequate comfort level or baseline comfort level  Description: Interventions:  - Encourage patient to monitor pain and request assistance  - Assess pain using appropriate pain scale  - Administer analgesics based on type and severity of pain and evaluate response  - Implement non-pharmacological measures as appropriate and evaluate response  - Consider cultural and social influences on pain and pain management  - Notify physician/advanced practitioner if interventions unsuccessful or patient reports new pain  7/20/2024 1102 by Gomez Irwin RN  Outcome: Progressing  7/20/2024 1102 by Gomez Irwin RN  Outcome: Progressing     Problem: Potential for Falls  Goal: Patient will remain free of falls  Description: INTERVENTIONS:  - Educate patient/family on patient safety including physical limitations  - Instruct patient to call for assistance with activity   - Consult OT/PT to assist with strengthening/mobility   - Keep Call bell within reach  - Keep bed low and locked with side rails adjusted as appropriate  - Keep care items and personal belongings within reach  - Initiate and maintain comfort rounds  - Apply yellow socks and bracelet for high fall risk patients  - Consider moving patient to room near nurses station  7/20/2024 1102 by Gomez Irwin RN  Outcome: Progressing  7/20/2024 1102 by Gomez Irwin RN  Outcome: Progressing

## 2024-07-20 NOTE — PROGRESS NOTES
[facility]  Progress Note  Name: Bree Barrios I  MRN: [id number]  Unit/Bed#: -01 I Date of Admission: 7/16/2024   Date of Service: 7/20/2024 I Hospital Day: 3    Assessment & Plan   * Idiopathic acute pancreatitis  Assessment & Plan  Patient with elevated lipase and pain somewhat typical of acute pancreatitis although CT scan did not reveal any peripancreatic edema.  Given the same we will treat as such since he makes biochemical and clinical criteria.  Ultrasound also with pancreatic edema suggestive of acute pancreatitis  Etiology not completely clear, he does not have any history of alcohol use, no gallstones noted on imaging, lipid profile apparently recently normal   Apparently patient may have been previously on Trulicity which not only can cause pancreatitis but also gastroparesis and pain  IgG4 pending    Patient was previously with significant pain in the upper abdomen but now improving.  MRI without any signs of active acute pancreatitis or obstruction, small pancreatic cyst  GI planning EGD for further investigation  Monitor dietary tolerance subsequently    Enlarged prostate  Assessment & Plan  Patient with enlarged prostate on imaging, no signs of obstruction or retention.  Daughter concerned because he has a family hx of cancer.  Follow PSA and OP follow up    Pancreatic cyst  Assessment & Plan  MRI with 8 mm pancreatic neck/head cyst. Follow-up with repeat pancreatic MRI in 2 years.     Benign essential hypertension  Assessment & Plan  Continue antihypertensives  Monitor BP    Controlled type 2 diabetes mellitus with chronic kidney disease, without long-term current use of insulin (HCC)  Assessment & Plan  Lab Results   Component Value Date    HGBA1C 7.7 (H) 05/02/2024       Recent Labs     07/19/24  0742 07/19/24  1123 07/19/24  1619 07/20/24  0740   POCGLU 103 112 107 107         Blood Sugar Average: Last 72 hrs:  (P) 114.0520311145159255    Glycemic control is  adequate, most recent A1c last month was 7.7, hold all oral medications and also Trulicity, use insulin sliding scale for now.  Kidney function seems to be at this time at baseline of CKD  Avoid nephrotoxins  Given overall good control would probably avoid Trulicity in the future given his abdominal pain elevated lipase           VTE Pharmacologic Prophylaxis:   Pharmacologic: Enoxaparin (Lovenox)  Mechanical VTE Prophylaxis in Place: Yes    Patient Centered Rounds: I have performed bedside rounds with nursing staff today.    Discussions with Specialists or Other Care Team Provider: Discussed with care management team    Education and Discussions with Family / Patient: Patient himself - he did not request me to talk to anyone today    Time Spent for Care: 1 hour.  More than 50% of total time spent on counseling and coordination of care as described above.    Current Length of Stay: 3 day(s)    Current Patient Status: Inpatient   Certification Statement: The patient will continue to require additional inpatient hospital stay due to need for GI investigation    Discharge Plan: 24-48h    Code Status: Level 1 - Full Code      Subjective:     Patient valuated this morning.  He does mention that abdominal pain is improving although not completely better yet.  No nausea or vomiting.  Currently n.p.o. pending potential EGD today.    Objective:     Vitals:   Temp (24hrs), Av.7 °F (36.5 °C), Min:97.5 °F (36.4 °C), Max:98 °F (36.7 °C)    Temp:  [97.5 °F (36.4 °C)-98 °F (36.7 °C)] 97.5 °F (36.4 °C)  HR:  [64-75] 75  Resp:  [16-18] 16  BP: (113-151)/(70-79) 151/76  SpO2:  [94 %-99 %] 99 %  Body mass index is 24.29 kg/m².     Input and Output Summary (last 24 hours):       Intake/Output Summary (Last 24 hours) at 2024 1215  Last data filed at 2024 0900  Gross per 24 hour   Intake 360 ml   Output 3800 ml   Net -3440 ml       Physical Exam:     Physical Exam  Vitals and nursing note reviewed.   Constitutional:        General: He is not in acute distress.     Appearance: Normal appearance. He is not ill-appearing, toxic-appearing or diaphoretic.      Comments: Male patient in bed, awake   HENT:      Head: Normocephalic and atraumatic.      Right Ear: External ear normal.      Left Ear: External ear normal.      Nose: Nose normal. No congestion or rhinorrhea.      Mouth/Throat:      Mouth: Mucous membranes are moist.      Pharynx: Oropharynx is clear. No oropharyngeal exudate or posterior oropharyngeal erythema.   Eyes:      General: No scleral icterus.        Right eye: No discharge.         Left eye: No discharge.      Pupils: Pupils are equal, round, and reactive to light.   Neck:      Vascular: No carotid bruit.   Cardiovascular:      Rate and Rhythm: Normal rate and regular rhythm.      Pulses: Normal pulses.      Heart sounds: No murmur heard.     No friction rub. No gallop.   Pulmonary:      Effort: Pulmonary effort is normal. No respiratory distress.      Breath sounds: Normal breath sounds. No stridor. No wheezing, rhonchi or rales.   Abdominal:      General: Abdomen is flat. Bowel sounds are normal. There is no distension.      Palpations: Abdomen is soft. There is no mass.      Tenderness: There is no abdominal tenderness. There is no guarding or rebound.      Hernia: No hernia is present.   Musculoskeletal:         General: No swelling, tenderness, deformity or signs of injury. Normal range of motion.      Cervical back: Normal range of motion. No rigidity. No muscular tenderness.   Lymphadenopathy:      Cervical: No cervical adenopathy.   Skin:     General: Skin is warm and dry.      Capillary Refill: Capillary refill takes less than 2 seconds.      Coloration: Skin is not jaundiced or pale.      Findings: No bruising or erythema.   Neurological:      General: No focal deficit present.      Mental Status: He is alert and oriented to person, place, and time. Mental status is at baseline.      Cranial Nerves: No cranial  nerve deficit.      Sensory: No sensory deficit.      Motor: No weakness.      Coordination: Coordination normal.      Deep Tendon Reflexes: Reflexes normal.   Psychiatric:         Mood and Affect: Mood normal.         Behavior: Behavior normal.         Thought Content: Thought content normal.         Judgment: Judgment normal.           Additional Data:     Labs:    Results from last 7 days   Lab Units 07/20/24  0612   WBC Thousand/uL 7.47   HEMOGLOBIN g/dL 13.9   HEMATOCRIT % 40.1   PLATELETS Thousands/uL 211   SEGS PCT % 62   LYMPHO PCT % 22   MONO PCT % 11   EOS PCT % 4     Results from last 7 days   Lab Units 07/20/24  0612   SODIUM mmol/L 136   POTASSIUM mmol/L 4.1   CHLORIDE mmol/L 106   CO2 mmol/L 22   BUN mg/dL 26*   CREATININE mg/dL 1.43*   ANION GAP mmol/L 8   CALCIUM mg/dL 8.6   ALBUMIN g/dL 3.9   TOTAL BILIRUBIN mg/dL 0.92   ALK PHOS U/L 65   ALT U/L 18   AST U/L 17   GLUCOSE RANDOM mg/dL 100         Results from last 7 days   Lab Units 07/20/24  0740 07/19/24  1619 07/19/24  1123 07/19/24  0742 07/18/24  2156 07/18/24  1710 07/18/24  1128 07/18/24  0737 07/17/24  1521 07/17/24  1114 07/17/24  0723 07/16/24  2321   POC GLUCOSE mg/dl 107 107 112 103 110 90 150* 112 103 166* 95 140                   * I Have Reviewed All Lab Data Listed Above.  * Additional Pertinent Lab Tests Reviewed: All Labs For Current Hospital Admission Reviewed        Recent Cultures (last 7 days):           Last 24 Hours Medication List:   Current Facility-Administered Medications   Medication Dose Route Frequency Provider Last Rate    acetaminophen  650 mg Oral Q6H PRN Andi Barbosa MD      aspirin  81 mg Oral Daily Andi Barbosa MD      atorvastatin  80 mg Oral Daily With Dinner Andi Barbosa MD      cholecalciferol  2,000 Units Oral Daily Andi Barbosa MD      enoxaparin  40 mg Subcutaneous Daily Andi Barbosa MD      lisinopril  20 mg Oral Daily Andi Barbosa MD      And    hydroCHLOROthiazide  25  mg Oral Daily Andi Barbosa MD      HYDROmorphone  0.5 mg Intravenous Q4H PRN Andi Barbosa MD      HYDROmorphone  1 mg Intravenous Q4H PRN Andi Barbosa MD      insulin lispro  1-5 Units Subcutaneous TID AC Andi Barbosa MD      ondansetron  4 mg Intravenous Q6H PRN Andi Barbosa MD      pantoprazole  40 mg Intravenous Q12H [facility] Gianfranco Paulson MD      sucralfate  1 g Oral 4x Daily (AC & HS) Gianfranco Paulson MD          Today, Patient Was Seen By: Gianfranco Paulson MD    ** Please Note: Dictation voice to text software may have been used in the creation of this document. **

## 2024-07-20 NOTE — ASSESSMENT & PLAN NOTE
Lab Results   Component Value Date    HGBA1C 7.7 (H) 05/02/2024       Recent Labs     07/19/24  0742 07/19/24  1123 07/19/24  1619 07/20/24  0740   POCGLU 103 112 107 107         Blood Sugar Average: Last 72 hrs:  (P) 114.9777691973574667    Glycemic control is adequate, most recent A1c last month was 7.7, hold all oral medications and also Trulicity, use insulin sliding scale for now.  Kidney function seems to be at this time at baseline of CKD  Avoid nephrotoxins  Given overall good control would probably avoid Trulicity in the future given his abdominal pain elevated lipase

## 2024-07-20 NOTE — ANESTHESIA POSTPROCEDURE EVALUATION
Post-Op Assessment Note    CV Status:  Stable  Pain Score: 0    Pain management: adequate       Mental Status:  Sleepy and arousable   Hydration Status:  Euvolemic   PONV Controlled:  Controlled   Airway Patency:  Patent     Post Op Vitals Reviewed: Yes    No anethesia notable event occurred.    Staff: YANNI               BP 92/54 (07/20/24 1355)    Temp 99.3 °F (37.4 °C) (07/20/24 1355)    Pulse 75 (07/20/24 1355)   Resp 19 (07/20/24 1355)    SpO2 97 % (07/20/24 1355)

## 2024-07-21 PROBLEM — E11.9 DM (DIABETES MELLITUS), TYPE 2 (HCC): Status: ACTIVE | Noted: 2018-02-08

## 2024-07-21 PROBLEM — N17.9 ACUTE KIDNEY FAILURE (HCC): Status: ACTIVE | Noted: 2024-07-21

## 2024-07-21 LAB
ALBUMIN SERPL BCG-MCNC: 3.6 G/DL (ref 3.5–5)
ALP SERPL-CCNC: 65 U/L (ref 34–104)
ALT SERPL W P-5'-P-CCNC: 32 U/L (ref 7–52)
ANION GAP SERPL CALCULATED.3IONS-SCNC: 10 MMOL/L (ref 4–13)
AST SERPL W P-5'-P-CCNC: 26 U/L (ref 13–39)
BASOPHILS # BLD AUTO: 0.05 THOUSANDS/ÂΜL (ref 0–0.1)
BASOPHILS NFR BLD AUTO: 1 % (ref 0–1)
BILIRUB SERPL-MCNC: 0.78 MG/DL (ref 0.2–1)
BUN SERPL-MCNC: 40 MG/DL (ref 5–25)
CALCIUM SERPL-MCNC: 8.2 MG/DL (ref 8.4–10.2)
CHLORIDE SERPL-SCNC: 104 MMOL/L (ref 96–108)
CO2 SERPL-SCNC: 22 MMOL/L (ref 21–32)
CREAT SERPL-MCNC: 2 MG/DL (ref 0.6–1.3)
EOSINOPHIL # BLD AUTO: 0.24 THOUSAND/ÂΜL (ref 0–0.61)
EOSINOPHIL NFR BLD AUTO: 4 % (ref 0–6)
ERYTHROCYTE [DISTWIDTH] IN BLOOD BY AUTOMATED COUNT: 12.4 % (ref 11.6–15.1)
GFR SERPL CREATININE-BSD FRML MDRD: 30 ML/MIN/1.73SQ M
GLUCOSE SERPL-MCNC: 108 MG/DL (ref 65–140)
GLUCOSE SERPL-MCNC: 144 MG/DL (ref 65–140)
GLUCOSE SERPL-MCNC: 204 MG/DL (ref 65–140)
GLUCOSE SERPL-MCNC: 219 MG/DL (ref 65–140)
GLUCOSE SERPL-MCNC: 96 MG/DL (ref 65–140)
HCT VFR BLD AUTO: 36.8 % (ref 36.5–49.3)
HGB BLD-MCNC: 12.8 G/DL (ref 12–17)
IMM GRANULOCYTES # BLD AUTO: 0.02 THOUSAND/UL (ref 0–0.2)
IMM GRANULOCYTES NFR BLD AUTO: 0 % (ref 0–2)
LYMPHOCYTES # BLD AUTO: 2.04 THOUSANDS/ÂΜL (ref 0.6–4.47)
LYMPHOCYTES NFR BLD AUTO: 31 % (ref 14–44)
MCH RBC QN AUTO: 30.8 PG (ref 26.8–34.3)
MCHC RBC AUTO-ENTMCNC: 34.8 G/DL (ref 31.4–37.4)
MCV RBC AUTO: 89 FL (ref 82–98)
MONOCYTES # BLD AUTO: 0.73 THOUSAND/ÂΜL (ref 0.17–1.22)
MONOCYTES NFR BLD AUTO: 11 % (ref 4–12)
NEUTROPHILS # BLD AUTO: 3.55 THOUSANDS/ÂΜL (ref 1.85–7.62)
NEUTS SEG NFR BLD AUTO: 53 % (ref 43–75)
NRBC BLD AUTO-RTO: 0 /100 WBCS
PLATELET # BLD AUTO: 181 THOUSANDS/UL (ref 149–390)
PMV BLD AUTO: 10.4 FL (ref 8.9–12.7)
POTASSIUM SERPL-SCNC: 3.9 MMOL/L (ref 3.5–5.3)
PROT SERPL-MCNC: 5.8 G/DL (ref 6.4–8.4)
RBC # BLD AUTO: 4.15 MILLION/UL (ref 3.88–5.62)
SODIUM SERPL-SCNC: 136 MMOL/L (ref 135–147)
WBC # BLD AUTO: 6.63 THOUSAND/UL (ref 4.31–10.16)

## 2024-07-21 PROCEDURE — 85025 COMPLETE CBC W/AUTO DIFF WBC: CPT | Performed by: INTERNAL MEDICINE

## 2024-07-21 PROCEDURE — 82948 REAGENT STRIP/BLOOD GLUCOSE: CPT

## 2024-07-21 PROCEDURE — 99233 SBSQ HOSP IP/OBS HIGH 50: CPT | Performed by: INTERNAL MEDICINE

## 2024-07-21 PROCEDURE — 80053 COMPREHEN METABOLIC PANEL: CPT | Performed by: INTERNAL MEDICINE

## 2024-07-21 RX ORDER — DIPHENHYDRAMINE HYDROCHLORIDE, ZINC ACETATE 2; .1 G/100G; G/100G
CREAM TOPICAL DAILY PRN
Status: DISCONTINUED | OUTPATIENT
Start: 2024-07-21 | End: 2024-07-22 | Stop reason: HOSPADM

## 2024-07-21 RX ORDER — SODIUM CHLORIDE, SODIUM GLUCONATE, SODIUM ACETATE, POTASSIUM CHLORIDE, MAGNESIUM CHLORIDE, SODIUM PHOSPHATE, DIBASIC, AND POTASSIUM PHOSPHATE .53; .5; .37; .037; .03; .012; .00082 G/100ML; G/100ML; G/100ML; G/100ML; G/100ML; G/100ML; G/100ML
75 INJECTION, SOLUTION INTRAVENOUS CONTINUOUS
Status: DISCONTINUED | OUTPATIENT
Start: 2024-07-21 | End: 2024-07-22

## 2024-07-21 RX ADMIN — INSULIN LISPRO 1 UNITS: 100 INJECTION, SOLUTION INTRAVENOUS; SUBCUTANEOUS at 18:07

## 2024-07-21 RX ADMIN — SUCRALFATE 1 G: 1 TABLET ORAL at 21:03

## 2024-07-21 RX ADMIN — ASPIRIN 81 MG: 81 TABLET, CHEWABLE ORAL at 09:37

## 2024-07-21 RX ADMIN — Medication 2000 UNITS: at 09:37

## 2024-07-21 RX ADMIN — INSULIN LISPRO 1 UNITS: 100 INJECTION, SOLUTION INTRAVENOUS; SUBCUTANEOUS at 13:52

## 2024-07-21 RX ADMIN — PANTOPRAZOLE SODIUM 40 MG: 40 INJECTION, POWDER, FOR SOLUTION INTRAVENOUS at 09:37

## 2024-07-21 RX ADMIN — SODIUM CHLORIDE, SODIUM GLUCONATE, SODIUM ACETATE, POTASSIUM CHLORIDE, MAGNESIUM CHLORIDE, SODIUM PHOSPHATE, DIBASIC, AND POTASSIUM PHOSPHATE 75 ML/HR: .53; .5; .37; .037; .03; .012; .00082 INJECTION, SOLUTION INTRAVENOUS at 21:03

## 2024-07-21 RX ADMIN — SODIUM CHLORIDE, SODIUM GLUCONATE, SODIUM ACETATE, POTASSIUM CHLORIDE, MAGNESIUM CHLORIDE, SODIUM PHOSPHATE, DIBASIC, AND POTASSIUM PHOSPHATE 75 ML/HR: .53; .5; .37; .037; .03; .012; .00082 INJECTION, SOLUTION INTRAVENOUS at 09:40

## 2024-07-21 RX ADMIN — PANTOPRAZOLE SODIUM 40 MG: 40 INJECTION, POWDER, FOR SOLUTION INTRAVENOUS at 21:02

## 2024-07-21 RX ADMIN — ATORVASTATIN CALCIUM 80 MG: 40 TABLET, FILM COATED ORAL at 18:06

## 2024-07-21 RX ADMIN — SUCRALFATE 1 G: 1 TABLET ORAL at 18:06

## 2024-07-21 RX ADMIN — ENOXAPARIN SODIUM 40 MG: 40 INJECTION SUBCUTANEOUS at 09:37

## 2024-07-21 NOTE — ASSESSMENT & PLAN NOTE
MOD Note    Asked by primary and beside nurse to evaluate patient due to abdominal pain and nausea/emesis. Patient endorses that over the last 1.5 hours has had epigastric pain and 3 episodes of bilious emesis. Vitals wnl. On exam, patient in visible pain, alert and oriented x3. Epigastric pain, non ttp. Patient made NPO. CT abd pelvis wo con, lactate, lipase pending. 2mg IV morphine once. PRN zofran.     Discussed w/ primary    Emre Patrick MD  MOD Patient with enlarged prostate on imaging, no signs of obstruction or retention.  Daughter concerned because he has a family hx of cancer.  PSA normal

## 2024-07-21 NOTE — NURSING NOTE
Patient had a BP of 83/54, BP was re-checked manually and was 88/68. Patient stated that he felt dizzy while going to the bathroom. Bed alarm utilized, SLIM provider made aware.

## 2024-07-21 NOTE — PROGRESS NOTES
[facility]  Progress Note  Name: Bree Barrios I  MRN: [id number]  Unit/Bed#: -01 I Date of Admission: 7/16/2024   Date of Service: 7/21/2024 I Hospital Day: 4    Assessment & Plan   * Idiopathic acute pancreatitis  Assessment & Plan  Patient with elevated lipase and pain somewhat typical of acute pancreatitis although CT scan did not reveal any peripancreatic edema.  Given the same we will treat as such since he makes biochemical and clinical criteria.  Ultrasound also with pancreatic edema suggestive of acute pancreatitis  Etiology not completely clear, he does not have any history of alcohol use, no gallstones noted on imaging, lipid profile apparently recently normal   Apparently patient may have been previously on Trulicity which not only can cause pancreatitis but also gastroparesis and pain  IgG4 pending    Patient was previously with significant pain in the upper abdomen but now improving.  MRI without any signs of active acute pancreatitis or obstruction, small pancreatic cyst  GI did an EGD which was essentially normal, discussed with patient in detail, biopsies taking just for the purpose of ruling out H. pylori  Monitor dietary tolerance and kidney function    Acute kidney injury (HCC)  Assessment & Plan  Patient noted to have elevated creatinine of 2 which is elevated compared to baseline 1.4 prior  Suspected largely prerenal given hypotension episodes, patient was not eating very well and is also on antihypertensive, also underwent sedation for his EGD.  Will hold any further antihypertensives, start gentle IV fluids with Isolyte.  Monitor kidney function avoid nephrotoxins  Check PVR/BS    Enlarged prostate  Assessment & Plan  Patient with enlarged prostate on imaging, no signs of obstruction or retention.  Daughter concerned because he has a family hx of cancer.  PSA normal    Pancreatic cyst  Assessment & Plan  MRI with 8 mm pancreatic neck/head cyst. Follow-up with  repeat pancreatic MRI in 2 years.     Benign essential hypertension  Assessment & Plan  Previously on lisinopril hydrochlorothiazide but will hold that for now given low blood pressure and acute kidney injury  Monitor BP closely    DM (diabetes mellitus), type 2 (HCC)  Assessment & Plan  Lab Results   Component Value Date    HGBA1C 7.7 (H) 05/02/2024       Recent Labs     07/20/24  1630 07/20/24  2023 07/21/24  0742 07/21/24  1101   POCGLU 81 196* 108 219*         Blood Sugar Average: Last 72 hrs:  (P) 124.3863503328822558    Glycemic control is adequate, most recent A1c last month was 7.7, hold all oral medications and also Trulicity, use insulin sliding scale for now.  Kidney function seems to be at this time at baseline of CKD  Avoid nephrotoxins  Given overall good control would probably avoid Trulicity in the future given his abdominal pain elevated lipase           VTE Pharmacologic Prophylaxis:   Pharmacologic: Enoxaparin (Lovenox)  Mechanical VTE Prophylaxis in Place: Yes    Patient Centered Rounds: I have performed bedside rounds with nursing staff today.    Discussions with Specialists or Other Care Team Provider: Discussed with care management team    Education and Discussions with Family / Patient:   Attempted to call Sadaf on 2 different occasions last 1 on 7/21/2024 around 12:35PM - did not     Time Spent for Care: 45 minutes.  More than 50% of total time spent on counseling and coordination of care as described above.    Current Length of Stay: 4 day(s)    Current Patient Status: Inpatient   Certification Statement: The patient will continue to require additional inpatient hospital stay due to need for IVF, kidney function monitoring    Discharge Plan: 24-48h    Code Status: Level 1 - Full Code      Subjective:     Patient valuated this morning.  The patient did have some hypotension earlier and now seems to have some acute kidney injury.  He is making urine fortunately.  Mentions abdominal  pain is significantly better compared to yesterday.    Objective:     Vitals:   Temp (24hrs), Av °F (36.7 °C), Min:97.5 °F (36.4 °C), Max:99.3 °F (37.4 °C)    Temp:  [97.5 °F (36.4 °C)-99.3 °F (37.4 °C)] 97.5 °F (36.4 °C)  HR:  [72-75] 72  Resp:  [16-19] 18  BP: ()/(52-70) 93/52  SpO2:  [96 %-97 %] 96 %  Body mass index is 24.29 kg/m².     Input and Output Summary (last 24 hours):       Intake/Output Summary (Last 24 hours) at 2024 1243  Last data filed at 2024 1102  Gross per 24 hour   Intake 2340 ml   Output 200 ml   Net 2140 ml       Physical Exam:     Physical Exam  Vitals and nursing note reviewed.   Constitutional:       Appearance: Normal appearance.   HENT:      Head: Normocephalic and atraumatic.      Right Ear: External ear normal.      Left Ear: External ear normal.      Nose: Nose normal. No congestion or rhinorrhea.      Mouth/Throat:      Mouth: Mucous membranes are moist.      Pharynx: Oropharynx is clear. No oropharyngeal exudate or posterior oropharyngeal erythema.   Eyes:      General: No scleral icterus.        Right eye: No discharge.         Left eye: No discharge.      Pupils: Pupils are equal, round, and reactive to light.   Neck:      Vascular: No carotid bruit.   Cardiovascular:      Rate and Rhythm: Normal rate and regular rhythm.      Pulses: Normal pulses.      Heart sounds: No murmur heard.     No friction rub. No gallop.   Pulmonary:      Effort: Pulmonary effort is normal. No respiratory distress.      Breath sounds: Normal breath sounds. No stridor. No wheezing, rhonchi or rales.   Abdominal:      General: Abdomen is flat. Bowel sounds are normal. There is no distension.      Palpations: Abdomen is soft. There is no mass.      Tenderness: There is no abdominal tenderness. There is no guarding or rebound.      Hernia: No hernia is present.   Musculoskeletal:         General: No swelling, tenderness, deformity or signs of injury. Normal range of motion.      Cervical  back: Normal range of motion. No rigidity. No muscular tenderness.   Lymphadenopathy:      Cervical: No cervical adenopathy.   Skin:     General: Skin is warm and dry.      Capillary Refill: Capillary refill takes less than 2 seconds.      Coloration: Skin is not jaundiced or pale.      Findings: No bruising or erythema.   Neurological:      General: No focal deficit present.      Mental Status: He is alert and oriented to person, place, and time. Mental status is at baseline.      Cranial Nerves: No cranial nerve deficit.      Sensory: No sensory deficit.      Motor: No weakness.      Coordination: Coordination normal.      Deep Tendon Reflexes: Reflexes normal.   Psychiatric:         Mood and Affect: Mood normal.         Behavior: Behavior normal.         Thought Content: Thought content normal.         Judgment: Judgment normal.         Additional Data:     Labs:    Results from last 7 days   Lab Units 07/21/24  0511   WBC Thousand/uL 6.63   HEMOGLOBIN g/dL 12.8   HEMATOCRIT % 36.8   PLATELETS Thousands/uL 181   SEGS PCT % 53   LYMPHO PCT % 31   MONO PCT % 11   EOS PCT % 4     Results from last 7 days   Lab Units 07/21/24  0511   SODIUM mmol/L 136   POTASSIUM mmol/L 3.9   CHLORIDE mmol/L 104   CO2 mmol/L 22   BUN mg/dL 40*   CREATININE mg/dL 2.00*   ANION GAP mmol/L 10   CALCIUM mg/dL 8.2*   ALBUMIN g/dL 3.6   TOTAL BILIRUBIN mg/dL 0.78   ALK PHOS U/L 65   ALT U/L 32   AST U/L 26   GLUCOSE RANDOM mg/dL 96         Results from last 7 days   Lab Units 07/21/24  1101 07/21/24  0742 07/20/24  2023 07/20/24  1630 07/20/24  0740 07/19/24  1619 07/19/24  1123 07/19/24  0742 07/18/24  2156 07/18/24  1710 07/18/24  1128 07/18/24  0737   POC GLUCOSE mg/dl 219* 108 196* 81 107 107 112 103 110 90 150* 112                   * I Have Reviewed All Lab Data Listed Above.  * Additional Pertinent Lab Tests Reviewed: All Labs For Current Hospital Admission Reviewed        Recent Cultures (last 7 days):           Last 24 Hours  Medication List:   Current Facility-Administered Medications   Medication Dose Route Frequency Provider Last Rate    acetaminophen  650 mg Oral Q6H PRN Andi Barbosa MD      aspirin  81 mg Oral Daily Andi Barbosa MD      atorvastatin  80 mg Oral Daily With Dinner Andi Barbosa MD      cholecalciferol  2,000 Units Oral Daily Andi Barbosa MD      diphenhydrAMINE-zinc acetate   Topical Daily PRN Leida Sr PA-C      enoxaparin  40 mg Subcutaneous Daily Andi Barbosa MD      HYDROmorphone  0.5 mg Intravenous Q4H PRN Leida Sr PA-C      insulin lispro  1-5 Units Subcutaneous TID AC Andi Barbosa MD      multi-electrolyte  75 mL/hr Intravenous Continuous Gianfranco Paulson MD 75 mL/hr (07/21/24 0940)    ondansetron  4 mg Intravenous Q6H PRN Andi Barbosa MD      pantoprazole  40 mg Intravenous Q12H [facility] Gianfranco Paulson MD      sucralfate  1 g Oral 4x Daily (AC & HS) Gianfranco Paulson MD          Today, Patient Was Seen By: Gianfranco Paulson MD    ** Please Note: Dictation voice to text software may have been used in the creation of this document. **

## 2024-07-21 NOTE — ASSESSMENT & PLAN NOTE
Patient noted to have elevated creatinine of 2 which is elevated compared to baseline 1.4 prior  Suspected largely prerenal given hypotension episodes, patient was not eating very well and is also on antihypertensive, also underwent sedation for his EGD.  Will hold any further antihypertensives, start gentle IV fluids with Isolyte.  Monitor kidney function avoid nephrotoxins  Check PVR/BS

## 2024-07-21 NOTE — QUICK NOTE
Patient with noted hypotension 88/68, MAP 64.  Felt slightly dizzy when getting up out of bed.  Heart rate stable.  Possibly could be due to hypovolemia in the setting of pancreatitis.  Will give 1 L IV fluid bolus and IV albumin.  Start continuous IV fluid hydration for the next 12 hours and closely monitor BP.  Will also recommend patient remain in bed and avoid getting up as much as possible.

## 2024-07-21 NOTE — ASSESSMENT & PLAN NOTE
Lab Results   Component Value Date    HGBA1C 7.7 (H) 05/02/2024       Recent Labs     07/20/24  1630 07/20/24 2023 07/21/24  0742 07/21/24  1101   POCGLU 81 196* 108 219*         Blood Sugar Average: Last 72 hrs:  (P) 124.4101517258342054    Glycemic control is adequate, most recent A1c last month was 7.7, hold all oral medications and also Trulicity, use insulin sliding scale for now.  Kidney function seems to be at this time at baseline of CKD  Avoid nephrotoxins  Given overall good control would probably avoid Trulicity in the future given his abdominal pain elevated lipase

## 2024-07-21 NOTE — ASSESSMENT & PLAN NOTE
Previously on lisinopril hydrochlorothiazide but will hold that for now given low blood pressure and acute kidney injury  Monitor BP closely

## 2024-07-21 NOTE — ASSESSMENT & PLAN NOTE
Patient with elevated lipase and pain somewhat typical of acute pancreatitis although CT scan did not reveal any peripancreatic edema.  Given the same we will treat as such since he makes biochemical and clinical criteria.  Ultrasound also with pancreatic edema suggestive of acute pancreatitis  Etiology not completely clear, he does not have any history of alcohol use, no gallstones noted on imaging, lipid profile apparently recently normal   Apparently patient may have been previously on Trulicity which not only can cause pancreatitis but also gastroparesis and pain  IgG4 pending    Patient was previously with significant pain in the upper abdomen but now improving.  MRI without any signs of active acute pancreatitis or obstruction, small pancreatic cyst  GI did an EGD which was essentially normal, discussed with patient in detail, biopsies taking just for the purpose of ruling out H. pylori  Monitor dietary tolerance and kidney function

## 2024-07-22 VITALS
WEIGHT: 166.89 LBS | TEMPERATURE: 97.6 F | HEART RATE: 63 BPM | RESPIRATION RATE: 18 BRPM | BODY MASS INDEX: 23.89 KG/M2 | OXYGEN SATURATION: 97 % | SYSTOLIC BLOOD PRESSURE: 110 MMHG | HEIGHT: 70 IN | DIASTOLIC BLOOD PRESSURE: 61 MMHG

## 2024-07-22 LAB
ANION GAP SERPL CALCULATED.3IONS-SCNC: 7 MMOL/L (ref 4–13)
BASOPHILS # BLD AUTO: 0.04 THOUSANDS/ÂΜL (ref 0–0.1)
BASOPHILS NFR BLD AUTO: 1 % (ref 0–1)
BUN SERPL-MCNC: 42 MG/DL (ref 5–25)
CALCIUM SERPL-MCNC: 8.1 MG/DL (ref 8.4–10.2)
CHLORIDE SERPL-SCNC: 105 MMOL/L (ref 96–108)
CO2 SERPL-SCNC: 25 MMOL/L (ref 21–32)
CREAT SERPL-MCNC: 1.83 MG/DL (ref 0.6–1.3)
EOSINOPHIL # BLD AUTO: 0.28 THOUSAND/ÂΜL (ref 0–0.61)
EOSINOPHIL NFR BLD AUTO: 4 % (ref 0–6)
ERYTHROCYTE [DISTWIDTH] IN BLOOD BY AUTOMATED COUNT: 12.5 % (ref 11.6–15.1)
GFR SERPL CREATININE-BSD FRML MDRD: 34 ML/MIN/1.73SQ M
GLUCOSE SERPL-MCNC: 138 MG/DL (ref 65–140)
GLUCOSE SERPL-MCNC: 145 MG/DL (ref 65–140)
GLUCOSE SERPL-MCNC: 288 MG/DL (ref 65–140)
HCT VFR BLD AUTO: 35.9 % (ref 36.5–49.3)
HGB BLD-MCNC: 11.9 G/DL (ref 12–17)
IMM GRANULOCYTES # BLD AUTO: 0.02 THOUSAND/UL (ref 0–0.2)
IMM GRANULOCYTES NFR BLD AUTO: 0 % (ref 0–2)
LYMPHOCYTES # BLD AUTO: 1.84 THOUSANDS/ÂΜL (ref 0.6–4.47)
LYMPHOCYTES NFR BLD AUTO: 28 % (ref 14–44)
MCH RBC QN AUTO: 29.9 PG (ref 26.8–34.3)
MCHC RBC AUTO-ENTMCNC: 33.1 G/DL (ref 31.4–37.4)
MCV RBC AUTO: 90 FL (ref 82–98)
MONOCYTES # BLD AUTO: 0.75 THOUSAND/ÂΜL (ref 0.17–1.22)
MONOCYTES NFR BLD AUTO: 11 % (ref 4–12)
NEUTROPHILS # BLD AUTO: 3.68 THOUSANDS/ÂΜL (ref 1.85–7.62)
NEUTS SEG NFR BLD AUTO: 56 % (ref 43–75)
NRBC BLD AUTO-RTO: 0 /100 WBCS
PLATELET # BLD AUTO: 180 THOUSANDS/UL (ref 149–390)
PMV BLD AUTO: 10.9 FL (ref 8.9–12.7)
POTASSIUM SERPL-SCNC: 4.1 MMOL/L (ref 3.5–5.3)
RBC # BLD AUTO: 3.98 MILLION/UL (ref 3.88–5.62)
SODIUM SERPL-SCNC: 137 MMOL/L (ref 135–147)
WBC # BLD AUTO: 6.61 THOUSAND/UL (ref 4.31–10.16)

## 2024-07-22 PROCEDURE — 80048 BASIC METABOLIC PNL TOTAL CA: CPT | Performed by: INTERNAL MEDICINE

## 2024-07-22 PROCEDURE — 99239 HOSP IP/OBS DSCHRG MGMT >30: CPT | Performed by: INTERNAL MEDICINE

## 2024-07-22 PROCEDURE — 82948 REAGENT STRIP/BLOOD GLUCOSE: CPT

## 2024-07-22 PROCEDURE — 85025 COMPLETE CBC W/AUTO DIFF WBC: CPT | Performed by: INTERNAL MEDICINE

## 2024-07-22 RX ORDER — ADHESIVE BANDAGE 3/4"
BANDAGE TOPICAL DAILY
Qty: 1 EACH | Refills: 0 | Status: SHIPPED | OUTPATIENT
Start: 2024-07-22

## 2024-07-22 RX ADMIN — ASPIRIN 81 MG: 81 TABLET, CHEWABLE ORAL at 08:43

## 2024-07-22 RX ADMIN — SUCRALFATE 1 G: 1 TABLET ORAL at 08:43

## 2024-07-22 RX ADMIN — ENOXAPARIN SODIUM 40 MG: 40 INJECTION SUBCUTANEOUS at 08:43

## 2024-07-22 RX ADMIN — SUCRALFATE 1 G: 1 TABLET ORAL at 12:03

## 2024-07-22 RX ADMIN — Medication 2000 UNITS: at 08:43

## 2024-07-22 RX ADMIN — PANTOPRAZOLE SODIUM 40 MG: 40 INJECTION, POWDER, FOR SOLUTION INTRAVENOUS at 08:43

## 2024-07-22 RX ADMIN — INSULIN LISPRO 2 UNITS: 100 INJECTION, SOLUTION INTRAVENOUS; SUBCUTANEOUS at 12:01

## 2024-07-22 NOTE — DISCHARGE INSTR - AVS FIRST PAGE
Stop using Trulicity given your recent pancreatic problem.  2. Do not take your BP medicine for now. Monitor your BP at home and keep a log of the numbers.  3. Stay well hydrated  4. Do a blood test (BMP) within 5 days. Follow results with your PCP and nephrologist  5. Avoid eating sugar or starches in your diet (e.g. breads, rice, sodas or juices, pasta, root vegetables such as potatoes among others), give preference to proteins.

## 2024-07-22 NOTE — CASE MANAGEMENT
Case Management Discharge Planning Note    Patient name Bree Barrios  Location /-01 MRN [id number]  : 1945 Date 2024       Current Admission Date: 2024  Current Admission Diagnosis:Idiopathic acute pancreatitis   Patient Active Problem List    Diagnosis Date Noted Date Diagnosed    Acute kidney injury (HCC) 2024     Pancreatic cyst 2024     Enlarged prostate 2024     Idiopathic acute pancreatitis 2024     Mixed hyperlipidemia 2024     Constipation 2024     Dysphagia 2024     Balance disorder 2024     Dysphoric mood 2022     Moderate asthma without complication 2021     Vitamin D deficiency 2021     Ganglion cyst of left foot 2021     Chronic kidney disease-mineral and bone disorder 2019     Overweight (BMI 25.0-29.9) 2019     Benign prostatic hyperplasia with lower urinary tract symptoms 2019     Degenerative cervical spinal stenosis 02/15/2019     Stage 4 chronic kidney disease (HCC) 2018     DM (diabetes mellitus), type 2 (HCC) 2018     Benign essential hypertension 2018     Hypercholesterolemia 2014       LOS (days): 5  Geometric Mean LOS (GMLOS) (days): 3.1  Days to GMLOS:-1.7     OBJECTIVE:  Risk of Unplanned Readmission Score: 15.82         Current admission status: Inpatient   Preferred Pharmacy:   [id number] [address] [address]  Phone: [phone] Fax: [phone]    [facility] - [place], [place] - [address]  [address]  [address] [address]  Phone: [phone] Fax: [phone]    Primary Care Provider: Lauren Amador MD    Primary Insurance: JULIA  REP  Secondary Insurance:     DISCHARGE DETAILS:  Pt is being dc home today. Pt has an ampac of 23, no CM needs noted. Pt wife to transport.

## 2024-07-22 NOTE — PLAN OF CARE
Problem: Potential for Falls  Goal: Patient will remain free of falls  Description: INTERVENTIONS:  - Educate patient/family on patient safety including physical limitations  - Instruct patient to call for assistance with activity   - Consult OT/PT to assist with strengthening/mobility   - Keep Call bell within reach  - Keep bed low and locked with side rails adjusted as appropriate  - Keep care items and personal belongings within reach  - Initiate and maintain comfort rounds  - Make Fall Risk Sign visible to staff  - Apply yellow socks and bracelet for high fall risk patients  - Consider moving patient to room near nurses station  Outcome: Progressing     Problem: SAFETY ADULT  Goal: Maintain or return to baseline ADL function  Description: INTERVENTIONS:  -  Assess patient's ability to carry out ADLs; assess patient's baseline for ADL function and identify physical deficits which impact ability to perform ADLs (bathing, care of mouth/teeth, toileting, grooming, dressing, etc.)  - Assess/evaluate cause of self-care deficits   - Assess range of motion  - Assess patient's mobility; develop plan if impaired  - Assess patient's need for assistive devices and provide as appropriate  - Encourage maximum independence but intervene and supervise when necessary  - Involve family in performance of ADLs  - Assess for home care needs following discharge   - Consider OT consult to assist with ADL evaluation and planning for discharge  - Provide patient education as appropriate  Outcome: Progressing     Problem: Nutrition/Hydration-ADULT  Goal: Nutrient/Hydration intake appropriate for improving, restoring or maintaining nutritional needs  Description: Monitor and assess patient's nutrition/hydration status for malnutrition. Collaborate with interdisciplinary team and initiate plan and interventions as ordered.  Monitor patient's weight and dietary intake as ordered or per policy. Utilize nutrition screening tool and  intervene as necessary. Determine patient's food preferences and provide high-protein, high-caloric foods as appropriate.     INTERVENTIONS:  - Monitor oral intake, urinary output, labs, and treatment plans  - Assess nutrition and hydration status and recommend course of action  - Evaluate amount of meals eaten  - Assist patient with eating if necessary   - Allow adequate time for meals  - Recommend/ encourage appropriate diets, oral nutritional supplements, and vitamin/mineral supplements  - Order, calculate, and assess calorie counts as needed  - Recommend, monitor, and adjust tube feedings and TPN/PPN based on assessed needs  - Assess need for intravenous fluids  - Provide specific nutrition/hydration education as appropriate  - Include patient/family/caregiver in decisions related to nutrition  Outcome: Progressing     Problem: GASTROINTESTINAL - ADULT  Goal: Minimal or absence of nausea and/or vomiting  Description: INTERVENTIONS:  - Administer IV fluids if ordered to ensure adequate hydration  - Maintain NPO status until nausea and vomiting are resolved  - Nasogastric tube if ordered  - Administer ordered antiemetic medications as needed  - Provide nonpharmacologic comfort measures as appropriate  - Advance diet as tolerated, if ordered  - Consider nutrition services referral to assist patient with adequate nutrition and appropriate food choices  Outcome: Progressing

## 2024-07-22 NOTE — DISCHARGE SUMMARY
[facility]  Discharge- Bree Barrios 1945, 78 y.o. male MRN: [id number]  Unit/Bed#: [facility]-01 Encounter: [id number]  Primary Care Provider: Lauren Amador MD   Date and time admitted to hospital: 7/16/2024  8:25 PM    Discharge Diagnosis:    Acute pancreatitis by biochemical and clinical criteria, resolved  ANNAMARIE on CKD  Incidental finding of pancreatic cyst 8 mm on MRCP  BPH  HTN  DM      Discharging Physician / Practitioner: Gianfranco Vega MD  PCP: Lauren Amador MD  Admission Date:   Admission Orders (From admission, onward)       Ordered        07/17/24 1610  INPATIENT ADMISSION  Once            07/16/24 2217  Place in Observation  Once                          Discharge Date: 07/22/24    Consultations During Hospital Stay:  GI    Procedures Performed:   MRI abdomen wo contrast and mrcp [[id number]] Collected: 07/19/24 2026   Order Status: Completed Updated: 07/19/24 2033   Narrative:     MRI OF THE ABDOMEN WITHOUT CONTRAST WITH MRCP    INDICATION: 78 years / Male. .    COMPARISON: Right upper quadrant ultrasound 7/17/2024. CT abdomen pelvis 7/16/2024. Numerous prior CTs.    TECHNIQUE: Multiplanar/multisequence MRI of the abdomen with 3D MRCP was performed without the administration of contrast.    FINDINGS:    LOWER CHEST: Unremarkable.    LIVER:  Normal in size and configuration.  No suspicious mass.  Limited evaluation of hepatic and portal veins on this non-contrast MRI is unremarkable.    BILE DUCTS: No intrahepatic or extrahepatic bile duct dilation. Common bile duct is normal in caliber. No choledocholithiasis, biliary stricture or suspicious mass.    GALLBLADDER: Normal gallbladder. The 2 mm calculus versus polyp seen on the ultrasound is not apparent on this study.    PANCREAS: No residual pancreatic/peripancreatic inflammatory changes. No pancreatic ductal dilation.    8 mm pancreatic neck/head cyst #5/26 and 6/18    ADRENAL GLANDS: Unremarkable.    SPLEEN:  Normal.    KIDNEYS/PROXIMAL URETERS: No hydroureteronephrosis. No suspicious renal mass. Simple cysts.    BOWEL: No dilated loops of bowel.    PERITONEUM/RETROPERITONEUM: No ascites.    LYMPH NODES: No abdominal lymphadenopathy.    VESSELS: No aneurysm.    ABDOMINAL WALL: Unremarkable    BONES: No suspicious osseous lesion.   Impression:       No residual pancreatic inflammatory changes.    No cholelithiasis or choledocholithiasis. No biliary dilation.    8 mm pancreatic neck/head cyst. Follow-up with repeat pancreatic MRI in 2 years.      Workstation performed: [id number]   US right upper quadrant [[id number]] Collected: 07/17/24 0840   Order Status: Completed Updated: 07/17/24 0910   Narrative:     RIGHT UPPER QUADRANT ULTRASOUND    INDICATION: Choledocholithiasis, gallbladder sludge. Pancreatitis.    COMPARISON: CT 7/16/2024. Ultrasound 12/5/2014.    TECHNIQUE: Real-time ultrasound of the right upper quadrant was performed with a curvilinear transducer with both volumetric sweeps and still imaging techniques.    FINDINGS:    PANCREAS: Pancreas appears enlarged and echogenic. No evidence of fluid collection.    AORTA AND IVC: Visualized portions are normal for patient age.    LIVER:  Size: Within normal range. The liver measures 13.4 cm in the midclavicular line.  Contour: Surface contour is smooth.  Parenchyma: Echogenicity and echotexture are within normal limits.  No liver mass identified.  Limited imaging of the main portal vein shows it to be patent and hepatopetal.    BILIARY:  The gallbladder is normal in caliber.  No wall thickening or pericholecystic fluid.  2 mm gallbladder polyp. No follow-up required.  Minimal sludge. 2 mm nonshadowing echogenic focus along the posterior wall which may represent an adherent calculus versus polyp.  No sonographic Mike sign.  No intrahepatic biliary dilatation.  CBD measures 4.0 mm.  No choledocholithiasis.    KIDNEY:  Right kidney measures 10.5 x 4.7 x 4.9 cm. Volume  124.6 mL  Kidney within normal limits.    ASCITES: None.   Impression:       Enlarged echogenic pancreas presumed related to acute pancreatitis. No fluid collections appreciated.    Minimal gallbladder sludge. Tiny polyp versus adherent gallstone. No biliary dilatation.    Workstation performed: [id number]   CT abdomen pelvis wo contrast [[id number]] Collected: 07/16/24 2134   Order Status: Completed Updated: 07/16/24 2147   Narrative:     CT ABDOMEN AND PELVIS WITHOUT IV CONTRAST    INDICATION: abdominal distension, abdominal pain.    COMPARISON: 9/16/2019.    TECHNIQUE: CT examination of the abdomen and pelvis was performed without intravenous contrast. Multiplanar 2D reformatted images were created from the source data.    This examination, like all CT scans performed in the [facility], was performed utilizing techniques to minimize radiation dose exposure, including the use of iterative reconstruction and automated exposure control. Radiation dose length  product (DLP) for this visit: 669 mGy-cm    Enteric Contrast: Not administered.    FINDINGS:    ABDOMEN    LOWER CHEST: No clinically significant abnormality in the visualized lower chest.    LIVER/BILIARY TREE: Unremarkable.    GALLBLADDER: No calcified gallstones. No pericholecystic inflammatory change.    SPLEEN: Unremarkable.    PANCREAS: Unremarkable.    ADRENAL GLANDS: Unremarkable.    KIDNEYS/URETERS: Unremarkable. No hydronephrosis.    STOMACH AND BOWEL: Colonic diverticulosis without findings of acute diverticulitis.    APPENDIX: No findings to suggest appendicitis.    ABDOMINOPELVIC CAVITY: No ascites. No pneumoperitoneum. No lymphadenopathy.    VESSELS: Unremarkable for patient's age.    PELVIS    REPRODUCTIVE ORGANS: Enlarged prostate.    URINARY BLADDER: Unremarkable.    ABDOMINAL WALL/INGUINAL REGIONS: Postsurgical changes at the right inguinal region    BONES: No acute fracture or suspicious osseous lesion.   Impression:       No  acute findings in the abdomen or pelvis within the limits of unenhanced technique.            Outpatient follow up Requested:  PCP  Nephrology  GI    Complications:  None    Reason for Admission: Abdominal pain, acute pancreatitis    HPI:  Bree Barrios is a 78 y.o. male with prior history of diabetes mellitus type 2 which appears to be well-controlled, coronary artery disease, CKD stage IIIb, hypertension and hyperlipidemia with remote history of perforated peptic ulcer manage surgically back in 1996, patient started having abdominal pain since yesterday which reported to be in the mid abdomen with no clear radiation, nothing apparently helped him, he always felt nauseous since yesterday but did not vomit, no change in his bowel habit, due to nonresolving nature of his pain, he decided to come to the hospital.  He is decreased kidney function at baseline prevented using contrast and so only noncontrast CT of the abdomen was done which showed no finding, his lipase was slightly elevated at 390, patient was decided to be placed in observation and be managed for pain control and IV hydration with impression of acute pancreatitis.    He denies having any prior gallbladder disease, history of alcohol intake, any recent viral infection, his last lipid panel last month was normal showing no evidence of hypertriglyceridemia, his calcium level was normal, he had no recent medication added to his regimen.    Hospital Course:     The patient was hospitalized.  He was treated supportively with IV fluids, slow diet advancement, US with pancreatic edema, he was seen by GI, underwent MRCP no obstruction incidental finding of 8mm pancreatic cyst.  Underwent EGD with no significant peptic ulcer disease or lesion, biopsies taken for H. pylori, pending, outpatient follow-up.  Of note the patient was prior to admission on [facility] which I believe may be contributing to his pancreatic issues given the fact that he does not have any  "history of gallstones or alcohol use.  His diabetes also seems to be well-controlled just with diet and as long as he does not eat too many carbohydrates his blood glucose is actually decent they were pretty good here when he was n.p.o. in the initial days.  Given his pancreatic issues I would recommend discontinuation of Trulicity for now.  He can continue his other antidiabetic medicines and low carbohydrate diet.  I do recommend outpatient follow-up.  Of note patient did develop some degree of acute kidney dysfunction on top of his CKD during hospitalization.  Suspect that this was most likely prerenal given his lack of p.o. intake due to GI issues as well as his previous antihypertensive regimen which included ACE inhibitor and HCTZ combination.  His creatinine is trending down although not back to baseline yet, currently 1.8.  Patient is making urine.  I do recommend patient to do an outpatient BMP.  Recommend him to hold his antihypertensive for now and monitor his blood pressure at home.  Do recommend outpatient follow-up with his primary care physician, nephrologist, GI, patient wife verbalized understanding.  Patient being discharged in stable condition    Condition at Discharge: good     Discharge Day Visit / Exam:     Subjective:      Tolerating PO intake  Denies any CP, nausea, vomiting  Making urine  Wishes to go homePatient valuated this morning.  Denies any chest pain nausea or vomiting.  Doing well.    Vitals: Blood Pressure: 110/61 (07/22/24 0714)  Pulse: 63 (07/21/24 2147)  Temperature: 97.6 °F (36.4 °C) (07/22/24 0714)  Temp Source: Temporal (07/20/24 1355)  Respirations: 18 (07/21/24 0744)  Height: 5' 9.5\" (176.5 cm) (07/16/24 2253)  Weight - Scale: 75.7 kg (166 lb 14.2 oz) (07/19/24 1120)  SpO2: 97 % (07/21/24 2147)    Exam:   Physical Exam  Vitals and nursing note reviewed.   Constitutional:       General: He is not in acute distress.     Appearance: Normal appearance. He is not ill-appearing, " toxic-appearing or diaphoretic.   HENT:      Head: Normocephalic and atraumatic.      Right Ear: External ear normal.      Left Ear: External ear normal.      Nose: Nose normal. No congestion or rhinorrhea.      Mouth/Throat:      Mouth: Mucous membranes are moist.      Pharynx: Oropharynx is clear. No oropharyngeal exudate or posterior oropharyngeal erythema.   Eyes:      General: No scleral icterus.        Right eye: No discharge.         Left eye: No discharge.      Pupils: Pupils are equal, round, and reactive to light.   Neck:      Vascular: No carotid bruit.   Cardiovascular:      Rate and Rhythm: Normal rate and regular rhythm.      Pulses: Normal pulses.      Heart sounds: No murmur heard.     No friction rub. No gallop.   Pulmonary:      Effort: Pulmonary effort is normal. No respiratory distress.      Breath sounds: Normal breath sounds. No stridor. No wheezing, rhonchi or rales.   Abdominal:      General: Abdomen is flat. Bowel sounds are normal. There is no distension.      Palpations: Abdomen is soft. There is no mass.      Tenderness: There is no abdominal tenderness. There is no guarding or rebound.      Hernia: No hernia is present.   Musculoskeletal:         General: No swelling, tenderness, deformity or signs of injury. Normal range of motion.      Cervical back: Normal range of motion. No rigidity. No muscular tenderness.   Lymphadenopathy:      Cervical: No cervical adenopathy.   Skin:     General: Skin is warm and dry.      Capillary Refill: Capillary refill takes less than 2 seconds.      Coloration: Skin is not jaundiced or pale.      Findings: No bruising or erythema.   Neurological:      General: No focal deficit present.      Mental Status: He is alert and oriented to person, place, and time. Mental status is at baseline.      Cranial Nerves: No cranial nerve deficit.      Sensory: No sensory deficit.      Motor: No weakness.      Coordination: Coordination normal.      Deep Tendon  Reflexes: Reflexes normal.   Psychiatric:         Mood and Affect: Mood normal.         Behavior: Behavior normal.         Thought Content: Thought content normal.         Judgment: Judgment normal.           Discussion with Family: Discussed with wife    Discharge instructions/Information to patient and family:   See after visit summary for information provided to patient and family.      Provisions for Follow-Up Care:  See after visit summary for information related to follow-up care and any pertinent home health orders.      Disposition:     Home    For Discharges to [facility]:   Not Applicable to this Patient - Not Applicable to this Patient    Planned Readmission: No     Discharge Statement:  I spent 86 minutes discharging the patient. This time was spent on the day of discharge. I had direct contact with the patient on the day of discharge. Greater than 50% of the total time was spent examining patient, answering all patient questions, arranging and discussing plan of care with patient as well as directly providing post-discharge instructions.  Additional time then spent on discharge activities.    Discharge Medications:  See after visit summary for reconciled discharge medications provided to patient and family.      ** Please Note: This note has been constructed using a voice recognition system **

## 2024-07-23 NOTE — UTILIZATION REVIEW
NOTIFICATION OF ADMISSION DISCHARGE   This is a Notification of Discharge from [facility]. Please be advised that this patient has been discharge from our facility. Below you will find the admission and discharge date and time including the patient’s disposition.   UTILIZATION REVIEW CONTACT:  Gloria Ragland  Utilization   Network Utilization Review Department  Phone: [phone] x carefully listen to the prompts. All voicemails are confidential.  Email: [email or web address]     ADMISSION INFORMATION  PRESENTATION DATE: 7/16/2024  8:25 PM  OBERVATION ADMISSION DATE: 07/16/2024 2217  INPATIENT ADMISSION DATE: 7/17/24  4:10 PM   DISCHARGE DATE: 7/22/2024  3:52 PM   DISPOSITION:Home/Self Care    Network Utilization Review Department  ATTENTION: Please call with any questions or concerns to [phone] and carefully listen to the prompts so that you are directed to the right person. All voicemails are confidential.   For Discharge needs, contact Care Management DC Support Team at [phone] opt. 2  Send all requests for admission clinical reviews, approved or denied determinations and any other requests to dedicated fax number below belonging to the campus where the patient is receiving treatment. List of dedicated fax numbers for the Facilities:  FACILITY NAME UR FAX NUMBER   ADMISSION DENIALS (Administrative/Medical Necessity) [phone]   DISCHARGE SUPPORT TEAM ([facility]) [phone]   PARENT CHILD HEALTH (Maternity/NICU/Pediatrics) [phone]   [facility] [phone]   [facility] [phone]   [facility] [phone]   [facility] [phone]   [facility] [phone]   [facility] [phone]   [facility] [phone]   [facility]  [place] [phone]   [facility] [phone]   [facility] [phone]   [facility] [phone]   [facility] [phone]

## 2024-07-24 ENCOUNTER — TRANSITIONAL CARE MANAGEMENT (OUTPATIENT)
Dept: FAMILY MEDICINE CLINIC | Facility: CLINIC | Age: 79
End: 2024-07-24

## 2024-07-26 ENCOUNTER — APPOINTMENT (OUTPATIENT)
Dept: LAB | Facility: CLINIC | Age: 79
End: 2024-07-26
Payer: COMMERCIAL

## 2024-07-26 DIAGNOSIS — N17.9 ACUTE KIDNEY INJURY (HCC): ICD-10-CM

## 2024-07-26 LAB
ANION GAP SERPL CALCULATED.3IONS-SCNC: 9 MMOL/L (ref 4–13)
BUN SERPL-MCNC: 29 MG/DL (ref 5–25)
CALCIUM SERPL-MCNC: 8.4 MG/DL (ref 8.4–10.2)
CHLORIDE SERPL-SCNC: 105 MMOL/L (ref 96–108)
CO2 SERPL-SCNC: 27 MMOL/L (ref 21–32)
CREAT SERPL-MCNC: 1.57 MG/DL (ref 0.6–1.3)
GFR SERPL CREATININE-BSD FRML MDRD: 41 ML/MIN/1.73SQ M
GLUCOSE P FAST SERPL-MCNC: 106 MG/DL (ref 65–99)
POTASSIUM SERPL-SCNC: 4.4 MMOL/L (ref 3.5–5.3)
SODIUM SERPL-SCNC: 141 MMOL/L (ref 135–147)

## 2024-07-26 PROCEDURE — 80048 BASIC METABOLIC PNL TOTAL CA: CPT

## 2024-07-26 PROCEDURE — 36415 COLL VENOUS BLD VENIPUNCTURE: CPT

## 2024-07-28 PROCEDURE — 88305 TISSUE EXAM BY PATHOLOGIST: CPT | Performed by: PATHOLOGY

## 2024-07-29 ENCOUNTER — TELEPHONE (OUTPATIENT)
Dept: GASTROENTEROLOGY | Facility: CLINIC | Age: 79
End: 2024-07-29

## 2024-07-29 NOTE — TELEPHONE ENCOUNTER
----- Message from Jyotsna Johansen PA-C sent at 7/29/2024  8:20 AM EDT -----  Please let patient know that the biopsy from his endoscopy showed mild chronic inflammation.  Thank you!

## 2024-07-31 ENCOUNTER — OFFICE VISIT (OUTPATIENT)
Dept: FAMILY MEDICINE CLINIC | Facility: CLINIC | Age: 79
End: 2024-07-31
Payer: COMMERCIAL

## 2024-07-31 VITALS
HEIGHT: 70 IN | DIASTOLIC BLOOD PRESSURE: 68 MMHG | HEART RATE: 74 BPM | WEIGHT: 172.8 LBS | SYSTOLIC BLOOD PRESSURE: 116 MMHG | OXYGEN SATURATION: 97 % | TEMPERATURE: 97.8 F | BODY MASS INDEX: 24.74 KG/M2

## 2024-07-31 DIAGNOSIS — E11.22 TYPE 2 DIABETES MELLITUS WITH STAGE 3B CHRONIC KIDNEY DISEASE, WITHOUT LONG-TERM CURRENT USE OF INSULIN (HCC): ICD-10-CM

## 2024-07-31 DIAGNOSIS — Z09 HOSPITAL DISCHARGE FOLLOW-UP: ICD-10-CM

## 2024-07-31 DIAGNOSIS — I10 BENIGN ESSENTIAL HYPERTENSION: ICD-10-CM

## 2024-07-31 DIAGNOSIS — K85.90 ACUTE PANCREATITIS, UNSPECIFIED COMPLICATION STATUS, UNSPECIFIED PANCREATITIS TYPE: Primary | ICD-10-CM

## 2024-07-31 DIAGNOSIS — E11.22 TYPE 2 DIABETES MELLITUS WITH STAGE 3 CHRONIC KIDNEY DISEASE, WITHOUT LONG-TERM CURRENT USE OF INSULIN, UNSPECIFIED WHETHER STAGE 3A OR 3B CKD (HCC): ICD-10-CM

## 2024-07-31 DIAGNOSIS — N18.30 TYPE 2 DIABETES MELLITUS WITH STAGE 3 CHRONIC KIDNEY DISEASE, WITHOUT LONG-TERM CURRENT USE OF INSULIN, UNSPECIFIED WHETHER STAGE 3A OR 3B CKD (HCC): ICD-10-CM

## 2024-07-31 DIAGNOSIS — N18.32 TYPE 2 DIABETES MELLITUS WITH STAGE 3B CHRONIC KIDNEY DISEASE, WITHOUT LONG-TERM CURRENT USE OF INSULIN (HCC): ICD-10-CM

## 2024-07-31 PROCEDURE — 99495 TRANSJ CARE MGMT MOD F2F 14D: CPT | Performed by: FAMILY MEDICINE

## 2024-07-31 PROCEDURE — 1111F DSCHRG MED/CURRENT MED MERGE: CPT | Performed by: FAMILY MEDICINE

## 2024-07-31 NOTE — PROGRESS NOTES
Transition of Care Visit  Name: Bree Barrios      : 1945      MRN: [id number]  Encounter Provider: Lauren Amador MD  Encounter Date: 2024   Encounter department: [facility]    Assessment & Plan   1. Acute pancreatitis, unspecified complication status, unspecified pancreatitis type  Assessment & Plan:  Resolved.  Will remain off Trulicity    2. Type 2 diabetes mellitus with stage 3 chronic kidney disease, without long-term current use of insulin, unspecified whether stage 3a or 3b CKD (HCC)  Assessment & Plan:    Lab Results   Component Value Date    HGBA1C 7.7 (H) 2024   Will check A1c in August.  Patient was recently taken off Trulicity due to pancreatitis.  Will increase Jardiance to 25 mg daily.  Continue glyburide 5 mg twice daily  3. Hospital discharge follow-up  4. Benign essential hypertension  Assessment & Plan:  Blood pressure remains stable off lisinopril-HCTZ.  Will continue monitor  5. Type 2 diabetes mellitus with stage 3b chronic kidney disease, without long-term current use of insulin (HCC)  Assessment & Plan:    Lab Results   Component Value Date    HGBA1C 7.7 (H) 2024   Will check A1c in August.  Patient was recently taken off Trulicity due to pancreatitis.  Will increase Jardiance to 25 mg daily.  Continue glyburide 5 mg twice daily  Orders:  -     Empagliflozin 25 MG TABS; Take 1 tablet (25 mg total) by mouth daily         History of Present Illness     Transitional Care Management Review:   Bree Barrios is a 78 y.o. male here for TCM follow up.     During the TCM phone call patient stated:  TCM Call     Date and time call was made  2024 11:00 AM    Hospital care reviewed  Records reviewed    Patient was hospitialized at  [facility]    Date of Admission  24    Date of discharge  24    Diagnosis  Idiopathic acute pancreatitis    Disposition  Home    Were the patients medications reviewed and updated  No    Current  "Symptoms  Weakness    Weakness severity  Mild      TCM Call     Post hospital issues  Reduced activity    Should patient be enrolled in anticoag monitoring?  No    Scheduled for follow up?  Yes    Did you obtain your prescribed medications  Yes    Do you need help managing your prescriptions or medications  No    Is transportation to your appointment needed  No    I have advised the patient to call PCP with any new or worsening symptoms  LAURYN ARGUELLO        Here for TCM for pancreatitis. Per discharging provider:    \"Hospital Course:      The patient was hospitalized.  He was treated supportively with IV fluids, slow diet advancement, US with pancreatic edema, he was seen by GI, underwent MRCP no obstruction incidental finding of 8mm pancreatic cyst.  Underwent EGD with no significant peptic ulcer disease or lesion, biopsies taken for H. pylori, pending, outpatient follow-up.  Of note the patient was prior to admission on Trulicity which I believe may be contributing to his pancreatic issues given the fact that he does not have any history of gallstones or alcohol use.  His diabetes also seems to be well-controlled just with diet and as long as he does not eat too many carbohydrates his blood glucose is actually decent they were pretty good here when he was n.p.o. in the initial days.  Given his pancreatic issues I would recommend discontinuation of Trulicity for now.  He can continue his other antidiabetic medicines and low carbohydrate diet.  I do recommend outpatient follow-up.  Of note patient did develop some degree of acute kidney dysfunction on top of his CKD during hospitalization.  Suspect that this was most likely prerenal given his lack of p.o. intake due to GI issues as well as his previous antihypertensive regimen which included ACE inhibitor and HCTZ combination.  His creatinine is trending down although not back to baseline yet, currently 1.8.  Patient is making urine.  I do recommend patient to do an " "outpatient BMP.  Recommend him to hold his antihypertensive for now and monitor his blood pressure at home.  Do recommend outpatient follow-up with his primary care physician, nephrologist, GI, patient wife verbalized understanding.  Patient being discharged in stable condition\"      TCM 7/31/2024  Patient is feeling better.  Denies abdominal pain, nausea, vomiting.  Bowels are normal.  Reviewed labs from 7/26/2024 and his BMP showed improvement in his creatinine.  Is back to baseline.  He has remained off Trulicity.  On glyburide and Jardiance 10 mg.  Fasting sugars are 120-150, he admits his diet is high in carbs.  His blood pressures remained stable off the lisinopril hydrochlorothiazide.  For me today his blood pressure is 144/60.    Review of Systems   Constitutional:  Negative for activity change, appetite change, fatigue and unexpected weight change.   Respiratory:  Negative for chest tightness and shortness of breath.    Cardiovascular:  Negative for chest pain and leg swelling.   Gastrointestinal:  Negative for abdominal pain, constipation, diarrhea, nausea and vomiting.   Neurological:  Negative for headaches.     Objective     /68   Pulse 74   Temp 97.8 °F (36.6 °C)   Ht 5' 9.5\" (1.765 m)   Wt 78.4 kg (172 lb 12.8 oz)   SpO2 97%   BMI 25.15 kg/m²     Physical Exam  Vitals and nursing note reviewed.   Constitutional:       General: He is not in acute distress.     Appearance: He is well-developed. He is not diaphoretic.   HENT:      Head: Normocephalic and atraumatic.      Right Ear: External ear normal.      Left Ear: External ear normal.   Cardiovascular:      Rate and Rhythm: Normal rate and regular rhythm.      Heart sounds: Normal heart sounds. No murmur heard.     No friction rub. No gallop.   Pulmonary:      Effort: Pulmonary effort is normal. No respiratory distress.      Breath sounds: Normal breath sounds. No stridor. No wheezing or rales.   Abdominal:      General: There is no " distension.      Palpations: Abdomen is soft.      Tenderness: There is no abdominal tenderness.   Neurological:      General: No focal deficit present.      Mental Status: He is alert.   Psychiatric:         Mood and Affect: Mood normal.         Behavior: Behavior normal.         Thought Content: Thought content normal.         Judgment: Judgment normal.     Medications have been reviewed by provider in current encounter    Administrative Statements

## 2024-07-31 NOTE — ASSESSMENT & PLAN NOTE
Lab Results   Component Value Date    HGBA1C 7.7 (H) 05/02/2024   Will check A1c in August.  Patient was recently taken off Trulicity due to pancreatitis.  Will increase Jardiance to 25 mg daily.  Continue glyburide 5 mg twice daily

## 2024-08-02 ENCOUNTER — TELEPHONE (OUTPATIENT)
Dept: GASTROENTEROLOGY | Facility: CLINIC | Age: 79
End: 2024-08-02

## 2024-08-08 ENCOUNTER — RA CDI HCC (OUTPATIENT)
Dept: OTHER | Facility: HOSPITAL | Age: 79
End: 2024-08-08

## 2024-08-16 ENCOUNTER — OFFICE VISIT (OUTPATIENT)
Dept: FAMILY MEDICINE CLINIC | Facility: CLINIC | Age: 79
End: 2024-08-16
Payer: COMMERCIAL

## 2024-08-16 VITALS
DIASTOLIC BLOOD PRESSURE: 60 MMHG | WEIGHT: 172.6 LBS | HEART RATE: 72 BPM | HEIGHT: 70 IN | TEMPERATURE: 97.5 F | BODY MASS INDEX: 24.71 KG/M2 | SYSTOLIC BLOOD PRESSURE: 138 MMHG | OXYGEN SATURATION: 97 %

## 2024-08-16 DIAGNOSIS — E11.22 TYPE 2 DIABETES MELLITUS WITH STAGE 3 CHRONIC KIDNEY DISEASE, WITHOUT LONG-TERM CURRENT USE OF INSULIN, UNSPECIFIED WHETHER STAGE 3A OR 3B CKD (HCC): Primary | ICD-10-CM

## 2024-08-16 DIAGNOSIS — Z00.00 MEDICARE ANNUAL WELLNESS VISIT, SUBSEQUENT: ICD-10-CM

## 2024-08-16 DIAGNOSIS — N18.30 TYPE 2 DIABETES MELLITUS WITH STAGE 3 CHRONIC KIDNEY DISEASE, WITHOUT LONG-TERM CURRENT USE OF INSULIN, UNSPECIFIED WHETHER STAGE 3A OR 3B CKD (HCC): Primary | ICD-10-CM

## 2024-08-16 PROBLEM — K85.90 ACUTE PANCREATITIS: Status: RESOLVED | Noted: 2024-07-16 | Resolved: 2024-08-16

## 2024-08-16 LAB — SL AMB POCT HEMOGLOBIN AIC: 8.4 (ref ?–6.5)

## 2024-08-16 PROCEDURE — 83036 HEMOGLOBIN GLYCOSYLATED A1C: CPT | Performed by: FAMILY MEDICINE

## 2024-08-16 PROCEDURE — 99214 OFFICE O/P EST MOD 30 MIN: CPT | Performed by: FAMILY MEDICINE

## 2024-08-16 PROCEDURE — G0439 PPPS, SUBSEQ VISIT: HCPCS | Performed by: FAMILY MEDICINE
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
## 2024-08-16 NOTE — ASSESSMENT & PLAN NOTE
Lab Results   Component Value Date    HGBA1C 8.4 (A) 08/16/2024   A1c rising  Cannot use GLP1 due to h/o pancreatitis  Cannot use Metformin due to CKD  Advised to start basal insulin. Patient declined. Would like to work on diet and continue Jardiance & Glyburide.   Advised diabetes education.  Repeat A1c in 3 months.

## 2024-08-19 ENCOUNTER — TELEPHONE (OUTPATIENT)
Dept: ADMINISTRATIVE | Facility: OTHER | Age: 79
End: 2024-08-19

## 2024-08-19 NOTE — TELEPHONE ENCOUNTER
----- Message from Lauren Amador MD sent at 8/16/2024 10:53 AM EDT -----  Regarding: EYES  08/16/24 10:54 AM    Hello, our patient Bree Barrios has had Diabetic Eye Exam completed/performed. Please assist in updating the patient chart by making an External outreach to [facility] facility located in [place]. The date of service is JULY OR JUNE of 2024.    Thank you,  Lauren Amador MD  [facility]

## 2024-08-19 NOTE — TELEPHONE ENCOUNTER
Upon review of the In Basket request and the patient's chart, initial outreach has been made via fax to facility. Please see Contacts section for details.     Thank you  Figueroa Owusu MA

## 2024-08-19 NOTE — LETTER
Diabetic Eye Exam Form    Date Requested: 24  Patient: Bree Barrios     Please complete form  Patient : 1945   Referring Provider: Lauren Amador MD      DIABETIC Eye Exam Date _______________________________      Type of Exam MUST be documented for Diabetic Eye Exams. Please CHECK ONE.     Retinal Exam       Dilated Retinal Exam       OCT       Optomap-Iris Exam      Fundus Photography       Left Eye - Please check Retinopathy or No Retinopathy        Exam did show retinopathy    Exam did not show retinopathy       Right Eye - Please check Retinopathy or No Retinopathy       Exam did show retinopathy    Exam did not show retinopathy       Comments __________________________________________________________    Practice Providing Exam ______________________________________________    Exam Performed By (print name) _______________________________________      Provider Signature ___________________________________________________      These reports are needed for  compliance.  Please fax this completed form and a copy of the Diabetic Eye Exam report to our office located at [address] [place] [address] as soon as possible via Fax [phone] attention Figueroa: Phone [phone]  We thank you for your assistance in treating our mutual patient.

## 2024-08-22 NOTE — TELEPHONE ENCOUNTER
Upon review of the In Basket request we were able to locate, review, and update the patient chart as requested for Diabetic Eye Exam.    Any additional questions or concerns should be emailed to the Practice Liaisons via the appropriate education email address, please do not reply via In Basket.    Thank you  Figueroa Owusu MA   [facility]

## 2024-09-03 DIAGNOSIS — I63.9 ACUTE CVA (CEREBROVASCULAR ACCIDENT) (HCC): ICD-10-CM

## 2024-09-04 RX ORDER — ATORVASTATIN CALCIUM 80 MG/1
TABLET, FILM COATED ORAL
Qty: 90 TABLET | Refills: 1 | Status: SHIPPED | OUTPATIENT
Start: 2024-09-04

## 2024-10-08 ENCOUNTER — TELEPHONE (OUTPATIENT)
Dept: NEPHROLOGY | Facility: CLINIC | Age: 79
End: 2024-10-08

## 2024-10-16 ENCOUNTER — APPOINTMENT (OUTPATIENT)
Dept: LAB | Facility: CLINIC | Age: 79
End: 2024-10-16
Payer: COMMERCIAL

## 2024-10-16 DIAGNOSIS — N18.32 STAGE 3B CHRONIC KIDNEY DISEASE (CKD) (HCC): ICD-10-CM

## 2024-10-16 DIAGNOSIS — E83.9 CHRONIC KIDNEY DISEASE-MINERAL AND BONE DISORDER: ICD-10-CM

## 2024-10-16 DIAGNOSIS — M89.9 CHRONIC KIDNEY DISEASE-MINERAL AND BONE DISORDER: ICD-10-CM

## 2024-10-16 DIAGNOSIS — N18.9 CHRONIC KIDNEY DISEASE-MINERAL AND BONE DISORDER: ICD-10-CM

## 2024-10-16 LAB
25(OH)D3 SERPL-MCNC: 55.2 NG/ML (ref 30–100)
ANION GAP SERPL CALCULATED.3IONS-SCNC: 9 MMOL/L (ref 4–13)
BACTERIA UR QL AUTO: ABNORMAL /HPF
BASOPHILS # BLD AUTO: 0.07 THOUSANDS/ΜL (ref 0–0.1)
BASOPHILS NFR BLD AUTO: 1 % (ref 0–1)
BILIRUB UR QL STRIP: NEGATIVE
BUN SERPL-MCNC: 37 MG/DL (ref 5–25)
CALCIUM SERPL-MCNC: 8.8 MG/DL (ref 8.4–10.2)
CHLORIDE SERPL-SCNC: 102 MMOL/L (ref 96–108)
CLARITY UR: CLEAR
CO2 SERPL-SCNC: 29 MMOL/L (ref 21–32)
COLOR UR: ABNORMAL
CREAT SERPL-MCNC: 1.76 MG/DL (ref 0.6–1.3)
CREAT UR-MCNC: 62.8 MG/DL
EOSINOPHIL # BLD AUTO: 0.27 THOUSAND/ΜL (ref 0–0.61)
EOSINOPHIL NFR BLD AUTO: 4 % (ref 0–6)
ERYTHROCYTE [DISTWIDTH] IN BLOOD BY AUTOMATED COUNT: 12.7 % (ref 11.6–15.1)
GFR SERPL CREATININE-BSD FRML MDRD: 35 ML/MIN/1.73SQ M
GLUCOSE P FAST SERPL-MCNC: 193 MG/DL (ref 65–99)
GLUCOSE UR STRIP-MCNC: ABNORMAL MG/DL
HCT VFR BLD AUTO: 43 % (ref 36.5–49.3)
HGB BLD-MCNC: 14.3 G/DL (ref 12–17)
HGB UR QL STRIP.AUTO: NEGATIVE
HYALINE CASTS #/AREA URNS LPF: ABNORMAL /LPF
IMM GRANULOCYTES # BLD AUTO: 0.02 THOUSAND/UL (ref 0–0.2)
IMM GRANULOCYTES NFR BLD AUTO: 0 % (ref 0–2)
KETONES UR STRIP-MCNC: NEGATIVE MG/DL
LEUKOCYTE ESTERASE UR QL STRIP: ABNORMAL
LYMPHOCYTES # BLD AUTO: 1.67 THOUSANDS/ΜL (ref 0.6–4.47)
LYMPHOCYTES NFR BLD AUTO: 27 % (ref 14–44)
MCH RBC QN AUTO: 30.8 PG (ref 26.8–34.3)
MCHC RBC AUTO-ENTMCNC: 33.3 G/DL (ref 31.4–37.4)
MCV RBC AUTO: 93 FL (ref 82–98)
MONOCYTES # BLD AUTO: 0.61 THOUSAND/ΜL (ref 0.17–1.22)
MONOCYTES NFR BLD AUTO: 10 % (ref 4–12)
NEUTROPHILS # BLD AUTO: 3.62 THOUSANDS/ΜL (ref 1.85–7.62)
NEUTS SEG NFR BLD AUTO: 58 % (ref 43–75)
NITRITE UR QL STRIP: NEGATIVE
NON-SQ EPI CELLS URNS QL MICRO: ABNORMAL /HPF
NRBC BLD AUTO-RTO: 0 /100 WBCS
PH UR STRIP.AUTO: 6 [PH]
PHOSPHATE SERPL-MCNC: 3.8 MG/DL (ref 2.3–4.1)
PLATELET # BLD AUTO: 218 THOUSANDS/UL (ref 149–390)
PMV BLD AUTO: 11.3 FL (ref 8.9–12.7)
POTASSIUM SERPL-SCNC: 4.8 MMOL/L (ref 3.5–5.3)
PROT UR STRIP-MCNC: ABNORMAL MG/DL
PROT UR-MCNC: 16.5 MG/DL
PROT/CREAT UR: 0.3 MG/G{CREAT} (ref 0–0.1)
PTH-INTACT SERPL-MCNC: 166.6 PG/ML (ref 12–88)
RBC # BLD AUTO: 4.65 MILLION/UL (ref 3.88–5.62)
RBC #/AREA URNS AUTO: ABNORMAL /HPF
SODIUM SERPL-SCNC: 140 MMOL/L (ref 135–147)
SP GR UR STRIP.AUTO: 1.02 (ref 1–1.03)
UROBILINOGEN UR STRIP-ACNC: <2 MG/DL
WBC # BLD AUTO: 6.26 THOUSAND/UL (ref 4.31–10.16)
WBC #/AREA URNS AUTO: ABNORMAL /HPF

## 2024-10-16 PROCEDURE — 83970 ASSAY OF PARATHORMONE: CPT

## 2024-10-16 PROCEDURE — 84156 ASSAY OF PROTEIN URINE: CPT

## 2024-10-16 PROCEDURE — 85025 COMPLETE CBC W/AUTO DIFF WBC: CPT

## 2024-10-16 PROCEDURE — 80048 BASIC METABOLIC PNL TOTAL CA: CPT

## 2024-10-16 PROCEDURE — 81001 URINALYSIS AUTO W/SCOPE: CPT

## 2024-10-16 PROCEDURE — 82570 ASSAY OF URINE CREATININE: CPT

## 2024-10-16 PROCEDURE — 84100 ASSAY OF PHOSPHORUS: CPT

## 2024-10-16 PROCEDURE — 36415 COLL VENOUS BLD VENIPUNCTURE: CPT

## 2024-10-16 PROCEDURE — 82306 VITAMIN D 25 HYDROXY: CPT

## 2024-10-21 ENCOUNTER — OFFICE VISIT (OUTPATIENT)
Dept: NEPHROLOGY | Facility: CLINIC | Age: 79
End: 2024-10-21
Payer: COMMERCIAL

## 2024-10-21 ENCOUNTER — TELEPHONE (OUTPATIENT)
Dept: OTHER | Facility: OTHER | Age: 79
End: 2024-10-21

## 2024-10-21 VITALS
DIASTOLIC BLOOD PRESSURE: 70 MMHG | HEIGHT: 70 IN | SYSTOLIC BLOOD PRESSURE: 120 MMHG | TEMPERATURE: 97.5 F | HEART RATE: 91 BPM | RESPIRATION RATE: 18 BRPM | OXYGEN SATURATION: 98 % | BODY MASS INDEX: 24.91 KG/M2 | WEIGHT: 174 LBS

## 2024-10-21 DIAGNOSIS — M89.9 CHRONIC KIDNEY DISEASE-MINERAL AND BONE DISORDER: ICD-10-CM

## 2024-10-21 DIAGNOSIS — N18.32 STAGE 3B CHRONIC KIDNEY DISEASE (HCC): Primary | ICD-10-CM

## 2024-10-21 DIAGNOSIS — N18.30 TYPE 2 DIABETES MELLITUS WITH STAGE 3 CHRONIC KIDNEY DISEASE, WITHOUT LONG-TERM CURRENT USE OF INSULIN, UNSPECIFIED WHETHER STAGE 3A OR 3B CKD (HCC): ICD-10-CM

## 2024-10-21 DIAGNOSIS — E11.22 TYPE 2 DIABETES MELLITUS WITH STAGE 3 CHRONIC KIDNEY DISEASE, WITHOUT LONG-TERM CURRENT USE OF INSULIN, UNSPECIFIED WHETHER STAGE 3A OR 3B CKD (HCC): ICD-10-CM

## 2024-10-21 DIAGNOSIS — E83.9 CHRONIC KIDNEY DISEASE-MINERAL AND BONE DISORDER: ICD-10-CM

## 2024-10-21 DIAGNOSIS — I10 BENIGN ESSENTIAL HYPERTENSION: ICD-10-CM

## 2024-10-21 DIAGNOSIS — N18.9 CHRONIC KIDNEY DISEASE-MINERAL AND BONE DISORDER: ICD-10-CM

## 2024-10-21 PROCEDURE — 99214 OFFICE O/P EST MOD 30 MIN: CPT | Performed by: INTERNAL MEDICINE

## 2024-10-21 NOTE — PROGRESS NOTES
Ambulatory Visit  Name: Bree Barrios      : 1945      MRN: [id number]  Encounter Provider: Frank Moncada MD  Encounter Date: 10/21/2024   Encounter department: [facility]    Assessment & Plan  Stage 3b chronic kidney disease (HCC)  Lab Results   Component Value Date    EGFR 35 10/16/2024    EGFR 41 2024    EGFR 34 2024    CREATININE 1.76 (H) 10/16/2024    CREATININE 1.57 (H) 2024    CREATININE 1.83 (H) 2024     Overall seems to be stable.  Patient was in hospital pancreatitis but renal function is still stable       Chronic kidney disease-mineral and bone disorder  Lab Results   Component Value Date    EGFR 35 10/16/2024    EGFR 41 2024    EGFR 34 2024    CREATININE 1.76 (H) 10/16/2024    CREATININE 1.57 (H) 2024    CREATININE 1.83 (H) 2024   PTH and phosphorus are within acceptable range along with vitamin D         Type 2 diabetes mellitus with stage 3 chronic kidney disease, without long-term current use of insulin, unspecified whether stage 3a or 3b CKD (HCC)    Lab Results   Component Value Date    HGBA1C 8.4 (A) 2024   A1c is high.  Patient is getting dizzy episode off-and-on.  Advise close monitoring with primary doctor         Benign essential hypertension  Very well-controlled         Patient was getting dizzy patient in office.  Check this sugar it was 300+ not hypoglycemic    Offered patient to go to hospital patient does not want to go to hospital and will go home    I will see him back in 6 months we will get blood and urine test before that visit advise close follow-up with primary doctor    History of Present Illness     Bree Barrios is a 79 y.o. male who presents patient with CKD stage III and diabetes    Patient was in hospital since I saw him last with pancreatitis which was likely because of Trulicity and patient is off that    No other acute complaint    Still gets stressed out because of home  "situation but no other acute complaint      Review of Systems   Constitutional:  Negative for fatigue.   HENT:  Negative for congestion.    Eyes:  Negative for photophobia and pain.   Respiratory:  Negative for chest tightness and shortness of breath.    Cardiovascular:  Negative for chest pain and palpitations.   Gastrointestinal:  Negative for abdominal distention, abdominal pain and blood in stool.   Endocrine: Negative for polydipsia.   Genitourinary:  Negative for difficulty urinating, dysuria, flank pain, hematuria and urgency.   Musculoskeletal:  Negative for arthralgias and back pain.   Skin:  Negative for rash.   Neurological:  Negative for dizziness, light-headedness and headaches.   Hematological:  Does not bruise/bleed easily.   Psychiatric/Behavioral:  Negative for behavioral problems. The patient is not nervous/anxious.            Objective     Pulse 91   Temp 97.5 °F (36.4 °C) (Temporal)   Resp 18   Ht 5' 9.5\" (1.765 m)   Wt 78.9 kg (174 lb)   SpO2 98%   BMI 25.33 kg/m²     Physical Exam  Constitutional:       General: He is not in acute distress.     Appearance: He is well-developed.   HENT:      Head: Normocephalic.      Mouth/Throat:      Mouth: Mucous membranes are moist.   Eyes:      General: No scleral icterus.     Conjunctiva/sclera: Conjunctivae normal.   Neck:      Vascular: No JVD.   Cardiovascular:      Rate and Rhythm: Normal rate.      Heart sounds: Normal heart sounds.   Pulmonary:      Effort: Pulmonary effort is normal.      Breath sounds: No wheezing.   Abdominal:      Palpations: Abdomen is soft.      Tenderness: There is no abdominal tenderness.   Musculoskeletal:         General: Normal range of motion.      Cervical back: Neck supple.   Skin:     General: Skin is warm.      Findings: No rash.   Neurological:      Mental Status: He is alert and oriented to person, place, and time.   Psychiatric:         Behavior: Behavior normal.       "

## 2024-10-21 NOTE — ASSESSMENT & PLAN NOTE
Lab Results   Component Value Date    HGBA1C 8.4 (A) 08/16/2024   A1c is high.  Patient is getting dizzy episode off-and-on.  Advise close monitoring with primary doctor

## 2024-10-21 NOTE — ASSESSMENT & PLAN NOTE
Very well-controlled         Patient was getting dizzy patient in office.  Check this sugar it was 300+ not hypoglycemic    Offered patient to go to hospital patient does not want to go to hospital and will go home    I will see him back in 6 months we will get blood and urine test before that visit advise close follow-up with primary doctor

## 2024-10-21 NOTE — ASSESSMENT & PLAN NOTE
Lab Results   Component Value Date    EGFR 35 10/16/2024    EGFR 41 07/26/2024    EGFR 34 07/22/2024    CREATININE 1.76 (H) 10/16/2024    CREATININE 1.57 (H) 07/26/2024    CREATININE 1.83 (H) 07/22/2024   PTH and phosphorus are within acceptable range along with vitamin D

## 2024-10-21 NOTE — ASSESSMENT & PLAN NOTE
Lab Results   Component Value Date    EGFR 35 10/16/2024    EGFR 41 07/26/2024    EGFR 34 07/22/2024    CREATININE 1.76 (H) 10/16/2024    CREATININE 1.57 (H) 07/26/2024    CREATININE 1.83 (H) 07/22/2024     Overall seems to be stable.  Patient was in hospital pancreatitis but renal function is still stable

## 2024-11-11 ENCOUNTER — RA CDI HCC (OUTPATIENT)
Dept: OTHER | Facility: HOSPITAL | Age: 79
End: 2024-11-11

## 2024-11-12 ENCOUNTER — APPOINTMENT (OUTPATIENT)
Dept: LAB | Facility: CLINIC | Age: 79
End: 2024-11-12
Payer: COMMERCIAL

## 2024-11-12 DIAGNOSIS — N18.30 TYPE 2 DIABETES MELLITUS WITH STAGE 3 CHRONIC KIDNEY DISEASE, WITHOUT LONG-TERM CURRENT USE OF INSULIN, UNSPECIFIED WHETHER STAGE 3A OR 3B CKD (HCC): ICD-10-CM

## 2024-11-12 DIAGNOSIS — N18.4 TYPE 2 DIABETES MELLITUS WITH STAGE 4 CHRONIC KIDNEY DISEASE, WITHOUT LONG-TERM CURRENT USE OF INSULIN (HCC): ICD-10-CM

## 2024-11-12 DIAGNOSIS — E11.22 TYPE 2 DIABETES MELLITUS WITH STAGE 3 CHRONIC KIDNEY DISEASE, WITHOUT LONG-TERM CURRENT USE OF INSULIN, UNSPECIFIED WHETHER STAGE 3A OR 3B CKD (HCC): ICD-10-CM

## 2024-11-12 DIAGNOSIS — E11.22 TYPE 2 DIABETES MELLITUS WITH STAGE 4 CHRONIC KIDNEY DISEASE, WITHOUT LONG-TERM CURRENT USE OF INSULIN (HCC): ICD-10-CM

## 2024-11-12 LAB
ANION GAP SERPL CALCULATED.3IONS-SCNC: 8 MMOL/L (ref 4–13)
BUN SERPL-MCNC: 42 MG/DL (ref 5–25)
CALCIUM SERPL-MCNC: 8.7 MG/DL (ref 8.4–10.2)
CHLORIDE SERPL-SCNC: 103 MMOL/L (ref 96–108)
CHOLEST SERPL-MCNC: 91 MG/DL
CO2 SERPL-SCNC: 28 MMOL/L (ref 21–32)
CREAT SERPL-MCNC: 1.97 MG/DL (ref 0.6–1.3)
EST. AVERAGE GLUCOSE BLD GHB EST-MCNC: 243 MG/DL
GFR SERPL CREATININE-BSD FRML MDRD: 31 ML/MIN/1.73SQ M
GLUCOSE P FAST SERPL-MCNC: 146 MG/DL (ref 65–99)
HBA1C MFR BLD: 10.1 %
HDLC SERPL-MCNC: 32 MG/DL
LDLC SERPL CALC-MCNC: 45 MG/DL (ref 0–100)
POTASSIUM SERPL-SCNC: 5 MMOL/L (ref 3.5–5.3)
SODIUM SERPL-SCNC: 139 MMOL/L (ref 135–147)
TRIGL SERPL-MCNC: 69 MG/DL

## 2024-11-12 PROCEDURE — 83036 HEMOGLOBIN GLYCOSYLATED A1C: CPT

## 2024-11-12 PROCEDURE — 36415 COLL VENOUS BLD VENIPUNCTURE: CPT

## 2024-11-12 PROCEDURE — 80061 LIPID PANEL: CPT

## 2024-11-12 PROCEDURE — 80048 BASIC METABOLIC PNL TOTAL CA: CPT

## 2024-11-13 ENCOUNTER — RESULTS FOLLOW-UP (OUTPATIENT)
Dept: FAMILY MEDICINE CLINIC | Facility: CLINIC | Age: 79
End: 2024-11-13

## 2024-11-18 ENCOUNTER — OFFICE VISIT (OUTPATIENT)
Dept: FAMILY MEDICINE CLINIC | Facility: CLINIC | Age: 79
End: 2024-11-18
Payer: COMMERCIAL

## 2024-11-18 VITALS
OXYGEN SATURATION: 96 % | TEMPERATURE: 97.5 F | HEIGHT: 70 IN | DIASTOLIC BLOOD PRESSURE: 70 MMHG | WEIGHT: 175 LBS | SYSTOLIC BLOOD PRESSURE: 138 MMHG | HEART RATE: 70 BPM | BODY MASS INDEX: 25.05 KG/M2

## 2024-11-18 DIAGNOSIS — E11.22 TYPE 2 DIABETES MELLITUS WITH STAGE 3 CHRONIC KIDNEY DISEASE, WITHOUT LONG-TERM CURRENT USE OF INSULIN, UNSPECIFIED WHETHER STAGE 3A OR 3B CKD (HCC): Primary | ICD-10-CM

## 2024-11-18 DIAGNOSIS — N18.32 TYPE 2 DIABETES MELLITUS WITH STAGE 3B CHRONIC KIDNEY DISEASE, WITHOUT LONG-TERM CURRENT USE OF INSULIN (HCC): ICD-10-CM

## 2024-11-18 DIAGNOSIS — N18.30 TYPE 2 DIABETES MELLITUS WITH STAGE 3 CHRONIC KIDNEY DISEASE, WITHOUT LONG-TERM CURRENT USE OF INSULIN, UNSPECIFIED WHETHER STAGE 3A OR 3B CKD (HCC): Primary | ICD-10-CM

## 2024-11-18 DIAGNOSIS — R10.31 RIGHT LOWER QUADRANT ABDOMINAL PAIN: ICD-10-CM

## 2024-11-18 DIAGNOSIS — E11.22 TYPE 2 DIABETES MELLITUS WITH STAGE 3B CHRONIC KIDNEY DISEASE, WITHOUT LONG-TERM CURRENT USE OF INSULIN (HCC): ICD-10-CM

## 2024-11-18 PROBLEM — N17.9 ACUTE KIDNEY INJURY (HCC): Status: RESOLVED | Noted: 2024-07-21 | Resolved: 2024-11-18

## 2024-11-18 PROCEDURE — G2211 COMPLEX E/M VISIT ADD ON: HCPCS | Performed by: FAMILY MEDICINE

## 2024-11-18 PROCEDURE — 99214 OFFICE O/P EST MOD 30 MIN: CPT | Performed by: FAMILY MEDICINE

## 2024-11-18 RX ORDER — PROCHLORPERAZINE 25 MG/1
SUPPOSITORY RECTAL
Qty: 1 EACH | Refills: 0 | Status: SHIPPED | OUTPATIENT
Start: 2024-11-18

## 2024-11-18 RX ORDER — BLOOD SUGAR DIAGNOSTIC
STRIP MISCELLANEOUS
Qty: 100 EACH | Refills: 3 | Status: SHIPPED | OUTPATIENT
Start: 2024-11-18

## 2024-11-18 RX ORDER — INSULIN DEGLUDEC 100 U/ML
15 INJECTION, SOLUTION SUBCUTANEOUS DAILY
Qty: 15 ML | Refills: 5 | Status: SHIPPED | OUTPATIENT
Start: 2024-11-18

## 2024-11-18 RX ORDER — PROCHLORPERAZINE 25 MG/1
SUPPOSITORY RECTAL
Qty: 9 EACH | Refills: 3 | Status: SHIPPED | OUTPATIENT
Start: 2024-11-18

## 2024-11-18 NOTE — ASSESSMENT & PLAN NOTE
Uncontrolled on Jardiance, Glyburide  GLP1 contraindicated due to h/o pancreatitis  Metformin contraindicated due to CKD  Start Tresiba 15 units daily. Ordered CGM.    Advised to send glucose log in 2 wks.   Advised diabetes education - declined.     Lab Results   Component Value Date    HGBA1C 10.1 (H) 11/12/2024       Orders:    insulin degludec (Tresiba FlexTouch) 100 units/mL injection pen; Inject 15 Units under the skin daily    Continuous Glucose  (Dexcom G6 ) JOANNE; 1 DEXCOM G6  FOR CONTINUOUS GLUCOSE MONITORING    Continuous Glucose Sensor (Dexcom G6 Sensor) MISC; 3 PACK SENSOR FOR CONTINUOUS GLUCOSE MONITORING    Hemoglobin A1C; Future    Albumin / creatinine urine ratio; Future    Comprehensive metabolic panel; Future    Lipid Panel with Direct LDL reflex; Future

## 2024-11-18 NOTE — ASSESSMENT & PLAN NOTE
Lab Results   Component Value Date    HGBA1C 10.1 (H) 11/12/2024       Orders:    glucose blood (OneTouch Verio) test strip; Check blood sugars once daily. Please substitute with appropriate alternative as covered by patient's insurance. Dx: E11.65

## 2024-11-18 NOTE — ASSESSMENT & PLAN NOTE
Intermittent, patient believes it is muscular   Reviewed recent imaging  If pain persists/worsens, will check imaging, pt to call
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
## 2024-11-19 ENCOUNTER — TELEPHONE (OUTPATIENT)
Dept: FAMILY MEDICINE CLINIC | Facility: CLINIC | Age: 79
End: 2024-11-19

## 2024-11-19 NOTE — TELEPHONE ENCOUNTER
PA for Dexcom G6  SUBMITTED to     via    [x]CMM-KEY: [id number]  []Surescripts-Case ID #   []Availity-Auth ID # NDC #   []Faxed to plan   []Other website   []Phone call Case ID #     [x]PA sent as URGENT    All office notes, labs and other pertaining documents and studies sent. Clinical questions answered. Awaiting determination from insurance company.     Turnaround time for your insurance to make a decision on your Prior Authorization can take 7-21 business days.

## 2024-11-20 ENCOUNTER — TELEPHONE (OUTPATIENT)
Age: 79
End: 2024-11-20

## 2024-11-20 DIAGNOSIS — N18.32 TYPE 2 DIABETES MELLITUS WITH STAGE 3B CHRONIC KIDNEY DISEASE, WITHOUT LONG-TERM CURRENT USE OF INSULIN (HCC): Primary | ICD-10-CM

## 2024-11-20 DIAGNOSIS — E11.22 TYPE 2 DIABETES MELLITUS WITH STAGE 3B CHRONIC KIDNEY DISEASE, WITHOUT LONG-TERM CURRENT USE OF INSULIN (HCC): Primary | ICD-10-CM

## 2024-11-20 DIAGNOSIS — E11.22 TYPE 2 DIABETES MELLITUS WITH STAGE 3B CHRONIC KIDNEY DISEASE, WITHOUT LONG-TERM CURRENT USE OF INSULIN (HCC): ICD-10-CM

## 2024-11-20 DIAGNOSIS — N18.32 TYPE 2 DIABETES MELLITUS WITH STAGE 3B CHRONIC KIDNEY DISEASE, WITHOUT LONG-TERM CURRENT USE OF INSULIN (HCC): ICD-10-CM

## 2024-11-20 RX ORDER — PROCHLORPERAZINE 25 MG/1
SUPPOSITORY RECTAL
Qty: 1 EACH | Refills: 0 | Status: SHIPPED | OUTPATIENT
Start: 2024-11-20

## 2024-11-20 RX ORDER — PEN NEEDLE, DIABETIC 30 GX5/16"
NEEDLE, DISPOSABLE MISCELLANEOUS DAILY
Qty: 100 EACH | Refills: 1 | Status: SHIPPED | OUTPATIENT
Start: 2024-11-20

## 2024-11-20 RX ORDER — PROCHLORPERAZINE 25 MG/1
SUPPOSITORY RECTAL
Qty: 1 EACH | Refills: 3 | Status: SHIPPED | OUTPATIENT
Start: 2024-11-20

## 2024-11-20 NOTE — TELEPHONE ENCOUNTER
Pharmacy reports the medication (Continuous Glucose  (Dexcom G6 ) JOANNE [[id number]) needs a transmitter and never received one. Also, the medication (insulin degludec (Tresiba FlexTouch) 100 units/mL injection pen) needs pen needles sent in as well.  PCP please send these orders in, thank you.

## 2024-11-21 RX ORDER — LANCETS 33 GAUGE
EACH MISCELLANEOUS
Qty: 100 EACH | Refills: 1 | Status: SHIPPED | OUTPATIENT
Start: 2024-11-21

## 2024-11-22 NOTE — TELEPHONE ENCOUNTER
PA for Dexcom G6  Cannot be processed via CMM. See below.     Reason:(Screenshot if applicable)  Not able to process via CMM.         Called the insurance number provided on the back of the patient's [employer] Medicare card. Spoke with representative to initiate the prior authorization over the phone.         **Please follow up with your patient regarding denial and next steps**

## 2024-11-22 NOTE — TELEPHONE ENCOUNTER
PA for Dexcom G6   RESUBMITTED to [employer] with the information provided Zoya Phoenix MA    BIN:[id number]  PCN: [id number]  Grp #: [id number]  Member ID - [id number]    via    [x]CMM-KEY: [id number]  []Surescripts-Case ID #   []Availity-Auth ID # NDC #   []Faxed to plan   []Other website   []Phone call Case ID #       All office notes, labs and other pertaining documents and studies sent. Clinical questions answered. Awaiting determination from insurance company.     Turnaround time for your insurance to make a decision on your Prior Authorization can take 7-21 business days.

## 2024-11-22 NOTE — TELEPHONE ENCOUNTER
PA for Dexcom G6  APPROVED     Date(s) approved 11/22/24-12/31/24    Case #[id number]    Patient advised by          [][employer] Message  []Phone call   [x]LMOM  []L/M to call office as no active Communication consent on file  []Unable to leave detailed message as VM not approved on Communication consent       Pharmacy advised by    [x]Fax  []Phone call    Approval letter scanned into Media No Not available. Verbal approval over the phone.

## 2024-11-22 NOTE — TELEPHONE ENCOUNTER
"Please be advised the PA for patients dexcom g6  per [facility] pharmacy should be submitted to patients medicare part b benefit, the information to resend PA is   BIN:[id number]  PCN: [id number]  Grp #: [id number]  Member ID - [id number]  Please resubmit the prior auth thank you, [facility] is patients pharmacy benefit and came back with \"drug not found\"     "

## 2024-12-27 DIAGNOSIS — E11.22 TYPE 2 DIABETES MELLITUS WITH STAGE 3B CHRONIC KIDNEY DISEASE, WITHOUT LONG-TERM CURRENT USE OF INSULIN (HCC): ICD-10-CM

## 2024-12-27 DIAGNOSIS — E11.22 TYPE 2 DIABETES MELLITUS WITH STAGE 3 CHRONIC KIDNEY DISEASE, WITHOUT LONG-TERM CURRENT USE OF INSULIN, UNSPECIFIED WHETHER STAGE 3A OR 3B CKD (HCC): ICD-10-CM

## 2024-12-27 DIAGNOSIS — N18.32 TYPE 2 DIABETES MELLITUS WITH STAGE 3B CHRONIC KIDNEY DISEASE, WITHOUT LONG-TERM CURRENT USE OF INSULIN (HCC): ICD-10-CM

## 2024-12-27 DIAGNOSIS — N18.30 TYPE 2 DIABETES MELLITUS WITH STAGE 3 CHRONIC KIDNEY DISEASE, WITHOUT LONG-TERM CURRENT USE OF INSULIN, UNSPECIFIED WHETHER STAGE 3A OR 3B CKD (HCC): ICD-10-CM

## 2024-12-27 RX ORDER — INSULIN DEGLUDEC 100 U/ML
15 INJECTION, SOLUTION SUBCUTANEOUS DAILY
Qty: 15 ML | Refills: 5 | Status: SHIPPED | OUTPATIENT
Start: 2024-12-27

## 2024-12-29 DIAGNOSIS — N18.32 TYPE 2 DIABETES MELLITUS WITH STAGE 3B CHRONIC KIDNEY DISEASE, WITHOUT LONG-TERM CURRENT USE OF INSULIN (HCC): ICD-10-CM

## 2024-12-29 DIAGNOSIS — E11.22 TYPE 2 DIABETES MELLITUS WITH STAGE 3B CHRONIC KIDNEY DISEASE, WITHOUT LONG-TERM CURRENT USE OF INSULIN (HCC): ICD-10-CM

## 2024-12-29 RX ORDER — PEN NEEDLE, DIABETIC 30 GX5/16"
NEEDLE, DISPOSABLE MISCELLANEOUS DAILY
Qty: 100 EACH | Refills: 1 | Status: SHIPPED | OUTPATIENT
Start: 2024-12-29

## 2025-01-16 ENCOUNTER — TELEPHONE (OUTPATIENT)
Age: 80
End: 2025-01-16

## 2025-01-16 NOTE — TELEPHONE ENCOUNTER
Appears Lisiniopril-Hctz:    Discontinued by: Gianfranco Paulson MD on 7/22/2024 15:52     D/t renal dysfunction during hospitalization.  Please review and advise.

## 2025-01-16 NOTE — TELEPHONE ENCOUNTER
Patient is requesting a refill on his lisiniprol -hydrochlorizide to [facility], if there are any questions please advise

## 2025-01-17 ENCOUNTER — TELEPHONE (OUTPATIENT)
Dept: NEPHROLOGY | Facility: CLINIC | Age: 80
End: 2025-01-17

## 2025-01-17 NOTE — TELEPHONE ENCOUNTER
Called and advised to stop taking it until seen by Dr Moncada in 04/28. Pt understood and was ok with that.

## 2025-01-17 NOTE — TELEPHONE ENCOUNTER
Pt called and wanted to know if you want him to be on lisinopril/hydrochlorothiazide 20-25 mg daily. Please advise.

## 2025-02-14 ENCOUNTER — RA CDI HCC (OUTPATIENT)
Dept: OTHER | Facility: HOSPITAL | Age: 80
End: 2025-02-14

## 2025-02-14 ENCOUNTER — APPOINTMENT (OUTPATIENT)
Dept: LAB | Facility: CLINIC | Age: 80
End: 2025-02-14
Payer: COMMERCIAL

## 2025-02-14 DIAGNOSIS — E11.22 TYPE 2 DIABETES MELLITUS WITH STAGE 3 CHRONIC KIDNEY DISEASE, WITHOUT LONG-TERM CURRENT USE OF INSULIN, UNSPECIFIED WHETHER STAGE 3A OR 3B CKD (HCC): ICD-10-CM

## 2025-02-14 DIAGNOSIS — N18.30 TYPE 2 DIABETES MELLITUS WITH STAGE 3 CHRONIC KIDNEY DISEASE, WITHOUT LONG-TERM CURRENT USE OF INSULIN, UNSPECIFIED WHETHER STAGE 3A OR 3B CKD (HCC): ICD-10-CM

## 2025-02-14 LAB
ALBUMIN SERPL BCG-MCNC: 4.3 G/DL (ref 3.5–5)
ALP SERPL-CCNC: 70 U/L (ref 34–104)
ALT SERPL W P-5'-P-CCNC: 17 U/L (ref 7–52)
ANION GAP SERPL CALCULATED.3IONS-SCNC: 9 MMOL/L (ref 4–13)
AST SERPL W P-5'-P-CCNC: 17 U/L (ref 13–39)
BILIRUB SERPL-MCNC: 0.97 MG/DL (ref 0.2–1)
BUN SERPL-MCNC: 29 MG/DL (ref 5–25)
CALCIUM SERPL-MCNC: 8.9 MG/DL (ref 8.4–10.2)
CHLORIDE SERPL-SCNC: 105 MMOL/L (ref 96–108)
CHOLEST SERPL-MCNC: 72 MG/DL (ref ?–200)
CO2 SERPL-SCNC: 29 MMOL/L (ref 21–32)
CREAT SERPL-MCNC: 1.52 MG/DL (ref 0.6–1.3)
CREAT UR-MCNC: 85.4 MG/DL
EST. AVERAGE GLUCOSE BLD GHB EST-MCNC: 160 MG/DL
GFR SERPL CREATININE-BSD FRML MDRD: 42 ML/MIN/1.73SQ M
GLUCOSE P FAST SERPL-MCNC: 63 MG/DL (ref 65–99)
HBA1C MFR BLD: 7.2 %
HDLC SERPL-MCNC: 33 MG/DL
LDLC SERPL CALC-MCNC: 27 MG/DL (ref 0–100)
MICROALBUMIN UR-MCNC: 83.8 MG/L
MICROALBUMIN/CREAT 24H UR: 98 MG/G CREATININE (ref 0–30)
POTASSIUM SERPL-SCNC: 4.6 MMOL/L (ref 3.5–5.3)
PROT SERPL-MCNC: 6.5 G/DL (ref 6.4–8.4)
SODIUM SERPL-SCNC: 143 MMOL/L (ref 135–147)
TRIGL SERPL-MCNC: 58 MG/DL (ref ?–150)

## 2025-02-14 PROCEDURE — 83036 HEMOGLOBIN GLYCOSYLATED A1C: CPT

## 2025-02-14 PROCEDURE — 82043 UR ALBUMIN QUANTITATIVE: CPT

## 2025-02-14 PROCEDURE — 80053 COMPREHEN METABOLIC PANEL: CPT

## 2025-02-14 PROCEDURE — 36415 COLL VENOUS BLD VENIPUNCTURE: CPT

## 2025-02-14 PROCEDURE — 80061 LIPID PANEL: CPT

## 2025-02-14 PROCEDURE — 82570 ASSAY OF URINE CREATININE: CPT

## 2025-02-15 ENCOUNTER — RESULTS FOLLOW-UP (OUTPATIENT)
Dept: FAMILY MEDICINE CLINIC | Facility: CLINIC | Age: 80
End: 2025-02-15

## 2025-02-21 ENCOUNTER — TELEPHONE (OUTPATIENT)
Dept: ADMINISTRATIVE | Facility: OTHER | Age: 80
End: 2025-02-21

## 2025-02-21 ENCOUNTER — OFFICE VISIT (OUTPATIENT)
Dept: FAMILY MEDICINE CLINIC | Facility: CLINIC | Age: 80
End: 2025-02-21
Payer: COMMERCIAL

## 2025-02-21 VITALS
OXYGEN SATURATION: 97 % | DIASTOLIC BLOOD PRESSURE: 70 MMHG | TEMPERATURE: 98.9 F | HEIGHT: 70 IN | HEART RATE: 66 BPM | WEIGHT: 181.2 LBS | SYSTOLIC BLOOD PRESSURE: 154 MMHG | BODY MASS INDEX: 25.94 KG/M2

## 2025-02-21 DIAGNOSIS — M54.32 LEFT SIDED SCIATICA: ICD-10-CM

## 2025-02-21 DIAGNOSIS — I10 BENIGN ESSENTIAL HYPERTENSION: ICD-10-CM

## 2025-02-21 DIAGNOSIS — N18.32 TYPE 2 DIABETES MELLITUS WITH STAGE 3B CHRONIC KIDNEY DISEASE, WITHOUT LONG-TERM CURRENT USE OF INSULIN (HCC): Primary | ICD-10-CM

## 2025-02-21 DIAGNOSIS — E11.22 TYPE 2 DIABETES MELLITUS WITH STAGE 3B CHRONIC KIDNEY DISEASE, WITHOUT LONG-TERM CURRENT USE OF INSULIN (HCC): Primary | ICD-10-CM

## 2025-02-21 PROBLEM — N18.4 TYPE 2 DIABETES MELLITUS WITH STAGE 4 CHRONIC KIDNEY DISEASE, WITHOUT LONG-TERM CURRENT USE OF INSULIN (HCC): Status: ACTIVE | Noted: 2018-02-08

## 2025-02-21 PROBLEM — N18.4 TYPE 2 DIABETES MELLITUS WITH STAGE 4 CHRONIC KIDNEY DISEASE, WITHOUT LONG-TERM CURRENT USE OF INSULIN (HCC): Status: RESOLVED | Noted: 2018-02-08 | Resolved: 2025-02-21

## 2025-02-21 PROCEDURE — 99214 OFFICE O/P EST MOD 30 MIN: CPT | Performed by: FAMILY MEDICINE

## 2025-02-21 PROCEDURE — G2211 COMPLEX E/M VISIT ADD ON: HCPCS | Performed by: FAMILY MEDICINE

## 2025-02-21 RX ORDER — AMLODIPINE BESYLATE 2.5 MG/1
2.5 TABLET ORAL DAILY
Qty: 90 TABLET | Refills: 1 | Status: SHIPPED | OUTPATIENT
Start: 2025-02-21

## 2025-02-21 RX ORDER — GLYBURIDE 5 MG/1
2.5 TABLET ORAL 2 TIMES DAILY
Start: 2025-02-21

## 2025-02-21 NOTE — PROGRESS NOTES
Name: Bree Barrios      : 1945      MRN: [id number]  Encounter Provider: Lauren Amador MD  Encounter Date: 2025   Encounter department: [facility]  :  Assessment & Plan  Type 2 diabetes mellitus with stage 3b chronic kidney disease, without long-term current use of insulin ([facility])  A1c improving on Tresiba and with CGM.  Having some hypoglycemia.   Decrease Glyburide to 2.5 mg BID, consider d/c'ing ultimately.  Continue Tresiba 15 units and continue Jardiance 25 mg  Repeat labs 3 months  Lab Results   Component Value Date    HGBA1C 7.2 (H) 2025     Orders:  •  glyBURIDE (DIABETA) 5 mg tablet; Take 0.5 tablets (2.5 mg total) by mouth 2 (two) times a day  •  Hemoglobin A1C; Future  •  Comprehensive metabolic panel; Future  •  Lipid Panel with Direct LDL reflex; Future    Benign essential hypertension  Blood pressure elevated after being taken off Lisinopril   Will start Amlodipine 2.5 mg.    Orders:  •  amLODIPine (NORVASC) 2.5 mg tablet; Take 1 tablet (2.5 mg total) by mouth daily    Left sided sciatica  Discussed options for treatment including PT, spine/pain management. Cannot take NSAIDs due to CKD. Can try Tylenol.  He declined further treatment for now.          FOLLOW-UP 3 months     History of Present Illness   Here for diabetic check up  A1c is much improved 7.2%, down from 10.1%. he has a Cgm now and tracks his sugar constantly.  He is on Tresiba, Glyburide 5 mg BID, Jardiance 25 mg. He occasionally has a low blood sugar but he is able to correct it. His glucose at the lab was 63.   CKD stable to improved GFR 42.  Lipids very well controlled. On statin.    His blood pressure is high today, and having some headaches. Lisinopril was stopped while he was in the hospital.  Not on any blood pressure meds right now.    Having pain L sided low back down left leg for several weeks. Comes and goes. No current treatment.     Diabetes  Hypoglycemia symptoms include  "headaches.     Review of Systems   Endocrine:        Hypoglycemia   Musculoskeletal:  Positive for back pain.        Pain going down L leg   Neurological:  Positive for headaches.       Objective   /70   Pulse 66   Temp 98.9 °F (37.2 °C)   Ht 5' 9.5\" (1.765 m)   Wt 82.2 kg (181 lb 3.2 oz)   SpO2 97%   BMI 26.37 kg/m²      Physical Exam  Vitals and nursing note reviewed.   Constitutional:       General: He is not in acute distress.     Appearance: He is well-developed. He is not diaphoretic.   HENT:      Head: Normocephalic and atraumatic.      Right Ear: External ear normal.      Left Ear: External ear normal.   Cardiovascular:      Rate and Rhythm: Normal rate and regular rhythm.      Pulses: no weak pulses.           Dorsalis pedis pulses are 2+ on the right side and 2+ on the left side.        Posterior tibial pulses are 2+ on the right side and 2+ on the left side.      Heart sounds: Normal heart sounds. No murmur heard.     No friction rub. No gallop.   Pulmonary:      Effort: Pulmonary effort is normal. No respiratory distress.      Breath sounds: Normal breath sounds. No stridor. No wheezing or rales.   Musculoskeletal:        Back:    Feet:      Right foot:      Skin integrity: Dry skin present. No ulcer, skin breakdown, erythema, warmth or callus.      Left foot:      Skin integrity: Dry skin present. No ulcer, skin breakdown, erythema, warmth or callus.   Skin:     Findings: No rash.   Neurological:      General: No focal deficit present.      Mental Status: He is alert.      Cranial Nerves: No cranial nerve deficit.   Psychiatric:         Mood and Affect: Mood normal.         Behavior: Behavior normal.         Thought Content: Thought content normal.         Judgment: Judgment normal.     Patient's shoes and socks removed.    Right Foot/Ankle   Right Foot Inspection  Skin Exam: skin normal, skin intact and dry skin. No warmth, no callus, no erythema, no maceration, no abnormal color, no " pre-ulcer, no ulcer and no callus.     Toe Exam: ROM and strength within normal limits. No swelling, no tenderness, erythema and  no right toe deformity    Sensory   Vibration: intact  Monofilament testing: intact    Vascular  Capillary refills: < 3 seconds  The right DP pulse is 2+. The right PT pulse is 2+.     Left Foot/Ankle  Left Foot Inspection  Skin Exam: skin normal, skin intact and dry skin. No warmth, no erythema, no maceration, normal color, no pre-ulcer, no ulcer and no callus.     Toe Exam: ROM and strength within normal limits. No swelling, no tenderness, no erythema and no left toe deformity.     Sensory   Vibration: intact  Monofilament testing: intact    Vascular  Capillary refills: < 3 seconds  The left DP pulse is 2+. The left PT pulse is 2+.     Assign Risk Category  No deformity present  No loss of protective sensation  No weak pulses  Risk: 0

## 2025-02-21 NOTE — LETTER
Diabetic Eye Exam Form    Date Requested: 25  Patient: Bree Barrios  Patient : 1945   Referring Provider: Lauren Amador MD      DIABETIC Eye Exam Date _______________________________      Type of Exam MUST be documented for Diabetic Eye Exams. Please CHECK ONE.     Retinal Exam       Dilated Retinal Exam       OCT       Optomap-Iris Exam      Fundus Photography       Left Eye - Please check Retinopathy or No Retinopathy        Exam did show retinopathy    Exam did not show retinopathy       Right Eye - Please check Retinopathy or No Retinopathy       Exam did show retinopathy    Exam did not show retinopathy       Comments __________________________________________________________    Practice Providing Exam ______________________________________________    Exam Performed By (print name) _______________________________________      Provider Signature ___________________________________________________      These reports are needed for  compliance.  Please fax this completed form and a copy of the Diabetic Eye Exam report to our office located at [address] [place] [address] as soon as possible via Fax [phone] attention [place]: Phone [phone]  We thank you for your assistance in treating our mutual patient.

## 2025-02-21 NOTE — TELEPHONE ENCOUNTER
Upon review of the In Basket request and the patient's chart, initial outreach has been made via fax to facility. Please see Contacts section for details.     Thank you  [place]en Landon MA

## 2025-02-21 NOTE — TELEPHONE ENCOUNTER
----- Message from Lauren Amador MD sent at 2/21/2025 10:56 AM EST -----  Regarding: eyes  02/21/25 10:56 AM    Hello, our patient Bree Barrios has had Diabetic Eye Exam completed/performed. Please assist in updating the patient chart by making an External outreach to [facility] facility located in [place]. The date of service is 2025 JAN APPROX.    Thank you,  Lauren Amador MD  [facility]

## 2025-02-21 NOTE — ASSESSMENT & PLAN NOTE
Blood pressure elevated after being taken off Lisinopril   Will start Amlodipine 2.5 mg.    Orders:  •  amLODIPine (NORVASC) 2.5 mg tablet; Take 1 tablet (2.5 mg total) by mouth daily

## 2025-02-21 NOTE — ASSESSMENT & PLAN NOTE
Discussed options for treatment including PT, spine/pain management. Cannot take NSAIDs due to CKD. Can try Tylenol.  He declined further treatment for now.

## 2025-02-21 NOTE — ASSESSMENT & PLAN NOTE
A1c improving on Tresiba and with CGM.  Having some hypoglycemia.   Decrease Glyburide to 2.5 mg BID, consider d/c'ing ultimately.  Continue Tresiba 15 units and continue Jardiance 25 mg  Repeat labs 3 months  Lab Results   Component Value Date    HGBA1C 7.2 (H) 02/14/2025     Orders:  •  glyBURIDE (DIABETA) 5 mg tablet; Take 0.5 tablets (2.5 mg total) by mouth 2 (two) times a day  •  Hemoglobin A1C; Future  •  Comprehensive metabolic panel; Future  •  Lipid Panel with Direct LDL reflex; Future

## 2025-02-24 NOTE — TELEPHONE ENCOUNTER
Upon review of the In Basket request we were able to locate, review, and update the patient chart as requested for Diabetic Eye Exam.    Any additional questions or concerns should be emailed to the Practice Liaisons via the appropriate education email address, please do not reply via In Basket.    Thank you  [place] Landon MA   [facility]

## 2025-04-14 ENCOUNTER — TELEPHONE (OUTPATIENT)
Dept: NEPHROLOGY | Facility: CLINIC | Age: 80
End: 2025-04-14

## 2025-04-18 ENCOUNTER — TELEPHONE (OUTPATIENT)
Age: 80
End: 2025-04-18

## 2025-04-18 NOTE — TELEPHONE ENCOUNTER
This is not something our office does. You can contact [facility], [phone], and Wendy, [phone], directly with any questions you might have.

## 2025-04-18 NOTE — TELEPHONE ENCOUNTER
Pt called stating the Insulin Pen Needles he received from [facility] are less painful than the ones he gets from [facility].  He's asking if the office can call [facility] and find out the  for the pen needles and compare that to [facility] .  He doesn't want to get them from [facility] because he as to pay a fee where [facility] he doesn't.  He said the boxes look the same but again the [facility] needles are less painful.  He said if he has to live with the needles from [facility] he will.  He just wanted to see if the office can do a little research to see what the difference is.  Pt would like a call back letting him know what the office finds.

## 2025-04-21 ENCOUNTER — APPOINTMENT (OUTPATIENT)
Dept: LAB | Facility: CLINIC | Age: 80
End: 2025-04-21
Payer: COMMERCIAL

## 2025-04-21 DIAGNOSIS — N18.32 STAGE 3B CHRONIC KIDNEY DISEASE (HCC): ICD-10-CM

## 2025-04-21 DIAGNOSIS — N18.9 CHRONIC KIDNEY DISEASE-MINERAL AND BONE DISORDER: ICD-10-CM

## 2025-04-21 DIAGNOSIS — E83.9 CHRONIC KIDNEY DISEASE-MINERAL AND BONE DISORDER: ICD-10-CM

## 2025-04-21 DIAGNOSIS — M89.9 CHRONIC KIDNEY DISEASE-MINERAL AND BONE DISORDER: ICD-10-CM

## 2025-04-21 LAB
25(OH)D3 SERPL-MCNC: 43.5 NG/ML (ref 30–100)
ANION GAP SERPL CALCULATED.3IONS-SCNC: 8 MMOL/L (ref 4–13)
BACTERIA UR QL AUTO: NORMAL /HPF
BASOPHILS # BLD AUTO: 0.06 THOUSANDS/ÂΜL (ref 0–0.1)
BASOPHILS NFR BLD AUTO: 1 % (ref 0–1)
BILIRUB UR QL STRIP: NEGATIVE
BUN SERPL-MCNC: 36 MG/DL (ref 5–25)
CALCIUM SERPL-MCNC: 9 MG/DL (ref 8.4–10.2)
CHLORIDE SERPL-SCNC: 104 MMOL/L (ref 96–108)
CLARITY UR: CLEAR
CO2 SERPL-SCNC: 28 MMOL/L (ref 21–32)
COLOR UR: ABNORMAL
CREAT SERPL-MCNC: 1.73 MG/DL (ref 0.6–1.3)
CREAT UR-MCNC: 66.8 MG/DL
EOSINOPHIL # BLD AUTO: 0.29 THOUSAND/ÂΜL (ref 0–0.61)
EOSINOPHIL NFR BLD AUTO: 5 % (ref 0–6)
ERYTHROCYTE [DISTWIDTH] IN BLOOD BY AUTOMATED COUNT: 12.4 % (ref 11.6–15.1)
GFR SERPL CREATININE-BSD FRML MDRD: 36 ML/MIN/1.73SQ M
GLUCOSE P FAST SERPL-MCNC: 126 MG/DL (ref 65–99)
GLUCOSE UR STRIP-MCNC: ABNORMAL MG/DL
HCT VFR BLD AUTO: 47.6 % (ref 36.5–49.3)
HGB BLD-MCNC: 15.6 G/DL (ref 12–17)
HGB UR QL STRIP.AUTO: NEGATIVE
IMM GRANULOCYTES # BLD AUTO: 0.02 THOUSAND/UL (ref 0–0.2)
IMM GRANULOCYTES NFR BLD AUTO: 0 % (ref 0–2)
KETONES UR STRIP-MCNC: NEGATIVE MG/DL
LEUKOCYTE ESTERASE UR QL STRIP: ABNORMAL
LYMPHOCYTES # BLD AUTO: 1.64 THOUSANDS/ÂΜL (ref 0.6–4.47)
LYMPHOCYTES NFR BLD AUTO: 26 % (ref 14–44)
MCH RBC QN AUTO: 30.4 PG (ref 26.8–34.3)
MCHC RBC AUTO-ENTMCNC: 32.8 G/DL (ref 31.4–37.4)
MCV RBC AUTO: 93 FL (ref 82–98)
MONOCYTES # BLD AUTO: 0.6 THOUSAND/ÂΜL (ref 0.17–1.22)
MONOCYTES NFR BLD AUTO: 10 % (ref 4–12)
NEUTROPHILS # BLD AUTO: 3.68 THOUSANDS/ÂΜL (ref 1.85–7.62)
NEUTS SEG NFR BLD AUTO: 58 % (ref 43–75)
NITRITE UR QL STRIP: NEGATIVE
NON-SQ EPI CELLS URNS QL MICRO: NORMAL /HPF
NRBC BLD AUTO-RTO: 0 /100 WBCS
PH UR STRIP.AUTO: 5.5 [PH]
PHOSPHATE SERPL-MCNC: 3.2 MG/DL (ref 2.3–4.1)
PLATELET # BLD AUTO: 224 THOUSANDS/UL (ref 149–390)
PMV BLD AUTO: 11 FL (ref 8.9–12.7)
POTASSIUM SERPL-SCNC: 4.4 MMOL/L (ref 3.5–5.3)
PROT UR STRIP-MCNC: ABNORMAL MG/DL
PROT UR-MCNC: 22.1 MG/DL
PROT/CREAT UR: 0.3 MG/G{CREAT}
PTH-INTACT SERPL-MCNC: 196.7 PG/ML (ref 12–88)
RBC # BLD AUTO: 5.14 MILLION/UL (ref 3.88–5.62)
RBC #/AREA URNS AUTO: NORMAL /HPF
SODIUM SERPL-SCNC: 140 MMOL/L (ref 135–147)
SP GR UR STRIP.AUTO: 1.02 (ref 1–1.03)
UROBILINOGEN UR STRIP-ACNC: <2 MG/DL
WBC # BLD AUTO: 6.29 THOUSAND/UL (ref 4.31–10.16)
WBC #/AREA URNS AUTO: NORMAL /HPF

## 2025-04-21 PROCEDURE — 84156 ASSAY OF PROTEIN URINE: CPT

## 2025-04-21 PROCEDURE — 85025 COMPLETE CBC W/AUTO DIFF WBC: CPT

## 2025-04-21 PROCEDURE — 82306 VITAMIN D 25 HYDROXY: CPT

## 2025-04-21 PROCEDURE — 82570 ASSAY OF URINE CREATININE: CPT

## 2025-04-21 PROCEDURE — 36415 COLL VENOUS BLD VENIPUNCTURE: CPT

## 2025-04-21 PROCEDURE — 81001 URINALYSIS AUTO W/SCOPE: CPT

## 2025-04-21 PROCEDURE — 84100 ASSAY OF PHOSPHORUS: CPT

## 2025-04-21 PROCEDURE — 80048 BASIC METABOLIC PNL TOTAL CA: CPT

## 2025-04-21 PROCEDURE — 83970 ASSAY OF PARATHORMONE: CPT

## 2025-04-28 ENCOUNTER — OFFICE VISIT (OUTPATIENT)
Dept: NEPHROLOGY | Facility: CLINIC | Age: 80
End: 2025-04-28
Payer: COMMERCIAL

## 2025-04-28 VITALS
WEIGHT: 180 LBS | TEMPERATURE: 97.4 F | HEART RATE: 74 BPM | OXYGEN SATURATION: 97 % | RESPIRATION RATE: 16 BRPM | HEIGHT: 70 IN | SYSTOLIC BLOOD PRESSURE: 120 MMHG | DIASTOLIC BLOOD PRESSURE: 80 MMHG | BODY MASS INDEX: 25.77 KG/M2

## 2025-04-28 DIAGNOSIS — N18.32 TYPE 2 DIABETES MELLITUS WITH STAGE 3B CHRONIC KIDNEY DISEASE, WITHOUT LONG-TERM CURRENT USE OF INSULIN (HCC): Primary | ICD-10-CM

## 2025-04-28 DIAGNOSIS — N18.32 TYPE 2 DIABETES MELLITUS WITH STAGE 3B CHRONIC KIDNEY DISEASE, WITHOUT LONG-TERM CURRENT USE OF INSULIN (HCC): ICD-10-CM

## 2025-04-28 DIAGNOSIS — E11.22 TYPE 2 DIABETES MELLITUS WITH STAGE 3B CHRONIC KIDNEY DISEASE, WITHOUT LONG-TERM CURRENT USE OF INSULIN (HCC): ICD-10-CM

## 2025-04-28 DIAGNOSIS — N18.32 STAGE 3B CHRONIC KIDNEY DISEASE (HCC): Primary | ICD-10-CM

## 2025-04-28 DIAGNOSIS — N18.9 CHRONIC KIDNEY DISEASE-MINERAL AND BONE DISORDER: ICD-10-CM

## 2025-04-28 DIAGNOSIS — M89.9 CHRONIC KIDNEY DISEASE-MINERAL AND BONE DISORDER: ICD-10-CM

## 2025-04-28 DIAGNOSIS — I10 BENIGN ESSENTIAL HYPERTENSION: ICD-10-CM

## 2025-04-28 DIAGNOSIS — E83.9 CHRONIC KIDNEY DISEASE-MINERAL AND BONE DISORDER: ICD-10-CM

## 2025-04-28 DIAGNOSIS — E11.22 TYPE 2 DIABETES MELLITUS WITH STAGE 3B CHRONIC KIDNEY DISEASE, WITHOUT LONG-TERM CURRENT USE OF INSULIN (HCC): Primary | ICD-10-CM

## 2025-04-28 PROCEDURE — 99214 OFFICE O/P EST MOD 30 MIN: CPT | Performed by: INTERNAL MEDICINE

## 2025-04-28 RX ORDER — GLYBURIDE 5 MG/1
5 TABLET ORAL 2 TIMES DAILY
Qty: 180 TABLET | Refills: 3 | OUTPATIENT
Start: 2025-04-28

## 2025-04-28 RX ORDER — GLYBURIDE 2.5 MG/1
2.5 TABLET ORAL 2 TIMES DAILY WITH MEALS
Qty: 180 TABLET | Refills: 3 | Status: SHIPPED | OUTPATIENT
Start: 2025-04-28

## 2025-04-28 NOTE — ASSESSMENT & PLAN NOTE
Lab Results   Component Value Date    HGBA1C 7.2 (H) 02/14/2025   It is getting better controlled as patient was started on insulin importance of diabetic control and effect on kidneys discussed with the patient.  Patient is also on SGLT2 inhibitor which I advised to continue

## 2025-04-28 NOTE — ASSESSMENT & PLAN NOTE
Lab Results   Component Value Date    EGFR 36 04/21/2025    EGFR 42 02/14/2025    EGFR 31 11/12/2024    CREATININE 1.73 (H) 04/21/2025    CREATININE 1.52 (H) 02/14/2025    CREATININE 1.97 (H) 11/12/2024   Patient renal function stable over some fluctuation.  Importance of hydration and avoiding nephrotoxic medicine discussed with him

## 2025-04-28 NOTE — ASSESSMENT & PLAN NOTE
Lab Results   Component Value Date    EGFR 36 04/21/2025    EGFR 42 02/14/2025    EGFR 31 11/12/2024    CREATININE 1.73 (H) 04/21/2025    CREATININE 1.52 (H) 02/14/2025    CREATININE 1.97 (H) 11/12/2024     PTH and phosphorus along with vitamin D are within acceptable range and will continue to monitor

## 2025-04-28 NOTE — PROGRESS NOTES
Name: Bree Barrios      : 1945      MRN: [id number]  Encounter Provider: Frank Moncada MD  Encounter Date: 2025   Encounter department: [facility]  :  Assessment & Plan  Stage 3b chronic kidney disease (HCC)  Lab Results   Component Value Date    EGFR 36 2025    EGFR 42 2025    EGFR 31 2024    CREATININE 1.73 (H) 2025    CREATININE 1.52 (H) 2025    CREATININE 1.97 (H) 2024   Patient renal function stable over some fluctuation.  Importance of hydration and avoiding nephrotoxic medicine discussed with him         Chronic kidney disease-mineral and bone disorder  Lab Results   Component Value Date    EGFR 36 2025    EGFR 42 2025    EGFR 31 2024    CREATININE 1.73 (H) 2025    CREATININE 1.52 (H) 2025    CREATININE 1.97 (H) 2024     PTH and phosphorus along with vitamin D are within acceptable range and will continue to monitor       Type 2 diabetes mellitus with stage 3b chronic kidney disease, without long-term current use of insulin (HCC)    Lab Results   Component Value Date    HGBA1C 7.2 (H) 2025   It is getting better controlled as patient was started on insulin importance of diabetic control and effect on kidneys discussed with the patient.  Patient is also on SGLT2 inhibitor which I advised to continue         Benign essential hypertension  Very well-controlled with ACE inhibitor which we will continue         Everything discussed at length with the patient.  I will see him back in 6 months.  Will get blood and urine test before that visit  History of Present Illness   HPI  Bree Barrios is a 79 y.o. male who presents CKD follow-up    Patient overall doing well    Feeling of tiredness is there and occasional dizziness    No chest pain no palpitation    No nausea no vomiting    Denies any urinary complaint      Review of Systems   Constitutional:  Negative for fatigue.   HENT:   "Negative for congestion.    Eyes:  Negative for photophobia and pain.   Respiratory:  Negative for chest tightness and shortness of breath.    Cardiovascular:  Negative for chest pain and palpitations.   Gastrointestinal:  Negative for abdominal distention, abdominal pain and blood in stool.   Endocrine: Negative for polydipsia.   Genitourinary:  Negative for difficulty urinating, dysuria, flank pain, hematuria and urgency.   Musculoskeletal:  Negative for arthralgias and back pain.   Skin:  Negative for rash.   Neurological:  Negative for dizziness, light-headedness and headaches.   Hematological:  Does not bruise/bleed easily.   Psychiatric/Behavioral:  Negative for behavioral problems. The patient is not nervous/anxious.           Objective   Pulse 74   Temp (!) 97.4 °F (36.3 °C) (Temporal)   Resp 16   Ht 5' 10\" (1.778 m)   Wt 81.6 kg (180 lb)   SpO2 97%   BMI 25.83 kg/m²      Physical Exam  Constitutional:       General: He is not in acute distress.     Appearance: He is well-developed.   HENT:      Head: Normocephalic.      Mouth/Throat:      Mouth: Mucous membranes are moist.   Eyes:      General: No scleral icterus.     Conjunctiva/sclera: Conjunctivae normal.   Neck:      Vascular: No JVD.   Cardiovascular:      Rate and Rhythm: Normal rate.      Heart sounds: Normal heart sounds.   Pulmonary:      Effort: Pulmonary effort is normal.      Breath sounds: No wheezing.   Abdominal:      Palpations: Abdomen is soft.      Tenderness: There is no abdominal tenderness.   Musculoskeletal:         General: Normal range of motion.      Cervical back: Neck supple.   Skin:     General: Skin is warm.      Findings: No rash.   Neurological:      Mental Status: He is alert and oriented to person, place, and time.   Psychiatric:         Behavior: Behavior normal.         "

## 2025-05-17 ENCOUNTER — APPOINTMENT (OUTPATIENT)
Dept: LAB | Facility: CLINIC | Age: 80
End: 2025-05-17
Payer: COMMERCIAL

## 2025-05-17 DIAGNOSIS — E11.22 TYPE 2 DIABETES MELLITUS WITH STAGE 3B CHRONIC KIDNEY DISEASE, WITHOUT LONG-TERM CURRENT USE OF INSULIN (HCC): ICD-10-CM

## 2025-05-17 DIAGNOSIS — N18.32 TYPE 2 DIABETES MELLITUS WITH STAGE 3B CHRONIC KIDNEY DISEASE, WITHOUT LONG-TERM CURRENT USE OF INSULIN (HCC): ICD-10-CM

## 2025-05-17 LAB
ALBUMIN SERPL BCG-MCNC: 4.4 G/DL (ref 3.5–5)
ALP SERPL-CCNC: 80 U/L (ref 34–104)
ALT SERPL W P-5'-P-CCNC: 21 U/L (ref 7–52)
ANION GAP SERPL CALCULATED.3IONS-SCNC: 10 MMOL/L (ref 4–13)
AST SERPL W P-5'-P-CCNC: 18 U/L (ref 13–39)
BILIRUB SERPL-MCNC: 0.6 MG/DL (ref 0.2–1)
BUN SERPL-MCNC: 36 MG/DL (ref 5–25)
CALCIUM SERPL-MCNC: 9 MG/DL (ref 8.4–10.2)
CHLORIDE SERPL-SCNC: 105 MMOL/L (ref 96–108)
CHOLEST SERPL-MCNC: 81 MG/DL (ref ?–200)
CO2 SERPL-SCNC: 26 MMOL/L (ref 21–32)
CREAT SERPL-MCNC: 1.56 MG/DL (ref 0.6–1.3)
EST. AVERAGE GLUCOSE BLD GHB EST-MCNC: 183 MG/DL
GFR SERPL CREATININE-BSD FRML MDRD: 41 ML/MIN/1.73SQ M
GLUCOSE P FAST SERPL-MCNC: 124 MG/DL (ref 65–99)
HBA1C MFR BLD: 8 %
HDLC SERPL-MCNC: 36 MG/DL
LDLC SERPL CALC-MCNC: 37 MG/DL (ref 0–100)
POTASSIUM SERPL-SCNC: 4.1 MMOL/L (ref 3.5–5.3)
PROT SERPL-MCNC: 6.8 G/DL (ref 6.4–8.4)
SODIUM SERPL-SCNC: 141 MMOL/L (ref 135–147)
TRIGL SERPL-MCNC: 41 MG/DL (ref ?–150)

## 2025-05-17 PROCEDURE — 36415 COLL VENOUS BLD VENIPUNCTURE: CPT

## 2025-05-17 PROCEDURE — 83036 HEMOGLOBIN GLYCOSYLATED A1C: CPT

## 2025-05-17 PROCEDURE — 80053 COMPREHEN METABOLIC PANEL: CPT

## 2025-05-17 PROCEDURE — 80061 LIPID PANEL: CPT

## 2025-05-18 ENCOUNTER — RESULTS FOLLOW-UP (OUTPATIENT)
Dept: FAMILY MEDICINE CLINIC | Facility: CLINIC | Age: 80
End: 2025-05-18

## 2025-05-23 ENCOUNTER — OFFICE VISIT (OUTPATIENT)
Dept: FAMILY MEDICINE CLINIC | Facility: CLINIC | Age: 80
End: 2025-05-23

## 2025-05-23 VITALS
DIASTOLIC BLOOD PRESSURE: 80 MMHG | HEIGHT: 70 IN | HEART RATE: 79 BPM | BODY MASS INDEX: 26.2 KG/M2 | SYSTOLIC BLOOD PRESSURE: 140 MMHG | OXYGEN SATURATION: 96 % | WEIGHT: 183 LBS | TEMPERATURE: 97.7 F

## 2025-05-23 DIAGNOSIS — E11.22 TYPE 2 DIABETES MELLITUS WITH STAGE 3B CHRONIC KIDNEY DISEASE, WITHOUT LONG-TERM CURRENT USE OF INSULIN (HCC): ICD-10-CM

## 2025-05-23 DIAGNOSIS — Z12.5 SCREENING PSA (PROSTATE SPECIFIC ANTIGEN): ICD-10-CM

## 2025-05-23 DIAGNOSIS — N18.32 STAGE 3B CHRONIC KIDNEY DISEASE (HCC): ICD-10-CM

## 2025-05-23 DIAGNOSIS — N18.32 TYPE 2 DIABETES MELLITUS WITH STAGE 3B CHRONIC KIDNEY DISEASE, WITHOUT LONG-TERM CURRENT USE OF INSULIN (HCC): ICD-10-CM

## 2025-05-23 DIAGNOSIS — N18.32 TYPE 2 DIABETES MELLITUS WITH STAGE 3B CHRONIC KIDNEY DISEASE, WITHOUT LONG-TERM CURRENT USE OF INSULIN (HCC): Primary | ICD-10-CM

## 2025-05-23 DIAGNOSIS — E11.22 TYPE 2 DIABETES MELLITUS WITH STAGE 3B CHRONIC KIDNEY DISEASE, WITHOUT LONG-TERM CURRENT USE OF INSULIN (HCC): Primary | ICD-10-CM

## 2025-05-23 PROBLEM — R10.31 RIGHT LOWER QUADRANT ABDOMINAL PAIN: Status: RESOLVED | Noted: 2019-08-22 | Resolved: 2025-05-23

## 2025-05-23 NOTE — ASSESSMENT & PLAN NOTE
On Jardiance, Glyburide and Tresiba  Discussed increasing insulin - declined. He would like to work on diet.   Advised low carb diet, increase exercise     Lab Results   Component Value Date    HGBA1C 8.0 (H) 05/17/2025       Orders:  •  Hemoglobin A1C; Future

## 2025-05-23 NOTE — ASSESSMENT & PLAN NOTE
Lab Results   Component Value Date    EGFR 41 05/17/2025    EGFR 36 04/21/2025    EGFR 42 02/14/2025    CREATININE 1.56 (H) 05/17/2025    CREATININE 1.73 (H) 04/21/2025    CREATININE 1.52 (H) 02/14/2025   CKD stable.  Monitor labs.

## 2025-05-25 RX ORDER — EMPAGLIFLOZIN 25 MG/1
25 TABLET, FILM COATED ORAL DAILY
Qty: 90 TABLET | Refills: 0 | Status: SHIPPED | OUTPATIENT
Start: 2025-05-25

## 2025-07-03 DIAGNOSIS — I63.9 ACUTE CVA (CEREBROVASCULAR ACCIDENT) (HCC): ICD-10-CM

## 2025-07-06 RX ORDER — ATORVASTATIN CALCIUM 80 MG/1
TABLET, FILM COATED ORAL
Qty: 90 TABLET | Refills: 0 | Status: SHIPPED | OUTPATIENT
Start: 2025-07-06

## 2025-07-07 ENCOUNTER — TELEPHONE (OUTPATIENT)
Dept: FAMILY MEDICINE CLINIC | Facility: CLINIC | Age: 80
End: 2025-07-07

## 2025-07-07 NOTE — TELEPHONE ENCOUNTER
Patient called requesting refill for Atorvastatin 80mg. Patient made aware medication was refilled on 7/6/25 for 90 with 0 refills to [facility] pharmacy. Patient instructed to contact the pharmacy and speak with someone directly to obtain refills of medication. Patient advised to call back for refill if their pharmacy is unable to assist them. Patient verbalized understanding.

## 2025-07-09 DIAGNOSIS — I10 BENIGN ESSENTIAL HYPERTENSION: ICD-10-CM

## 2025-07-10 ENCOUNTER — VBI (OUTPATIENT)
Dept: ADMINISTRATIVE | Facility: OTHER | Age: 80
End: 2025-07-10

## 2025-07-10 LAB
LEFT EYE DIABETIC RETINOPATHY: NORMAL
RIGHT EYE DIABETIC RETINOPATHY: NORMAL

## 2025-07-10 RX ORDER — AMLODIPINE BESYLATE 2.5 MG/1
2.5 TABLET ORAL DAILY
Qty: 90 TABLET | Refills: 1 | Status: SHIPPED | OUTPATIENT
Start: 2025-07-10

## 2025-07-10 NOTE — TELEPHONE ENCOUNTER
Upon review of the In Basket request we were able to locate, review, and update the patient chart as requested for Diabetic Eye Exam.    Any additional questions or concerns should be emailed to the Practice Liaisons via the appropriate education email address, please do not reply via In Basket.    Thank you  Hillary Bauer MA   [facility]

## 2025-08-18 ENCOUNTER — APPOINTMENT (OUTPATIENT)
Dept: LAB | Facility: CLINIC | Age: 80
End: 2025-08-18
Payer: COMMERCIAL

## 2025-08-18 DIAGNOSIS — E11.22 TYPE 2 DIABETES MELLITUS WITH STAGE 3B CHRONIC KIDNEY DISEASE, WITHOUT LONG-TERM CURRENT USE OF INSULIN (HCC): ICD-10-CM

## 2025-08-18 DIAGNOSIS — Z12.5 SCREENING PSA (PROSTATE SPECIFIC ANTIGEN): ICD-10-CM

## 2025-08-18 DIAGNOSIS — N18.32 TYPE 2 DIABETES MELLITUS WITH STAGE 3B CHRONIC KIDNEY DISEASE, WITHOUT LONG-TERM CURRENT USE OF INSULIN (HCC): ICD-10-CM

## 2025-08-18 LAB
EST. AVERAGE GLUCOSE BLD GHB EST-MCNC: 171 MG/DL
HBA1C MFR BLD: 7.6 %

## 2025-08-18 PROCEDURE — 83036 HEMOGLOBIN GLYCOSYLATED A1C: CPT

## 2025-08-18 PROCEDURE — G0103 PSA SCREENING: HCPCS

## 2025-08-18 PROCEDURE — 36415 COLL VENOUS BLD VENIPUNCTURE: CPT

## 2025-08-19 LAB — PSA SERPL-MCNC: 2.57 NG/ML (ref 0–4)

## 2025-08-22 ENCOUNTER — OFFICE VISIT (OUTPATIENT)
Dept: FAMILY MEDICINE CLINIC | Facility: CLINIC | Age: 80
End: 2025-08-22
Payer: COMMERCIAL

## 2025-08-22 VITALS
BODY MASS INDEX: 27 KG/M2 | HEART RATE: 63 BPM | TEMPERATURE: 97.9 F | WEIGHT: 188.6 LBS | DIASTOLIC BLOOD PRESSURE: 84 MMHG | HEIGHT: 70 IN | SYSTOLIC BLOOD PRESSURE: 166 MMHG | OXYGEN SATURATION: 96 %

## 2025-08-22 DIAGNOSIS — J45.909 MODERATE ASTHMA WITHOUT COMPLICATION, UNSPECIFIED WHETHER PERSISTENT: ICD-10-CM

## 2025-08-22 DIAGNOSIS — N18.32 TYPE 2 DIABETES MELLITUS WITH STAGE 3B CHRONIC KIDNEY DISEASE, WITHOUT LONG-TERM CURRENT USE OF INSULIN (HCC): Primary | ICD-10-CM

## 2025-08-22 DIAGNOSIS — E11.22 TYPE 2 DIABETES MELLITUS WITH STAGE 3B CHRONIC KIDNEY DISEASE, WITHOUT LONG-TERM CURRENT USE OF INSULIN (HCC): Primary | ICD-10-CM

## 2025-08-22 DIAGNOSIS — Z00.00 MEDICARE ANNUAL WELLNESS VISIT, SUBSEQUENT: ICD-10-CM

## 2025-08-22 DIAGNOSIS — I10 BENIGN ESSENTIAL HYPERTENSION: ICD-10-CM

## 2025-08-22 PROCEDURE — G2211 COMPLEX E/M VISIT ADD ON: HCPCS | Performed by: FAMILY MEDICINE

## 2025-08-22 PROCEDURE — G0439 PPPS, SUBSEQ VISIT: HCPCS | Performed by: FAMILY MEDICINE

## 2025-08-22 PROCEDURE — 99214 OFFICE O/P EST MOD 30 MIN: CPT | Performed by: FAMILY MEDICINE

## 2025-08-22 RX ORDER — ALBUTEROL SULFATE 90 UG/1
2 INHALANT RESPIRATORY (INHALATION) EVERY 4 HOURS PRN
Qty: 8.5 G | Refills: 1 | Status: SHIPPED | OUTPATIENT
Start: 2025-08-22

## 2025-08-22 RX ORDER — AMLODIPINE BESYLATE 5 MG/1
5 TABLET ORAL DAILY
Qty: 90 TABLET | Refills: 3 | Status: SHIPPED | OUTPATIENT
Start: 2025-08-22

## (undated) DEVICE — PLUMEPEN PRO 10FT

## (undated) DEVICE — SUT FIBERLOOP 4-0 3/8 CIRCLE TPR 12IN AR-7229-12

## (undated) DEVICE — SUT ETHILON 4-0 PS-2 18 IN 1667H

## (undated) DEVICE — SPONGE SCRUB 4 PCT CHLORHEXIDINE

## (undated) DEVICE — PAD GROUNDING ADULT

## (undated) DEVICE — GAUZE SPONGES,USP TYPE VII GAUZE, 12 PLY: Brand: CURITY

## (undated) DEVICE — CURITY NON-ADHERENT STRIPS: Brand: CURITY

## (undated) DEVICE — SCD SEQUENTIAL COMPRESSION COMFORT SLEEVE MEDIUM KNEE LENGTH: Brand: KENDALL SCD

## (undated) DEVICE — NEEDLE 25G X 1 1/2

## (undated) DEVICE — LIGHT HANDLE COVER SLEEVE DISP BLUE STELLAR

## (undated) DEVICE — 4-PORT MANIFOLD: Brand: NEPTUNE 2

## (undated) DEVICE — SUT VICRYL 3-0 SH 27 IN J416H

## (undated) DEVICE — 3M™ TEGADERM™ TRANSPARENT FILM DRESSING FRAME STYLE, 1626W, 4 IN X 4-3/4 IN (10 CM X 12 CM), 50/CT 4CT/CASE: Brand: 3M™ TEGADERM™

## (undated) DEVICE — 3M™ STERI-STRIP™ REINFORCED ADHESIVE SKIN CLOSURES, R1546, 1/4 IN X 4 IN (6 MM X 100 MM), 10 STRIPS/ENVELOPE: Brand: 3M™ STERI-STRIP™

## (undated) DEVICE — TUBING SUCTION 5MM X 12 FT

## (undated) DEVICE — INTENDED FOR TISSUE SEPARATION, AND OTHER PROCEDURES THAT REQUIRE A SHARP SURGICAL BLADE TO PUNCTURE OR CUT.: Brand: BARD-PARKER SAFETY BLADES SIZE 15, STERILE

## (undated) DEVICE — STRETCH BANDAGE: Brand: CURITY

## (undated) DEVICE — GLOVE SRG BIOGEL 8

## (undated) DEVICE — INTENDED FOR TISSUE SEPARATION, AND OTHER PROCEDURES THAT REQUIRE A SHARP SURGICAL BLADE TO PUNCTURE OR CUT.: Brand: BARD-PARKER ® CARBON RIB-BACK BLADES

## (undated) DEVICE — SUT ETHILON 3-0 PS-1 18 IN 1663H

## (undated) DEVICE — BANDAGE, ESMARK LF STR 6"X9' (20/CS): Brand: CYPRESS

## (undated) DEVICE — DRAPE EQUIPMENT RF WAND

## (undated) DEVICE — POOLE SUCTION HANDLE: Brand: CARDINAL HEALTH

## (undated) DEVICE — SUT PROLENE 6-0 BV-1/BV-1 24 IN 8805H

## (undated) DEVICE — ACE WRAP 4 IN UNSTERILE

## (undated) DEVICE — BETHLEHEM UNIVERSAL MINOR GEN: Brand: CARDINAL HEALTH

## (undated) DEVICE — SUT PROLENE 2-0 CT-2 30 IN 8411H

## (undated) DEVICE — DISPOSABLE EQUIPMENT COVER: Brand: SMALL TOWEL DRAPE

## (undated) DEVICE — GAUZE SPONGES,16 PLY: Brand: CURITY

## (undated) DEVICE — SUT VICRYL 2-0 SH 27 IN UNDYED J417H

## (undated) DEVICE — GLOVE SRG BIOGEL 7

## (undated) DEVICE — SUT MONOCRYL 4-0 PS-2 18 IN Y496G

## (undated) DEVICE — SUT ETHIBOND 3-0 RB-1 30 IN X872H

## (undated) DEVICE — SPLINT 4 X 15 IN FAST SET PLASTER

## (undated) DEVICE — CHLORAPREP HI-LITE 26ML ORANGE

## (undated) DEVICE — ALUMI-HAND POSITIONER LG

## (undated) DEVICE — SPONGE CHERRY 1/2IN